# Patient Record
Sex: FEMALE | Race: WHITE | Employment: OTHER
[De-identification: names, ages, dates, MRNs, and addresses within clinical notes are randomized per-mention and may not be internally consistent; named-entity substitution may affect disease eponyms.]

---

## 2017-05-16 ENCOUNTER — HOSPITAL ENCOUNTER (OUTPATIENT)
Dept: PHYSICAL THERAPY | Facility: CLINIC | Age: 62
Setting detail: THERAPIES SERIES
Discharge: HOME | End: 2017-05-16
Payer: COMMERCIAL

## 2017-05-16 PROCEDURE — 97110 THERAPEUTIC EXERCISES: CPT

## 2017-05-16 PROCEDURE — 97760 ORTHOTIC MGMT&TRAING 1ST ENC: CPT

## 2017-05-16 PROCEDURE — 97124 MASSAGE THERAPY: CPT

## 2017-05-16 PROCEDURE — 97161 PT EVAL LOW COMPLEX 20 MIN: CPT

## 2017-05-18 ENCOUNTER — HOSPITAL ENCOUNTER (OUTPATIENT)
Dept: PHYSICAL THERAPY | Facility: CLINIC | Age: 62
Setting detail: THERAPIES SERIES
Discharge: HOME | End: 2017-05-18
Payer: COMMERCIAL

## 2017-05-18 PROCEDURE — 97110 THERAPEUTIC EXERCISES: CPT

## 2017-05-18 PROCEDURE — 97124 MASSAGE THERAPY: CPT

## 2017-05-22 ENCOUNTER — HOSPITAL ENCOUNTER (OUTPATIENT)
Dept: PHYSICAL THERAPY | Facility: CLINIC | Age: 62
Setting detail: THERAPIES SERIES
Discharge: HOME | End: 2017-05-22
Payer: COMMERCIAL

## 2017-05-22 PROCEDURE — 97124 MASSAGE THERAPY: CPT

## 2017-05-22 PROCEDURE — 97110 THERAPEUTIC EXERCISES: CPT

## 2017-06-06 ENCOUNTER — HOSPITAL ENCOUNTER (OUTPATIENT)
Dept: PHYSICAL THERAPY | Facility: CLINIC | Age: 62
Setting detail: THERAPIES SERIES
Discharge: HOME | End: 2017-06-06
Payer: COMMERCIAL

## 2017-06-06 PROCEDURE — 97124 MASSAGE THERAPY: CPT

## 2017-06-06 PROCEDURE — 97110 THERAPEUTIC EXERCISES: CPT

## 2017-06-08 ENCOUNTER — HOSPITAL ENCOUNTER (OUTPATIENT)
Dept: PHYSICAL THERAPY | Facility: CLINIC | Age: 62
Setting detail: THERAPIES SERIES
Discharge: HOME | End: 2017-06-08
Payer: COMMERCIAL

## 2017-06-08 PROCEDURE — 97110 THERAPEUTIC EXERCISES: CPT

## 2017-06-08 PROCEDURE — 97124 MASSAGE THERAPY: CPT

## 2017-06-13 ENCOUNTER — HOSPITAL ENCOUNTER (OUTPATIENT)
Dept: PHYSICAL THERAPY | Facility: CLINIC | Age: 62
Setting detail: THERAPIES SERIES
Discharge: HOME | End: 2017-06-13
Payer: COMMERCIAL

## 2017-06-13 PROCEDURE — 97110 THERAPEUTIC EXERCISES: CPT

## 2017-06-13 PROCEDURE — 97124 MASSAGE THERAPY: CPT

## 2017-06-15 ENCOUNTER — HOSPITAL ENCOUNTER (OUTPATIENT)
Age: 62
Setting detail: SPECIMEN
Discharge: HOME | End: 2017-06-15
Payer: COMMERCIAL

## 2017-06-19 ENCOUNTER — HOSPITAL ENCOUNTER (OUTPATIENT)
Dept: PHYSICAL THERAPY | Facility: CLINIC | Age: 62
Setting detail: THERAPIES SERIES
Discharge: HOME | End: 2017-06-19
Payer: COMMERCIAL

## 2017-06-19 LAB — DERMATOLOGY PATHOLOGY REPORT: NORMAL

## 2017-06-19 PROCEDURE — 97110 THERAPEUTIC EXERCISES: CPT

## 2017-06-19 PROCEDURE — 97124 MASSAGE THERAPY: CPT

## 2017-06-22 ENCOUNTER — HOSPITAL ENCOUNTER (OUTPATIENT)
Dept: PHYSICAL THERAPY | Facility: CLINIC | Age: 62
Setting detail: THERAPIES SERIES
Discharge: HOME | End: 2017-06-22
Payer: COMMERCIAL

## 2017-06-22 PROBLEM — L57.0 ACTINIC KERATOSIS: Status: ACTIVE | Noted: 2017-06-22

## 2017-06-22 PROBLEM — C44.01 BASAL CELL CARCINOMA OF UPPER LIP: Status: ACTIVE | Noted: 2017-06-22

## 2017-06-22 PROCEDURE — 97110 THERAPEUTIC EXERCISES: CPT

## 2017-06-22 PROCEDURE — 97124 MASSAGE THERAPY: CPT

## 2017-06-26 ENCOUNTER — HOSPITAL ENCOUNTER (OUTPATIENT)
Dept: PHYSICAL THERAPY | Facility: CLINIC | Age: 62
Setting detail: THERAPIES SERIES
End: 2017-06-26
Payer: COMMERCIAL

## 2017-07-20 ENCOUNTER — OFFICE VISIT (OUTPATIENT)
Dept: OBGYN CLINIC | Age: 62
End: 2017-07-20
Payer: COMMERCIAL

## 2017-07-20 ENCOUNTER — HOSPITAL ENCOUNTER (OUTPATIENT)
Age: 62
Setting detail: SPECIMEN
Discharge: HOME | End: 2017-07-20
Payer: COMMERCIAL

## 2017-07-20 ENCOUNTER — HOSPITAL ENCOUNTER (OUTPATIENT)
Dept: PHYSICAL THERAPY | Facility: CLINIC | Age: 62
Setting detail: THERAPIES SERIES
Discharge: HOME | End: 2017-07-20
Payer: COMMERCIAL

## 2017-07-20 DIAGNOSIS — Z01.419 ENCOUNTER FOR ROUTINE GYNECOLOGICAL EXAMINATION WITH PAPANICOLAOU SMEAR OF CERVIX: Primary | ICD-10-CM

## 2017-07-20 DIAGNOSIS — Z12.31 ENCOUNTER FOR SCREENING MAMMOGRAM FOR BREAST CANCER: ICD-10-CM

## 2017-07-20 PROCEDURE — 97110 THERAPEUTIC EXERCISES: CPT

## 2017-07-20 PROCEDURE — 97124 MASSAGE THERAPY: CPT

## 2017-07-20 PROCEDURE — 99396 PREV VISIT EST AGE 40-64: CPT | Performed by: OBSTETRICS & GYNECOLOGY

## 2017-07-24 RX ORDER — TRIAMCINOLONE ACETONIDE 5 MG/G
CREAM TOPICAL 2 TIMES DAILY
Qty: 1 TUBE | Refills: 1 | Status: SHIPPED | OUTPATIENT
Start: 2017-07-24 | End: 2017-08-31 | Stop reason: ALTCHOICE

## 2017-07-25 ENCOUNTER — HOSPITAL ENCOUNTER (OUTPATIENT)
Dept: PHYSICAL THERAPY | Facility: CLINIC | Age: 62
Setting detail: THERAPIES SERIES
Discharge: HOME | End: 2017-07-25
Payer: COMMERCIAL

## 2017-07-27 ENCOUNTER — HOSPITAL ENCOUNTER (OUTPATIENT)
Dept: PHYSICAL THERAPY | Facility: CLINIC | Age: 62
Setting detail: THERAPIES SERIES
Discharge: HOME | End: 2017-07-27
Payer: COMMERCIAL

## 2017-07-27 PROCEDURE — 97110 THERAPEUTIC EXERCISES: CPT

## 2017-07-28 LAB — CYTOLOGY REPORT: NORMAL

## 2017-07-31 ENCOUNTER — HOSPITAL ENCOUNTER (OUTPATIENT)
Dept: WOMENS IMAGING | Age: 62
Discharge: HOME | End: 2017-07-31
Payer: COMMERCIAL

## 2017-07-31 ENCOUNTER — HOSPITAL ENCOUNTER (OUTPATIENT)
Dept: PHYSICAL THERAPY | Facility: CLINIC | Age: 62
Setting detail: THERAPIES SERIES
End: 2017-07-31
Payer: COMMERCIAL

## 2017-07-31 VITALS
DIASTOLIC BLOOD PRESSURE: 94 MMHG | HEART RATE: 60 BPM | BODY MASS INDEX: 24.45 KG/M2 | SYSTOLIC BLOOD PRESSURE: 124 MMHG | WEIGHT: 138 LBS

## 2017-07-31 DIAGNOSIS — Z12.31 ENCOUNTER FOR SCREENING MAMMOGRAM FOR BREAST CANCER: ICD-10-CM

## 2017-07-31 PROCEDURE — 77063 BREAST TOMOSYNTHESIS BI: CPT

## 2017-08-31 ENCOUNTER — OFFICE VISIT (OUTPATIENT)
Dept: OBGYN CLINIC | Age: 62
End: 2017-08-31
Payer: COMMERCIAL

## 2017-08-31 VITALS
SYSTOLIC BLOOD PRESSURE: 130 MMHG | HEIGHT: 63 IN | BODY MASS INDEX: 23.04 KG/M2 | WEIGHT: 130 LBS | DIASTOLIC BLOOD PRESSURE: 86 MMHG | HEART RATE: 66 BPM

## 2017-08-31 DIAGNOSIS — L90.0 LICHEN SCLEROSUS: ICD-10-CM

## 2017-08-31 PROCEDURE — 99213 OFFICE O/P EST LOW 20 MIN: CPT | Performed by: OBSTETRICS & GYNECOLOGY

## 2017-08-31 RX ORDER — NYSTATIN 100000 U/G
CREAM TOPICAL
Qty: 1 TUBE | Refills: 2 | Status: SHIPPED | OUTPATIENT
Start: 2017-08-31 | End: 2019-06-06

## 2017-09-20 ENCOUNTER — OFFICE VISIT (OUTPATIENT)
Dept: OBGYN CLINIC | Age: 62
End: 2017-09-20
Payer: COMMERCIAL

## 2017-09-20 VITALS
RESPIRATION RATE: 16 BRPM | HEART RATE: 76 BPM | BODY MASS INDEX: 23.05 KG/M2 | SYSTOLIC BLOOD PRESSURE: 133 MMHG | DIASTOLIC BLOOD PRESSURE: 88 MMHG | WEIGHT: 135 LBS | OXYGEN SATURATION: 100 % | HEIGHT: 64 IN

## 2017-09-20 DIAGNOSIS — L90.0 LICHEN SCLEROSUS: Primary | ICD-10-CM

## 2017-09-20 PROCEDURE — 99213 OFFICE O/P EST LOW 20 MIN: CPT | Performed by: OBSTETRICS & GYNECOLOGY

## 2017-09-20 RX ORDER — CLOBETASOL PROPIONATE 0.5 MG/G
CREAM TOPICAL
Qty: 1 TUBE | Refills: 1 | Status: SHIPPED | OUTPATIENT
Start: 2017-09-20 | End: 2017-12-13 | Stop reason: SDUPTHER

## 2017-10-19 ENCOUNTER — OFFICE VISIT (OUTPATIENT)
Dept: OBGYN CLINIC | Age: 62
End: 2017-10-19
Payer: COMMERCIAL

## 2017-10-19 VITALS
DIASTOLIC BLOOD PRESSURE: 84 MMHG | WEIGHT: 140 LBS | HEART RATE: 72 BPM | OXYGEN SATURATION: 100 % | BODY MASS INDEX: 24.8 KG/M2 | RESPIRATION RATE: 16 BRPM | HEIGHT: 63 IN | SYSTOLIC BLOOD PRESSURE: 134 MMHG

## 2017-10-19 DIAGNOSIS — L90.0 LICHEN SCLEROSUS: Primary | ICD-10-CM

## 2017-10-19 PROCEDURE — 99212 OFFICE O/P EST SF 10 MIN: CPT | Performed by: OBSTETRICS & GYNECOLOGY

## 2017-10-19 NOTE — PROGRESS NOTES
The patient was seen today. She is here regarding response to medication . Her bowels are regular and she is voiding without difficulty. HPI: 54yo with vulvar itching and skin disruption, using Temovate as directed twice daily, reports 50% improvement. Past Medical History:   Diagnosis Date    Allergy to food dye     yellow and red dyes    Anemia     Asthma     Cancer ([facility] [address]     Diverticulitis     Gastroparesis     Hiatal hernia     History of squamous cell carcinoma in situ of skin 6/9/2014    left wrist    IBS (irritable bowel syndrome)     diarrhea-prominent type    MVP (mitral valve prolapse)     Neoplasm of unspecified nature of bone, soft tissue, and skin 5/20/2014    New lesions of left arm, right arm, and right thigh.  Neoplasm of unspecified nature of bone, soft tissue, and skin     New lesions of right dorsum of hand (healed after Efudex) and lesion of right anterior thigh.     Osteoarthritis     Osteoporosis     beginning     Squamous cell carcinoma in situ 6/9/2014    left wrist    Squamous cell carcinoma in situ of skin of left wrist 6/9/2014    left wrist     Past Surgical History:   Procedure Laterality Date    CHOLECYSTECTOMY  2011    ELBOW SURGERY      Tendonitis X2    HERNIA REPAIR  2005    hiatal hernia repair    HYSTERECTOMY  92-97    [facility] - 3 surgeries    KNEE SURGERY Right 1996    MALIGNANT SKIN LESION EXCISION  2011    rt elbow-scc, rt shoulder-keratosis, lft leg skin with excoriation    NASAL POLYP SURGERY  2012    X2 in [address]    pheregenal flap    SKIN BIOPSY  2012    lft leg keratosis    SKIN BIOPSY  2009    rt forearm, under brst, lft leg-keratosis    SKIN BIOPSY  2008    back and rt forearm-keratosis, abdomen-hemangioma    SKIN BIOPSY  2002    rt breast, lft arm, lft brst-keratosis    THYROIDECTOMY  1985    Thyroid Cancer    TONSILLECTOMY AND ADENOIDECTOMY       REVIEW OF SYSTEMS:        A minimum of an eleven point review of systems was completed and found to be negative except for the pertinent positives found below. Constitutional: No fever, chills or malaise; No weight change or fatigue  Head and Eyes: No vision, Headache, Dizziness or trauma in last 12 months  ENT ROS: No hearing, Tinnitis, sinus or taste problems  Hematological and Lymphatic ROS:No Lymphoma, Von Willebrand's, Hemophillia or Bleeding History  Psych ROS: No Depression, Homicidal thoughts,suicidal thoughts, or anxiety  Breast ROS: No prior breast abnormalities or lumps  Respiratory ROS: No SOB, Pneumoniae,Cough, or Pulmonary Embolism History  Cardiovascular ROS: No Chest Pain with Exertion, Palpitations, Syncope, Edema, Arrhythmia  Gastrointestinal ROS: No Indigestion, Heartburn, Nausea, vomiting, Diahrea, Constipation,or Bowel Changes; No Bloody Stools or melena  Genito-Urinary ROS: No Dysuria, Hematuria or Nocturia. No Urinary Incontinence or Vaginal Discharge  Musculoskeletal ROS: No Arthralgia, Arthritis,Gout,Osteoporosis or Rheumatism  Neurological ROS: No CVA, Migraines, Epilepsy, Seizure Hx, or Limb Weakness  Dermatological ROS: No Rash, Itching, Hives, Mole Changes or Cancer                                            Blood pressure 134/84, pulse 72, resp. rate 16, height 5' 3\" (1.6 m), weight 140 lb (63.5 kg), SpO2 100 %, not currently breastfeeding. Abdomen: Soft non-tender; good bowel sounds. No guarding, rebound or rigidity. No CVA tenderness bilaterally. Extremities: No calf tenderness, DTR 2/4, and No edema bilaterally    Pelvic: External genitalia: periclitoral and posterior fourchette thickening and white tissue improved and minimally present today    Assessment:  Lichen sclerosus    PLAN:  Twice daily temovate to continue additional 4 weeks, then daily for four weeks  Return to the office in 8 weeks. Counseled on preventative health maintenance follow-up. Patient was seen with total face to face time of 10 minutes.  More than 50% of this visit was counseling and education regarding There were no encounter diagnoses. and 1 Month Follow-Up (Temovate trial, POSSIBLE BIOPSY, improving )   as well as  counseling on preventative health maintenance follow-up.

## 2017-12-13 ENCOUNTER — OFFICE VISIT (OUTPATIENT)
Dept: OBGYN CLINIC | Age: 62
End: 2017-12-13
Payer: COMMERCIAL

## 2017-12-13 VITALS
HEIGHT: 63 IN | HEART RATE: 68 BPM | DIASTOLIC BLOOD PRESSURE: 89 MMHG | SYSTOLIC BLOOD PRESSURE: 133 MMHG | BODY MASS INDEX: 23.04 KG/M2 | WEIGHT: 130 LBS

## 2017-12-13 DIAGNOSIS — L90.0 LICHEN SCLEROSUS: Primary | ICD-10-CM

## 2017-12-13 PROCEDURE — 99213 OFFICE O/P EST LOW 20 MIN: CPT | Performed by: OBSTETRICS & GYNECOLOGY

## 2017-12-13 RX ORDER — CLOBETASOL PROPIONATE 0.5 MG/G
CREAM TOPICAL
Qty: 1 TUBE | Refills: 1 | Status: SHIPPED | OUTPATIENT
Start: 2017-12-13 | End: 2018-06-13 | Stop reason: SDUPTHER

## 2017-12-14 NOTE — PROGRESS NOTES
completed and found to be negative except for the pertinent positives found below. Constitutional: No fever, chills or malaise; No weight change or fatigue  Head and Eyes: No vision, Headache, Dizziness or trauma in last 12 months  ENT ROS: No hearing, Tinnitis, sinus or taste problems  Hematological and Lymphatic ROS:No Lymphoma, Von Willebrand's, Hemophillia or Bleeding History  Psych ROS: No Depression, Homicidal thoughts,suicidal thoughts, or anxiety  Breast ROS: No prior breast abnormalities or lumps  Respiratory ROS: No SOB, Pneumoniae,Cough, or Pulmonary Embolism History  Cardiovascular ROS: No Chest Pain with Exertion, Palpitations, Syncope, Edema, Arrhythmia  Gastrointestinal ROS: No Indigestion, Heartburn, Nausea, vomiting, Diahrea, Constipation,or Bowel Changes; No Bloody Stools or melena  Genito-Urinary ROS: No Dysuria, Hematuria or Nocturia. No Urinary Incontinence or Vaginal Discharge  Musculoskeletal ROS: No Arthralgia, Arthritis,Gout,Osteoporosis or Rheumatism  Neurological ROS: No CVA, Migraines, Epilepsy, Seizure Hx, or Limb Weakness  Dermatological ROS: No Rash, Itching, Hives, Mole Changes or Cancer                                            Blood pressure 133/89, pulse 68, height 5' 3\" (1.6 m), weight 130 lb (59 kg), not currently breastfeeding. Abdomen: Soft non-tender; good bowel sounds. No guarding, rebound or rigidity. No CVA tenderness bilaterally. Extremities: No calf tenderness, DTR 2/4, and No edema bilaterally    Pelvic: External genitalia: normal general appearance periclitoral area healed, posterior fourchette with white tissue but thinner in depth. Assessment:  Lichen sclerosus    PLAN:  Continue temovate daily for 12 weeks then recheck. Reassurance about concerns over prolapse which is not present. Return to the office in 12 weeks. Counseled on preventative health maintenance follow-up. Patient was seen with total face to face time of 15 minutes.  More than 50% of this visit was counseling and education regarding There were no encounter diagnoses. and Medication Check (Feels like the medication along with cornstarch has been working well)   as well as  counseling on preventative health maintenance follow-up.

## 2018-02-19 ENCOUNTER — HOSPITAL ENCOUNTER (OUTPATIENT)
Dept: MRI IMAGING | Age: 63
Discharge: HOME | End: 2018-02-21
Payer: COMMERCIAL

## 2018-02-19 DIAGNOSIS — H90.A31 MIXED CONDUCTIVE AND SENSORINEURAL HEARING LOSS, UNILATERAL, RIGHT EAR WITH RESTRICTED HEARING ON THE CONTRALATERAL SIDE: ICD-10-CM

## 2018-02-19 DIAGNOSIS — H90.A22 SENSORINEURAL HEARING LOSS, UNILATERAL, LEFT EAR, WITH RESTRICTED HEARING ON THE CONTRALATERAL SIDE: ICD-10-CM

## 2018-02-19 PROCEDURE — 70551 MRI BRAIN STEM W/O DYE: CPT

## 2018-03-14 ENCOUNTER — OFFICE VISIT (OUTPATIENT)
Dept: OBGYN CLINIC | Age: 63
End: 2018-03-14
Payer: COMMERCIAL

## 2018-03-14 VITALS
HEART RATE: 71 BPM | DIASTOLIC BLOOD PRESSURE: 84 MMHG | BODY MASS INDEX: 22.2 KG/M2 | SYSTOLIC BLOOD PRESSURE: 154 MMHG | WEIGHT: 130 LBS | HEIGHT: 64 IN

## 2018-03-14 DIAGNOSIS — L90.0 LICHEN SCLEROSUS: Primary | ICD-10-CM

## 2018-03-14 PROCEDURE — 99212 OFFICE O/P EST SF 10 MIN: CPT | Performed by: OBSTETRICS & GYNECOLOGY

## 2018-06-13 ENCOUNTER — OFFICE VISIT (OUTPATIENT)
Dept: OBGYN CLINIC | Age: 63
End: 2018-06-13
Payer: COMMERCIAL

## 2018-06-13 VITALS
HEIGHT: 63 IN | SYSTOLIC BLOOD PRESSURE: 147 MMHG | BODY MASS INDEX: 22.89 KG/M2 | DIASTOLIC BLOOD PRESSURE: 91 MMHG | WEIGHT: 129.2 LBS | HEART RATE: 63 BPM

## 2018-06-13 DIAGNOSIS — Z12.39 BREAST SCREENING: Primary | ICD-10-CM

## 2018-06-13 DIAGNOSIS — N90.4 VULVAR DYSTROPHY: ICD-10-CM

## 2018-06-13 PROCEDURE — 99212 OFFICE O/P EST SF 10 MIN: CPT | Performed by: OBSTETRICS & GYNECOLOGY

## 2018-06-13 RX ORDER — CLOBETASOL PROPIONATE 0.5 MG/G
CREAM TOPICAL
Qty: 1 TUBE | Refills: 1 | Status: SHIPPED | OUTPATIENT
Start: 2018-06-13 | End: 2018-08-23

## 2018-06-14 PROBLEM — N90.4 VULVAR DYSTROPHY: Status: ACTIVE | Noted: 2018-06-14

## 2018-08-02 ENCOUNTER — TELEPHONE (OUTPATIENT)
Dept: INFUSION THERAPY | Age: 63
End: 2018-08-02

## 2018-08-15 ENCOUNTER — HOSPITAL ENCOUNTER (OUTPATIENT)
Dept: WOMENS IMAGING | Age: 63
Discharge: HOME | End: 2018-08-17
Payer: COMMERCIAL

## 2018-08-15 DIAGNOSIS — Z12.39 BREAST SCREENING: ICD-10-CM

## 2018-08-15 PROCEDURE — 77067 SCR MAMMO BI INCL CAD: CPT

## 2018-08-27 ENCOUNTER — TELEPHONE (OUTPATIENT)
Dept: ONCOLOGY | Age: 63
End: 2018-08-27

## 2018-08-28 ENCOUNTER — TELEPHONE (OUTPATIENT)
Dept: ONCOLOGY | Age: 63
End: 2018-08-28

## 2018-09-04 ENCOUNTER — OFFICE VISIT (OUTPATIENT)
Dept: OBGYN CLINIC | Age: 63
End: 2018-09-04
Payer: COMMERCIAL

## 2018-09-04 VITALS
BODY MASS INDEX: 23.21 KG/M2 | SYSTOLIC BLOOD PRESSURE: 134 MMHG | DIASTOLIC BLOOD PRESSURE: 87 MMHG | WEIGHT: 131 LBS | HEART RATE: 70 BPM

## 2018-09-04 DIAGNOSIS — Z01.419 WELL FEMALE EXAM WITH ROUTINE GYNECOLOGICAL EXAM: Primary | ICD-10-CM

## 2018-09-04 PROCEDURE — 99396 PREV VISIT EST AGE 40-64: CPT | Performed by: OBSTETRICS & GYNECOLOGY

## 2018-09-04 RX ORDER — CLOBETASOL PROPIONATE 0.5 MG/G
CREAM TOPICAL 2 TIMES DAILY
COMMUNITY
End: 2019-03-04 | Stop reason: SDUPTHER

## 2018-09-12 NOTE — PROGRESS NOTES
Felix Prior  9/4/2018              57 y.o. Chief Complaint   Patient presents with    Annual Exam     last pap 07/20/17              No referring provider defined for this encounter. The patient was seen and examined. She has no chief complaint today and is here for her annual exam.  Her bowels are regular. There are no voiding complaints. She denies any bloating. HPI : 54yo here for annual, using Temovate with significant relief of lichen sclerosus. Patient using infrequently at this time. ________________________________________________________________________    Past Medical History:   Diagnosis Date    Allergy to food dye     yellow and red dyes    Anemia     Asthma     Cancer ([facility] [address]     Diverticulitis     Gastroparesis     Hiatal hernia     History of squamous cell carcinoma in situ of skin 6/9/2014    left wrist    IBS (irritable bowel syndrome)     diarrhea-prominent type    MVP (mitral valve prolapse)     Neoplasm of unspecified nature of bone, soft tissue, and skin 5/20/2014    New lesions of left arm, right arm, and right thigh.  Neoplasm of unspecified nature of bone, soft tissue, and skin     New lesions of right dorsum of hand (healed after Efudex) and lesion of right anterior thigh.     Osteoarthritis     Osteoporosis     beginning     Squamous cell carcinoma in situ 6/9/2014    left wrist    Squamous cell carcinoma in situ of skin of left wrist 6/9/2014    left wrist                                                                   Past Surgical History:   Procedure Laterality Date    CHOLECYSTECTOMY  2011    ELBOW SURGERY      Tendonitis X2    HERNIA REPAIR  2005    hiatal hernia repair    HYSTERECTOMY, TOTAL ABDOMINAL  92-97    [facility] 3 Surgeries    KNEE SURGERY Right 1996    MALIGNANT SKIN LESION EXCISION  2011    rt elbow-scc, rt shoulder-keratosis, lft leg skin with excoriation    NASAL POLYP SURGERY  2012    X2 in [address] pheregenal flap    SKIN BIOPSY  2012    lft leg keratosis    SKIN BIOPSY  2009    rt forearm, under brst, lft leg-keratosis    SKIN BIOPSY  2008    back and rt forearm-keratosis, abdomen-hemangioma    SKIN BIOPSY  2002    rt breast, lft arm, lft brst-keratosis    THYROIDECTOMY  1985    Thyroid Cancer    TONSILLECTOMY AND ADENOIDECTOMY       Family History   Problem Relation Age of Onset    Other Mother         Hernia Surgery (passed after surgery)    Coronary Art Dis Mother     Liver Cancer Father         age 80    Coronary Art Dis Father     Liver Cancer Brother 59    Cancer Brother 59        Bowel & Liver cancer    Diabetes Other         Father's side    Thyroid Disease Other         Mom's side     History   Smoking Status    Never Smoker   Smokeless Tobacco    Never Used     History   Alcohol Use No       MEDICATIONS:  Current Outpatient Prescriptions   Medication Sig Dispense Refill    clobetasol (TEMOVATE) 0.05 % cream Apply topically 2 times daily Apply topically 2 times daily.  clindamycin (CLEOCIN) 150 MG capsule Take one PO QID. Start one day prior to surgery. 12 capsule 0    cyanocobalamin 1000 MCG/ML injection Inject 1 mL into the muscle every 30 days      Simethicone (GAS-X PO) Take 1 capsule by mouth 2 times daily as needed      nystatin (MYCOSTATIN) 973791 UNIT/GM cream Apply topically 2 times daily. 1 Tube 2    mometasone (NASONEX) 50 MCG/ACT nasal spray 2 sprays by Nasal route daily       ARMOUR THYROID 30 MG tablet Take 30 mg by mouth daily       diphenoxylate-atropine (LOMOTIL) 2.5-0.025 MG per tablet Take by mouth See Admin Instructions 2 tablets every morning and 1 tablet four times a day      VIBERZI 75 MG TABS Take 75 mg by mouth daily       albuterol (PROVENTIL HFA;VENTOLIN HFA) 108 (90 BASE) MCG/ACT inhaler Inhale 2 puffs into the lungs every 6 hours as needed for Wheezing       No current facility-administered medications for this visit. ALLERGIES:  Aspirin; Cefadroxil; Kayqeobn-hmubyrs-hesyed [fluocinolone]; Ciprofloxacin hcl; Codeine; Demerol hcl [meperidine]; Pcn [penicillins]; Red dye; Shellfish-derived products; Sulfa antibiotics; Yellow dyes (non-tartrazine); Gadolinium derivatives; and Iodine  Immunization status: stated as current, but no records available. Gynecologic History:     No LMP recorded. Patient is postmenopausal.    Sexually Active: No    STD History: No     Permanent Sterilization: Yes    Reversible Birth Control: No        Hormone Replacement Exposure: No      Family History of Breast, Ovarian , Colon or Uterine Cancer:bowel  If YES see scanned worksheet. Preventative Health Testing:  Date of Last Pap Smear: 2017  Abnormal Pap Smear History:   Colposcopy History:   Date of Last Mammogram: 2018  Date of Last Colonoscopy:   Date of Last Bone Density:      ________________________________________________________________________  REVIEW OF SYSTEMS:        A minimum of an eleven point review of systems was completed and found to be negative except for the pertinent positives found below. Constitutional: No fever, chills or malaise; No weight change or fatigue  Head and Eyes: No vision, Headache, Dizziness or trauma in last 12 months  ENT ROS: No hearing, Tinnitis, sinus or taste problems  Hematological and Lymphatic ROS:No Lymphoma, Von Willebrand's, Hemophillia or Bleeding History  Psych ROS: No Depression, Homicidal thoughts,suicidal thoughts, or anxiety  Breast ROS: No prior breast abnormalities or lumps  Respiratory ROS: No SOB, Pneumoniae,Cough, or Pulmonary Embolism History  Cardiovascular ROS: No Chest Pain with Exertion, Palpitations, Syncope, Edema, Arrhythmia  Gastrointestinal ROS: No Indigestion, Heartburn, Nausea, vomiting, Diahrea, Constipation,or Bowel Changes; No Bloody Stools or melena  Genito-Urinary ROS: No Dysuria, Hematuria or Nocturia.  No Urinary Incontinence or Vaginal

## 2018-10-15 ENCOUNTER — HOSPITAL ENCOUNTER (OUTPATIENT)
Age: 63
Setting detail: SPECIMEN
Discharge: HOME | End: 2018-10-15
Payer: COMMERCIAL

## 2018-10-17 LAB — DERMATOLOGY PATHOLOGY REPORT: NORMAL

## 2018-11-08 PROBLEM — D48.5 NEOPLASM OF UNCERTAIN BEHAVIOR OF SKIN: Status: ACTIVE | Noted: 2018-11-08

## 2018-11-19 ENCOUNTER — HOSPITAL ENCOUNTER (OUTPATIENT)
Dept: ULTRASOUND IMAGING | Age: 63
Discharge: HOME | End: 2018-11-21
Payer: COMMERCIAL

## 2018-11-19 DIAGNOSIS — R10.11 RUQ PAIN: ICD-10-CM

## 2018-11-19 PROCEDURE — 76705 ECHO EXAM OF ABDOMEN: CPT

## 2018-12-03 ENCOUNTER — HOSPITAL ENCOUNTER (OUTPATIENT)
Dept: NUCLEAR MEDICINE | Age: 63
Discharge: HOME | End: 2018-12-05
Payer: COMMERCIAL

## 2018-12-03 DIAGNOSIS — R11.0 NAUSEA: ICD-10-CM

## 2018-12-03 PROCEDURE — A9541 TC99M SULFUR COLLOID: HCPCS | Performed by: INTERNAL MEDICINE

## 2018-12-03 PROCEDURE — 3430000000 HC RX DIAGNOSTIC RADIOPHARMACEUTICAL: Performed by: INTERNAL MEDICINE

## 2018-12-03 PROCEDURE — 78264 GASTRIC EMPTYING IMG STUDY: CPT

## 2018-12-03 RX ADMIN — Medication 1.1 MILLICURIE: at 08:09

## 2019-03-04 ENCOUNTER — OFFICE VISIT (OUTPATIENT)
Dept: OBGYN CLINIC | Age: 64
End: 2019-03-04
Payer: COMMERCIAL

## 2019-03-04 VITALS
BODY MASS INDEX: 23.53 KG/M2 | HEIGHT: 63 IN | SYSTOLIC BLOOD PRESSURE: 147 MMHG | WEIGHT: 132.8 LBS | DIASTOLIC BLOOD PRESSURE: 90 MMHG | HEART RATE: 67 BPM

## 2019-03-04 DIAGNOSIS — L90.0 LICHEN SCLEROSUS: Primary | ICD-10-CM

## 2019-03-04 PROCEDURE — 99213 OFFICE O/P EST LOW 20 MIN: CPT | Performed by: OBSTETRICS & GYNECOLOGY

## 2019-03-04 RX ORDER — CLOBETASOL PROPIONATE 0.5 MG/G
CREAM TOPICAL
Qty: 1 TUBE | Refills: 0 | Status: SHIPPED | OUTPATIENT
Start: 2019-03-04 | End: 2019-06-06

## 2019-06-06 ENCOUNTER — OFFICE VISIT (OUTPATIENT)
Dept: PRIMARY CARE CLINIC | Age: 64
End: 2019-06-06
Payer: COMMERCIAL

## 2019-06-06 VITALS
HEART RATE: 61 BPM | WEIGHT: 137.4 LBS | DIASTOLIC BLOOD PRESSURE: 86 MMHG | BODY MASS INDEX: 24.34 KG/M2 | OXYGEN SATURATION: 99 % | SYSTOLIC BLOOD PRESSURE: 136 MMHG | HEIGHT: 63 IN

## 2019-06-06 DIAGNOSIS — E89.0 POST-OPERATIVE HYPOTHYROIDISM: Primary | ICD-10-CM

## 2019-06-06 DIAGNOSIS — K31.84 GASTROPARESIS: Chronic | ICD-10-CM

## 2019-06-06 DIAGNOSIS — R00.0 TACHYCARDIA: ICD-10-CM

## 2019-06-06 DIAGNOSIS — E53.8 B12 DEFICIENCY: Chronic | ICD-10-CM

## 2019-06-06 PROBLEM — E03.9 HYPOTHYROID: Chronic | Status: ACTIVE | Noted: 2019-06-06

## 2019-06-06 PROCEDURE — 96372 THER/PROPH/DIAG INJ SC/IM: CPT | Performed by: FAMILY MEDICINE

## 2019-06-06 PROCEDURE — 99203 OFFICE O/P NEW LOW 30 MIN: CPT | Performed by: FAMILY MEDICINE

## 2019-06-06 RX ORDER — LEVOTHYROXINE AND LIOTHYRONINE 19; 4.5 UG/1; UG/1
30 TABLET ORAL DAILY
COMMUNITY
End: 2019-06-06

## 2019-06-06 RX ORDER — ACETAMINOPHEN 160 MG
1 TABLET,DISINTEGRATING ORAL DAILY
COMMUNITY
End: 2020-09-23

## 2019-06-06 RX ORDER — CYANOCOBALAMIN 1000 UG/ML
1000 INJECTION INTRAMUSCULAR; SUBCUTANEOUS ONCE
Status: COMPLETED | OUTPATIENT
Start: 2019-06-06 | End: 2019-06-06

## 2019-06-06 RX ADMIN — CYANOCOBALAMIN 1000 MCG: 1000 INJECTION INTRAMUSCULAR; SUBCUTANEOUS at 11:01

## 2019-06-06 ASSESSMENT — ENCOUNTER SYMPTOMS: SHORTNESS OF BREATH: 1

## 2019-06-06 ASSESSMENT — PATIENT HEALTH QUESTIONNAIRE - PHQ9
SUM OF ALL RESPONSES TO PHQ QUESTIONS 1-9: 0
2. FEELING DOWN, DEPRESSED OR HOPELESS: 0
1. LITTLE INTEREST OR PLEASURE IN DOING THINGS: 0
SUM OF ALL RESPONSES TO PHQ QUESTIONS 1-9: 0
SUM OF ALL RESPONSES TO PHQ9 QUESTIONS 1 & 2: 0

## 2019-06-06 NOTE — PROGRESS NOTES
[address] [place] [address] PRIMARY CARE  [id number] [facility]  Dept: [phone]  Dept Fax: [phone]    Dulce Johnson a 70 y.o. female who presents today as an new patient for her medical conditionsas noted below. Chief Complaint   Patient presents with    [facility]    Medication Refill       HPI:     HPI     Stomach issues. Has lactose intolerance  Tries lactaid with taking her thyroid since there is lactose in her pill. Very allergic to yellow and red dye. Hx of IBS  Hx of Hiatal hernia surgery  Hx of GB surgery  Gastroparesis started after those surgeries. Total Thyroid surgery. No results found for: LDLCHOLESTEROL, LDLCALC    (goal LDL is <100)   AST (U/L)   Date Value   01/25/2016 73 (H)     ALT (U/L)   Date Value   01/25/2016 81 (H)     BUN (mg/dL)   Date Value   10/18/2015 12     BP Readings from Last 3 Encounters:   06/06/19 136/86   05/07/19 (!) 142/90   03/04/19 (!) 147/90          (goal 120/80)    Past Medical History:   Diagnosis Date    Allergy to food dye     yellow and red dyes    Anemia     Asthma     Cancer ([address]     Diverticulitis     Gastroparesis     Hiatal hernia     History of squamous cell carcinoma in situ of skin 6/9/2014    left wrist    IBS (irritable bowel syndrome)     diarrhea-prominent type    Lichen sclerosus     MVP (mitral valve prolapse)     Neoplasm of unspecified nature of bone, soft tissue, and skin 5/20/2014    New lesions of left arm, right arm, and right thigh.  Neoplasm of unspecified nature of bone, soft tissue, and skin     New lesions of right dorsum of hand (healed after Efudex) and lesion of right anterior thigh.     Osteoarthritis     Osteoporosis     beginning     Squamous cell carcinoma in situ 6/9/2014    left wrist    Squamous cell carcinoma in situ of skin of left wrist 6/9/2014    left wrist    Thrombocytopenia ([address]     Vulvar dystrophy       Past Surgical History: Procedure Laterality Date    CHOLECYSTECTOMY  2011    ELBOW SURGERY      Tendonitis X2    HERNIA REPAIR  2005    hiatal hernia repair    HYSTERECTOMY, TOTAL ABDOMINAL  92-97    [facility] 3 Surgeries    KNEE SURGERY Right 1996    MALIGNANT SKIN LESION EXCISION  2011    rt elbow-scc, rt shoulder-keratosis, lft leg skin with excoriation    NASAL POLYP SURGERY  2012    X2 in [address]    pheregenal flap    SKIN BIOPSY  2012    lft leg keratosis    SKIN BIOPSY  2009    rt forearm, under brst, lft leg-keratosis    SKIN BIOPSY  2008    back and rt forearm-keratosis, abdomen-hemangioma    SKIN BIOPSY  2002    rt breast, lft arm, lft brst-keratosis    THYROIDECTOMY  1985    Thyroid Cancer    TONSILLECTOMY AND ADENOIDECTOMY         Family History   Problem Relation Age of Onset    Other Mother         Hernia Surgery (passed after surgery)    Coronary Art Dis Mother     Liver Cancer Father         age 80    Coronary Art Dis Father     Liver Cancer Brother 59    Cancer Brother 59        Bowel & Liver cancer    Diabetes Other         Father's side    Thyroid Disease Other         Mom's side          Social History     Tobacco Use    Smoking status: Never Smoker    Smokeless tobacco: Never Used   Substance Use Topics    Alcohol use: No     Binge frequency: Never         Current Outpatient Medications   Medication Sig Dispense Refill    Cholecalciferol (VITAMIN D3) 2000 units CAPS Take 1 capsule by mouth daily      hyoscyamine (LEVSIN/SL) 125 MCG sublingual tablet Place 125 mcg under the tongue every 8 hours as needed for Cramping      NONFORMULARY Indications: domperidone       levothyroxine (SYNTHROID) 50 MCG tablet levothyroxine 50 mcg tablet   Take 3 tablets daily.       Simethicone (GAS-X PO) Take 1 capsule by mouth 2 times daily as needed      mometasone (NASONEX) 50 MCG/ACT nasal spray 2 sprays by Nasal route daily       diphenoxylate-atropine (LOMOTIL) 2.5-0.025 MG per tablet Take by mouth See Admin Instructions 2 tablets every morning and 1 tablet four times a day      VIBERZI 75 MG TABS Take 75 mg by mouth daily       albuterol (PROVENTIL HFA;VENTOLIN HFA) 108 (90 BASE) MCG/ACT inhaler Inhale 2 puffs into the lungs every 6 hours as needed for Wheezing       No current facility-administered medications for this visit. Allergies   Allergen Reactions    Aspirin Anaphylaxis and Shortness Of Breath    Cefadroxil Shortness Of Breath    Ksktzbbu-Wuljoxb-Qumkan [Fluocinolone] Shortness Of Breath    Ciprofloxacin Hcl Shortness Of Breath    Codeine Shortness Of Breath    Demerol Hcl [Meperidine] Shortness Of Breath    Doxycycline Nausea Only    Glucosamine Shortness Of Breath, Diarrhea and Swelling     Other reaction(s): Respiratory Difficulty  \"lips swell up and get severe diarrhea\" - INCLUDES IODINE    Iodine Hives, Shortness Of Breath and Itching    Pcn [Penicillins] Anaphylaxis and Shortness Of Breath    Red Dye Shortness Of Breath    Shellfish-Derived Products Shortness Of Breath, Diarrhea and Swelling     \"lips swell up and get severe diarrhea\" - INCLUDES IODINE    Sulfa Antibiotics Hives, Shortness Of Breath, Itching and Rash     OK in small amounts, but larger amounts cause \"shortness of breath. \"    Yellow Dye Shortness Of Breath     Other reaction(s): Respiratory Difficulty    Yellow Dyes (Non-Tartrazine) Shortness Of Breath    Gadolinium Rash     Moderate allergic-like reaction consisting of diffuse urticaria to ProHance gadolinium-containing contrast material    Gadolinium Derivatives Rash     Moderate allergic-like reaction consisting of diffuse urticaria to ProHance gadolinium-containing contrast material    Lac Bovis     Midazolam Nausea Only     Versed caused bad nausea.        Health Maintenance   Topic Date Due    TSH testing  1953    HIV screen  10/03/1968    DTaP/Tdap/Td vaccine (1 - Tdap) 10/03/1972    Lipid screen  10/03/1993    Shingles Vaccine (1 of 2) 10/03/2003    Colon cancer screen colonoscopy  10/03/2003    DEXA (modify frequency per FRAX score)  10/03/2018    Pneumococcal 65+ years Vaccine (1 of 2 - PCV13) 10/03/2018    Flu vaccine (Season Ended) 09/01/2019    Breast cancer screen  08/15/2020    Hepatitis C screen  Completed       Subjective:     Review of Systems   Constitutional: Positive for fatigue. HENT: Positive for congestion. Seasonal  otc allergy  Pill     Respiratory: Positive for shortness of breath (with heavier activity). Cardiovascular: Negative. Hx of tachycardia in past.   Had EKG done May  1st and sees [place] at [facility] [place]   Dr Ricky Velazquez at [facility] [place] doctor  Reviewed records from her PCP  Has cataracts  Objective:     /86   Pulse 61   Ht 5' 3.25\" (1.607 m)   Wt 137 lb 6.4 oz (62.3 kg)   SpO2 99%   BMI 24.15 kg/m²   Physical Exam   Constitutional: She is oriented to person, place, and time. She appears well-developed and well-nourished. No distress. HENT:   Head: Normocephalic and atraumatic. Right Ear: External ear normal.   Left Ear: External ear normal.   Mouth/Throat: Oropharynx is clear and moist.   Eyes: Pupils are equal, round, and reactive to light. Conjunctivae are normal. Right eye exhibits no discharge. Left eye exhibits no discharge. No scleral icterus. Neck: No tracheal deviation present. No thyromegaly present. Cardiovascular: Normal rate and regular rhythm. Murmur heard. No carotid bruits   Pulmonary/Chest: Effort normal and breath sounds normal. No respiratory distress. She has no wheezes. Musculoskeletal: She exhibits edema (very mild lower leg edema). Lymphadenopathy:     She has no cervical adenopathy. Neurological: She is alert and oriented to person, place, and time. Skin: Skin is warm. Capillary refill takes less than 2 seconds. No rash noted. Psychiatric: She has a normal mood and affect.  Her behavior is normal. Thought content normal.   Nursing note and vitals reviewed. Assessment:       Diagnosis Orders   1. Post-operative hypothyroidism  TSH 3RD GENERATION    T4, Free   2. Gastroparesis  EKG 12 lead   3. B12 deficiency  cyanocobalamin injection 1,000 mcg   4. Tachycardia  EKG 12 lead        Plan:       Return in about 4 months (around 10/6/2019) for thyroid. Monthly B12 shots. She states she needs EKG every 2 months per her GI specialist at [facility]. Reviewed on line info on her med she gets from [facility]) and hand wrote it. Orders Placed This Encounter   Procedures    TSH 3RD GENERATION     Standing Status:   Future     Standing Expiration Date:   6/5/2020    T4, Free     Standing Status:   Future     Standing Expiration Date:   6/5/2020    EKG 12 lead     Standing Status:   Future     Standing Expiration Date:   8/5/2019     Order Specific Question:   Reason for Exam?     Answer: Tachycardia     Comments:   tachycardia     Orders Placed This Encounter   Medications    cyanocobalamin injection 1,000 mcg       Patient given educationalmaterials - see patient instructions. Discussed use, benefit, and side effectsof prescribed medications. All patient questions answered. Pt voiced understanding. Reviewed health maintenance. Instructed to continue current medications, diet. Patient agreed with treatment plan. Follow up as directed.      Electronicallysigned by Rhea Villegas MD on 6/6/2019 at 11:38 AM

## 2019-06-14 ENCOUNTER — OFFICE VISIT (OUTPATIENT)
Dept: PRIMARY CARE CLINIC | Age: 64
End: 2019-06-14
Payer: COMMERCIAL

## 2019-06-14 VITALS
BODY MASS INDEX: 24.57 KG/M2 | OXYGEN SATURATION: 99 % | HEART RATE: 72 BPM | WEIGHT: 139.8 LBS | DIASTOLIC BLOOD PRESSURE: 84 MMHG | SYSTOLIC BLOOD PRESSURE: 132 MMHG

## 2019-06-14 DIAGNOSIS — R00.0 TACHYCARDIA: Primary | ICD-10-CM

## 2019-06-14 DIAGNOSIS — R00.2 PALPITATION: ICD-10-CM

## 2019-06-14 PROCEDURE — 99213 OFFICE O/P EST LOW 20 MIN: CPT | Performed by: FAMILY MEDICINE

## 2019-06-14 NOTE — PROGRESS NOTES
Teresita Brady a 70 y.o. female who presents today for her medical conditions/complaints as notedbelow. Chief Complaint   Patient presents with    Tachycardia     would like a referral to Dr. Sofiya Hoover Problem     left foot pain         HPI:   HPI  WOke up with heart racing x 4  this week, feels better when she gets up  She wore heart monitor 24 hrs and it didn't pick it up   Hasn't seen a heart doctor in the past.   She hasn't been checking her pulse: doesn't know how. Left large toe pain, arthritis: looked bloody and having trouble walking on it     No results found for: LDLCHOLESTEROL, LDLCALC    (goal LDL is <100)   AST (U/L)   Date Value   01/25/2016 73 (H)     ALT (U/L)   Date Value   01/25/2016 81 (H)     BUN (mg/dL)   Date Value   10/18/2015 12     BP Readings from Last 3 Encounters:   06/14/19 132/84   06/06/19 136/86   05/07/19 (!) 142/90          (goal 120/80)    Past Medical History:   Diagnosis Date    Allergy to food dye     yellow and red dyes    Anemia     Asthma     Cancer ([address]     Diverticulitis     Gastroparesis     Hiatal hernia     History of squamous cell carcinoma in situ of skin 6/9/2014    left wrist    IBS (irritable bowel syndrome)     diarrhea-prominent type    Lichen sclerosus     MVP (mitral valve prolapse)     Neoplasm of unspecified nature of bone, soft tissue, and skin 5/20/2014    New lesions of left arm, right arm, and right thigh.  Neoplasm of unspecified nature of bone, soft tissue, and skin     New lesions of right dorsum of hand (healed after Efudex) and lesion of right anterior thigh.     Osteoarthritis     Osteoporosis     beginning     Squamous cell carcinoma in situ 6/9/2014    left wrist    Squamous cell carcinoma in situ of skin of left wrist 6/9/2014    left wrist    Thrombocytopenia [facility]     Vulvar dystrophy       Past Surgical History:   Procedure Laterality Date    CHOLECYSTECTOMY  2011    ELBOW SURGERY      Tendonitis X2    HERNIA REPAIR  2005    hiatal hernia repair    HYSTERECTOMY, TOTAL ABDOMINAL  92-97    BAIRON 3 Surgeries    KNEE SURGERY Right 1996    MALIGNANT SKIN LESION EXCISION  2011    rt elbow-scc, rt shoulder-keratosis, lft leg skin with excoriation    NASAL POLYP SURGERY  2012    X2 in [address]    pheregenal flap    SKIN BIOPSY  2012    lft leg keratosis    SKIN BIOPSY  2009    rt forearm, under brst, lft leg-keratosis    SKIN BIOPSY  2008    back and rt forearm-keratosis, abdomen-hemangioma    SKIN BIOPSY  2002    rt breast, lft arm, lft brst-keratosis    THYROIDECTOMY  1985    Thyroid Cancer    TONSILLECTOMY AND ADENOIDECTOMY         Family History   Problem Relation Age of Onset    Other Mother         Hernia Surgery (passed after surgery)    Coronary Art Dis Mother     Liver Cancer Father         age 80    Coronary Art Dis Father     Liver Cancer Brother 59    Cancer Brother 59        Bowel & Liver cancer    Diabetes Other         Father's side    Thyroid Disease Other         Mom's side       Social History     Tobacco Use    Smoking status: Never Smoker    Smokeless tobacco: Never Used   Substance Use Topics    Alcohol use: No     Binge frequency: Never      Current Outpatient Medications   Medication Sig Dispense Refill    Cholecalciferol (VITAMIN D3) 2000 units CAPS Take 1 capsule by mouth daily      hyoscyamine (LEVSIN/SL) 125 MCG sublingual tablet Place 125 mcg under the tongue every 8 hours as needed for Cramping      NONFORMULARY Indications: domperidone       levothyroxine (SYNTHROID) 50 MCG tablet levothyroxine 50 mcg tablet   Take 3 tablets daily.       Simethicone (GAS-X PO) Take 1 capsule by mouth 2 times daily as needed      mometasone (NASONEX) 50 MCG/ACT nasal spray 2 sprays by Nasal route daily       diphenoxylate-atropine (LOMOTIL) 2.5-0.025 MG per tablet Take by mouth See Admin Instructions 2 tablets every morning and 1 tablet four times a (modify frequency per FRAX score)  10/03/2018    Pneumococcal 65+ years Vaccine (1 of 2 - PCV13) 10/03/2018    Flu vaccine (Season Ended) 09/01/2019    Breast cancer screen  08/15/2020    Hepatitis C screen  Completed       Subjective:      Review of Systems   Cardiovascular: Positive for palpitations. Objective:     /84   Pulse 72   Wt 139 lb 12.8 oz (63.4 kg)   SpO2 99%   BMI 24.57 kg/m²   Physical Exam   Constitutional: She is oriented to person, place, and time. She appears well-developed and well-nourished. HENT:   Head: Normocephalic and atraumatic. Cardiovascular: Intact distal pulses. Pulses:       Dorsalis pedis pulses are 1+ on the right side, and 1+ on the left side. Musculoskeletal:   Left great toe OA changes and decreased ROM   Neurological: She is alert and oriented to person, place, and time. Assessment:       Diagnosis Orders   1. Tachycardia  AFL - Morena Keith MD, Cardiology, [place]   2. Palpitation          Plan:      No follow-ups on file. Reviewed how to check pulse and count it when she is having symptoms  Orders Placed This Encounter   Procedures   Sandor Hunter MD, Cardiology, [place]     Referral Priority:   Routine     Referral Type:   Eval and Treat     Referral Reason:   Specialty Services Required     Referred to Provider:   Blanche Friedman MD     Requested Specialty:   Cardiology     Number of Visits Requested:   1     No orders of the defined types were placed in this encounter. Patient given educational materials - see patient instructions. Discussed use, benefit, and side effects of prescribed medications. All patient questions answered. Pt voiced understanding. Reviewed health maintenance. Instructed to continue current medications, diet. .  Patient agreed with treatment plan. Follow up as directed.      Electronicallysigned by Vasile Zhou MD on 6/14/2019 at 2:31 PM

## 2019-07-15 ENCOUNTER — TELEPHONE (OUTPATIENT)
Dept: PRIMARY CARE CLINIC | Age: 64
End: 2019-07-15

## 2019-07-15 DIAGNOSIS — D68.04 ACQUIRED VON WILLEBRAND'S DISEASE: ICD-10-CM

## 2019-07-15 DIAGNOSIS — R00.0 TACHYCARDIA: Primary | ICD-10-CM

## 2019-07-15 DIAGNOSIS — D69.6 THROMBOCYTOPENIA (HCC): ICD-10-CM

## 2019-07-15 PROBLEM — E89.0 POSTOPERATIVE HYPOTHYROIDISM: Status: ACTIVE | Noted: 2019-07-15

## 2019-07-22 DIAGNOSIS — K58.0 IRRITABLE BOWEL SYNDROME WITH DIARRHEA: ICD-10-CM

## 2019-07-22 DIAGNOSIS — K59.1 DIARRHEA, FUNCTIONAL: ICD-10-CM

## 2019-07-22 DIAGNOSIS — K31.84 GASTROPARESIS: Primary | ICD-10-CM

## 2019-07-22 RX ORDER — ELUXADOLINE 75 MG/1
75 TABLET, FILM COATED ORAL DAILY
Qty: 90 TABLET | Refills: 1 | Status: SHIPPED | OUTPATIENT
Start: 2019-07-22 | End: 2019-08-13

## 2019-08-07 ENCOUNTER — TELEPHONE (OUTPATIENT)
Dept: PRIMARY CARE CLINIC | Age: 64
End: 2019-08-07

## 2019-08-07 DIAGNOSIS — R11.0 NAUSEA: Primary | ICD-10-CM

## 2019-08-07 RX ORDER — ONDANSETRON 4 MG/1
4 TABLET, FILM COATED ORAL 3 TIMES DAILY PRN
Qty: 30 TABLET | Refills: 0 | Status: SHIPPED | OUTPATIENT
Start: 2019-08-07 | End: 2019-09-06

## 2019-08-12 ENCOUNTER — TELEPHONE (OUTPATIENT)
Dept: ADMINISTRATIVE | Age: 64
End: 2019-08-12

## 2019-08-12 DIAGNOSIS — R11.0 NAUSEA: ICD-10-CM

## 2019-08-12 DIAGNOSIS — K59.1 DIARRHEA, FUNCTIONAL: ICD-10-CM

## 2019-08-12 DIAGNOSIS — K58.0 IRRITABLE BOWEL SYNDROME WITH DIARRHEA: ICD-10-CM

## 2019-08-13 DIAGNOSIS — K58.0 IRRITABLE BOWEL SYNDROME WITH DIARRHEA: ICD-10-CM

## 2019-08-13 DIAGNOSIS — K59.1 DIARRHEA, FUNCTIONAL: ICD-10-CM

## 2019-08-13 RX ORDER — ELUXADOLINE 75 MG/1
75 TABLET, FILM COATED ORAL 2 TIMES DAILY
Qty: 180 TABLET | Refills: 1 | Status: SHIPPED | OUTPATIENT
Start: 2019-08-13 | End: 2019-09-16 | Stop reason: SDUPTHER

## 2019-08-20 ENCOUNTER — HOSPITAL ENCOUNTER (OUTPATIENT)
Dept: WOMENS IMAGING | Age: 64
Discharge: HOME | End: 2019-08-22
Payer: COMMERCIAL

## 2019-08-20 DIAGNOSIS — Z12.39 BREAST CANCER SCREENING: ICD-10-CM

## 2019-08-20 PROCEDURE — 77063 BREAST TOMOSYNTHESIS BI: CPT

## 2019-09-06 ENCOUNTER — OFFICE VISIT (OUTPATIENT)
Dept: OBGYN CLINIC | Age: 64
End: 2019-09-06
Payer: COMMERCIAL

## 2019-09-06 VITALS
DIASTOLIC BLOOD PRESSURE: 85 MMHG | SYSTOLIC BLOOD PRESSURE: 140 MMHG | BODY MASS INDEX: 24.3 KG/M2 | WEIGHT: 138.25 LBS | HEART RATE: 88 BPM

## 2019-09-06 DIAGNOSIS — Z01.419 ENCOUNTER FOR WELL WOMAN EXAM WITH ROUTINE GYNECOLOGICAL EXAM: Primary | ICD-10-CM

## 2019-09-06 PROCEDURE — 99397 PER PM REEVAL EST PAT 65+ YR: CPT | Performed by: OBSTETRICS & GYNECOLOGY

## 2019-09-06 RX ORDER — CLOBETASOL PROPIONATE 0.5 MG/G
CREAM TOPICAL 2 TIMES DAILY
COMMUNITY
End: 2019-09-06 | Stop reason: SDUPTHER

## 2019-09-06 RX ORDER — CLOBETASOL PROPIONATE 0.5 MG/G
CREAM TOPICAL PRN
Qty: 1 TUBE | Refills: 1 | Status: SHIPPED | OUTPATIENT
Start: 2019-09-06 | End: 2020-12-15 | Stop reason: SDUPTHER

## 2019-09-06 ASSESSMENT — ENCOUNTER SYMPTOMS
CONSTIPATION: 0
ABDOMINAL PAIN: 0
BACK PAIN: 0
COUGH: 0
SHORTNESS OF BREATH: 0

## 2019-09-06 NOTE — PROGRESS NOTES
Lifestyle    Physical activity:     Days per week: Not on file     Minutes per session: Not on file    Stress: Not on file   Relationships    Social connections:     Talks on phone: Not on file     Gets together: Not on file     Attends Oriental orthodox service: Not on file     Active member of club or organization: Not on file     Attends meetings of clubs or organizations: Not on file     Relationship status: Not on file    Intimate partner violence:     Fear of current or ex partner: Not on file     Emotionally abused: Not on file     Physically abused: Not on file     Forced sexual activity: Not on file   Other Topics Concern    Not on file   Social History Narrative    Not on file       Family History   Problem Relation Age of Onset    Other Mother         Hernia Surgery (passed after surgery)    Coronary Art Dis Mother     Liver Cancer Father         age 80    Coronary Art Dis Father     Liver Cancer Brother 59    Cancer Brother 59        Bowel & Liver cancer    Diabetes Other         Father's side    Thyroid Disease Other         Mom's side       Review of Systems:   Review of Systems   Constitutional: Negative for chills and fever. HENT: Negative for congestion. Respiratory: Negative for cough and shortness of breath. Cardiovascular: Negative for chest pain and palpitations. Gastrointestinal: Negative for abdominal pain and constipation. Endocrine: Negative for cold intolerance and heat intolerance. Genitourinary: Negative for vaginal discharge. Musculoskeletal: Negative for back pain. Neurological: Negative for light-headedness. Psychiatric/Behavioral: The patient is not nervous/anxious.         Physical exam:  vitals:  Height   5  ft    3 in,  Weight    138 lbs,   140/85 BP  Gen: alert, no apparent distress  HEENT:No pathologic skin lesions noted,NC/AT,PERRL, normal midline nontender thyroid   Lung Exam: Clear to auscultation in all fields bilaterally, without wheezes,rales or

## 2019-09-16 DIAGNOSIS — K58.0 IRRITABLE BOWEL SYNDROME WITH DIARRHEA: ICD-10-CM

## 2019-09-16 DIAGNOSIS — K59.1 DIARRHEA, FUNCTIONAL: ICD-10-CM

## 2019-09-16 RX ORDER — ELUXADOLINE 75 MG/1
75 TABLET, FILM COATED ORAL 2 TIMES DAILY
Qty: 180 TABLET | Refills: 1 | Status: SHIPPED | OUTPATIENT
Start: 2019-09-16 | End: 2020-09-02 | Stop reason: SDUPTHER

## 2019-09-16 RX ORDER — LEVOTHYROXINE SODIUM 0.05 MG/1
TABLET ORAL
Qty: 270 TABLET | Refills: 1 | Status: SHIPPED | OUTPATIENT
Start: 2019-09-16 | End: 2020-09-23 | Stop reason: SDUPTHER

## 2019-10-18 ENCOUNTER — HOSPITAL ENCOUNTER (OUTPATIENT)
Dept: GENERAL RADIOLOGY | Age: 64
Discharge: HOME | End: 2019-10-20
Payer: COMMERCIAL

## 2019-10-18 ENCOUNTER — HOSPITAL ENCOUNTER (OUTPATIENT)
Age: 64
Discharge: HOME | End: 2019-10-20
Payer: COMMERCIAL

## 2019-10-18 DIAGNOSIS — M25.552 LEFT HIP PAIN: ICD-10-CM

## 2019-10-18 PROCEDURE — 73502 X-RAY EXAM HIP UNI 2-3 VIEWS: CPT

## 2019-11-01 ENCOUNTER — HOSPITAL ENCOUNTER (OUTPATIENT)
Age: 64
Discharge: HOME | End: 2019-11-03
Payer: COMMERCIAL

## 2019-11-01 PROCEDURE — 93005 ELECTROCARDIOGRAM TRACING: CPT | Performed by: NURSE PRACTITIONER

## 2019-11-02 LAB
EKG ATRIAL RATE: 64 BPM
EKG P AXIS: 49 DEGREES
EKG P-R INTERVAL: 128 MS
EKG Q-T INTERVAL: 434 MS
EKG QRS DURATION: 72 MS
EKG QTC CALCULATION (BAZETT): 447 MS
EKG R AXIS: 3 DEGREES
EKG T AXIS: 8 DEGREES
EKG VENTRICULAR RATE: 64 BPM

## 2019-11-02 PROCEDURE — 93010 ELECTROCARDIOGRAM REPORT: CPT | Performed by: INTERNAL MEDICINE

## 2019-12-17 ENCOUNTER — HOSPITAL ENCOUNTER (OUTPATIENT)
Dept: GENERAL RADIOLOGY | Age: 64
Discharge: HOME | End: 2019-12-19
Payer: COMMERCIAL

## 2019-12-17 ENCOUNTER — HOSPITAL ENCOUNTER (OUTPATIENT)
Age: 64
Discharge: HOME | End: 2019-12-19
Payer: COMMERCIAL

## 2019-12-17 ENCOUNTER — HOSPITAL ENCOUNTER (OUTPATIENT)
Age: 64
Discharge: HOME | End: 2019-12-17
Payer: COMMERCIAL

## 2019-12-17 DIAGNOSIS — M54.50 LOW BACK PAIN, UNSPECIFIED BACK PAIN LATERALITY, UNSPECIFIED CHRONICITY, UNSPECIFIED WHETHER SCIATICA PRESENT: ICD-10-CM

## 2019-12-17 PROCEDURE — 93005 ELECTROCARDIOGRAM TRACING: CPT

## 2019-12-17 PROCEDURE — 72100 X-RAY EXAM L-S SPINE 2/3 VWS: CPT

## 2019-12-18 LAB
EKG ATRIAL RATE: 70 BPM
EKG P AXIS: 67 DEGREES
EKG P-R INTERVAL: 128 MS
EKG Q-T INTERVAL: 386 MS
EKG QRS DURATION: 80 MS
EKG QTC CALCULATION (BAZETT): 416 MS
EKG R AXIS: 35 DEGREES
EKG T AXIS: -86 DEGREES
EKG VENTRICULAR RATE: 70 BPM

## 2020-01-06 ENCOUNTER — HOSPITAL ENCOUNTER (OUTPATIENT)
Dept: PHYSICAL THERAPY | Facility: CLINIC | Age: 65
Setting detail: THERAPIES SERIES
Discharge: HOME | End: 2020-01-06
Payer: COMMERCIAL

## 2020-01-06 PROCEDURE — 97110 THERAPEUTIC EXERCISES: CPT

## 2020-01-06 PROCEDURE — 97161 PT EVAL LOW COMPLEX 20 MIN: CPT

## 2020-01-06 NOTE — CONSULTS
[] PM    [] Sit    [] Rise/Sit    []Stand    [] Walk    [x] Lying    [] Other:  Worse: [] AM    [] PM    [] Sit    [x] Rise/Sit    []Stand    [] Walk    [] Lying    [x] Bend                             [] Valsalva    [x] Other:cold  Sleep: [x] OK    [] Disturbed    Objective:      STRENGTH  STRENGTH  ROM    Left Right  Left Right Cervical    C5 Shld Abd 4+ 4 L1-2 Hip Flex 4- 4- Flexion    Shld Flexion 5 5 Hip Abd   Extension    Shld IR   L3-4 Knee Ext 5 5 Rotation L R   Shld ER   L4 Ankle DF 5 5 Sidebend L R   C6 Elb Flex 5 5 L5 EHL 5 5 Retraction    C7 Elb Ext 5 5 S1 Plant.  Flex   Lumbar    C8 EPL   Abdominals   Flexion wnl   T1 Fing Abd   Erector Spinae   Extension 50% deficit         Rotation L  Mod dec R  Mod dec         Sidebend L slight dec R  slight dec         UE/LE           HIps wnl wnl         knees wnl wnl                                        L hip grinding test positive,   R negative    L hip pain with  With trunk rotaiton, knees to the R  TESTS (+/-) LEFT RIGHT Not Tested   SLR [] sit [] supine - - []   Hamstring (SLR) 70 70 []   SKTC wnl,pn wnl []   DKTC wnl  []       OBSERVATION No Deficit Deficit Not Tested Comments   Posture       Forward Head [] [x] []    Rounded Shoulders [] [x] []    Kyphosis [x] [] []    Lordosis [] [x] [] flat   Lateral Shift [x] [] []    Scoliosis [x] [] []    Iliac Crest [x] [] []    PSIS [x] [] []    Palpation [] [x] [] Tender to palpation B buttocks/LB ,slight lat thighs   Sensation [x] [] []        Functional Information:  Patient lives with: spouse   In what type of home [x]  One story   [] Two story   [] Split level   Number of stairs to enter 1 step   With handrail on the []  Right to enter   [] Left to enter   Bathroom has a [x]  Tub only  [] Tub/shower combo   [] Walk in shower    []  Grab bars   Washing machine is on [x]  Main level   [] Second level   [] Basement   Employer    Job Status []  Normal duty   [] Light duty   [] Off due to condition    [x] Retired   [] Not employed   [] Disability   [] Other:   Work activities/duties        ADL/IADL Previous level of function Current level of function Who currently assists the patient with task   Bathing  [x] Independent  [] Assist [x] Independent  [] Assist    Dress/grooming [x] Independent  [] Assist [x] Independent  [] Assist    Transfer/mobility [x] Independent  [] Assist [x] Independent  [] Assist    Feeding [x] Independent  [] Assist [x] Independent  [] Assist    Toileting [x] Independent  [] Assist [x] Independent  [] Assist    Driving [x] Independent  [] Assist [x] Independent  [] Assist    Housekeeping [x] Independent  [] Assist [x] Independent  [] Assist    Grocery shop/meal prep [x] Independent  [] Assist [x] Independent  [] Assist            FUNCTION Normal Difficult Unable   Sitting [] [x] []   Standing [] [x] []   Ambulation [x] [] []   Groom/Dress [x] [] []   Lift/Carry [] [] []   Stairs [x] [] []   Bending [] [x] []   Sit to Stand [] [] []   [id number]/50  26% deficit        Assessment:  Problems:    [x] ? Back /HIP Pain:    [] ? Cervical Pain:    [x] ? ROM: L SPINE    [x] ? Strength: HIP FLEX ,EXT,ABD  [x] ? Function: Difficulty with sitting over 1 hour, bending,  With cold weather  [x] Postural Deviations      STG: (to be met in 6 treatments)  1. ? Pain: 6/10 at max, less frequent  2. ? Strength: core and B hips  3. ? Function: improve tolerance to bend and prolonged sitting  4. Independent with Home Exercise Programs    LTG: (to be met in 12 treatments)  1. Pain 2 or less  2. Able to sit for 2-3 hours w/o increased pain  3. Able to bend without increased pain.       Patient goals: reduce pain    Rehab Potential:  [x] Good  [] Fair  [] Poor   Suggested Professional Referral:  [x] No  [] Yes:  Barriers to Goal Achievement:  [] No  [x] Yes:DDD ,L ARTHRITIS   Domestic Concerns:  [x] No  [] Yes:    Pt. Education:  [x] Plans/Goals, Risks/Benefits discussed  [x] Home exercise program  Method of Education: [x] Verbal  [x] Demo  [x] Written  Comprehension of Education:  [x] Verbalizes understanding. [x] Demonstrates understanding. [] Needs Review. [] Demonstrates/verbalizes understanding of HEP/Ed previously given. Treatment Plan:  [x] Therapeutic Exercise   50225  [] Iontophoresis: 4 mg/mL Dexamethasone Sodium Phosphate  mAmin  85351   [] Therapeutic Activity  83452 [] Vasopneumatic cold with compression  41565    [] Gait Training   06969 [] Ultrasound   64551   [] Neuromuscular Re-education  56581 [x] Electrical Stimulation Unattended  29155   [] Manual Therapy  49632 [] Electrical Stimulation Attended  16434   [x] Instruction in HEP  [] Lumbar/Cervical Traction  17030   [] Aquatic Therapy   31691 [x] hotpack    [x] Massage   65501      [] Dry Needling, 1 or 2 muscles  92566   [] Biofeedback, first 15 minutes   85907  [] Biofeedback, additional 15 minutes   56694 [] Dry Needling, 3 or more muscles  90760     []  Medication allergies reviewed for use of    Dexamethasone Sodium Phosphate 4mg/ml     with iontophoresis treatments. Pt is not allergic. Frequency:  2 x/week for 12 visits    Todays Treatment:  Modalities: HP  In sitting 15 min  Precautions:  Exercises:  Exercise Reps/ Time Weight/ Level Comments   Supine      SKTC 5     DKTC 5     TRUNK ROT 5     HS STRETCH 1X 20 SEC WITH STRAP AND PT ASSIST  ALSO DEMO HS STRETCH SEATED IN CHAIR         PRONE      PRONE PROP 15 SEC X 1     ALT HIP EXT 5EA                                   Other:    Specific Instructions for next treatment: BACK STRETCHING AND STRENGTHENING, B HIP STRENGTHENING, HP , IFC IF NEEDED.       Evaluation Complexity:  History (Personal factors, comorbidities) [] 0 [x] 1-2 [] 3+   Exam (limitations, restrictions) [] 1-2 [x] 3 [] 4+   Clinical presentation (progression) [x] Stable [] Evolving  [] Unstable   Decision Making [x] Low [] Moderate [] High    [x] Low Complexity [] Moderate Complexity [] High Complexity       Treatment Charges: Mins Units   [x] Evaluation       [x]  Low       []  Moderate       []  High 25 1   [x]  Modalities HP 15 0   x  Ther Exercise 20 1   []  Manual Therapy     []  Ther Activities     []  Aquatics     []  Vasocompression     []  Other       TOTAL TREATMENT TIME: 40 MINS    Time in: 11A      Time out: 1210P    Electronically signed by: Marcela Ortiz PT

## 2020-01-09 ENCOUNTER — HOSPITAL ENCOUNTER (OUTPATIENT)
Dept: PHYSICAL THERAPY | Facility: CLINIC | Age: 65
Setting detail: THERAPIES SERIES
Discharge: HOME | End: 2020-01-09
Payer: COMMERCIAL

## 2020-01-09 PROCEDURE — G0283 ELEC STIM OTHER THAN WOUND: HCPCS

## 2020-01-09 PROCEDURE — 97110 THERAPEUTIC EXERCISES: CPT

## 2020-01-13 ENCOUNTER — HOSPITAL ENCOUNTER (OUTPATIENT)
Dept: PHYSICAL THERAPY | Facility: CLINIC | Age: 65
Setting detail: THERAPIES SERIES
Discharge: HOME | End: 2020-01-13
Payer: COMMERCIAL

## 2020-01-13 PROCEDURE — 97110 THERAPEUTIC EXERCISES: CPT

## 2020-01-13 PROCEDURE — G0283 ELEC STIM OTHER THAN WOUND: HCPCS

## 2020-01-13 NOTE — FLOWSHEET NOTE
SIDE LYING          HIP ABD  5    Added 1/9/2020    CLAM SHELLS  5    Added 1/9/2020         SEATED       HIP FLEX 5EA  Added 1/9/2020         STANDING      SQUATS 5  Added 1/13/2020   Other    Specific Instructions for next treatment: BACK STRETCHING AND STRENGTHENING, B HIP STRENGTHENING, HP , IFC        Treatment Charges: Mins Units   [x]  Modalities HP /IFC 15/15 0/1   [x]  Ther Exercise 25 2   []  Manual Therapy     []  Ther Activities     []  Aquatics     []  Vasocompression     []  Other     Total Treatment time 40        Assessment: [x] Progressing toward goals. Pt exercising at home. Needed review. Again needed much verbal cuing to correctly perform L stab ex's. Less  L hip pain with ex's. Good response to HP/IFC at last session. Reports ability to sit longer without aggravation of pain. Pt to benefit from skilled physical therapy services in order to improve strength, flexibility, core control, functional activity tolerance and to decrease pain. [] No change. [] Other:    Problems:    [x]? ? Back /HIP Pain:                 []? ? Cervical Pain:        [x]? ? ROM: L SPINE                  [x]? ? Strength:  HIP FLEX ,EXT,ABD  [x]? ? Function: Difficulty with sitting over 1 hour, bending,  With cold weather  [x]? Postural Deviations                            STG: (to be met in 6 treatments)  1. ? Pain: 6/10 at max, less frequent  2. ? Strength: core and B hips  3. ? Function: improve tolerance to bend and prolonged sitting  4. Independent with Home Exercise Programs     LTG: (to be met in 12 treatments)  1. Pain 2 or less  2. Able to sit for 2-3 hours w/o increased pain  3. Able to bend without increased pain.        Patient goals: reduce pain    Pt. Education:  [x] Yes  [] No  [x] Reviewed Prior HEP/Ed  Method of Education: [x] Verbal  [x] Demo  [x] Written- ADD SQUATS TO HEP  Comprehension of Education:  [x] Verbalizes understanding. [x] Demonstrates understanding.   [] Needs

## 2020-01-15 ENCOUNTER — HOSPITAL ENCOUNTER (OUTPATIENT)
Dept: PHYSICAL THERAPY | Facility: CLINIC | Age: 65
Setting detail: THERAPIES SERIES
Discharge: HOME | End: 2020-01-15
Payer: COMMERCIAL

## 2020-01-15 PROCEDURE — 97110 THERAPEUTIC EXERCISES: CPT

## 2020-01-15 PROCEDURE — G0283 ELEC STIM OTHER THAN WOUND: HCPCS

## 2020-01-20 ENCOUNTER — HOSPITAL ENCOUNTER (OUTPATIENT)
Dept: PHYSICAL THERAPY | Facility: CLINIC | Age: 65
Setting detail: THERAPIES SERIES
Discharge: HOME | End: 2020-01-20
Payer: COMMERCIAL

## 2020-01-20 PROCEDURE — G0283 ELEC STIM OTHER THAN WOUND: HCPCS

## 2020-01-20 PROCEDURE — 97110 THERAPEUTIC EXERCISES: CPT

## 2020-01-20 PROCEDURE — 97124 MASSAGE THERAPY: CPT

## 2020-01-20 NOTE — FLOWSHEET NOTE
[] [address]  [address]  [phone]  F: [phone] [x] [address]  [facility]   [address]  P: [phone]  F: [phone] [] [place]  [address] [facility]  P: [phone]  F: [phone] [] [address]  [facility]   [address]   [place]: [phone]  F: [phone]      Physical Therapy Daily Treatment Note    Date:  2020  Patient Name:  Rolanda Gaston    :  1955  MRN: [id number]  Physician: Dr Tomas Friendly: karen [facility]  Medical Diagnosis: arthritis and DDD Lumbar spine                       Rehab Codes: M54.5.79.1,M62.81, M25.551,M25.552  Onset Date: 2019         Visit# / total visits: 12  Cancels/No Shows: 0    Subjective:    Pain:  [x] Yes  [] No Location: B LBP/L HIP   Pain Rating: (0-10 scale)3/10  Pain altered Tx:  [x] No  [] Yes  Action:   Comments: Received cortisone shot L buttock 2020  \"Haven't felt this good in a long time\"       Objective:  Modalities:  2020 added massage to LB and L buttock- tender over L piriformis     HP/IFC to B LB - extra padding-  Supine with wedge  Precautions:  Exercises:  Exercise Reps/ Time Weight/ Level Comments   Supine         SKTC 5  5 sec No  increased pain   DKTC 5  5 sec     TRUNK ROT 5  5 sec Good tolerance today   HS STRETCH 2x ea 20 SEC WITH  PT ASSIST           ALT LEG LIFTS 10  Added 2020   ALT ARM AND LEG LIFTS 10  Added 2020   BRIDGES 10  Added 2020   SLR  10  Added 2020                   PRONE         PRONE PROP 30 sec       ALT HIP EXT 5       QUAD STRETCH 3 ea  5 sec Had to decrease  today due to pain              SIDE LYING          HIP ABD 10    Added 2020    CLAM SHELLS 10    Added 2020         SEATED       HIP FLEX 10

## 2020-01-22 ENCOUNTER — HOSPITAL ENCOUNTER (OUTPATIENT)
Dept: PHYSICAL THERAPY | Facility: CLINIC | Age: 65
Setting detail: THERAPIES SERIES
Discharge: HOME | End: 2020-01-22
Payer: COMMERCIAL

## 2020-01-22 PROCEDURE — 97124 MASSAGE THERAPY: CPT

## 2020-01-22 PROCEDURE — G0283 ELEC STIM OTHER THAN WOUND: HCPCS

## 2020-01-22 NOTE — FLOWSHEET NOTE
[facility] Fall Risk Assessment    Patient Name:  Nanette Owens  : 1955        Risk Factor Scale  Score   History of Falls [] Yes  [x] No 25  0 0   Secondary Diagnosis [] Yes  [x] No 15  0 0   Ambulatory Aid [] Furniture  [] Crutches/cane/walker  [x] None/bedrest/wheelchair/nurse 30  15  0 0   IV/Heparin Lock [] Yes  [x] No 20  0 0   Gait/Transferring [] Impaired  [] Weak  [x] Normal/bedrest/immobile 20  10  0 0   Mental Status [] Forgets limitations  [x] Oriented to own ability 15  0 0      Total:0     Based on the Assessment score: check the appropriate box.     [x]  No intervention needed   Low =   Score of 0-24    []  Use standard prevention interventions Moderate =  Score of 24-44   [] Give patient handout and discuss fall prevention strategies   [] Establish goal of education for patient/family RE: fall prevention strategies    []  Use high risk prevention interventions High = Score of 45 and higher   [] Give patient handout and discuss fall prevention strategies   [] Establish goal of education for patient/family Re: fall prevention strategies   [] Discuss lifeline / other resources    Electronically signed by:   Wilton Archuleta PT  Date: 2020

## 2020-01-22 NOTE — FLOWSHEET NOTE
[] [facility]) - [facility] &  Therapy  [address]  [phone]  F: [phone] [x] [address]  [place] [address]   [address]  P: [phone]  F: [phone] [] [address] &  Therapy  [address] [facility]  P: [phone]  F: [phone] [] [address]  [facility]   [address]   [place]: [phone]  F: [phone]      Physical Therapy Daily Treatment Note    Date:  2020  Patient Name:  Mary Ann Chapa    :  1955  MRN: [id number]  Physician: Dr Estevez Sing: karen [facility]  Medical Diagnosis: arthritis and DDD Lumbar spine                       Rehab Codes: M54.5.79.1,M62.81, M25.551,M25.552  Onset Date: 2019         Visit# / total visits: 12  Cancels/No Shows: 0    Subjective:    Pain:  [x] Yes  [] No Location: B LBP/L HIP   Pain Rating: (0-10 scale)3-4/10,    L flank 9/10  Pain altered Tx:  [] No  [x] Yes  Action: held ex's today, held reassessment  Comments: pt states she felt good after last session. Later that evening was upon stool reaching into cupboard. Feels she may have pulled something as later pain was intense to L side-waist level and just below.  Pain still high     Objective:  Modalities:   massage to LB and L buttock- tender over L piriformis,  included area of increased pain today L side-very tender but responded to massage     HP/IFC to B LB - extra padding-  Supine with wedge  Precautions:  Exercises: 2020  HELD DUE TO PAIN  Exercise Reps/ Time Weight/ Level Comments   Supine         SKTC 5  5 sec No  increased pain   DKTC 5  5 sec     TRUNK ROT 5  5 sec Good tolerance today   HS STRETCH 2x ea 20 SEC WITH  PT ASSIST           ALT LEG LIFTS 10  Added 2020   ALT ARM AND LEG LIFTS 10  Added 2020   BRIDGES 10  Added 2020   SLR  10 Added 2020                   PRONE         PRONE PROP 30 sec       ALT HIP EXT 5       QUAD STRETCH 3 ea  5 sec Had to decrease  today due to pain              SIDE LYING          HIP ABD 10    Added 2020    CLAM SHELLS 10    Added 2020         SEATED       HIP FLEX 10  Added 2020         STANDING      SQUATS 10     Other    Specific Instructions for next treatment: BACK STRETCHING AND STRENGTHENING, B HIP STRENGTHENING, HP , IFC, massage       Treatment Charges: Mins Units   [x]  Modalities HP /IFC 15/15 0/1   [x]  Ther Exercise     []  Manual Therapy     []  Ther Activities     []  Aquatics     []  Vasocompression     [x]  Other massage 25 2   Total Treatment time 40        Assessment: [] Progressing toward goals. [] No change. [x] Other: no exercises due to pain L side after reaching into cupboard earlier this week,. Good response to treatmen- pain  to 6/10. Pt to benefit from continue skilled physical therapy services in order to improve strength, flexibility, core control, functional activity tolerance and to decrease pain. REASSESS next session. Problems:    [x]? ? Back /HIP Pain:                 []? ? Cervical Pain:        [x]? ? ROM: L SPINE                  [x]? ? Strength:  HIP FLEX ,EXT,ABD  [x]? ? Function: Difficulty with sitting over 1 hour, bending,  With cold weather  [x]? Postural Deviations                            STG: (to be met in 6 treatments)  1. ? Pain: 6/10 at max, less frequent  2. ? Strength: core and B hips  3. ? Function: improve tolerance to bend and prolonged sitting  4. Independent with Home Exercise Programs     LTG: (to be met in 12 treatments)  1. Pain 2 or less  2. Able to sit for 2-3 hours w/o increased pain  3. Able to bend without increased pain.        Patient goals: reduce pain    Pt.  Education:  [x] Yes  [] No  [] Reviewed Prior HEP/Ed  Method of Education: [x] Verbal hold ex today, resume as able with HEP- gentle  [] Demo  [] Written-  Comprehension of Education:  [] Verbalizes understanding. [] Demonstrates understanding. [] Needs review. [] Demonstrates/verbalizes HEP/Ed previously given. Plan: [x] Continue per plan of care. 2x/week   [] Other:      Time In:10am          Time Out: 10:55am    Electronically signed by:  Shree White PT

## 2020-01-27 ENCOUNTER — HOSPITAL ENCOUNTER (OUTPATIENT)
Dept: PHYSICAL THERAPY | Facility: CLINIC | Age: 65
Setting detail: THERAPIES SERIES
Discharge: HOME | End: 2020-01-27
Payer: COMMERCIAL

## 2020-01-27 NOTE — FLOWSHEET NOTE
[] [address]  [address]    [phone]  F: [phone]   [] [address] [facility] [address]   [address]  P: [phone]  F: [phone]  [] [address]  [address] [facility]  P: [phone]  F: [phone]  [] [address]  [address] [facility] [address]   [address]   [place]: [phone]  F: [phone]   [] [address]  [place]: [phone]   F: [phone]     Physical Therapy Cancel/No Show note    Date: 2020  Patient: Olga Lidia Devries  : 1955  MRN: [id number]    Cancels/No Shows to date:    For today's appointment patient:    [x]  Cancelled    [] Rescheduled appointment    [] No-show     Reason given by patient:    [x]  Patient ill    []  Conflicting appointment    [] No transportation      [] Conflict with work    [] No reason given    [] Weather related    [] Other:      Comments:        [x] Next appointment was confirmed    Electronically signed by: Marcela Ortiz, PT

## 2020-01-29 ENCOUNTER — HOSPITAL ENCOUNTER (OUTPATIENT)
Dept: PHYSICAL THERAPY | Facility: CLINIC | Age: 65
Setting detail: THERAPIES SERIES
Discharge: HOME | End: 2020-01-29
Payer: COMMERCIAL

## 2020-01-29 NOTE — FLOWSHEET NOTE
[] [address]  [address]    [phone]  F: [phone]   [] [address]  [facility] [facility] [address]   [address]  P: [phone]  F: [phone]  [] [address]  [address] [facility] [address]  P: [phone]  F: [phone]  [] [address]  [address] [facility] [address]   [address]   [place]: [phone]  F: [phone]   [] [place] [address] [facility] [address]  [place]: [phone]   F: [phone]     Physical Therapy Cancel/No Show note    Date: 2020  Patient: Sonido Dickens  : 1955  MRN: [id number]    Cancels/No Shows to date:    For today's appointment patient:    [x]  Cancelled    [] Rescheduled appointment    [] No-show     Reason given by patient:    [x]  Patient ill    []  Conflicting appointment    [] No transportation      [] Conflict with work    [] No reason given    [] Weather related    [] Other:      Comments:        [] Next appointment was confirmed    Electronically signed by: Crow Brian PT

## 2020-02-03 ENCOUNTER — HOSPITAL ENCOUNTER (OUTPATIENT)
Dept: PHYSICAL THERAPY | Facility: CLINIC | Age: 65
Setting detail: THERAPIES SERIES
Discharge: HOME | End: 2020-02-03
Payer: COMMERCIAL

## 2020-02-03 PROCEDURE — 97110 THERAPEUTIC EXERCISES: CPT

## 2020-02-03 PROCEDURE — G0283 ELEC STIM OTHER THAN WOUND: HCPCS

## 2020-02-03 NOTE — DISCHARGE SUMMARY
pain-met        Patient goals: reduce pain    Treatment to Date:  [x] Therapeutic Exercise    [] Modalities:  [] Therapeutic Activity    [] Ultrasound  [x] Electrical Stimulation  [] Gait Training     [x] Massage       [] Lumbar/Cervical Traction  [] Neuromuscular Re-education [x] hotpack [] Iontophoresis: 4 mg/mL  [x] Instruction in Home Exercise Program                     Dexamethasone Sodium  [] Manual Therapy             Phosphate 40-80 mAmin  [] Aquatic Therapy                   [] Vasocompression/    [] Other:             Game Ready    Discharge Status:     [x] Pt recovered from conditions. Treatment goals were met. [x] Pt to continue exercise/home instructions independently. Electronically signed by Roman Russell PT on 2/3/2020 at 3:12 PM      If you have any questions or concerns, please don't hesitate to call.   Thank you for your referral.

## 2020-02-03 NOTE — FLOWSHEET NOTE
did well with all ex's - no pain    REASSESSMENT: 2/3/2020  HIP STRENGTH 5/5 FLEX, ABD AND EXT  TRUNK ROM WNL FLEX, EXT and B, no pain  SLR neg B , to 90AA   oswestry 2/50   4% deficit,   no difficulty with bending or prolonged sitting      Treatment Charges: Mins Units   [x]  Modalities HP /IFC 15/15 0/1   [x]  Ther Exercise 25 2   []  Manual Therapy     []  Ther Activities     []  Aquatics     []  Vasocompression     [x]  Other massage     Total Treatment time 40        Assessment: [] Progressing toward goals. [] No change. [x] Other: all goals met. Pt feels ready to self treat. She was advised to continue HEP at least 3x/week. Problems:    [x]? ? Back /HIP Pain:                 []? ? Cervical Pain:        [x]? ? ROM: L SPINE                  [x]? ? Strength:  HIP FLEX ,EXT,ABD  [x]? ? Function: Difficulty with sitting over 1 hour, bending,  With cold weather  [x]? Postural Deviations                            STG: (to be met in 6 treatments)2/3/2020  All met  1. ? Pain: 6/10 at max, less frequent-  2. ? Strength: core and B hips  3. ? Function: improve tolerance to bend and prolonged sitting  4. Independent with Home Exercise Programs     LTG: (to be met in 12 treatments) 2/3/2020  1. Pain 2 or less-met  2. Able to sit for 2-3 hours w/o increased pain-met  3. Able to bend without increased pain-met        Patient goals: reduce pain    Pt. Education:  [] Yes  [] No  [x] Reviewed Prior HEP/Ed  Method of Education: [] Verbal   [] Demo  [] Written-  Comprehension of Education:  [] Verbalizes understanding. [] Demonstrates understanding. [] Needs review. [x] Demonstrates/verbalizes HEP/Ed previously given. Plan: Discharge to HEP.          Time In:10am          Time Out: 11:05am    Electronically signed by:  Lorenzo Landaverde, PT

## 2020-02-05 ENCOUNTER — APPOINTMENT (OUTPATIENT)
Dept: PHYSICAL THERAPY | Facility: CLINIC | Age: 65
End: 2020-02-05
Payer: COMMERCIAL

## 2020-02-10 ENCOUNTER — APPOINTMENT (OUTPATIENT)
Dept: PHYSICAL THERAPY | Facility: CLINIC | Age: 65
End: 2020-02-10
Payer: COMMERCIAL

## 2020-02-12 ENCOUNTER — APPOINTMENT (OUTPATIENT)
Dept: PHYSICAL THERAPY | Facility: CLINIC | Age: 65
End: 2020-02-12
Payer: COMMERCIAL

## 2020-02-17 ENCOUNTER — HOSPITAL ENCOUNTER (OUTPATIENT)
Age: 65
Discharge: HOME | End: 2020-02-17
Payer: COMMERCIAL

## 2020-02-17 PROCEDURE — 93005 ELECTROCARDIOGRAM TRACING: CPT

## 2020-02-23 LAB
EKG ATRIAL RATE: 63 BPM
EKG P AXIS: 73 DEGREES
EKG P-R INTERVAL: 130 MS
EKG Q-T INTERVAL: 398 MS
EKG QRS DURATION: 74 MS
EKG QTC CALCULATION (BAZETT): 407 MS
EKG R AXIS: 35 DEGREES
EKG T AXIS: -71 DEGREES
EKG VENTRICULAR RATE: 63 BPM

## 2020-03-18 RX ORDER — CLOBETASOL PROPIONATE 0.5 MG/G
OINTMENT TOPICAL
Qty: 15 G | Refills: 0 | Status: SHIPPED | OUTPATIENT
Start: 2020-03-18 | End: 2020-09-23

## 2020-04-02 ENCOUNTER — HOSPITAL ENCOUNTER (OUTPATIENT)
Age: 65
Setting detail: SPECIMEN
Discharge: HOME | End: 2020-04-02
Payer: COMMERCIAL

## 2020-04-06 LAB — DERMATOLOGY PATHOLOGY REPORT: NORMAL

## 2020-04-28 ENCOUNTER — HOSPITAL ENCOUNTER (OUTPATIENT)
Age: 65
Discharge: HOME | End: 2020-04-28
Payer: COMMERCIAL

## 2020-04-28 LAB
ALT SERPL-CCNC: 13 U/L (ref 5–33)
AST SERPL-CCNC: 18 U/L
CHOLESTEROL, FASTING: 198 MG/DL
CHOLESTEROL/HDL RATIO: 4.8
HDLC SERPL-MCNC: 41 MG/DL
LDL CHOLESTEROL: 106 MG/DL (ref 0–130)
TOTAL CK: 156 U/L (ref 26–192)
TRIGLYCERIDE, FASTING: 255 MG/DL
VLDLC SERPL CALC-MCNC: ABNORMAL MG/DL (ref 1–30)

## 2020-04-28 PROCEDURE — 36415 COLL VENOUS BLD VENIPUNCTURE: CPT

## 2020-04-28 PROCEDURE — 80061 LIPID PANEL: CPT

## 2020-04-28 PROCEDURE — 84450 TRANSFERASE (AST) (SGOT): CPT

## 2020-04-28 PROCEDURE — 84460 ALANINE AMINO (ALT) (SGPT): CPT

## 2020-04-28 PROCEDURE — 82550 ASSAY OF CK (CPK): CPT

## 2020-05-04 ENCOUNTER — HOSPITAL ENCOUNTER (OUTPATIENT)
Dept: GENERAL RADIOLOGY | Age: 65
Discharge: HOME | End: 2020-05-06
Payer: COMMERCIAL

## 2020-05-04 ENCOUNTER — HOSPITAL ENCOUNTER (OUTPATIENT)
Age: 65
Discharge: HOME | End: 2020-05-06
Payer: COMMERCIAL

## 2020-05-04 PROCEDURE — 74019 RADEX ABDOMEN 2 VIEWS: CPT

## 2020-05-28 ENCOUNTER — HOSPITAL ENCOUNTER (OUTPATIENT)
Age: 65
Discharge: HOME | End: 2020-05-28
Payer: COMMERCIAL

## 2020-05-28 LAB
AMYLASE: 65 U/L (ref 28–100)
ANION GAP SERPL CALCULATED.3IONS-SCNC: 15 MMOL/L (ref 9–17)
BUN BLDV-MCNC: 13 MG/DL (ref 8–23)
BUN/CREAT BLD: ABNORMAL (ref 9–20)
CALCIUM SERPL-MCNC: 8.3 MG/DL (ref 8.6–10.4)
CHLORIDE BLD-SCNC: 99 MMOL/L (ref 98–107)
CO2: 24 MMOL/L (ref 20–31)
CREAT SERPL-MCNC: 0.89 MG/DL (ref 0.5–0.9)
GFR AFRICAN AMERICAN: >60 ML/MIN
GFR NON-AFRICAN AMERICAN: >60 ML/MIN
GFR SERPL CREATININE-BSD FRML MDRD: ABNORMAL ML/MIN/{1.73_M2}
GFR SERPL CREATININE-BSD FRML MDRD: ABNORMAL ML/MIN/{1.73_M2}
GLUCOSE BLD-MCNC: 83 MG/DL (ref 70–99)
LIPASE: 18 U/L (ref 13–60)
POTASSIUM SERPL-SCNC: 4.3 MMOL/L (ref 3.7–5.3)
SODIUM BLD-SCNC: 138 MMOL/L (ref 135–144)

## 2020-05-28 PROCEDURE — 36415 COLL VENOUS BLD VENIPUNCTURE: CPT

## 2020-05-28 PROCEDURE — 80048 BASIC METABOLIC PNL TOTAL CA: CPT

## 2020-05-28 PROCEDURE — 82150 ASSAY OF AMYLASE: CPT

## 2020-05-28 PROCEDURE — 83690 ASSAY OF LIPASE: CPT

## 2020-07-20 ENCOUNTER — HOSPITAL ENCOUNTER (OUTPATIENT)
Age: 65
Discharge: HOME | End: 2020-07-20
Payer: COMMERCIAL

## 2020-07-20 LAB
THYROXINE, FREE: 0.86 NG/DL (ref 0.93–1.7)
TSH SERPL DL<=0.05 MIU/L-ACNC: 26.77 MIU/L (ref 0.3–5)

## 2020-07-20 PROCEDURE — 84439 ASSAY OF FREE THYROXINE: CPT

## 2020-07-20 PROCEDURE — 36415 COLL VENOUS BLD VENIPUNCTURE: CPT

## 2020-07-20 PROCEDURE — 84443 ASSAY THYROID STIM HORMONE: CPT

## 2020-08-21 ENCOUNTER — HOSPITAL ENCOUNTER (OUTPATIENT)
Age: 65
Discharge: HOME | End: 2020-08-21
Payer: COMMERCIAL

## 2020-08-21 LAB
EKG ATRIAL RATE: 69 BPM
EKG P AXIS: 56 DEGREES
EKG P-R INTERVAL: 132 MS
EKG Q-T INTERVAL: 402 MS
EKG QRS DURATION: 74 MS
EKG QTC CALCULATION (BAZETT): 430 MS
EKG R AXIS: 49 DEGREES
EKG T AXIS: 19 DEGREES
EKG VENTRICULAR RATE: 69 BPM
THYROXINE, FREE: 1.23 NG/DL (ref 0.93–1.7)
TSH SERPL DL<=0.05 MIU/L-ACNC: 10.34 MIU/L (ref 0.3–5)

## 2020-08-21 PROCEDURE — 36415 COLL VENOUS BLD VENIPUNCTURE: CPT

## 2020-08-21 PROCEDURE — 93005 ELECTROCARDIOGRAM TRACING: CPT | Performed by: NURSE PRACTITIONER

## 2020-08-21 PROCEDURE — 93010 ELECTROCARDIOGRAM REPORT: CPT | Performed by: INTERNAL MEDICINE

## 2020-08-21 PROCEDURE — 84443 ASSAY THYROID STIM HORMONE: CPT

## 2020-08-21 PROCEDURE — 84439 ASSAY OF FREE THYROXINE: CPT

## 2020-09-04 ENCOUNTER — HOSPITAL ENCOUNTER (OUTPATIENT)
Age: 65
Setting detail: OBSERVATION
Discharge: HOME | End: 2020-09-05
Attending: EMERGENCY MEDICINE | Admitting: INTERNAL MEDICINE
Payer: COMMERCIAL

## 2020-09-04 ENCOUNTER — APPOINTMENT (OUTPATIENT)
Dept: GENERAL RADIOLOGY | Age: 65
End: 2020-09-04
Payer: COMMERCIAL

## 2020-09-04 PROBLEM — R07.9 CHEST PAIN: Status: ACTIVE | Noted: 2020-09-04

## 2020-09-04 LAB
ABSOLUTE EOS #: 0.1 K/UL (ref 0–0.4)
ABSOLUTE IMMATURE GRANULOCYTE: ABNORMAL K/UL (ref 0–0.3)
ABSOLUTE LYMPH #: 0.9 K/UL (ref 1–4.8)
ABSOLUTE MONO #: 0.3 K/UL (ref 0.1–1.3)
ALBUMIN SERPL-MCNC: 4.4 G/DL (ref 3.5–5.2)
ALBUMIN/GLOBULIN RATIO: ABNORMAL (ref 1–2.5)
ALP BLD-CCNC: 67 U/L (ref 35–104)
ALT SERPL-CCNC: 16 U/L (ref 5–33)
ANION GAP SERPL CALCULATED.3IONS-SCNC: 12 MMOL/L (ref 9–17)
AST SERPL-CCNC: 20 U/L
BASOPHILS # BLD: 0 % (ref 0–2)
BASOPHILS ABSOLUTE: 0 K/UL (ref 0–0.2)
BILIRUB SERPL-MCNC: 0.32 MG/DL (ref 0.3–1.2)
BUN BLDV-MCNC: 13 MG/DL (ref 8–23)
BUN/CREAT BLD: ABNORMAL (ref 9–20)
CALCIUM SERPL-MCNC: 7.6 MG/DL (ref 8.6–10.4)
CHLORIDE BLD-SCNC: 102 MMOL/L (ref 98–107)
CO2: 27 MMOL/L (ref 20–31)
CREAT SERPL-MCNC: 0.85 MG/DL (ref 0.5–0.9)
DIFFERENTIAL TYPE: ABNORMAL
EOSINOPHILS RELATIVE PERCENT: 2 % (ref 0–4)
GFR AFRICAN AMERICAN: >60 ML/MIN
GFR NON-AFRICAN AMERICAN: >60 ML/MIN
GFR SERPL CREATININE-BSD FRML MDRD: ABNORMAL ML/MIN/{1.73_M2}
GFR SERPL CREATININE-BSD FRML MDRD: ABNORMAL ML/MIN/{1.73_M2}
GLUCOSE BLD-MCNC: 78 MG/DL (ref 70–99)
HCT VFR BLD CALC: 36.8 % (ref 36–46)
HEMOGLOBIN: 12.3 G/DL (ref 12–16)
IMMATURE GRANULOCYTES: ABNORMAL %
INR BLD: 0.9
LYMPHOCYTES # BLD: 16 % (ref 24–44)
MCH RBC QN AUTO: 31.5 PG (ref 26–34)
MCHC RBC AUTO-ENTMCNC: 33.5 G/DL (ref 31–37)
MCV RBC AUTO: 94.2 FL (ref 80–100)
MONOCYTES # BLD: 6 % (ref 1–7)
NRBC AUTOMATED: ABNORMAL PER 100 WBC
PDW BLD-RTO: 14.8 % (ref 11.5–14.9)
PLATELET # BLD: 116 K/UL (ref 150–450)
PLATELET ESTIMATE: ABNORMAL
PMV BLD AUTO: 12.2 FL (ref 6–12)
POTASSIUM SERPL-SCNC: 4.1 MMOL/L (ref 3.7–5.3)
PROTHROMBIN TIME: 12.4 SEC (ref 11.8–14.6)
RBC # BLD: 3.91 M/UL (ref 4–5.2)
RBC # BLD: ABNORMAL 10*6/UL
SEG NEUTROPHILS: 76 % (ref 36–66)
SEGMENTED NEUTROPHILS ABSOLUTE COUNT: 4.6 K/UL (ref 1.3–9.1)
SODIUM BLD-SCNC: 141 MMOL/L (ref 135–144)
TOTAL PROTEIN: 7.5 G/DL (ref 6.4–8.3)
TROPONIN INTERP: NORMAL
TROPONIN INTERP: NORMAL
TROPONIN T: NORMAL NG/ML
TROPONIN T: NORMAL NG/ML
TROPONIN, HIGH SENSITIVITY: 7 NG/L (ref 0–14)
TROPONIN, HIGH SENSITIVITY: 7 NG/L (ref 0–14)
WBC # BLD: 5.9 K/UL (ref 3.5–11)
WBC # BLD: ABNORMAL 10*3/UL

## 2020-09-04 PROCEDURE — 99285 EMERGENCY DEPT VISIT HI MDM: CPT

## 2020-09-04 PROCEDURE — 71046 X-RAY EXAM CHEST 2 VIEWS: CPT

## 2020-09-04 PROCEDURE — G0378 HOSPITAL OBSERVATION PER HR: HCPCS

## 2020-09-04 PROCEDURE — 80053 COMPREHEN METABOLIC PANEL: CPT

## 2020-09-04 PROCEDURE — 85025 COMPLETE CBC W/AUTO DIFF WBC: CPT

## 2020-09-04 PROCEDURE — 73030 X-RAY EXAM OF SHOULDER: CPT

## 2020-09-04 PROCEDURE — 85610 PROTHROMBIN TIME: CPT

## 2020-09-04 PROCEDURE — 93005 ELECTROCARDIOGRAM TRACING: CPT | Performed by: EMERGENCY MEDICINE

## 2020-09-04 PROCEDURE — 2580000003 HC RX 258: Performed by: INTERNAL MEDICINE

## 2020-09-04 PROCEDURE — 84484 ASSAY OF TROPONIN QUANT: CPT

## 2020-09-04 PROCEDURE — 36415 COLL VENOUS BLD VENIPUNCTURE: CPT

## 2020-09-04 RX ORDER — SODIUM CHLORIDE 0.9 % (FLUSH) 0.9 %
10 SYRINGE (ML) INJECTION EVERY 12 HOURS SCHEDULED
Status: DISCONTINUED | OUTPATIENT
Start: 2020-09-04 | End: 2020-09-05 | Stop reason: HOSPADM

## 2020-09-04 RX ORDER — METOCLOPRAMIDE 10 MG/1
10 TABLET ORAL
Status: DISCONTINUED | OUTPATIENT
Start: 2020-09-04 | End: 2020-09-05 | Stop reason: HOSPADM

## 2020-09-04 RX ORDER — ACETAMINOPHEN 325 MG/1
650 TABLET ORAL EVERY 4 HOURS PRN
Status: DISCONTINUED | OUTPATIENT
Start: 2020-09-04 | End: 2020-09-05 | Stop reason: HOSPADM

## 2020-09-04 RX ORDER — SODIUM CHLORIDE 0.9 % (FLUSH) 0.9 %
10 SYRINGE (ML) INJECTION PRN
Status: DISCONTINUED | OUTPATIENT
Start: 2020-09-04 | End: 2020-09-05 | Stop reason: HOSPADM

## 2020-09-04 RX ORDER — CLOPIDOGREL BISULFATE 75 MG/1
75 TABLET ORAL ONCE
Status: DISCONTINUED | OUTPATIENT
Start: 2020-09-04 | End: 2020-09-05 | Stop reason: HOSPADM

## 2020-09-04 RX ADMIN — Medication 10 ML: at 19:29

## 2020-09-04 ASSESSMENT — PAIN DESCRIPTION - ORIENTATION: ORIENTATION: LEFT

## 2020-09-04 ASSESSMENT — ENCOUNTER SYMPTOMS
HEARTBURN: 0
BACK PAIN: 0
SHORTNESS OF BREATH: 0
COUGH: 0
VOMITING: 0
ABDOMINAL PAIN: 0
NAUSEA: 0

## 2020-09-04 ASSESSMENT — PAIN DESCRIPTION - LOCATION: LOCATION: ARM

## 2020-09-04 ASSESSMENT — PAIN DESCRIPTION - FREQUENCY: FREQUENCY: CONTINUOUS

## 2020-09-04 ASSESSMENT — PAIN SCALES - GENERAL
PAINLEVEL_OUTOF10: 3
PAINLEVEL_OUTOF10: 0
PAINLEVEL_OUTOF10: 1

## 2020-09-04 ASSESSMENT — HEART SCORE: ECG: 1

## 2020-09-04 ASSESSMENT — PAIN DESCRIPTION - DESCRIPTORS: DESCRIPTORS: ACHING

## 2020-09-04 ASSESSMENT — PAIN DESCRIPTION - PAIN TYPE: TYPE: ACUTE PAIN

## 2020-09-04 NOTE — PROGRESS NOTES
Dr. Brad Romero notified of pt stating she takes Domperidone 3x daily before meals d/t her GI issues, but she has to go to [facility]) to get the medication since it is banned in the [facility]. Order received for Reglan 3x daily before meals, upon telling the pt that she stated she would rather take pepto bismol than reglan, when asked if she had a reaction, she stated when taking reglan she didn't like the way it made her feel and will not take it. Dr. Brad Romero notified of this also, waiting on a response.

## 2020-09-04 NOTE — ED NOTES
Mode of arrival (squad #, walk in, police, etc) : Pt. Walked into ED        Chief complaint(s): arm pain. Arrival Note (brief scenario, treatment PTA, etc).: Pt. States she had been having sinus issues the past couple of days. Pt. States Tuesday night she took a tylenol sinus and began having pain in her left shoulder. Pt. States the pain has only got worse. Pt. Denies any injury to the shoulder. Pt has full range of motion in both upper extremities. Pt. Is alert and oriented resting on cot in room no signs of distress. C= \"Have you ever felt that you should Cut down on your drinking? \" No  A= \"Have people Annoyed you by criticizing your drinking? \"  No  G= \"Have you ever felt bad or Guilty about your drinking? \"  No  E= \"Have you ever had a drink as an Eye-opener first thing in the morning to steady your nerves or to help a hangover? \"  No      Deferred []      Reason for deferring: N/A    *If yes to two or more: probable alcohol abuse. Dolph Sever, RN  09/04/20 3300

## 2020-09-04 NOTE — PROGRESS NOTES
Paged Dr. Diamante Mendoza for cardiac consult regarding chest pain and ekg changes. Dr. Diamante Mendoza called back and spoke with RN.

## 2020-09-04 NOTE — ED PROVIDER NOTES
Osteoporosis     beginning     Squamous cell carcinoma in situ 6/9/2014    left wrist    Squamous cell carcinoma in situ of skin of left wrist 6/9/2014    left wrist    Thrombocytopenia ([facility] [address]     Vulvar dystrophy      Past Problem List  Patient Active Problem List   Diagnosis Code    Benign neoplasm of skin of upper limb, including shoulder D23.60    Benign neoplasm of skin of trunk, except scrotum D23.5    Rosacea L71.9    Squamous cell carcinoma of skin of upper limb, including shoulder C44.621    Neoplasm of unspecified nature of bone, soft tissue, and skin D49.2    Squamous cell carcinoma in situ D09.9    Squamous cell carcinoma in situ of skin of left wrist D04.62    Allergy to food dye Z91.02    Other plastic surgery for unacceptable cosmetic appearance Z41.1    Diarrhea, functional K59.1    Abdominal pain, right upper quadrant R10.11    Basal cell carcinoma of upper lip C44.01    Actinic keratosis V38.2    Lichen sclerosus A35.1    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    Neoplasm of uncertain behavior of skin D48.5    Hypothyroid E03.9    Gastroparesis K31.84    B12 deficiency E53.8    Acquired von Willebrand's disease ([facility] [address] D68.0    Asthma J45.909    Benign paroxysmal positional vertigo H81.10    Cervical spondylosis without myelopathy M47.812    Diaphragmatic hernia K44.9    Diverticular disease of colon K57.30    Dyspareunia JXM5106    Enthesopathy M77.9    Hip pain M25.559    History of malignant neoplasm of thyroid Z85.850    Hypoparathyroidism (HCC) E20.9    Hyperlipidemia E78.5    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    Iron deficiency anemia D50.9    Irritable bowel syndrome with diarrhea K58.0    Medial epicondylitis M77.00    Mitral valve disorder I05.9    Obstructive sleep apnea syndrome G47.33    Pain in limb M79.609    Postoperative hypothyroidism E89.0    Scoliosis (and kyphoscoliosis), idiopathic M41.20    Sensorineural hearing loss, bilateral H90.3    Thrombocytopenia (HCC) D69.6    Vitamin D deficiency E55.9    Wrist joint pain M25.539    Gastroesophageal reflux disease K21.9    Chest pain R07.9     SURGICAL HISTORY       Past Surgical History:   Procedure Laterality Date    CATARACT REMOVAL Right 07/17/2019    Right eye    CHOLECYSTECTOMY  2011    ELBOW SURGERY      Tendonitis X2    HERNIA REPAIR  2005    hiatal hernia repair    HYSTERECTOMY, TOTAL ABDOMINAL  92-97    [facility] 3 Surgeries    KNEE SURGERY Right 1996    MALIGNANT SKIN LESION EXCISION  2011    rt elbow-scc, rt shoulder-keratosis, lft leg skin with excoriation    NASAL POLYP SURGERY  2012    X2 in [address]    pheregenal flap    SKIN BIOPSY  2012    lft leg keratosis    SKIN BIOPSY  2009    rt forearm, under brst, lft leg-keratosis    SKIN BIOPSY  2008    back and rt forearm-keratosis, abdomen-hemangioma    SKIN BIOPSY  2002    rt breast, lft arm, lft brst-keratosis    THYROIDECTOMY  1985    Thyroid Cancer    TONSILLECTOMY AND ADENOIDECTOMY       CURRENT MEDICATIONS       Previous Medications    ALBUTEROL (PROVENTIL HFA;VENTOLIN HFA) 108 (90 BASE) MCG/ACT INHALER    Inhale 2 puffs into the lungs every 6 hours as needed for Wheezing    CHOLECALCIFEROL (VITAMIN D3) 2000 UNITS CAPS    Take 1 capsule by mouth daily    CLINDAMYCIN (CLEOCIN) 150 MG CAPSULE    Take one PO QID. Start one day prior to surgery. CLOBETASOL (TEMOVATE) 0.05 % CREAM    Apply topically as needed (when she has a lichen sclerosus flare up) Apply topically 2 times daily. CLOBETASOL (TEMOVATE) 0.05 % OINTMENT    Apply topically 2 times daily. LEVOTHYROXINE (SYNTHROID) 50 MCG TABLET    levothyroxine 50 mcg tablet  Take 3 tablets daily.     MOMETASONE (NASONEX) 50 MCG/ACT NASAL SPRAY    2 sprays by Nasal route daily     NONFORMULARY    Indications: domperidone     SIMETHICONE (GAS-X PO)    Take 1 capsule by mouth 2 times daily as needed    VIBERZI 75 MG TABS    Take 75 mg by mouth 2 times daily for 90 days. Saroj Roman     is allergic to latex; aspirin; cefadroxil; gegjqhtx-wnyzefa-mkzjro [fluocinolone]; ciprofloxacin hcl; codeine; demerol hcl [meperidine]; doxycycline; glucosamine; iodine; pcn [penicillins]; red dye; shellfish-derived products; sulfa antibiotics; yellow dye; yellow dyes (non-tartrazine); gadolinium; gadolinium derivatives; lac bovis; and midazolam.  FAMILY HISTORY     She indicated that her mother is . She indicated that her father is . She indicated that one of her three brothers is . She indicated that the status of her other is unknown. SOCIAL HISTORY       Social History     Tobacco Use    Smoking status: Never Smoker    Smokeless tobacco: Never Used   Substance Use Topics    Alcohol use: No     Binge frequency: Never    Drug use: No     PHYSICAL EXAM     INITIAL VITALS: BP (!) 141/85   Pulse 73   Temp 97.2 °F (36.2 °C) (Oral)   Resp 18   Ht 5' 4\" (1.626 m)   Wt 137 lb (62.1 kg)   SpO2 100%   BMI 23.52 kg/m²    Physical Exam  Vitals signs reviewed. Constitutional:       General: She is not in acute distress. Appearance: Normal appearance. She is well-developed. She is not diaphoretic. HENT:      Head: Normocephalic and atraumatic. Right Ear: External ear normal.      Left Ear: External ear normal.      Nose: Nose normal. No congestion. Mouth/Throat:      Mouth: Mucous membranes are moist.      Pharynx: Oropharynx is clear. Eyes:      General:         Right eye: No discharge. Left eye: No discharge. Conjunctiva/sclera: Conjunctivae normal.      Pupils: Pupils are equal, round, and reactive to light. Neck:      Musculoskeletal: Normal range of motion and neck supple. Trachea: No tracheal deviation. Cardiovascular:      Rate and Rhythm: Normal rate and regular rhythm. Pulses: Normal pulses. Heart sounds: Normal heart sounds.       Comments: Radial pulses and pt 2+ BL  Pulmonary: Effort: Pulmonary effort is normal. No respiratory distress. Breath sounds: Normal breath sounds. No stridor. No wheezing or rales. Abdominal:      Palpations: Abdomen is soft. Tenderness: There is no abdominal tenderness. There is no guarding or rebound. Musculoskeletal: Normal range of motion. General: No tenderness or deformity. Skin:     General: Skin is warm and dry. Capillary Refill: Capillary refill takes less than 2 seconds. Findings: No erythema or rash. Neurological:      General: No focal deficit present. Mental Status: She is alert and oriented to person, place, and time. Cranial Nerves: No cranial nerve deficit. Coordination: Coordination normal.   Psychiatric:         Mood and Affect: Mood normal.         Behavior: Behavior normal.         Thought Content: Thought content normal.         Judgment: Judgment normal.         MEDICAL DECISION MAKING:   Heart score 5  Given plavix 75 mg, asa allergy  Admit to [facility] clinic for cp/acs  Do not suspect AD or PE  Consult card dr Jacqueline Matamoros on floor         Procedures    DIAGNOSTIC RESULTS   EKG:All EKG's are interpreted by the Emergency Department Physician who either signs or Co-signs this chart in the absence of a cardiologist.  NSR, nonspecific changes, inverted t waves inf and lateral that are new compared to old ekg, normal rate and normal intervals        RADIOLOGY:All plain film, CT, MRI, and formal ultrasound images (except ED bedside ultrasound) are read by the radiologist, see reports below, unless otherwisenoted in MDM or here. XR SHOULDER LEFT (MIN 2 VIEWS)   Final Result   Mild AC joint osteoarthritis. No acute osseous abnormality. Follow-up imaging recommended if pain persists or worsens following   conservative management.          XR CHEST (2 VW)   Final Result   No acute cardiopulmonary findings           LABS: All lab results were reviewed by myself, and all abnormals are listed below. Labs Reviewed   CBC WITH AUTO DIFFERENTIAL - Abnormal; Notable for the following components:       Result Value    RBC 3.91 (*)     Platelets 310 (*)     MPV 12.2 (*)     Seg Neutrophils 76 (*)     Lymphocytes 16 (*)     Absolute Lymph # 0.90 (*)     All other components within normal limits   COMPREHENSIVE METABOLIC PANEL - Abnormal; Notable for the following components:    Calcium 7.6 (*)     All other components within normal limits   TROPONIN   PROTIME-INR   TROPONIN       EMERGENCY DEPARTMENTCOURSE:         Vitals:    Vitals:    09/04/20 1356   BP: (!) 141/85   Pulse: 73   Resp: 18   Temp: 97.2 °F (36.2 °C)   TempSrc: Oral   SpO2: 100%   Weight: 137 lb (62.1 kg)   Height: 5' 4\" (1.626 m)       The patient was given the following medications while in the emergency department:  Orders Placed This Encounter   Medications    clopidogrel (PLAVIX) tablet 75 mg     CONSULTS:  IP CONSULT TO INTERNAL MEDICINE  IP CONSULT TO CARDIOLOGY    FINAL IMPRESSION      1.  Chest pain, unspecified type          DISPOSITION/PLAN   DISPOSITION Admitted 09/04/2020 04:57:12 PM      PATIENT REFERRED TO:  Mimi Tabares APRN - CNP  [address] [place] [address]  [phone]          Mimi Tabares APRN - CNP  [address] [facility] [phone]          DISCHARGE MEDICATIONS:  New Prescriptions    No medications on file     Royal Sravanthi MD  Attending Emergency Physician                   Royal Sravanthi MD  09/04/20 2859

## 2020-09-05 ENCOUNTER — APPOINTMENT (OUTPATIENT)
Dept: NUCLEAR MEDICINE | Age: 65
End: 2020-09-05
Payer: COMMERCIAL

## 2020-09-05 VITALS
BODY MASS INDEX: 23.39 KG/M2 | RESPIRATION RATE: 16 BRPM | TEMPERATURE: 97.1 F | SYSTOLIC BLOOD PRESSURE: 111 MMHG | HEART RATE: 74 BPM | HEIGHT: 64 IN | DIASTOLIC BLOOD PRESSURE: 76 MMHG | OXYGEN SATURATION: 98 % | WEIGHT: 137 LBS

## 2020-09-05 LAB
ABSOLUTE EOS #: 0.1 K/UL (ref 0–0.4)
ABSOLUTE IMMATURE GRANULOCYTE: ABNORMAL K/UL (ref 0–0.3)
ABSOLUTE LYMPH #: 1.02 K/UL (ref 1–4.8)
ABSOLUTE MONO #: 0.36 K/UL (ref 0.1–1.3)
ANION GAP SERPL CALCULATED.3IONS-SCNC: 10 MMOL/L (ref 9–17)
BASOPHILS # BLD: 1 % (ref 0–2)
BASOPHILS ABSOLUTE: 0.05 K/UL (ref 0–0.2)
BUN BLDV-MCNC: 15 MG/DL (ref 8–23)
BUN/CREAT BLD: ABNORMAL (ref 9–20)
CALCIUM SERPL-MCNC: 7.5 MG/DL (ref 8.6–10.4)
CHLORIDE BLD-SCNC: 103 MMOL/L (ref 98–107)
CO2: 26 MMOL/L (ref 20–31)
CREAT SERPL-MCNC: 0.7 MG/DL (ref 0.5–0.9)
DIFFERENTIAL TYPE: ABNORMAL
EKG ATRIAL RATE: 76 BPM
EKG P AXIS: 45 DEGREES
EKG P-R INTERVAL: 124 MS
EKG Q-T INTERVAL: 362 MS
EKG QRS DURATION: 74 MS
EKG QTC CALCULATION (BAZETT): 407 MS
EKG R AXIS: 10 DEGREES
EKG T AXIS: -60 DEGREES
EKG VENTRICULAR RATE: 76 BPM
EOSINOPHILS RELATIVE PERCENT: 2 % (ref 0–4)
GFR AFRICAN AMERICAN: >60 ML/MIN
GFR NON-AFRICAN AMERICAN: >60 ML/MIN
GFR SERPL CREATININE-BSD FRML MDRD: ABNORMAL ML/MIN/{1.73_M2}
GFR SERPL CREATININE-BSD FRML MDRD: ABNORMAL ML/MIN/{1.73_M2}
GLUCOSE BLD-MCNC: 97 MG/DL (ref 70–99)
HCT VFR BLD CALC: 31.9 % (ref 36–46)
HEMOGLOBIN: 10.7 G/DL (ref 12–16)
IMMATURE GRANULOCYTES: ABNORMAL %
LV EF: 81 %
LVEF MODALITY: NORMAL
LYMPHOCYTES # BLD: 20 % (ref 24–44)
MCH RBC QN AUTO: 31.6 PG (ref 26–34)
MCHC RBC AUTO-ENTMCNC: 33.6 G/DL (ref 31–37)
MCV RBC AUTO: 94 FL (ref 80–100)
MONOCYTES # BLD: 7 % (ref 1–7)
MORPHOLOGY: NORMAL
NRBC AUTOMATED: ABNORMAL PER 100 WBC
PDW BLD-RTO: 14.6 % (ref 11.5–14.9)
PLATELET # BLD: 91 K/UL (ref 150–450)
PLATELET ESTIMATE: ABNORMAL
PMV BLD AUTO: 12.7 FL (ref 6–12)
POTASSIUM SERPL-SCNC: 4.5 MMOL/L (ref 3.7–5.3)
RBC # BLD: 3.39 M/UL (ref 4–5.2)
RBC # BLD: ABNORMAL 10*6/UL
SEG NEUTROPHILS: 70 % (ref 36–66)
SEGMENTED NEUTROPHILS ABSOLUTE COUNT: 3.57 K/UL (ref 1.3–9.1)
SODIUM BLD-SCNC: 139 MMOL/L (ref 135–144)
WBC # BLD: 5.1 K/UL (ref 3.5–11)
WBC # BLD: ABNORMAL 10*3/UL

## 2020-09-05 PROCEDURE — 78452 HT MUSCLE IMAGE SPECT MULT: CPT

## 2020-09-05 PROCEDURE — G0378 HOSPITAL OBSERVATION PER HR: HCPCS

## 2020-09-05 PROCEDURE — 85025 COMPLETE CBC W/AUTO DIFF WBC: CPT

## 2020-09-05 PROCEDURE — 80048 BASIC METABOLIC PNL TOTAL CA: CPT

## 2020-09-05 PROCEDURE — 36415 COLL VENOUS BLD VENIPUNCTURE: CPT

## 2020-09-05 PROCEDURE — 99220 PR INITIAL OBSERVATION CARE/DAY 70 MINUTES: CPT | Performed by: INTERNAL MEDICINE

## 2020-09-05 PROCEDURE — A9500 TC99M SESTAMIBI: HCPCS | Performed by: INTERNAL MEDICINE

## 2020-09-05 PROCEDURE — 6360000002 HC RX W HCPCS: Performed by: INTERNAL MEDICINE

## 2020-09-05 PROCEDURE — 6370000000 HC RX 637 (ALT 250 FOR IP): Performed by: INTERNAL MEDICINE

## 2020-09-05 PROCEDURE — 93017 CV STRESS TEST TRACING ONLY: CPT

## 2020-09-05 PROCEDURE — 2580000003 HC RX 258: Performed by: INTERNAL MEDICINE

## 2020-09-05 PROCEDURE — 3430000000 HC RX DIAGNOSTIC RADIOPHARMACEUTICAL: Performed by: INTERNAL MEDICINE

## 2020-09-05 RX ORDER — NITROGLYCERIN 0.4 MG/1
0.4 TABLET SUBLINGUAL EVERY 5 MIN PRN
Status: DISCONTINUED | OUTPATIENT
Start: 2020-09-05 | End: 2020-09-05 | Stop reason: HOSPADM

## 2020-09-05 RX ORDER — LEVOTHYROXINE SODIUM 0.05 MG/1
50 TABLET ORAL DAILY
Status: DISCONTINUED | OUTPATIENT
Start: 2020-09-05 | End: 2020-09-05 | Stop reason: HOSPADM

## 2020-09-05 RX ORDER — METOPROLOL TARTRATE 5 MG/5ML
5 INJECTION INTRAVENOUS EVERY 5 MIN PRN
Status: DISCONTINUED | OUTPATIENT
Start: 2020-09-05 | End: 2020-09-05 | Stop reason: HOSPADM

## 2020-09-05 RX ORDER — ALBUTEROL SULFATE 90 UG/1
2 AEROSOL, METERED RESPIRATORY (INHALATION) EVERY 6 HOURS PRN
Status: DISCONTINUED | OUTPATIENT
Start: 2020-09-05 | End: 2020-09-05 | Stop reason: HOSPADM

## 2020-09-05 RX ORDER — AMINOPHYLLINE DIHYDRATE 25 MG/ML
50 INJECTION, SOLUTION INTRAVENOUS PRN
Status: DISCONTINUED | OUTPATIENT
Start: 2020-09-05 | End: 2020-09-05 | Stop reason: HOSPADM

## 2020-09-05 RX ORDER — SODIUM CHLORIDE 0.9 % (FLUSH) 0.9 %
10 SYRINGE (ML) INJECTION PRN
Status: DISCONTINUED | OUTPATIENT
Start: 2020-09-05 | End: 2020-09-05 | Stop reason: HOSPADM

## 2020-09-05 RX ORDER — SODIUM CHLORIDE 9 MG/ML
500 INJECTION, SOLUTION INTRAVENOUS CONTINUOUS PRN
Status: DISCONTINUED | OUTPATIENT
Start: 2020-09-05 | End: 2020-09-05 | Stop reason: HOSPADM

## 2020-09-05 RX ORDER — ATROPINE SULFATE 0.1 MG/ML
0.5 INJECTION INTRAVENOUS EVERY 5 MIN PRN
Status: DISCONTINUED | OUTPATIENT
Start: 2020-09-05 | End: 2020-09-05 | Stop reason: HOSPADM

## 2020-09-05 RX ORDER — ALBUTEROL SULFATE 90 UG/1
2 AEROSOL, METERED RESPIRATORY (INHALATION) PRN
Status: DISCONTINUED | OUTPATIENT
Start: 2020-09-05 | End: 2020-09-05 | Stop reason: HOSPADM

## 2020-09-05 RX ADMIN — ACETAMINOPHEN 650 MG: 325 TABLET, FILM COATED ORAL at 10:43

## 2020-09-05 RX ADMIN — Medication 10 ML: at 08:46

## 2020-09-05 RX ADMIN — Medication 10 ML: at 10:31

## 2020-09-05 RX ADMIN — TETRAKIS(2-METHOXYISOBUTYLISOCYANIDE)COPPER(I) TETRAFLUOROBORATE 37.8 MILLICURIE: 1 INJECTION, POWDER, LYOPHILIZED, FOR SOLUTION INTRAVENOUS at 09:21

## 2020-09-05 RX ADMIN — Medication 10 ML: at 07:25

## 2020-09-05 RX ADMIN — REGADENOSON 0.4 MG: 0.08 INJECTION, SOLUTION INTRAVENOUS at 09:17

## 2020-09-05 RX ADMIN — TETRAKIS(2-METHOXYISOBUTYLISOCYANIDE)COPPER(I) TETRAFLUOROBORATE 9.2 MILLICURIE: 1 INJECTION, POWDER, LYOPHILIZED, FOR SOLUTION INTRAVENOUS at 07:25

## 2020-09-05 ASSESSMENT — PAIN SCALES - GENERAL: PAINLEVEL_OUTOF10: 3

## 2020-09-05 NOTE — PROGRESS NOTES
lexiscan stress test complete. Patient had very brief sob and slight nausea.  Caffeine given for reversal. Pt. Did c/o headache but stated she had that before test.

## 2020-09-05 NOTE — PLAN OF CARE
Problem: Falls - Risk of:  Goal: Will remain free from falls  Description: Will remain free from falls  Outcome: Ongoing  Note: Pt remains free from falls this shift. Bed in lowest position. Calls appropriately. Goal: Absence of physical injury  Description: Absence of physical injury  Outcome: Ongoing     Problem: Pain:  Goal: Pain level will decrease  Description: Pain level will decrease  Outcome: Ongoing  Note: Pt denies pain this shift.    Goal: Control of acute pain  Description: Control of acute pain  Outcome: Ongoing  Goal: Control of chronic pain  Description: Control of chronic pain  Outcome: Ongoing

## 2020-09-05 NOTE — H&P
SCOTT Matthew 51    HISTORY AND PHYSICAL EXAMINATION            Date:   9/5/2020  Patient name:  Renata Waters  Date of admission:  9/4/2020  2:42 PM  MRN:   [id number]  Account:  [de-identified]  YOB: 1955  PCP:    CHARLOTTE Yoon CNP  Room:   2116/2116-01  Code Status:    Full Code    Chief Complaint:     Chief Complaint   Patient presents with    Arm Pain     denies injury        History Obtained From:     patient, electronic medical record    History of Present Illness: The patient is a 75 y.o. Non-/non  female who presents with Arm Pain (denies injury )   and she is admitted to the hospital for the management of chest pain. Patient past medical history of hypothyroidism, IBS, she noticed chest pain, chest pain started Tuesday, pain is mainly affecting left arm, left side of chest, intermittent nature, dull aching. No injury, trauma, fall. No aggravating or relieving factor. Patient is a very poor historian  She has history of IBS diarrhea variety, on Viberzi, history of thyroid cancer on Synthroid  At the time of my assessment, chest pain has completely resolved        Past Medical History:     Past Medical History:   Diagnosis Date    Allergy to food dye     yellow and red dyes    Anemia     Asthma     Cancer ([facility] [address]     Diverticulitis     Gastroparesis     Hiatal hernia     History of squamous cell carcinoma in situ of skin 6/9/2014    left wrist    IBS (irritable bowel syndrome)     diarrhea-prominent type    Lichen sclerosus     MVP (mitral valve prolapse)     Neoplasm of unspecified nature of bone, soft tissue, and skin 5/20/2014    New lesions of left arm, right arm, and right thigh.  Neoplasm of unspecified nature of bone, soft tissue, and skin     New lesions of right dorsum of hand (healed after Efudex) and lesion of right anterior thigh.     Osteoarthritis     Osteoporosis     beginning     Squamous cell carcinoma in situ 6/9/2014    left wrist    Squamous cell carcinoma in situ of skin of left wrist 6/9/2014    left wrist    Thrombocytopenia ([address]     Vulvar dystrophy         Past Surgical History:     Past Surgical History:   Procedure Laterality Date    CATARACT REMOVAL Right 07/17/2019    Right eye    CHOLECYSTECTOMY  2011    ELBOW SURGERY      Tendonitis X2    HERNIA REPAIR  2005    hiatal hernia repair    HYSTERECTOMY, TOTAL ABDOMINAL  92-97    [facility] 3 Surgeries    KNEE SURGERY Right 1996    MALIGNANT SKIN LESION EXCISION  2011    rt elbow-scc, rt shoulder-keratosis, lft leg skin with excoriation    NASAL POLYP SURGERY  2012    X2 in [address]    pheregenal flap    SKIN BIOPSY  2012    lft leg keratosis    SKIN BIOPSY  2009    rt forearm, under brst, lft leg-keratosis    SKIN BIOPSY  2008    back and rt forearm-keratosis, abdomen-hemangioma    SKIN BIOPSY  2002    rt breast, lft arm, lft brst-keratosis    THYROIDECTOMY  1985    Thyroid Cancer    TONSILLECTOMY AND ADENOIDECTOMY          Medications Prior to Admission:     Prior to Admission medications    Medication Sig Start Date End Date Taking? Authorizing Provider   NONFORMULARY Take 10 mg by mouth 3 times daily Domperidone (Jamp)   Yes Historical Provider, MD CONTE 75 MG TABS Take 75 mg by mouth 2 times daily for 90 days. ALFREDO 9/2/20 12/1/20 Yes Magdalene Tabares, APRN - CNP   levothyroxine (SYNTHROID) 50 MCG tablet levothyroxine 50 mcg tablet  Take 3 tablets daily. 9/16/19  Yes Alton Brown MD   clobetasol (TEMOVATE) 0.05 % cream Apply topically as needed (when she has a lichen sclerosus flare up) Apply topically 2 times daily.  9/6/19  Yes Malia Martinez MD   albuterol (PROVENTIL HFA;VENTOLIN HFA) 108 (90 BASE) MCG/ACT inhaler Inhale 2 puffs into the lungs every 6 hours as needed for Wheezing   Yes Historical Provider, MD   clobetasol (TEMOVATE) 0.05 % ointment Apply topically 2 times daily. 3/18/20   Malia Martinez MD   clindamycin (CLEOCIN) 150 MG capsule Take one PO QID. Start one day prior to surgery. 3/10/20   Malina Hill MD   Cholecalciferol (VITAMIN D3) 2000 units CAPS Take 1 capsule by mouth daily    Historical Provider, MD   Simethicone (GAS-X PO) Take 1 capsule by mouth 2 times daily as needed    Historical Provider, MD   mometasone (NASONEX) 50 MCG/ACT nasal spray 2 sprays by Nasal route daily  5/1/17   Historical Provider, MD        Allergies:     Latex; Aspirin; Cefadroxil; Ecxpsvos-paaiiwd-[id number] [fluocinolone]; Ciprofloxacin hcl; Codeine; Demerol hcl [meperidine]; Doxycycline; Glucosamine; Iodine; Pcn [penicillins]; Red dye; Shellfish-derived products; Sulfa antibiotics; Yellow dye; Yellow dyes (non-tartrazine); Gadolinium; Gadolinium derivatives; Lac bovis; and Midazolam    Social History:     Tobacco:    reports that she has never smoked. She has never used smokeless tobacco.  Alcohol:      reports no history of alcohol use. Drug Use:  reports no history of drug use. Family History:     Family History   Problem Relation Age of Onset    Other Mother         Hernia Surgery (passed after surgery)    Coronary Art Dis Mother     Liver Cancer Father         age 80    Coronary Art Dis Father     Liver Cancer Brother 59    Cancer Brother 59        Bowel & Liver cancer    Diabetes Other         Father's side    Thyroid Disease Other         Mom's side       Review of Systems:     Positive and Negative as described in HPI. CONSTITUTIONAL:  negative for fevers, chills, sweats, fatigue, weight loss  HEENT:  negative for vision, hearing changes, runny nose, throat pain  RESPIRATORY:  negative for shortness of breath, cough, congestion, wheezing. CARDIOVASCULAR:  negative for chest pain, palpitations.   GASTROINTESTINAL:  negative for nausea, vomiting, diarrhea, constipation, change in bowel habits, abdominal pain   GENITOURINARY:  negative for difficulty of urination, burning with urination, frequency   INTEGUMENT:  negative for rash, skin lesions, easy bruising   HEMATOLOGIC/LYMPHATIC:  negative for swelling/edema   ALLERGIC/IMMUNOLOGIC:  negative for urticaria , itching  ENDOCRINE:  negative increase in drinking, increase in urination, hot or cold intolerance  MUSCULOSKELETAL: Pain in left arm, left side of chest  NEUROLOGICAL:  negative for headaches, dizziness, lightheadedness, numbness, pain, tingling extremities  BEHAVIOR/PSYCH:  negative for depression, anxiety    Physical Exam:   BP (!) 142/75   Pulse 66   Temp 97.9 °F (36.6 °C) (Oral)   Resp 16   Ht 5' 4\" (1.626 m)   Wt 137 lb (62.1 kg)   SpO2 96%   BMI 23.52 kg/m²   Temp (24hrs), Av.6 °F (36.4 °C), Min:97.2 °F (36.2 °C), Max:97.9 °F (36.6 °C)    No results for input(s): POCGLU in the last 72 hours. No intake or output data in the 24 hours ending 20 1042    General Appearance:  alert, well appearing, and in no acute distress  Mental status: oriented to person, place, and time with normal affect  Head:  normocephalic, atraumatic. Eye: no icterus, redness, pupils equal and reactive, extraocular eye movements intact, conjunctiva clear  Ear: normal external ear, no discharge, hearing intact  Nose:  no drainage noted  Mouth: mucous membranes moist  Neck: supple, no carotid bruits, thyroid not palpable  Lungs: Bilateral equal air entry, clear to ausculation, no wheezing, rales or rhonchi, normal effort  Cardiovascular: normal rate, regular rhythm, no murmur, gallop, rub.   Abdomen: Soft, nontender, nondistended, normal bowel sounds, no hepatomegaly or splenomegaly  Neurologic: There are no new focal motor or sensory deficits, normal muscle tone and bulk, no abnormal sensation, normal speech, cranial nerves II through XII grossly intact  Skin: No gross lesions, rashes, bruising or bleeding on exposed skin area  Extremities: Restricted movement of left arm  Psych: normal affect     Investigations: Laboratory Testing:  Recent Results (from the past 24 hour(s))   EKG 12 Lead    Collection Time: 09/04/20  2:00 PM   Result Value Ref Range    Ventricular Rate 76 BPM    Atrial Rate 76 BPM    P-R Interval 124 ms    QRS Duration 74 ms    Q-T Interval 362 ms    QTc Calculation (Bazett) 407 ms    P Axis 45 degrees    R Axis 10 degrees    T Axis -60 degrees   CBC Auto Differential    Collection Time: 09/04/20  3:05 PM   Result Value Ref Range    WBC 5.9 3.5 - 11.0 k/uL    RBC 3.91 (L) 4.0 - 5.2 m/uL    Hemoglobin 12.3 12.0 - 16.0 g/dL    Hematocrit 36.8 36 - 46 %    MCV 94.2 80 - 100 fL    MCH 31.5 26 - 34 pg    MCHC 33.5 31 - 37 g/dL    RDW 14.8 11.5 - 14.9 %    Platelets 678 (L) 553 - 450 k/uL    MPV 12.2 (H) 6.0 - 12.0 fL    NRBC Automated NOT REPORTED per 100 WBC    Differential Type NOT REPORTED     Seg Neutrophils 76 (H) 36 - 66 %    Lymphocytes 16 (L) 24 - 44 %    Monocytes 6 1 - 7 %    Eosinophils % 2 0 - 4 %    Basophils 0 0 - 2 %    Immature Granulocytes NOT REPORTED 0 %    Segs Absolute 4.60 1.3 - 9.1 k/uL    Absolute Lymph # 0.90 (L) 1.0 - 4.8 k/uL    Absolute Mono # 0.30 0.1 - 1.3 k/uL    Absolute Eos # 0.10 0.0 - 0.4 k/uL    Basophils Absolute 0.00 0.0 - 0.2 k/uL    Absolute Immature Granulocyte NOT REPORTED 0.00 - 0.30 k/uL    WBC Morphology NOT REPORTED     RBC Morphology NOT REPORTED     Platelet Estimate NOT REPORTED    Comprehensive Metabolic Panel    Collection Time: 09/04/20  3:05 PM   Result Value Ref Range    Glucose 78 70 - 99 mg/dL    BUN 13 8 - 23 mg/dL    CREATININE 0.85 0.50 - 0.90 mg/dL    Bun/Cre Ratio NOT REPORTED 9 - 20    Calcium 7.6 (L) 8.6 - 10.4 mg/dL    Sodium 141 135 - 144 mmol/L    Potassium 4.1 3.7 - 5.3 mmol/L    Chloride 102 98 - 107 mmol/L    CO2 27 20 - 31 mmol/L    Anion Gap 12 9 - 17 mmol/L    Alkaline Phosphatase 67 35 - 104 U/L    ALT 16 5 - 33 U/L    AST 20 <32 U/L    Total Bilirubin 0.32 0.3 - 1.2 mg/dL    Total Protein 7.5 6.4 - 8.3 g/dL    Alb 4.4 3.5 - 5.2 g/dL Albumin/Globulin Ratio NOT REPORTED 1.0 - 2.5    GFR Non-African American >60 >60 mL/min    GFR African American >60 >60 mL/min    GFR Comment          GFR Staging NOT REPORTED    Troponin    Collection Time: 09/04/20  3:05 PM   Result Value Ref Range    Troponin, High Sensitivity 7 0 - 14 ng/L    Troponin T NOT REPORTED <0.03 ng/mL    Troponin Interp NOT REPORTED    Protime-INR    Collection Time: 09/04/20  3:05 PM   Result Value Ref Range    Protime 12.4 11.8 - 14.6 sec    INR 0.9    Troponin    Collection Time: 09/04/20  5:18 PM   Result Value Ref Range    Troponin, High Sensitivity 7 0 - 14 ng/L    Troponin T NOT REPORTED <0.03 ng/mL    Troponin Interp NOT REPORTED    CBC with DIFF    Collection Time: 09/05/20  4:24 AM   Result Value Ref Range    WBC 5.1 3.5 - 11.0 k/uL    RBC 3.39 (L) 4.0 - 5.2 m/uL    Hemoglobin 10.7 (L) 12.0 - 16.0 g/dL    Hematocrit 31.9 (L) 36 - 46 %    MCV 94.0 80 - 100 fL    MCH 31.6 26 - 34 pg    MCHC 33.6 31 - 37 g/dL    RDW 14.6 11.5 - 14.9 %    Platelets 91 (L) 456 - 450 k/uL    MPV 12.7 (H) 6.0 - 12.0 fL    NRBC Automated NOT REPORTED per 100 WBC    Differential Type NOT REPORTED     Immature Granulocytes NOT REPORTED 0 %    Absolute Immature Granulocyte NOT REPORTED 0.00 - 0.30 k/uL    WBC Morphology NOT REPORTED     RBC Morphology NOT REPORTED     Platelet Estimate NOT REPORTED     Seg Neutrophils 70 (H) 36 - 66 %    Lymphocytes 20 (L) 24 - 44 %    Monocytes 7 1 - 7 %    Eosinophils % 2 0 - 4 %    Basophils 1 0 - 2 %    Segs Absolute 3.57 1.3 - 9.1 k/uL    Absolute Lymph # 1.02 1.0 - 4.8 k/uL    Absolute Mono # 0.36 0.1 - 1.3 k/uL    Absolute Eos # 0.10 0.0 - 0.4 k/uL    Basophils Absolute 0.05 0.0 - 0.2 k/uL    Morphology Normal    BASIC METABOLIC PANEL    Collection Time: 09/05/20  4:24 AM   Result Value Ref Range    Glucose 97 70 - 99 mg/dL    BUN 15 8 - 23 mg/dL    CREATININE 0.70 0.50 - 0.90 mg/dL    Bun/Cre Ratio NOT REPORTED 9 - 20    Calcium 7.5 (L) 8.6 - 10.4 mg/dL Sodium 139 135 - 144 mmol/L    Potassium 4.5 3.7 - 5.3 mmol/L    Chloride 103 98 - 107 mmol/L    CO2 26 20 - 31 mmol/L    Anion Gap 10 9 - 17 mmol/L    GFR Non-African American >60 >60 mL/min    GFR African American >60 >60 mL/min    GFR Comment          GFR Staging NOT REPORTED        Imaging/Diagnostics:        Assessment :      Primary Problem  <principal problem not specified>    Active Hospital Problems    Diagnosis Date Noted    Chest pain [R07.9] 09/04/2020    Hypothyroid [E03.9] 06/06/2019    [facility] [D68.0] 10/30/2018    Irritable bowel syndrome with diarrhea [K58.0] 09/27/2012       Plan:     Patient status Admit as inpatient in the  Progressive Unit/Step down    1. Patient presented with pain in left arm, left side of chest, stress test, nuclear part is negative, EKG is normal, troponins normal, evaluated by cardiologist  2. History of thyroid cancer on Synthroid, TSH is 10, patient dose of Synthroid need to be adjusted, as per nursing assessment, patient is refusing her meds, I suspect she has issues of noncompliance, her TSH should be less than 0.01, discussed with patient and family, need to follow-up with endocrinologist as outpatient  3. History of IBS with diarrhea on Viberzi, controlled  4. Likely discharge later today  5. Ordering x-ray of left shoulder to rule out arthritis    Consultations:   [address] [place] [address] CONSULT TO CARDIOLOGY     Patient is admitted as inpatient status because of co-morbidities listed above, severity of signs and symptoms as outlined, requirement for current medical therapies and most importantly because of direct risk to patient if care not provided in a hospital setting. Canelo Galaviz MD  9/5/2020  10:42 AM    Copy sent to Dr. Rj Rinaldi, APRN - CNP    Please note that this chart was generated using voice recognition Dragon dictation software.   Although every effort was made to ensure the accuracy of this automated transcription, some errors in transcription may have occurred.

## 2020-09-05 NOTE — CONSULTS
07/17/2019). Home Medications:    Prior to Admission medications    Medication Sig Start Date End Date Taking? Authorizing Provider   NONFORMULARY Take 10 mg by mouth 3 times daily Domperidone (Jamp)   Yes Historical Provider, MD   VIBERZI 75 MG TABS Take 75 mg by mouth 2 times daily for 90 days. ALFREDO 9/2/20 12/1/20 Yes Magdalene Tabares, APRN - CNP   levothyroxine (SYNTHROID) 50 MCG tablet levothyroxine 50 mcg tablet  Take 3 tablets daily. 9/16/19  Yes Heather Zayas MD   clobetasol (TEMOVATE) 0.05 % cream Apply topically as needed (when she has a lichen sclerosus flare up) Apply topically 2 times daily. 9/6/19  Yes Sylvia Arias MD   albuterol (PROVENTIL HFA;VENTOLIN HFA) 108 (90 BASE) MCG/ACT inhaler Inhale 2 puffs into the lungs every 6 hours as needed for Wheezing   Yes Historical Provider, MD   clobetasol (TEMOVATE) 0.05 % ointment Apply topically 2 times daily. 3/18/20   Sylvia Arias MD   clindamycin (CLEOCIN) 150 MG capsule Take one PO QID. Start one day prior to surgery. 3/10/20   Nitin Francisco MD   Cholecalciferol (VITAMIN D3) 2000 units CAPS Take 1 capsule by mouth daily    Historical Provider, MD   Simethicone (GAS-X PO) Take 1 capsule by mouth 2 times daily as needed    Historical Provider, MD   mometasone (NASONEX) 50 MCG/ACT nasal spray 2 sprays by Nasal route daily  5/1/17   Historical Provider, MD       Allergies:  Latex; Aspirin; Cefadroxil; [id number]-kstqvad-pocpqj [fluocinolone]; Ciprofloxacin hcl; Codeine; Demerol hcl [meperidine]; Doxycycline; Glucosamine; Iodine; Pcn [penicillins]; Red dye; Shellfish-derived products; Sulfa antibiotics; Yellow dye; Yellow dyes (non-tartrazine); Gadolinium; Gadolinium derivatives; Lac bovis; and Midazolam    Social History:   reports that she has never smoked. She has never used smokeless tobacco. She reports that she does not drink alcohol or use drugs.      Family History: family history includes Cancer (age of onset: 59) in her brother; Coronary Art Dis in her father and mother; Diabetes in an other family member; JoséL uis Bowers in her father; Liver Cancer (age of onset: 59) in her brother; Other in her mother; Thyroid Disease in an other family member. No h/o sudden cardiac death. No for premature CAD    REVIEW OF SYSTEMS:    · Constitutional: there has been no unanticipated weight loss. There's been No change in energy level, No change in activity level. · Eyes: No visual changes or diplopia. No scleral icterus. · ENT: No Headaches, hearing loss or vertigo. No mouth sores or sore throat. · Cardiovascular: see above  · Respiratory: see above  · Gastrointestinal: No abdominal pain, appetite loss, blood in stools. · Genitourinary: No dysuria, trouble voiding, or hematuria. · Musculoskeletal:  No gait disturbance, No weakness or joint complaints. · Integumentary: No rash or pruritis. · Neurological: No headache or diplopia. No tingling  · Psychiatric: No anxiety, or depression. · Endocrine: No temperature intolerance. · Hematologic/Lymphatic: No abnormal bruising or bleeding, blood clots or swollen lymph nodes. · Allergic/Immunologic: No nasal congestion or hives. PHYSICAL EXAM:      /71   Pulse 64   Temp 97.8 °F (36.6 °C) (Oral)   Resp 16   Ht 5' 4\" (1.626 m)   Wt 137 lb (62.1 kg)   SpO2 97%   BMI 23.52 kg/m²    Constitutional and General Appearance: alert, cooperative, no distress and appears stated age  HEENT: PERRL, no cervical lymphadenopathy. No masses palpable. Normal oral mucosa  Respiratory:  · Normal excursion and expansion without use of accessory muscles  · Resp Auscultation: Good respiratory effort. No for increased work of breathing.  On auscultation: clear to auscultation bilaterally  Cardiovascular:  · Heart tones are crisp and normal. regular S1 and S2.  · Jugular venous pulsation Normal  · The carotid upstroke is normal in amplitude and contour without delay or bruit   Abdomen:   · soft  · Bowel sounds present  Extremities:  ·  No edema  Neurological:  · Alert and oriented. DATA:    Diagnostics:    EKG: SR, PAC, NS T wave abnormality  Labs:     CBC:   Recent Labs     09/04/20  1505 09/05/20  0424   WBC 5.9 5.1   HGB 12.3 10.7*   HCT 36.8 31.9*   * 91*     BMP:   Recent Labs     09/04/20  1505 09/05/20  0424    139   K 4.1 4.5   CO2 27 26   BUN 13 15   CREATININE 0.85 0.70   LABGLOM >60 >60   GLUCOSE 78 97     BNP: No results for input(s): BNP in the last 72 hours. PT/INR:   Recent Labs     09/04/20  1505   PROTIME 12.4   INR 0.9     APTT:No results for input(s): APTT in the last 72 hours. CARDIAC ENZYMES:No results for input(s): CKTOTAL, CKMB, CKMBINDEX, TROPONINI in the last 72 hours.   FASTING LIPID PANEL:  Lab Results   Component Value Date    HDL 41 04/28/2020     LIVER PROFILE:  Recent Labs     09/04/20  1505   AST 20   ALT 16   LABALBU 4.4       IMPRESSION:    Patient Active Problem List   Diagnosis    Benign neoplasm of skin of upper limb, including shoulder    Benign neoplasm of skin of trunk, except scrotum    Rosacea    Squamous cell carcinoma of skin of upper limb, including shoulder    Neoplasm of unspecified nature of bone, soft tissue, and skin    Squamous cell carcinoma in situ    Squamous cell carcinoma in situ of skin of left wrist    Allergy to food dye    Other plastic surgery for unacceptable cosmetic appearance    Diarrhea, functional    Abdominal pain, right upper quadrant    Basal cell carcinoma of upper lip    Actinic keratosis    Lichen sclerosus    Vulvar dystrophy    Neoplasm of uncertain behavior of skin    Hypothyroid    Gastroparesis    B12 deficiency    Acquired [facility]'s disease ([facility] [address]    Asthma    Benign paroxysmal positional vertigo    Cervical spondylosis without myelopathy    Diaphragmatic hernia    Diverticular disease of colon    Dyspareunia    Enthesopathy    Hip pain    History of malignant neoplasm of thyroid    Hypoparathyroidism ([facility]    Hyperlipidemia    Intestinal disaccharidase deficiency    Iron deficiency anemia    Irritable bowel syndrome with diarrhea    Medial epicondylitis    Mitral valve disorder    Obstructive sleep apnea syndrome    Pain in limb    Postoperative hypothyroidism    Scoliosis (and kyphoscoliosis), idiopathic    Sensorineural hearing loss, bilateral    Thrombocytopenia (HCC)    Vitamin D deficiency    Wrist joint pain    Gastroesophageal reflux disease    Chest pain     Atypical chest pain  Thrombocytopenia  IBS  Palpitations  Hypothyroidism. TSH 10.34    RECOMMENDATIONS:  1. Patient had stress test today. If no ischemia, can be discharged  2. Metoprolol 25mg po BID  3. Recommend holter monitor for 2 weeks  4. Primary team to address thyroid profile      Discussed with patient and spouse and Nurse.     Sylvia Nguyen [id number] Cardiology Consult           [phone]

## 2020-09-08 NOTE — DISCHARGE SUMMARY
[facility] Internal Medicine    Discharge Summary     Patient ID: Shahrzad Ledbetter  :  1955   MRN: [id number]     ACCOUNT:  [de-identified]   Patient's PCP: CHARLOTTE Irizarry CNP  Admit Date: 2020   Discharge Date: 2020    Length of Stay: 0  Code Status:  Prior  Admitting Physician: Canelo Galaviz MD  Discharge Physician: Canelo Galaviz MD     Active Discharge Diagnoses:     Primary Problem  <principal problem not specified>      [place] Problems    Diagnosis Date Noted    Chest pain [R07.9] 2020    Hypothyroid [E03.9] 2019    [facility] [D68.0] 10/30/2018    Irritable bowel syndrome with diarrhea [K58.0] 2012       Admission Condition:  Poor     Discharged Condition: fair    Hospital Stay:     Hospital Course:  Shahrzad Ledbetter is a 75 y.o. female who was admitted for the management of <principal problem not specified> , presented to ER with Arm Pain (denies injury )  Patient has history of IBS, admitted with chest pain, patient underwent stress test which was negative  Patient has history of thyroid cancer, her TSH was high, patient was explained that she need to take Synthroid regularly, to keep TSH less than 0.01, she need to follow with endocrinologist as outpatient  Patient has history of noncompliance with medication        Significant therapeutic interventions:     Significant Diagnostic Studies:   Labs / Micro:        ,     Radiology:    Xr Chest (2 Vw)    Result Date: 2020  EXAMINATION: TWO XRAY VIEWS OF THE CHEST 2020 3:13 pm COMPARISON: 2015, chest examination HISTORY: ORDERING SYSTEM PROVIDED HISTORY: cp TECHNOLOGIST PROVIDED HISTORY: cp Reason for Exam: Pt states left shoulder and chest pain 4 days. No trauma.  Acuity: Unknown Type of Exam: Unknown FINDINGS: Stable normal cardiopericardial silhouette Mild tortuosity of the thoracic aorta No significant pleural or parenchymal findings Mild dextroscoliosis of the upper thoracic spine. No acute cardiopulmonary findings     Xr Shoulder Left (min 2 Views)    Result Date: 9/4/2020  EXAMINATION: 3 XRAY VIEWS OF THE LEFT SHOULDER 9/4/2020 3:14 pm COMPARISON: None. HISTORY: ORDERING SYSTEM PROVIDED HISTORY: pain TECHNOLOGIST PROVIDED HISTORY: pain Reason for Exam: Pt states left shoulder and chest pain 4 days. No trauma. Acuity: Acute Type of Exam: Initial FINDINGS: Osseous structures of the left shoulder are intact and aligned normally. AC joint space narrowing with marginal osteophytosis reflects AC joint osteoarthritis. No retained radiopaque foreign body. Visualized portions of the upper outer left hemithorax appear unremarkable. Mild AC joint osteoarthritis. No acute osseous abnormality. Follow-up imaging recommended if pain persists or worsens following conservative management. Nm Cardiac Stress Test Nuclear Imaging    Result Date: 9/5/2020  EXAMINATION: MYOCARDIAL PERFUSION IMAGING 9/5/2020 7:25 am TECHNIQUE: For the rest study, 9.2 mCi of Tc 99 labeled sestamibi were injected. SPECT images were acquired. Under cardiology supervision, 0.4mg Marnee Rubinstein was infused. After pharmacologic stress, 37.8 mCi of Tc 99 labeled sestamibi were injected. SPECT images with ECG gating were acquired. COMPARISON: 01/27/2017 HISTORY: ORDERING SYSTEM PROVIDED HISTORY: Chest pain TECHNOLOGIST PROVIDED HISTORY: Reason for Exam: Chest pain Procedure Type->Rx unable to exercise Reason for Exam: Chest pain Acuity: Unknown Type of Exam: Unknown FINDINGS: Images interpreted utilizing [facility] and [employer]. There is normal distribution of radiotracer throughout the myocardium without evidence for a significant reversible or fixed perfusion defect. The gated images show no wall motion abnormalities. Normal myocardial thickening. Perfusion scores are visually adjusted to account for artifact.  Summed stress score:  0 Summed rest score:  0 Summed reversibility score:  0 Function: End diastolic volume:  25VT Left ventricular ejection fraction:  81% TID score:  1.07 (scores greater than 1.39 are considered elevated for Lexiscan stress with Tc99m) Notes concerning risk stratification: Risk stratification incorporates both clinical history and some testing results. Final risk determination is the responsibility of the ordering provider as other patient information and test results may increase or decrease the risk assessment reported for this examination. Risk stratification criteria are adapted from \"Noninvasive Risk Stratification\" criteria from [place]. Al, ACC/AATS/AHA/ASE/ASNC/SCAI/SCCT/STS 2017 Appropriate Use Criteria For Coronary Revascularization in Patients With Stable Ischemic Heart Disease [facility] Volume 69, Issue 17, May 2017 High risk (>3% annual death or MI) 1. Severe resting LV dysfunction (LVEF <35%) not readily explained by non coronary causes 2. Resting perfusion abnormalities greater than 10% of the myocardium in patients without prior history or evidence of MI 3. Stress-induced perfusion abnormalities encumbering greater than or equal to 10% myocardium or stress segmental scores indicating multiple vascular territories with abnormalities 4. Stress-induced LV dilatation (TID ratio greater than 1.19 for exercise and greater than 1.39 for regadenoson) Intermediate risk (1% to 3% annual death or MI) 1. Mild/moderate resting LV dysfunction (LVEF 35% to 49%) not readily explained by non coronary causes. 2. Resting perfusion abnormalities in 5%-9.9% of the myocardium in patients without a history or prior evidence of MI 3. Stress-induced perfusion abnormality encumbering 5%-9.9% of the myocardium or stress segmental scores indicating 1 vascular territory with abnormalities but without LV dilation 4. Small wall motion abnormality involving 1-2 segments and only 1 coronary bed.  Low Risk (Less than 1% annual death or MI) 1. Normal or small myocardial perfusion defect at rest or with stress encumbering less than 5% of the myocardium. Normal study. Risk stratification: Low         Consultations:    Consults:     Final Specialist Recommendations/Findings:   IP CONSULT TO INTERNAL MEDICINE  IP CONSULT TO CARDIOLOGY      The patient was seen and examined on day of discharge and this discharge summary is in conjunction with any daily progress note from day of discharge. Discharge plan:     Disposition: Home    Physician Follow Up:     Piotr Boles, APRN - CNP  [address], [address]    Schedule an appointment as soon as possible for a visit         Requiring Further Evaluation/Follow Up POST HOSPITALIZATION/Incidental Findings:    Diet: cardiac diet    Activity: As tolerated    Instructions to Patient:     Discharge Medications:      Medication List      START taking these medications    metoprolol tartrate 25 MG tablet  Commonly known as:  LOPRESSOR  Take 1 tablet by mouth 2 times daily        CONTINUE taking these medications    albuterol sulfate  (90 Base) MCG/ACT inhaler     * clobetasol 0.05 % cream  Commonly known as:  TEMOVATE  Apply topically as needed (when she has a lichen sclerosus flare up) Apply topically 2 times daily. * clobetasol 0.05 % ointment  Commonly known as:  Temovate  Apply topically 2 times daily. GAS-X PO     levothyroxine 50 MCG tablet  Commonly known as:  SYNTHROID  levothyroxine 50 mcg tablet  Take 3 tablets daily. mometasone 50 MCG/ACT nasal spray  Commonly known as:  NASONEX     NONFORMULARY     Viberzi 75 MG Tabs  Generic drug:  Eluxadoline  Take 75 mg by mouth 2 times daily for 90 days. ALFREDO     Vitamin D3 50 MCG (2000 UT) Caps         * This list has 2 medication(s) that are the same as other medications prescribed for you.  Read the directions carefully, and ask your doctor or other care provider to review them with you. ASK your doctor about these medications    clindamycin 150 MG capsule  Commonly known as:  CLEOCIN  Take one PO QID. Start one day prior to surgery. Where to Get Your Medications      These medications were sent to [address], [facility] [address] [facility]    Phone:  [phone]   · metoprolol tartrate 25 MG tablet         Time Spent on discharge is  35 mins in patient examination, evaluation, counseling as well as medication reconciliation, prescriptions for required medications, discharge plan and follow up. Electronically signed by   Jamin Michelle MD  9/7/2020  9:28 PM      Thank you Dr. Moe Tabares, CHARLOTTE - TATY for the opportunity to be involved in this patient's care.

## 2020-09-22 PROBLEM — D49.9 NEOPLASTIC DISEASE: Status: ACTIVE | Noted: 2018-11-08

## 2020-09-22 PROBLEM — E89.0 POSTPROCEDURAL HYPOTHYROIDISM: Status: ACTIVE | Noted: 2018-07-16

## 2020-09-22 PROBLEM — E03.9 HYPOTHYROIDISM, UNSPECIFIED: Status: ACTIVE | Noted: 2019-01-30

## 2020-09-22 PROBLEM — K31.84 GASTROPARESIS: Status: ACTIVE | Noted: 2019-06-06

## 2020-11-16 ENCOUNTER — TELEPHONE (OUTPATIENT)
Dept: OBGYN CLINIC | Age: 65
End: 2020-11-16

## 2020-12-15 ENCOUNTER — OFFICE VISIT (OUTPATIENT)
Dept: OBGYN CLINIC | Age: 65
End: 2020-12-15
Payer: COMMERCIAL

## 2020-12-15 VITALS
BODY MASS INDEX: 24.43 KG/M2 | HEIGHT: 64 IN | TEMPERATURE: 97.3 F | DIASTOLIC BLOOD PRESSURE: 94 MMHG | WEIGHT: 143.13 LBS | SYSTOLIC BLOOD PRESSURE: 163 MMHG | HEART RATE: 71 BPM

## 2020-12-15 PROCEDURE — 99397 PER PM REEVAL EST PAT 65+ YR: CPT | Performed by: OBSTETRICS & GYNECOLOGY

## 2020-12-15 RX ORDER — CLOBETASOL PROPIONATE 0.5 MG/G
CREAM TOPICAL PRN
Qty: 1 TUBE | Refills: 1 | Status: SHIPPED | OUTPATIENT
Start: 2020-12-15 | End: 2021-08-26

## 2020-12-15 ASSESSMENT — ENCOUNTER SYMPTOMS
ABDOMINAL PAIN: 0
SHORTNESS OF BREATH: 0
BACK PAIN: 1
COUGH: 0

## 2020-12-15 NOTE — PROGRESS NOTES
Breath    Doxycycline Nausea Only    Glucosamine Shortness Of Breath, Diarrhea and Swelling     Other reaction(s): Respiratory Difficulty  \"lips swell up and get severe diarrhea\" - INCLUDES IODINE    Iodine Hives, Shortness Of Breath and Itching    Pcn [Penicillins] Anaphylaxis and Shortness Of Breath    Red Dye Shortness Of Breath    Shellfish-Derived Products Shortness Of Breath, Diarrhea and Swelling     \"lips swell up and get severe diarrhea\" - INCLUDES IODINE    Sulfa Antibiotics Hives, Shortness Of Breath, Itching and Rash     OK in small amounts, but larger amounts cause \"shortness of breath. \"    Yellow Dye Shortness Of Breath     Other reaction(s): Respiratory Difficulty    Yellow Dyes (Non-Tartrazine) Shortness Of Breath    Gadolinium Rash     Moderate allergic-like reaction consisting of diffuse urticaria to ProHance gadolinium-containing contrast material    Gadolinium Derivatives Rash     Moderate allergic-like reaction consisting of diffuse urticaria to ProHance gadolinium-containing contrast material    Lac Bovis     Midazolam Nausea Only     Versed caused bad nausea. Past Medical History:   Past Medical History:   Diagnosis Date    Allergy to food dye     yellow and red dyes    Anemia     Asthma     Cancer ([facility]     Diverticulitis     Gastroparesis     Hiatal hernia     History of squamous cell carcinoma in situ of skin 6/9/2014    left wrist    IBS (irritable bowel syndrome)     diarrhea-prominent type    Lichen sclerosus     MVP (mitral valve prolapse)     Neoplasm of unspecified nature of bone, soft tissue, and skin 5/20/2014    New lesions of left arm, right arm, and right thigh.  Neoplasm of unspecified nature of bone, soft tissue, and skin     New lesions of right dorsum of hand (healed after Efudex) and lesion of right anterior thigh.     Osteoarthritis     Osteoporosis     beginning     Squamous cell carcinoma in situ 6/9/2014    left wrist    Squamous cell carcinoma in situ of skin of left wrist 2014    left wrist    Thrombocytopenia ([address]     Vulvar dystrophy      Past Surgical History:   Past Surgical History:   Procedure Laterality Date    CATARACT REMOVAL Right 2019    Right eye    CHOLECYSTECTOMY  2011    ELBOW SURGERY      Tendonitis X2    HERNIA REPAIR  2005    hiatal hernia repair    HYSTERECTOMY, TOTAL ABDOMINAL  92-97    [facility] 3 Surgeries    KNEE SURGERY Right 1996    MALIGNANT SKIN LESION EXCISION  2011    rt elbow-scc, rt shoulder-keratosis, lft leg skin with excoriation    NASAL POLYP SURGERY  2012    X2 in [address]    pheregenal flap    SKIN BIOPSY      lft leg keratosis    SKIN BIOPSY      rt forearm, under brst, lft leg-keratosis    SKIN BIOPSY      back and rt forearm-keratosis, abdomen-hemangioma    SKIN BIOPSY      rt breast, lft arm, lft brst-keratosis    THYROIDECTOMY  1985    Thyroid Cancer    TONSILLECTOMY AND ADENOIDECTOMY       Obstetric History:   3  Para 2  Gynecologic History: LMP postmenopausal  Last Pap: 15       Any history of abnormal paps no    PriorColpo/Biopsy n/a     Last Mammogram 17  Result  normal  Contraception: postmenopausal  Complications: none  STDs: none  Psychosocial History: Occupation:   Retired, teacher's aid previously   Caffeine No    At risk for depression No    Abuse:   No  Seatbelt:   Yes  Exercise:  No    Social History     Socioeconomic History    Marital status:      Spouse name: Not on file    Number of children: Not on file    Years of education: Not on file    Highest education level: Not on file   Occupational History    Not on file   Social Needs    Financial resource strain: Not on file    Food insecurity     Worry: Not on file     Inability: Not on file    Transportation needs     Medical: Not on file     Non-medical: Not on file   Tobacco Use    Smoking status: Never Smoker    Smokeless tobacco: Never Used   Substance and Sexual Activity    Alcohol use: No     Binge frequency: Never    Drug use: No    Sexual activity: Not Currently   Lifestyle    Physical activity     Days per week: Not on file     Minutes per session: Not on file    Stress: Not on file   Relationships    Social connections     Talks on phone: Not on file     Gets together: Not on file     Attends Nondenominational service: Not on file     Active member of club or organization: Not on file     Attends meetings of clubs or organizations: Not on file     Relationship status: Not on file    Intimate partner violence     Fear of current or ex partner: Not on file     Emotionally abused: Not on file     Physically abused: Not on file     Forced sexual activity: Not on file   Other Topics Concern    Not on file   Social History Narrative    Not on file       Family History   Problem Relation Age of Onset    Other Mother         Hernia Surgery (passed after surgery)    Coronary Art Dis Mother     Liver Cancer Father         age 80    Coronary Art Dis Father     Liver Cancer Brother 59    Cancer Brother 59        Bowel & Liver cancer    Diabetes Other         Father's side    Thyroid Disease Other         Mom's side       Review of Systems:   Review of Systems   Constitutional: Negative for chills and fever. HENT: Positive for hearing loss. Respiratory: Negative for cough and shortness of breath. Cardiovascular: Negative for chest pain and palpitations. Gastrointestinal: Negative for abdominal pain. Genitourinary: Negative for pelvic pain and vaginal discharge. Musculoskeletal: Positive for arthralgias and back pain. Neurological: Negative for dizziness and light-headedness. Psychiatric/Behavioral: The patient is not nervous/anxious.         Physical exam:  vitals:  Height   5  ft    6 in,  Weight    143 lbs,   163/94 BP  Gen: alert, no apparent distress  HEENT:No pathologic skin lesions noted,NC/AT,PERRL, normal midline nontender thyroid   Lung Exam: Clear to auscultation in all fields bilaterally, without wheezes,rales or rhonchi. Cardiac Exam: Normal sinus rhythm andrate, without murmurs, rubs or gallops appreciated. Breast Exam: Symmetric without pathological skin changes, nontender without discrete suspicious masses palpated, supraclavicular or axillary adenopathy or nipple discharge noted. Abdominal Exam: Nontender to deep palpation without organomegaly, masses or CVAT appreciated, BS positive. No spinal deformation or tenderness. External Genitalia: Normal development without vulvar,vaginal or cervical lesions noted. Normal vaginal discharge, uterus anterior, 4-6 weeks without CMT. Adnexa nontender without abnormal masses bilaterally. Rectal Exam: Omitted. Extremities: Nontender without clubbing, cyanosis or edema. F.R.O.M. Neurologic Exam: Grossly intact without noted sensorimotor deficits and oriented x 3. Assessment/Plan:   Unremarkable annual Gyn exam.    Cervical Cytology Evaluation begins at 24years old. If Negative Cytology, Follow-up screening per current guidelines. Mammograms every 1year. If 37 yo and last mammogram was negative. Calcium and Vitamin D dosing reviewed. Colonoscopy screening reviewed as well as onset for bone density testing. Birth control and barrier recommendations discussed. STD counseling and prevention reviewed. Routine health maintenance per patients PCP.   Pt to follow up for annual exam in 1 year    Charis Galloway MD  [address]

## 2020-12-21 ENCOUNTER — HOSPITAL ENCOUNTER (OUTPATIENT)
Dept: GENERAL RADIOLOGY | Age: 65
Discharge: HOME | End: 2020-12-23
Payer: COMMERCIAL

## 2020-12-21 ENCOUNTER — HOSPITAL ENCOUNTER (OUTPATIENT)
Age: 65
Discharge: HOME | End: 2020-12-23
Payer: COMMERCIAL

## 2020-12-21 PROCEDURE — 73562 X-RAY EXAM OF KNEE 3: CPT

## 2020-12-21 PROCEDURE — 73080 X-RAY EXAM OF ELBOW: CPT

## 2020-12-21 PROCEDURE — 73502 X-RAY EXAM HIP UNI 2-3 VIEWS: CPT

## 2020-12-22 ENCOUNTER — HOSPITAL ENCOUNTER (OUTPATIENT)
Age: 65
Discharge: HOME | End: 2020-12-22
Payer: COMMERCIAL

## 2020-12-22 LAB
CHOLESTEROL, FASTING: 183 MG/DL
CHOLESTEROL/HDL RATIO: 4.6
HDLC SERPL-MCNC: 40 MG/DL
LDL CHOLESTEROL: 72 MG/DL (ref 0–130)
MAGNESIUM: 2.1 MG/DL (ref 1.6–2.6)
TRIGLYCERIDE, FASTING: 357 MG/DL
VLDLC SERPL CALC-MCNC: ABNORMAL MG/DL (ref 1–30)

## 2020-12-22 PROCEDURE — 83735 ASSAY OF MAGNESIUM: CPT

## 2020-12-22 PROCEDURE — 36415 COLL VENOUS BLD VENIPUNCTURE: CPT

## 2020-12-22 PROCEDURE — 80061 LIPID PANEL: CPT

## 2021-02-15 ENCOUNTER — HOSPITAL ENCOUNTER (OUTPATIENT)
Age: 66
Discharge: HOME | End: 2021-02-15
Payer: COMMERCIAL

## 2021-02-15 LAB
C-REACTIVE PROTEIN: <3 MG/L (ref 0–5)
HCT VFR BLD CALC: 36 % (ref 36–46)
HEMOGLOBIN: 12 G/DL (ref 12–16)
MCH RBC QN AUTO: 31.4 PG (ref 26–34)
MCHC RBC AUTO-ENTMCNC: 33.3 G/DL (ref 31–37)
MCV RBC AUTO: 94.3 FL (ref 80–100)
NRBC AUTOMATED: ABNORMAL PER 100 WBC
PDW BLD-RTO: 14.2 % (ref 11.5–14.9)
PLATELET # BLD: 113 K/UL (ref 150–450)
PMV BLD AUTO: 12.6 FL (ref 6–12)
RBC # BLD: 3.82 M/UL (ref 4–5.2)
SEDIMENTATION RATE, ERYTHROCYTE: 7 MM (ref 0–30)
WBC # BLD: 7.8 K/UL (ref 3.5–11)

## 2021-02-15 PROCEDURE — 85027 COMPLETE CBC AUTOMATED: CPT

## 2021-02-15 PROCEDURE — 86140 C-REACTIVE PROTEIN: CPT

## 2021-02-15 PROCEDURE — 85652 RBC SED RATE AUTOMATED: CPT

## 2021-02-15 PROCEDURE — 36415 COLL VENOUS BLD VENIPUNCTURE: CPT

## 2021-03-31 ENCOUNTER — HOSPITAL ENCOUNTER (OUTPATIENT)
Age: 66
Discharge: HOME | End: 2021-03-31
Payer: COMMERCIAL

## 2021-03-31 PROCEDURE — 93005 ELECTROCARDIOGRAM TRACING: CPT | Performed by: INTERNAL MEDICINE

## 2021-04-01 LAB
EKG ATRIAL RATE: 66 BPM
EKG P AXIS: 64 DEGREES
EKG P-R INTERVAL: 130 MS
EKG Q-T INTERVAL: 396 MS
EKG QRS DURATION: 72 MS
EKG QTC CALCULATION (BAZETT): 415 MS
EKG R AXIS: 50 DEGREES
EKG T AXIS: 18 DEGREES
EKG VENTRICULAR RATE: 66 BPM

## 2021-04-07 ENCOUNTER — HOSPITAL ENCOUNTER (OUTPATIENT)
Dept: ULTRASOUND IMAGING | Age: 66
Discharge: HOME | End: 2021-04-09
Payer: COMMERCIAL

## 2021-04-07 DIAGNOSIS — K76.0 FATTY LIVER: ICD-10-CM

## 2021-04-07 PROCEDURE — 76705 ECHO EXAM OF ABDOMEN: CPT

## 2021-04-12 ENCOUNTER — OFFICE VISIT (OUTPATIENT)
Dept: UROLOGY | Age: 66
End: 2021-04-12
Payer: COMMERCIAL

## 2021-04-12 VITALS — DIASTOLIC BLOOD PRESSURE: 88 MMHG | HEART RATE: 79 BPM | SYSTOLIC BLOOD PRESSURE: 142 MMHG | TEMPERATURE: 97.5 F

## 2021-04-12 DIAGNOSIS — R10.31 RIGHT LOWER QUADRANT PAIN: Primary | ICD-10-CM

## 2021-04-12 DIAGNOSIS — R10.9 RIGHT FLANK PAIN: ICD-10-CM

## 2021-04-12 PROCEDURE — 99204 OFFICE O/P NEW MOD 45 MIN: CPT | Performed by: UROLOGY

## 2021-04-12 ASSESSMENT — ENCOUNTER SYMPTOMS
BACK PAIN: 1
CONSTIPATION: 0
NAUSEA: 0
DIARRHEA: 0
VOMITING: 0
COUGH: 0
EYE REDNESS: 0
ABDOMINAL PAIN: 1
EYE PAIN: 0
WHEEZING: 0
SHORTNESS OF BREATH: 0

## 2021-04-12 NOTE — PROGRESS NOTES
Review of Systems   Constitutional: Negative for appetite change, chills and fever. Eyes: Negative for pain, redness and visual disturbance. Respiratory: Negative for cough, shortness of breath and wheezing. Cardiovascular: Negative for chest pain and leg swelling. Gastrointestinal: Positive for abdominal pain. Negative for constipation, diarrhea, nausea and vomiting. Genitourinary: Negative for difficulty urinating, dysuria, flank pain, frequency, hematuria and urgency. Musculoskeletal: Positive for back pain. Negative for joint swelling and myalgias. Skin: Negative for rash and wound. Neurological: Negative for dizziness, tremors and numbness. Hematological: Does not bruise/bleed easily.

## 2021-04-12 NOTE — PROGRESS NOTES
[address] [address]  Dept:  Jenny Clements [facility] Office [facility] - New Patient    Patient:  Nolvia Cramer  YOB: 1955  Date: 4/12/2021    The patient is a 77 y.o. female who presentstoday for evaluation of the following problems:   Chief Complaint   Patient presents with    New Patient    Mass     renal mass US     referred by CHARLOTTE Garcia CNP. HPI  Dave Hudson is a 59-year-old female who recently had a right upper quadrant ultrasound. Apparently she was told she has a mass in her right kidney. She has had right flank pain and right lower quadrant pain for the last few months. She is particularly concerned because she has a history of liver cancer in her family. She denies any bothersome urinary symptoms. She has never had any gross hematuria. (Patient's old records have been requested, reviewed and summarized in today's note.)    Summary of old records: N/A    History: N/A    ProceduresToday: N/A    Urinalysis today:  No results found for this visit on 04/12/21. AUA Symptom Score (4/12/2021):   INCOMPLETE EMPTYING: How often have you had the sensation of not emptying your bladder?: Not at all  FREQUENCY: How often do you have to urinate less than every two hours?: Not at all  INTERMITTENCY: How often have you found you stopped and started again several times when you urinated?: Not at all  URGENCY: How often have you found it difficult to postpone urination?: Not at all  WEAK STREAM: How often have you had a weak urinary stream?: Not at all  STRAINING: How often have you had to strain to start  urination?: Not at all  NOCTURIA: How many times did you typically get up at night to uriniate?: 1 Time  TOTAL I-PSS SCORE[de-identified] 1  How would you feel if you were to spend the rest of your life with your urinary condition?: Pleased    Last BUN andcreatinine:  Lab Results   Component Value Date BUN 15 09/05/2020     Lab Results   Component Value Date    CREATININE 0.70 09/05/2020       Additional Lab/Culture results: none    Reviewed during this Office Visit: none  (results were independently reviewed byphysician and radiology report verified)    PAST MEDICAL, FAMILY AND SOCIAL HISTORY:  Past Medical History:   Diagnosis Date    Allergy to food dye     yellow and red dyes    Anemia     Asthma     Cancer ([address]     Diverticulitis     Gastroparesis     Hiatal hernia     History of squamous cell carcinoma in situ of skin 6/9/2014    left wrist    IBS (irritable bowel syndrome)     diarrhea-prominent type    Lichen sclerosus     MVP (mitral valve prolapse)     Neoplasm of unspecified nature of bone, soft tissue, and skin 5/20/2014    New lesions of left arm, right arm, and right thigh.  Neoplasm of unspecified nature of bone, soft tissue, and skin     New lesions of right dorsum of hand (healed after Efudex) and lesion of right anterior thigh.     Osteoarthritis     Osteoporosis     beginning     Squamous cell carcinoma in situ 6/9/2014    left wrist    Squamous cell carcinoma in situ of skin of left wrist 6/9/2014    left wrist    Thrombocytopenia ([address]     Vulvar dystrophy      Past Surgical History:   Procedure Laterality Date    CATARACT REMOVAL Right 07/17/2019    Right eye    CHOLECYSTECTOMY  2011    ELBOW SURGERY      Tendonitis X2    HERNIA REPAIR  2005    hiatal hernia repair    HYSTERECTOMY, TOTAL ABDOMINAL  92-97    [facility] 3 Surgeries    KNEE SURGERY Right 1996    MALIGNANT SKIN LESION EXCISION  2011    rt elbow-scc, rt shoulder-keratosis, lft leg skin with excoriation    NASAL POLYP SURGERY  2012    X2 in [address]    pheregenal flap    SKIN BIOPSY  2012    lft leg keratosis    SKIN BIOPSY  2009    rt forearm, under brst, lft leg-keratosis    SKIN BIOPSY  2008    back and rt forearm-keratosis, abdomen-hemangioma    SKIN BIOPSY  2002    rt Exam:    This a 77 y.o. female      Vitals:    04/12/21 0832   BP: (!) 142/88   Pulse: 79   Temp: 97.5 °F (36.4 °C)     There is no height or weight on file to calculate BMI. Physical Exam  Constitutional: Patient in no acute distress, ggod grooming, appropriately dressed  Neuro: Alert and oriented to person, place and time. Psych:Mood normal, affect normal  Skin: No rash noted  HEENT: Head: Normocephalic and atraumatic,Conjunctivae and EOM are normal,Nose- normal, Right/Left External Ear: normal, Mouth: Mucosa Moist  Neck: Supple  Lungs: Respiratory effort is normal  Cardiovascular: strong and regular, no lower leg edema  Abdomen: Soft, non-tender, non-distended with no CVA,    Lymphatics: No cervical palpable lymphadenopathy. Bladder non-tender and not distended. Musculoskeletal: Normal gait and station        Assessment and Plan      1. Right lower quadrant pain    2. Right flank pain            Plan:   Renal u/s non concerning for mass  Will get CT to elucidate cause of RLQ and Right flank pain. Doubt urological in etiology. F/u 1 week       Prescriptions Ordered:  No orders of the defined types were placed in this encounter. Orders Placed:  Orders Placed This Encounter   Procedures    CT ABDOMEN PELVIS WO CONTRAST Additional Contrast? None     Standing Status:   Future     Standing Expiration Date:   4/12/2022     Order Specific Question:   Additional Contrast?     Answer:   None     Order Specific Question:   Reason for exam:     Answer:   right flank pain, right lower quadrant pain            Abigail Banda MD    Agree with the ROS entered by the MA.

## 2021-04-16 ENCOUNTER — HOSPITAL ENCOUNTER (OUTPATIENT)
Dept: CT IMAGING | Facility: CLINIC | Age: 66
Discharge: HOME | End: 2021-04-18
Payer: COMMERCIAL

## 2021-04-16 DIAGNOSIS — R10.31 RIGHT LOWER QUADRANT PAIN: ICD-10-CM

## 2021-04-16 DIAGNOSIS — R10.9 RIGHT FLANK PAIN: ICD-10-CM

## 2021-04-16 PROCEDURE — 74176 CT ABD & PELVIS W/O CONTRAST: CPT

## 2021-04-19 ENCOUNTER — OFFICE VISIT (OUTPATIENT)
Dept: UROLOGY | Age: 66
End: 2021-04-19
Payer: COMMERCIAL

## 2021-04-19 VITALS
SYSTOLIC BLOOD PRESSURE: 130 MMHG | WEIGHT: 142 LBS | HEART RATE: 85 BPM | HEIGHT: 64 IN | DIASTOLIC BLOOD PRESSURE: 74 MMHG | TEMPERATURE: 97.7 F | BODY MASS INDEX: 24.24 KG/M2

## 2021-04-19 DIAGNOSIS — R10.9 RIGHT FLANK PAIN: ICD-10-CM

## 2021-04-19 DIAGNOSIS — R10.31 RIGHT LOWER QUADRANT PAIN: Primary | ICD-10-CM

## 2021-04-19 PROCEDURE — 99213 OFFICE O/P EST LOW 20 MIN: CPT | Performed by: UROLOGY

## 2021-04-19 ASSESSMENT — ENCOUNTER SYMPTOMS
NAUSEA: 0
BACK PAIN: 0
EYE REDNESS: 0
ABDOMINAL PAIN: 1
EYE PAIN: 0
VOMITING: 0
COUGH: 0
SHORTNESS OF BREATH: 0
WHEEZING: 0

## 2021-04-19 NOTE — PROGRESS NOTES
..Review of Systems   Constitutional: Negative for activity change, chills and fever. Eyes: Negative for pain, redness and visual disturbance. Respiratory: Negative for cough, shortness of breath and wheezing. Cardiovascular: Negative for chest pain and leg swelling. Gastrointestinal: Positive for abdominal pain. Negative for nausea and vomiting. Genitourinary: Positive for flank pain. Negative for difficulty urinating, dysuria, enuresis, frequency, hematuria and urgency. Musculoskeletal: Negative for back pain, joint swelling and myalgias. Skin: Negative for rash and wound. Neurological: Negative for dizziness, tremors and numbness. Hematological: Does not bruise/bleed easily.

## 2021-04-19 NOTE — PROGRESS NOTES
[address] [address]  Dept: [address] Clements [facility] Urology Office Note - Established    Patient:  Pilar Bamberger  YOB: 1955  Date: 4/19/2021    The patient is a 77 y.o. female whopresents today for evaluation of the following problems:   Chief Complaint   Patient presents with    Follow-up       HPI  Rika Jaquez was seeing me for right upper quadrant and flank pain that radiated to her right lower quadrant. We had ordered a CT and it shows no urinary tract abnormalities. It does show appendiceal thickening. She does localize her pain to her right lower quadrant. There are no urinary abnormalities noted. Summary of old records: N/A    Additional History: N/A    Procedures Today: N/A    Urinalysis today:  No results found for this visit on 04/19/21. Imaging Reviewed during this Office Visit: CT appendiceal thickening  (results were independently reviewed by physician and radiology report verified)    AUA Symptom Score (4/19/2021):   INCOMPLETE EMPTYING: How often have you had the sensation of not emptying your bladder?: Not at all  FREQUENCY: How often do you have to urinate less than every two hours?: Not at all  INTERMITTENCY: How often have you found you stopped and started again several times when you urinated?: Not at all  URGENCY: How often have you found it difficult to postpone urination?: Not at all  WEAK STREAM: How often have you had a weak urinary stream?: Not at all  STRAINING: How often have you had to strain to start  urination?: Not at all  NOCTURIA: How many times did you typically get up at night to uriniate?: NONE  TOTAL I-PSS SCORE[de-identified] 0  How would you feel if you were to spend the rest of your life with your urinary condition?: Delighted    Last BUN and creatinine:  Lab Results   Component Value Date    BUN 15 09/05/2020     Lab Results   Component Value Date    CREATININE 0.70 09/05/2020       Additional Lab/Culture results: none    PAST MEDICAL, FAMILY AND SOCIAL HISTORY UPDATE:  Past Medical History:   Diagnosis Date    Allergy to food dye     yellow and red dyes    Anemia     Asthma     Cancer ([address]     Diverticulitis     Gastroparesis     Hiatal hernia     History of squamous cell carcinoma in situ of skin 6/9/2014    left wrist    IBS (irritable bowel syndrome)     diarrhea-prominent type    Lichen sclerosus     MVP (mitral valve prolapse)     Neoplasm of unspecified nature of bone, soft tissue, and skin 5/20/2014    New lesions of left arm, right arm, and right thigh.  Neoplasm of unspecified nature of bone, soft tissue, and skin     New lesions of right dorsum of hand (healed after Efudex) and lesion of right anterior thigh.     Osteoarthritis     Osteoporosis     beginning     Squamous cell carcinoma in situ 6/9/2014    left wrist    Squamous cell carcinoma in situ of skin of left wrist 6/9/2014    left wrist    Thrombocytopenia ([address]     Vulvar dystrophy      Past Surgical History:   Procedure Laterality Date    CATARACT REMOVAL Right 07/17/2019    Right eye    CHOLECYSTECTOMY  2011    ELBOW SURGERY      Tendonitis X2    HERNIA REPAIR  2005    hiatal hernia repair    HYSTERECTOMY, TOTAL ABDOMINAL  92-97    [facility] 3 Surgeries    KNEE SURGERY Right 1996    MALIGNANT SKIN LESION EXCISION  2011    rt elbow-scc, rt shoulder-keratosis, lft leg skin with excoriation    NASAL POLYP SURGERY  2012    X2 in [address]    pheregenal flap    SKIN BIOPSY  2012    lft leg keratosis    SKIN BIOPSY  2009    rt forearm, under brst, lft leg-keratosis    SKIN BIOPSY  2008    back and rt forearm-keratosis, abdomen-hemangioma    SKIN BIOPSY  2002    rt breast, lft arm, lft brst-keratosis    THYROIDECTOMY  1985    Thyroid Cancer    TONSILLECTOMY AND ADENOIDECTOMY       Family History   Problem Relation Age of Onset    Other Mother Hernia Surgery (passed after surgery)    Coronary Art Dis Mother     Liver Cancer Father         age 80    Coronary Art Dis Father     Liver Cancer Brother 59    Cancer Brother 59        Bowel & Liver cancer    Diabetes Other         Father's side    Thyroid Disease Other         Mom's side     Outpatient Medications Marked as Taking for the 4/19/21 encounter (Office Visit) with Lien Miller MD   Medication Sig Dispense Refill    albuterol sulfate  (90 Base) MCG/ACT inhaler Inhale 2 puffs into the lungs every 6 hours as needed for Wheezing 2 Inhaler 2    VIBERZI 75 MG TABS Take 75 mg by mouth 2 times daily for 90 days. ALFREDO 180 tablet 1    levothyroxine (SYNTHROID) 50 MCG tablet levothyroxine 50 mcg tablet  Take 3 tablets daily. 270 tablet 1    clobetasol (TEMOVATE) 0.05 % cream Apply topically as needed (when she has a lichen sclerosus flare up) Apply topically 2 times daily. 1 Tube 1    miconazole (MICOTIN) 2 % vaginal cream Place 1 applicator vaginally nightly Place vaginally nightly.       NONFORMULARY Take 10 mg by mouth 3 times daily Domperidone (Jamp)        (All medications reviewed and updated by provider sincelast office visit or hospitalization)   Latex, Aspirin, Cefadroxil, [place]izihau-yemhocb-cyabzh [fluocinolone], Ciprofloxacin hcl, Codeine, Demerol hcl [meperidine], Doxycycline, Glucosamine, Iodine, Pcn [penicillins], Red dye, Shellfish-derived products, Sulfa antibiotics, Yellow dye, Yellow dyes (non-tartrazine), Gadolinium, Gadolinium derivatives, Lac bovis, Midazolam, and Iodides  Social History     Tobacco Use   Smoking Status Never Smoker   Smokeless Tobacco Never Used      (If patient a smoker, smoking cessation counseling offered)     Social History     Substance and Sexual Activity   Alcohol Use No    Binge frequency: Never       REVIEW OF SYSTEMS:  Review of Systems      Physical Exam:      Vitals:    04/19/21 1116   BP: 130/74   Pulse: 85   Temp: 97.7 °F (36.5 °C)     Body mass index is 24.37 kg/m². Patient is a 77 y.o. female in noacute distress and alert and oriented to person, place and time. Physical Exam  Constitutional: Patient in no acute distress. Neuro: Alert andoriented to person, place and time. Psych: Mood normal, affect normal  Skin: No rash noted        and Plan      1. Right lower quadrant pain    2. Right flank pain           Plan:   F/u prn  Will defer evaluation of ct findings to pt's NP and will forward CT to her office     Return if symptoms worsen or fail to improve. Prescriptions Ordered:  No orders of the defined types were placed in this encounter. Orders Placed:  No orders of the defined types were placed in this encounter. Abimael Langley MD    Agree with the ROS entered by the MA.

## 2021-04-27 ENCOUNTER — HOSPITAL ENCOUNTER (OUTPATIENT)
Dept: PREADMISSION TESTING | Age: 66
Discharge: HOME | End: 2021-05-01
Payer: COMMERCIAL

## 2021-04-27 VITALS
RESPIRATION RATE: 18 BRPM | TEMPERATURE: 98.7 F | HEIGHT: 64 IN | DIASTOLIC BLOOD PRESSURE: 73 MMHG | OXYGEN SATURATION: 99 % | HEART RATE: 70 BPM | WEIGHT: 142 LBS | BODY MASS INDEX: 24.24 KG/M2 | SYSTOLIC BLOOD PRESSURE: 146 MMHG

## 2021-04-27 LAB
ABSOLUTE EOS #: 0.2 K/UL (ref 0–0.4)
ABSOLUTE IMMATURE GRANULOCYTE: ABNORMAL K/UL (ref 0–0.3)
ABSOLUTE LYMPH #: 1.2 K/UL (ref 1–4.8)
ABSOLUTE MONO #: 0.3 K/UL (ref 0.1–1.3)
ANION GAP SERPL CALCULATED.3IONS-SCNC: 13 MMOL/L (ref 9–17)
BASOPHILS # BLD: 1 % (ref 0–2)
BASOPHILS ABSOLUTE: 0 K/UL (ref 0–0.2)
BUN BLDV-MCNC: 14 MG/DL (ref 8–23)
BUN/CREAT BLD: NORMAL (ref 9–20)
CALCIUM SERPL-MCNC: 9.2 MG/DL (ref 8.6–10.4)
CHLORIDE BLD-SCNC: 98 MMOL/L (ref 98–107)
CO2: 28 MMOL/L (ref 20–31)
CREAT SERPL-MCNC: 0.86 MG/DL (ref 0.5–0.9)
DIFFERENTIAL TYPE: ABNORMAL
EOSINOPHILS RELATIVE PERCENT: 3 % (ref 0–4)
GFR AFRICAN AMERICAN: >60 ML/MIN
GFR NON-AFRICAN AMERICAN: >60 ML/MIN
GFR SERPL CREATININE-BSD FRML MDRD: NORMAL ML/MIN/{1.73_M2}
GFR SERPL CREATININE-BSD FRML MDRD: NORMAL ML/MIN/{1.73_M2}
GLUCOSE BLD-MCNC: 86 MG/DL (ref 70–99)
HCT VFR BLD CALC: 34.9 % (ref 36–46)
HEMOGLOBIN: 11.5 G/DL (ref 12–16)
IMMATURE GRANULOCYTES: ABNORMAL %
LYMPHOCYTES # BLD: 20 % (ref 24–44)
MCH RBC QN AUTO: 30.3 PG (ref 26–34)
MCHC RBC AUTO-ENTMCNC: 32.9 G/DL (ref 31–37)
MCV RBC AUTO: 92 FL (ref 80–100)
MONOCYTES # BLD: 5 % (ref 1–7)
NRBC AUTOMATED: ABNORMAL PER 100 WBC
PDW BLD-RTO: 14.3 % (ref 11.5–14.9)
PLATELET # BLD: 94 K/UL (ref 150–450)
PLATELET ESTIMATE: ABNORMAL
PMV BLD AUTO: 11.6 FL (ref 6–12)
POTASSIUM SERPL-SCNC: 4.3 MMOL/L (ref 3.7–5.3)
RBC # BLD: 3.79 M/UL (ref 4–5.2)
RBC # BLD: ABNORMAL 10*6/UL
SEG NEUTROPHILS: 71 % (ref 36–66)
SEGMENTED NEUTROPHILS ABSOLUTE COUNT: 4.5 K/UL (ref 1.3–9.1)
SODIUM BLD-SCNC: 139 MMOL/L (ref 135–144)
WBC # BLD: 6.1 K/UL (ref 3.5–11)
WBC # BLD: ABNORMAL 10*3/UL

## 2021-04-27 PROCEDURE — 36415 COLL VENOUS BLD VENIPUNCTURE: CPT

## 2021-04-27 PROCEDURE — 85025 COMPLETE CBC W/AUTO DIFF WBC: CPT

## 2021-04-27 PROCEDURE — 80048 BASIC METABOLIC PNL TOTAL CA: CPT

## 2021-04-27 PROCEDURE — 93005 ELECTROCARDIOGRAM TRACING: CPT | Performed by: SURGERY

## 2021-04-27 ASSESSMENT — ENCOUNTER SYMPTOMS
CONSTIPATION: 0
COUGH: 0
SHORTNESS OF BREATH: 0
VOMITING: 0
DIARRHEA: 0
WHEEZING: 0
RHINORRHEA: 0
NAUSEA: 1
ANAL BLEEDING: 0
TROUBLE SWALLOWING: 0
ABDOMINAL PAIN: 1
BLOOD IN STOOL: 0
SORE THROAT: 0

## 2021-04-27 NOTE — PROGRESS NOTES
Dr. Tristin Beyer, anesthesia, was contacted and informed of patient's history, EKG and planned surgery. Orders received and no clearance required.

## 2021-04-27 NOTE — H&P (VIEW-ONLY)
HISTORY and [facility]ai [facility]leg[facility] [id number]       NAME:  Freddy Bahena  MRN: [id number]   YOB: 1955   Date: 4/27/2021   Age: 77 y.o. Gender: female     COMPLAINT AND PRESENT HISTORY:   Freddy Bahena is 77 y.o.,  female, presents for pre-anesthesia/admission testing for COLONOSCOPY DIAGNOSTIC (5-10-21) AND APPENDECTOMY LAPAROSCOPIC (5-11-21)per Dr. Kristy Rehman. Primary dx: ABDOMINAL PAIN.    HPI:  Patient states that for the past year she has felt nauseated after she eats for the first hour or so, then it gradually improves. She states that past couple of weeks s/s have worsened. She followed up with GI specialist at [facility] about 1.5 months ago- thought was she had enlarged or fatty liver. She was also following with Dr. Oj Goddard- ordered CT ABD/pelvis which did show thickening of appendix. She does c/o RLQ ABD pain, which has been present most of the time. She rates the pain about a 6 on 0-10 pain scale, increased to 7-8- describes as a dull pain. She does take Tylenol PRN for pain, also uses heating pad. Denies vomiting. Hx of hiatal hernia. Denies constipation/diarrhea, denies blood in stool/black tarry stools. Hx of colonoscopy 4 years ago- denies hx of colon polyps. Denies hx of appetite change/loss. She states \"I don't think I have cancer\". Testing completed r/t condition:  Narrative   EXAMINATION:   CT OF THE ABDOMEN AND PELVIS WITHOUT CONTRAST 4/16/2021 11:08 am       TECHNIQUE:   CT of the abdomen and pelvis was performed without the administration of   intravenous contrast. Multiplanar reformatted images are provided for review.    Dose modulation, iterative reconstruction, and/or weight based adjustment of   the mA/kV was utilized to reduce the radiation dose to as low as reasonably   achievable.       COMPARISON:   1/21/2008       HISTORY:   ORDERING SYSTEM PROVIDED HISTORY: Right lower quadrant pain   TECHNOLOGIST PROVIDED HISTORY: Right flank pain, right lower quadrant pain   Reason for Exam: patient c/o generalized abd pain for a few months   Additional signs and symptoms: patient c/o generalized abd pain for a few   months       FINDINGS:   Lower Chest: There is minimal linear scarring versus atelectasis to the lung   bases.  No focal consolidations or pleural effusions.  Heart is normal in   size.  No pericardial effusion.       Organs: Unenhanced liver, spleen, pancreas, adrenal glands and kidneys are   unremarkable.  Prior cholecystectomy.       GI/Bowel: Colonic diverticulosis without evidence for diverticulitis.  No   evidence for bowel obstruction.  Question if there is some degree of   congenital malrotation of the bowel with large bowel located essentially   entirely on the left and small bowel located predominantly on the right with   abnormal location of the ligament of Treitz.  Distal appendix is thickened   measuring up to 1.2 cm in caliber without discrete mass lesion identified or   significant periappendiceal inflammatory change.  Chronic appearing   postsurgical change to the stomach suggestive of prior Nissen fundoplication,   similar to remote exam.       Pelvis: Collapsed urinary bladder limiting evaluation without gross bladder   abnormality noted.  Patient appears to be status post prior hysterectomy.  No   adnexal masses are seen.       Peritoneum/Retroperitoneum: No ascites or focal fluid collections.  No   intraperitoneal free air.  Atherosclerosis.  No evidence for abdominal aortic   aneurysm.  No lymphadenopathy is identified.       Bones/Soft Tissues: No acute or suspicious bone or soft tissue abnormality.           Impression   Distal appendix is mildly thickened without significant periappendiceal   inflammatory change.  Nonetheless acute appendicitis cannot be excluded.  No   discrete mass lesion identified.  If no surgical intervention is planned,   consider follow-up CT to ensure stability versus resolution.       Colonic diverticulosis without evidence for diverticulitis.       Configuration of large and small bowel suggestive of congenital malrotation   of bowel.       The findings were sent to the Radiology Results [address] at 11:44   am on 4/16/2021to be communicated to the caregivers office. Narrative   EXAMINATION:   RIGHT UPPER QUADRANT ULTRASOUND       4/7/2021 9:33 am       COMPARISON:   None.       HISTORY:   ORDERING SYSTEM PROVIDED HISTORY: Fatty liver   TECHNOLOGIST PROVIDED HISTORY:   Acuity: Acute   Type of Exam: Initial       FINDINGS:   LIVER:  The echogenicity and echotexture of the liver suggests a degree of   diffuse fatty infiltration.  No evidence of focal defect.  Normal directional   vascular flow.       BILIARY SYSTEM:  The gallbladder is surgically absent.       Common bile duct is within normal limits measuring 5 mm.       RIGHT KIDNEY: The right kidney is grossly unremarkable without evidence of   hydronephrosis. There is  decreased parenchymal thickness.       PANCREAS:  Visualized portions of the pancreas are unremarkable.       OTHER: No evidence of right upper quadrant ascites.           Impression   1.  Findings compatible with a degree of hepatic steatosis.       2.  Some decreased cortical thickness right kidney. Review of additional significant medical hx:  ANEMIA, THROMBOCYTOPENIA, VON WILLEBRAND'S DISEASE: She did follow up with hematologist- Dr. Maverick Morel- states that she was told that she no longer has Von Willebrand's disease. She states she never had to do anything special prior to surgery d/t Von Willebrand's disease, hx of easy bruising/bleeding, but this has improved. Labs reviewed per [facility] do reveal elevated Factor 8 assay of 167, Von Willebrand Ag slightly elevated at 152 as of 3-9-21.   Lab Results   Component Value Date    WBC 6.1 04/27/2021    HGB 11.5 (L) 04/27/2021    HCT 34.9 (L) 04/27/2021    MCV 92.0 04/27/2021    PLT 94 (L) 04/27/2021     MVP: Patient denies hx of MVP. She did follow up with Dr. Aaliyah Mckeon last year. She believes she was experiencing too much anxiety- had a sharp pain in left arm, was admitted over night, states she has been \"fine ever since\". Denies chest pain, palpitations, SOB, dizziness, leg swelling, headache. 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 GASTROPARESIS, DIVERTICULITIS, IBS, GERD: Denies dysphagia, pharyngitis, voice change. Current medications r/t condition: VIBERZI    ASTHMA: Denies current SOB, wheezing, cough. Current medications r/t condition: ALBUTEROL INHALER PRN    BPPV: Denies recent issues w/vertigo, denies syncope. THYROID CA, HYPOPARATHYROIDISM, HYPOTHYROIDISM: Dx'd with thyroid CA 1985- tx w/complete thyroidectomy and radiation tx. Current medications r/t condition: LEVOTHYROXINE   Lab Results   Component Value Date    TSH 10.34 (H) 08/21/2020     RENE: Patient denies hx of- does not use machine. B12 DEFICIENCY: Receives monthly B12 injections, but did not receive this month- per Dr. Aung Taylor. 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 SKIN CA: HX OF BCC AND SCC- HX OF BX'S AND EXCISIONS. Denies hx of MRSA infection. Denies hx of blood clots. Denies hx of any personal or family hx of complications w/anesthesia EXCEPT DID HAVE PROLONGED EMERGENCE FROM ANESTHESIA X1, HX OF PONV. She states she does not like to take Percoset, does not like how it makes her feel- denies any true allergy.   PAST MEDICAL HISTORY     Past Medical History:   Diagnosis Date    Abdominal pain     RLQ    Abnormal computed tomography of abdomen and pelvis 04/16/2021    Distal appendix is mildly thickened without significant periappendiceal inflammatory change    Acquired von Willebrand's disease ([facility] 10/30/2018    Allergy to food dye     yellow and red dyes    Anemia     Asthma     Basal cell carcinoma of upper lip 06/22/2017    Benign paroxysmal positional vertigo 12/09/2013    Cancer ([address]     Thyroid and skin    Cardiac murmur     STATES SINCE CHILD BILLY- STATES SHE WAS BORN WITH A \"90 DEGREE ANGLE VALVE\"    Chest pain 09/04/2020    Cobalamin deficiency 12/26/2012    Diverticulitis     Diverticulosis     Gastroesophageal reflux disease 12/26/2012    Gastroparesis     Hiatal hernia     History of fractured kneecap     RIGHT    History of malignant neoplasm of thyroid 04/17/2012    History of squamous cell carcinoma in situ of skin 06/09/2014    Chart states left wrist, but patient denies    Hypoparathyroidism ([address] 07/03/2012    IBS (irritable bowel syndrome)     diarrhea-prominent type    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  Neoplasm of unspecified nature of bone, soft tissue, and skin     New lesions of right dorsum of hand (healed after Efudex) and lesion of right anterior thigh.  Obstructive sleep apnea syndrome 12/26/2012    Osteoarthritis     Osteoporosis     beginning     PONV (postoperative nausea and vomiting)     Postprocedural hypothyroidism 07/16/2018    Prolonged emergence from general anesthesia     Pt states this was \"a long time ago\"     Sensorineural hearing loss, bilateral 12/26/2012    Squamous cell carcinoma of upper extremity 04/08/2013    Scc in situ rt elbow 1/11    Thrombocytopenia ([address]     Thyroid disease     Thyroid Ca Hx. - thyroid was removed.      Vulvar dystrophy        SURGICAL HISTORY       Past Surgical History:   Procedure Laterality Date    BREAST SURGERY      Cyst removed rt. nipple     CATARACT REMOVAL Right 07/17/2019    Right eye    CHOLECYSTECTOMY  2011    COLONOSCOPY      ELBOW SURGERY Bilateral     Tendonitis X2    HERNIA REPAIR  2005    hiatal hernia repair    HYSTERECTOMY, TOTAL ABDOMINAL  92-97    [facility] 3 Surgeries    KNEE SURGERY Right 1996    W/PINS    MALIGNANT SKIN LESION EXCISION  2011    rt elbow-scc, rt shoulder-keratosis, lft leg skin with excoriation    NASAL POLYP SURGERY  2012    X2 in [facility] Right     [facility] SKIN BIOPSY  2012    lft leg keratosis    SKIN BIOPSY  2009    rt forearm, under brst, lft leg-keratosis    SKIN BIOPSY  2008    back and rt forearm-keratosis, abdomen-hemangioma    SKIN BIOPSY  2002    rt breast, lft arm, lft brst-keratosis    SKIN CANCER EXCISION      THYROIDECTOMY  1985    Thyroid Cancer    TONSILLECTOMY AND ADENOIDECTOMY         SOCIAL HISTORY       Social History     Socioeconomic History    Marital status:      Spouse name: None    Number of children: None    Years of education: None    Highest education level: None   Occupational History    None   Social Needs    Financial resource strain: None    Food insecurity     Worry: None     Inability: None    Transportation needs     Medical: None     Non-medical: None   Tobacco Use    Smoking status: Never Smoker    Smokeless tobacco: Never Used   Substance and Sexual Activity    Alcohol use: No     Binge frequency: Never    Drug use: No    Sexual activity: Not Currently   Lifestyle    Physical activity     Days per week: None     Minutes per session: None    Stress: None   Relationships    Social connections     Talks on phone: None     Gets together: None     Attends Synagogue service: None     Active member of club or organization: None     Attends meetings of clubs or organizations: None     Relationship status: None    Intimate partner violence     Fear of current or ex partner: None     Emotionally abused: None     Physically abused: None     Forced sexual activity: None Other Topics Concern    None   Social History Narrative    None       REVIEW OF SYSTEMS      Allergies   Allergen Reactions    Latex     Aspirin Anaphylaxis and Shortness Of Breath    Cefadroxil Shortness Of Breath    Rbtzdfcn-Jkedbql-Fyduxs [Fluocinolone] Shortness Of Breath    Ciprofloxacin Hcl Shortness Of Breath    Codeine Shortness Of Breath    Demerol Hcl [Meperidine] Shortness Of Breath    Doxycycline Nausea Only and Other (See Comments)    Glucosamine Shortness Of Breath, Diarrhea and Swelling     Other reaction(s): Respiratory Difficulty  \"lips swell up and get severe diarrhea\" - INCLUDES IODINE    Iodine Hives, Shortness Of Breath and Itching    Pcn [Penicillins] Anaphylaxis and Shortness Of Breath    Red Dye Shortness Of Breath    Shellfish-Derived Products Shortness Of Breath, Diarrhea and Swelling     \"lips swell up and get severe diarrhea\" - INCLUDES IODINE    Sulfa Antibiotics Hives, Shortness Of Breath, Itching and Rash     OK in small amounts, but larger amounts cause \"shortness of breath. \"    Yellow Dye Shortness Of Breath     Other reaction(s): Respiratory Difficulty    Yellow Dyes (Non-Tartrazine) Shortness Of Breath    Gadolinium Rash     Moderate allergic-like reaction consisting of diffuse urticaria to ProHance gadolinium-containing contrast material    Gadolinium Derivatives Rash     Moderate allergic-like reaction consisting of diffuse urticaria to ProHance gadolinium-containing contrast material    Lac Bovis     Midazolam Nausea Only     Versed caused bad nausea.  Iodides Hives       Current Outpatient Medications on File Prior to Encounter   Medication Sig Dispense Refill    albuterol sulfate  (90 Base) MCG/ACT inhaler Inhale 2 puffs into the lungs every 6 hours as needed for Wheezing 2 Inhaler 2    VIBERZI 75 MG TABS Take 75 mg by mouth 2 times daily for 90 days.  ALFREDO 180 tablet 1    levothyroxine (SYNTHROID) 50 MCG tablet levothyroxine 50 mcg tablet Take 3 tablets daily. 270 tablet 1    clobetasol (TEMOVATE) 0.05 % cream Apply topically as needed (when she has a lichen sclerosus flare up) Apply topically 2 times daily. 1 Tube 1    NONFORMULARY Take 10 mg by mouth 3 times daily Domperidone (Jamp)      miconazole (MICOTIN) 2 % vaginal cream Place 1 applicator vaginally nightly Place vaginally nightly. No current facility-administered medications on file prior to encounter. Review of Systems   Constitutional: Negative for chills, fever and unexpected weight change. HENT: Negative for congestion, ear pain, rhinorrhea, sore throat and trouble swallowing. Respiratory: Negative for cough, shortness of breath and wheezing. Cardiovascular: Negative for chest pain, palpitations and leg swelling. Gastrointestinal: Positive for abdominal pain and nausea. Negative for anal bleeding, blood in stool, constipation, diarrhea and vomiting. Genitourinary: Negative. Negative for dysuria, frequency and hematuria. Neurological: Negative for dizziness and headaches. Hematological: Does not bruise/bleed easily. GENERAL PHYSICAL EXAM     Vitals: BP (!) 146/73   Pulse 70   Temp 98.7 °F (37.1 °C) (Temporal)   Resp 18   Ht 5' 4\" (1.626 m)   Wt 142 lb (64.4 kg)   SpO2 99%   BMI 24.37 kg/m²               Physical Exam   Constitutional: She is oriented to person, place, and time. She appears well-developed and well-nourished. Non-toxic appearance. She does not have a sickly appearance. She does not appear ill. No distress. HENT:   Head: Normocephalic. Right Ear: External ear normal.   Left Ear: External ear normal.   Nose: Nose normal.   Mouth/Throat: Oropharynx is clear and moist and mucous membranes are normal. No oropharyngeal exudate, posterior oropharyngeal edema, posterior oropharyngeal erythema or tonsillar abscesses. Eyes: Pupils are equal, round, and reactive to light. Conjunctivae are normal. Right eye exhibits no discharge.  Left eye exhibits no discharge. Cardiovascular: Normal rate, regular rhythm and intact distal pulses. Murmur heard. Systolic murmur is present with a grade of 1/6. Pulses:       Radial pulses are 2+ on the right side and 2+ on the left side. Dorsalis pedis pulses are 2+ on the right side and 2+ on the left side. Posterior tibial pulses are 2+ on the right side and 2+ on the left side. Pulmonary/Chest: Effort normal and breath sounds normal. No accessory muscle usage. No respiratory distress. She has no decreased breath sounds. She has no wheezes. She has no rhonchi. She has no rales. Abdominal: Soft. Normal appearance and bowel sounds are normal. She exhibits no distension and no mass. There is no abdominal tenderness. There is no rebound, no guarding and no tenderness at McBurney's point. Musculoskeletal: Normal range of motion. Right lower leg: She exhibits no tenderness and no swelling. Left lower leg: She exhibits no tenderness and no swelling. Lymphadenopathy:     She has no cervical adenopathy. Neurological: She is alert and oriented to person, place, and time. Skin: Skin is warm and dry. She is not diaphoretic. Psychiatric: She has a normal mood and affect. Her behavior is normal.   Vitals reviewed.       SURGERY / PROVISIONAL DIAGNOSES:      COLONOSCOPY DIAGNOSTIC (5-10-21) AND APPENDECTOMY LAPAROSCOPIC (5-11-21)    ABDOMINAL PAIN    Patient Active Problem List    Diagnosis Date Noted    Chest pain 09/04/2020    Gastroparesis 06/06/2019    Hypothyroidism, unspecified 01/30/2019    Neoplastic disease 11/08/2018    Neoplasm of uncertain behavior of skin 11/08/2018    Acquired von Willebrand's disease ([facility] [address] 10/30/2018    Thrombocytopenia ([facility] [address] 10/16/2018    Postprocedural hypothyroidism 07/16/2018    Dystrophy of vulva 27/59/2676    Lichen sclerosus et atrophicus 08/31/2017    Basal cell carcinoma of upper lip 06/22/2017    Actinic keratosis 06/22/2017    Functional diarrhea 11/16/2016    Right upper quadrant pain 10/28/2015    Other plastic surgery for unacceptable cosmetic appearance 08/18/2015    Food allergy     Squamous cell carcinoma in situ 06/09/2014    Squamous cell carcinoma in situ of skin of left wrist 06/09/2014    Wrist joint pain 01/16/2014    Benign paroxysmal positional vertigo 12/09/2013    Hip pain 12/09/2013    Pain in limb 08/08/2013    Hyperlipidemia 05/10/2013    Benign neoplasm of skin of upper extremity 04/08/2013    Benign neoplasm of skin of trunk, except scrotum 04/08/2013    Rosacea 04/08/2013    Squamous cell carcinoma of upper extremity 04/08/2013    Cobalamin deficiency 12/26/2012    Asthma 12/26/2012    Cervical spondylosis without myelopathy 12/26/2012    Diaphragmatic hernia 12/26/2012    Diverticulosis of colon 12/26/2012    Dyspareunia 12/26/2012    Enthesopathy 12/26/2012    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

## 2021-04-27 NOTE — H&P (VIEW-ONLY)
HISTORY and [facility] [id number]       NAME:  Pilar Bamberger  MRN: [id number]   YOB: 1955   Date: 4/27/2021   Age: 77 y.o. Gender: female     COMPLAINT AND PRESENT HISTORY:   Pilar Bamberger is 77 y.o.,  female, presents for pre-anesthesia/admission testing for COLONOSCOPY DIAGNOSTIC (5-10-21) AND APPENDECTOMY LAPAROSCOPIC (5-11-21)per Dr. Natan Lewis. Primary dx: ABDOMINAL PAIN.    HPI:  Patient states that for the past year she has felt nauseated after she eats for the first hour or so, then it gradually improves. She states that past couple of weeks s/s have worsened. She followed up with GI specialist at [facility] about 1.5 months ago- thought was she had enlarged or fatty liver. She was also following with Dr. Cyndi Calle- ordered CT ABD/pelvis which did show thickening of appendix. She does c/o RLQ ABD pain, which has been present most of the time. She rates the pain about a 6 on 0-10 pain scale, increased to 7-8- describes as a dull pain. She does take Tylenol PRN for pain, also uses heating pad. Denies vomiting. Hx of hiatal hernia. Denies constipation/diarrhea, denies blood in stool/black tarry stools. Hx of colonoscopy 4 years ago- denies hx of colon polyps. Denies hx of appetite change/loss. She states \"I don't think I have cancer\". Testing completed r/t condition:  Narrative   EXAMINATION:   CT OF THE ABDOMEN AND PELVIS WITHOUT CONTRAST 4/16/2021 11:08 am       TECHNIQUE:   CT of the abdomen and pelvis was performed without the administration of   intravenous contrast. Multiplanar reformatted images are provided for review.    Dose modulation, iterative reconstruction, and/or weight based adjustment of   the mA/kV was utilized to reduce the radiation dose to as low as reasonably   achievable.       COMPARISON:   1/21/2008       HISTORY:   ORDERING SYSTEM PROVIDED HISTORY: Right lower quadrant pain   TECHNOLOGIST PROVIDED HISTORY: Right flank pain, right lower quadrant pain   Reason for Exam: patient c/o generalized abd pain for a few months   Additional signs and symptoms: patient c/o generalized abd pain for a few   months       FINDINGS:   Lower Chest: There is minimal linear scarring versus atelectasis to the lung   bases.  No focal consolidations or pleural effusions.  Heart is normal in   size.  No pericardial effusion.       Organs: Unenhanced liver, spleen, pancreas, adrenal glands and kidneys are   unremarkable.  Prior cholecystectomy.       GI/Bowel: Colonic diverticulosis without evidence for diverticulitis.  No   evidence for bowel obstruction.  Question if there is some degree of   congenital malrotation of the bowel with large bowel located essentially   entirely on the left and small bowel located predominantly on the right with   abnormal location of the ligament of Treitz.  Distal appendix is thickened   measuring up to 1.2 cm in caliber without discrete mass lesion identified or   significant periappendiceal inflammatory change.  Chronic appearing   postsurgical change to the stomach suggestive of prior Nissen fundoplication,   similar to remote exam.       Pelvis: Collapsed urinary bladder limiting evaluation without gross bladder   abnormality noted.  Patient appears to be status post prior hysterectomy.  No   adnexal masses are seen.       Peritoneum/Retroperitoneum: No ascites or focal fluid collections.  No   intraperitoneal free air.  Atherosclerosis.  No evidence for abdominal aortic   aneurysm.  No lymphadenopathy is identified.       Bones/Soft Tissues: No acute or suspicious bone or soft tissue abnormality.           Impression   Distal appendix is mildly thickened without significant periappendiceal   inflammatory change.  Nonetheless acute appendicitis cannot be excluded.  No   discrete mass lesion identified.  If no surgical intervention is planned,   consider follow-up CT to ensure stability versus resolution.       Colonic diverticulosis Benign paroxysmal positional vertigo 12/09/2013    Cancer ([address]     Thyroid and skin    Cardiac murmur     STATES SINCE CHILD BILLY- STATES SHE WAS BORN WITH A \"90 DEGREE ANGLE VALVE\"    Chest pain 09/04/2020    Cobalamin deficiency 12/26/2012    Diverticulitis     Diverticulosis     Gastroesophageal reflux disease 12/26/2012    Gastroparesis     Hiatal hernia     History of fractured kneecap     RIGHT    History of malignant neoplasm of thyroid 04/17/2012    History of squamous cell carcinoma in situ of skin 06/09/2014    Chart states left wrist, but patient denies    Hypoparathyroidism ([address] 07/03/2012    IBS (irritable bowel syndrome)     diarrhea-prominent type    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  Neoplasm of unspecified nature of bone, soft tissue, and skin     New lesions of right dorsum of hand (healed after Efudex) and lesion of right anterior thigh.  Obstructive sleep apnea syndrome 12/26/2012    Osteoarthritis     Osteoporosis     beginning     PONV (postoperative nausea and vomiting)     Postprocedural hypothyroidism 07/16/2018    Prolonged emergence from general anesthesia     Pt states this was \"a long time ago\"     Sensorineural hearing loss, bilateral 12/26/2012    Squamous cell carcinoma of upper extremity 04/08/2013    Scc in situ rt elbow 1/11    Thrombocytopenia ([address]     Thyroid disease     Thyroid Ca Hx. - thyroid was removed.      Vulvar dystrophy        SURGICAL HISTORY       Past Surgical History:   Procedure Laterality Date    BREAST SURGERY      Cyst removed rt. nipple     CATARACT REMOVAL Right 07/17/2019    Right eye    CHOLECYSTECTOMY  2011    COLONOSCOPY      ELBOW Other Topics Concern    None   Social History Narrative    None       REVIEW OF SYSTEMS      Allergies   Allergen Reactions    Latex     Aspirin Anaphylaxis and Shortness Of Breath    Cefadroxil Shortness Of Breath    Hmuohyoj-Egngfev-Glwcjb [Fluocinolone] Shortness Of Breath    Ciprofloxacin Hcl Shortness Of Breath    Codeine Shortness Of Breath    Demerol Hcl [Meperidine] Shortness Of Breath    Doxycycline Nausea Only and Other (See Comments)    Glucosamine Shortness Of Breath, Diarrhea and Swelling     Other reaction(s): Respiratory Difficulty  \"lips swell up and get severe diarrhea\" - INCLUDES IODINE    Iodine Hives, Shortness Of Breath and Itching    Pcn [Penicillins] Anaphylaxis and Shortness Of Breath    Red Dye Shortness Of Breath    Shellfish-Derived Products Shortness Of Breath, Diarrhea and Swelling     \"lips swell up and get severe diarrhea\" - INCLUDES IODINE    Sulfa Antibiotics Hives, Shortness Of Breath, Itching and Rash     OK in small amounts, but larger amounts cause \"shortness of breath. \"    Yellow Dye Shortness Of Breath     Other reaction(s): Respiratory Difficulty    Yellow Dyes (Non-Tartrazine) Shortness Of Breath    Gadolinium Rash     Moderate allergic-like reaction consisting of diffuse urticaria to ProHance gadolinium-containing contrast material    Gadolinium Derivatives Rash     Moderate allergic-like reaction consisting of diffuse urticaria to ProHance gadolinium-containing contrast material    Lac Bovis     Midazolam Nausea Only     Versed caused bad nausea.  Iodides Hives       Current Outpatient Medications on File Prior to Encounter   Medication Sig Dispense Refill    albuterol sulfate  (90 Base) MCG/ACT inhaler Inhale 2 puffs into the lungs every 6 hours as needed for Wheezing 2 Inhaler 2    VIBERZI 75 MG TABS Take 75 mg by mouth 2 times daily for 90 days.  ALFREDO 180 tablet 1    levothyroxine (SYNTHROID) 50 MCG tablet levothyroxine 50 mcg tablet Take 3 tablets daily. 270 tablet 1    clobetasol (TEMOVATE) 0.05 % cream Apply topically as needed (when she has a lichen sclerosus flare up) Apply topically 2 times daily. 1 Tube 1    NONFORMULARY Take 10 mg by mouth 3 times daily Domperidone (Jamp)      miconazole (MICOTIN) 2 % vaginal cream Place 1 applicator vaginally nightly Place vaginally nightly. No current facility-administered medications on file prior to encounter. Review of Systems   Constitutional: Negative for chills, fever and unexpected weight change. HENT: Negative for congestion, ear pain, rhinorrhea, sore throat and trouble swallowing. Respiratory: Negative for cough, shortness of breath and wheezing. Cardiovascular: Negative for chest pain, palpitations and leg swelling. Gastrointestinal: Positive for abdominal pain and nausea. Negative for anal bleeding, blood in stool, constipation, diarrhea and vomiting. Genitourinary: Negative. Negative for dysuria, frequency and hematuria. Neurological: Negative for dizziness and headaches. Hematological: Does not bruise/bleed easily. GENERAL PHYSICAL EXAM     Vitals: BP (!) 146/73   Pulse 70   Temp 98.7 °F (37.1 °C) (Temporal)   Resp 18   Ht 5' 4\" (1.626 m)   Wt 142 lb (64.4 kg)   SpO2 99%   BMI 24.37 kg/m²               Physical Exam   Constitutional: She is oriented to person, place, and time. She appears well-developed and well-nourished. Non-toxic appearance. She does not have a sickly appearance. She does not appear ill. No distress. HENT:   Head: Normocephalic. Right Ear: External ear normal.   Left Ear: External ear normal.   Nose: Nose normal.   Mouth/Throat: Oropharynx is clear and moist and mucous membranes are normal. No oropharyngeal exudate, posterior oropharyngeal edema, posterior oropharyngeal erythema or tonsillar abscesses. Eyes: Pupils are equal, round, and reactive to light. Conjunctivae are normal. Right eye exhibits no discharge.  Left eye exhibits no discharge. Cardiovascular: Normal rate, regular rhythm and intact distal pulses. Murmur heard. Systolic murmur is present with a grade of 1/6. Pulses:       Radial pulses are 2+ on the right side and 2+ on the left side. Dorsalis pedis pulses are 2+ on the right side and 2+ on the left side. Posterior tibial pulses are 2+ on the right side and 2+ on the left side. Pulmonary/Chest: Effort normal and breath sounds normal. No accessory muscle usage. No respiratory distress. She has no decreased breath sounds. She has no wheezes. She has no rhonchi. She has no rales. Abdominal: Soft. Normal appearance and bowel sounds are normal. She exhibits no distension and no mass. There is no abdominal tenderness. There is no rebound, no guarding and no tenderness at McBurney's point. Musculoskeletal: Normal range of motion. Right lower leg: She exhibits no tenderness and no swelling. Left lower leg: She exhibits no tenderness and no swelling. Lymphadenopathy:     She has no cervical adenopathy. Neurological: She is alert and oriented to person, place, and time. Skin: Skin is warm and dry. She is not diaphoretic. Psychiatric: She has a normal mood and affect. Her behavior is normal.   Vitals reviewed.       SURGERY / PROVISIONAL DIAGNOSES:      COLONOSCOPY DIAGNOSTIC (5-10-21) AND APPENDECTOMY LAPAROSCOPIC (5-11-21)    ABDOMINAL PAIN    Patient Active Problem List    Diagnosis Date Noted    Chest pain 09/04/2020    Gastroparesis 06/06/2019    Hypothyroidism, unspecified 01/30/2019    Neoplastic disease 11/08/2018    Neoplasm of uncertain behavior of skin 11/08/2018    Acquired von Willebrand's disease ([facility] [address] 10/30/2018    Thrombocytopenia ([facility] [facility] 10/16/2018    Postprocedural hypothyroidism 07/16/2018    Dystrophy of vulva 86/65/5920    Lichen sclerosus et atrophicus 08/31/2017    Basal cell carcinoma of upper lip 06/22/2017    Actinic keratosis 06/22/2017    Functional diarrhea 11/16/2016    Right upper quadrant pain 10/28/2015    Other plastic surgery for unacceptable cosmetic appearance 08/18/2015    Food allergy     Squamous cell carcinoma in situ 06/09/2014    Squamous cell carcinoma in situ of skin of left wrist 06/09/2014    Wrist joint pain 01/16/2014    Benign paroxysmal positional vertigo 12/09/2013    Hip pain 12/09/2013    Pain in limb 08/08/2013    Hyperlipidemia 05/10/2013    Benign neoplasm of skin of upper extremity 04/08/2013    Benign neoplasm of skin of trunk, except scrotum 04/08/2013    Rosacea 04/08/2013    Squamous cell carcinoma of upper extremity 04/08/2013    Cobalamin deficiency 12/26/2012    Asthma 12/26/2012    Cervical spondylosis without myelopathy 12/26/2012    Diaphragmatic hernia 12/26/2012    Diverticulosis of colon 12/26/2012    Dyspareunia 12/26/2012    Enthesopathy 12/26/2012    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

## 2021-04-27 NOTE — H&P
HISTORY and Isai Villeg[facility] [id number]       NAME:  Jesse Candelaria  MRN: [id number]   YOB: 1955   Date: 4/27/2021   Age: 77 y.o. Gender: female     COMPLAINT AND PRESENT HISTORY:   Jesse Candelaria is 77 y.o.,  female, presents for pre-anesthesia/admission testing for COLONOSCOPY DIAGNOSTIC (5-10-21) AND APPENDECTOMY LAPAROSCOPIC (5-11-21)per Dr. Izzy Moreno. Primary dx: ABDOMINAL PAIN.    HPI:  Patient states that for the past year she has felt nauseated after she eats for the first hour or so, then it gradually improves. She states that past couple of weeks s/s have worsened. She followed up with GI specialist at [facility] about 1.5 months ago- thought was she had enlarged or fatty liver. She was also following with Dr. Charlee Tiwari- ordered CT ABD/pelvis which did show thickening of appendix. She does c/o RLQ ABD pain, which has been present most of the time. She rates the pain about a 6 on 0-10 pain scale, increased to 7-8- describes as a dull pain. She does take Tylenol PRN for pain, also uses heating pad. Denies vomiting. Hx of hiatal hernia. Denies constipation/diarrhea, denies blood in stool/black tarry stools. Hx of colonoscopy 4 years ago- denies hx of colon polyps. Denies hx of appetite change/loss. She states \"I don't think I have cancer\". Testing completed r/t condition:  Narrative   EXAMINATION:   CT OF THE ABDOMEN AND PELVIS WITHOUT CONTRAST 4/16/2021 11:08 am       TECHNIQUE:   CT of the abdomen and pelvis was performed without the administration of   intravenous contrast. Multiplanar reformatted images are provided for review.    Dose modulation, iterative reconstruction, and/or weight based adjustment of   the mA/kV was utilized to reduce the radiation dose to as low as reasonably   achievable.       COMPARISON:   1/21/2008       HISTORY:   ORDERING SYSTEM PROVIDED HISTORY: Right lower quadrant pain   TECHNOLOGIST PROVIDED HISTORY: Right flank pain, right lower quadrant pain   Reason for Exam: patient c/o generalized abd pain for a few months   Additional signs and symptoms: patient c/o generalized abd pain for a few   months       FINDINGS:   Lower Chest: There is minimal linear scarring versus atelectasis to the lung   bases.  No focal consolidations or pleural effusions.  Heart is normal in   size.  No pericardial effusion.       Organs: Unenhanced liver, spleen, pancreas, adrenal glands and kidneys are   unremarkable.  Prior cholecystectomy.       GI/Bowel: Colonic diverticulosis without evidence for diverticulitis.  No   evidence for bowel obstruction.  Question if there is some degree of   congenital malrotation of the bowel with large bowel located essentially   entirely on the left and small bowel located predominantly on the right with   abnormal location of the ligament of Treitz.  Distal appendix is thickened   measuring up to 1.2 cm in caliber without discrete mass lesion identified or   significant periappendiceal inflammatory change.  Chronic appearing   postsurgical change to the stomach suggestive of prior Nissen fundoplication,   similar to remote exam.       Pelvis: Collapsed urinary bladder limiting evaluation without gross bladder   abnormality noted.  Patient appears to be status post prior hysterectomy.  No   adnexal masses are seen.       Peritoneum/Retroperitoneum: No ascites or focal fluid collections.  No   intraperitoneal free air.  Atherosclerosis.  No evidence for abdominal aortic   aneurysm.  No lymphadenopathy is identified.       Bones/Soft Tissues: No acute or suspicious bone or soft tissue abnormality.           Impression   Distal appendix is mildly thickened without significant periappendiceal   inflammatory change.  Nonetheless acute appendicitis cannot be excluded.  No   discrete mass lesion identified.  If no surgical intervention is planned,   consider follow-up CT to ensure stability versus resolution.       Colonic diverticulosis without evidence for diverticulitis.       Configuration of large and small bowel suggestive of congenital malrotation   of bowel.       The findings were sent to the Radiology Results [address] at 11:44   am on 4/16/2021to be communicated to the caregivers office. Narrative   EXAMINATION:   RIGHT UPPER QUADRANT ULTRASOUND       4/7/2021 9:33 am       COMPARISON:   None.       HISTORY:   ORDERING SYSTEM PROVIDED HISTORY: Fatty liver   TECHNOLOGIST PROVIDED HISTORY:   Acuity: Acute   Type of Exam: Initial       FINDINGS:   LIVER:  The echogenicity and echotexture of the liver suggests a degree of   diffuse fatty infiltration.  No evidence of focal defect.  Normal directional   vascular flow.       BILIARY SYSTEM:  The gallbladder is surgically absent.       Common bile duct is within normal limits measuring 5 mm.       RIGHT KIDNEY: The right kidney is grossly unremarkable without evidence of   hydronephrosis. There is  decreased parenchymal thickness.       PANCREAS:  Visualized portions of the pancreas are unremarkable.       OTHER: No evidence of right upper quadrant ascites.           Impression   1.  Findings compatible with a degree of hepatic steatosis.       2.  Some decreased cortical thickness right kidney. Review of additional significant medical hx:  ANEMIA, THROMBOCYTOPENIA, VON WILLEBRAND'S DISEASE: She did follow up with hematologist- Dr. Carrol Greene- states that she was told that she no longer has Von Willebrand's disease. She states she never had to do anything special prior to surgery d/t Von Willebrand's disease, hx of easy bruising/bleeding, but this has improved. Labs reviewed per [facility] do reveal elevated Factor 8 assay of 167, Von Willebrand Ag slightly elevated at 152 as of 3-9-21.   Lab Results   Component Value Date    WBC 6.1 04/27/2021    HGB 11.5 (L) 04/27/2021    HCT 34.9 (L) 04/27/2021    MCV 92.0 04/27/2021    PLT 94 (L) 04/27/2021     MVP: Patient denies hx of MVP. She did follow up with Dr. Kerri Burgess last year. She believes she was experiencing too much anxiety- had a sharp pain in left arm, was admitted over night, states she has been \"fine ever since\". Denies chest pain, palpitations, SOB, dizziness, leg swelling, headache. 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 GASTROPARESIS, DIVERTICULITIS, IBS, GERD: Denies dysphagia, pharyngitis, voice change. Current medications r/t condition: VIBERZI    ASTHMA: Denies current SOB, wheezing, cough. Current medications r/t condition: ALBUTEROL INHALER PRN    BPPV: Denies recent issues w/vertigo, denies syncope. THYROID CA, HYPOPARATHYROIDISM, HYPOTHYROIDISM: Dx'd with thyroid CA 1985- tx w/complete thyroidectomy and radiation tx. Current medications r/t condition: LEVOTHYROXINE   Lab Results   Component Value Date    TSH 10.34 (H) 08/21/2020     RENE: Patient denies hx of- does not use machine. B12 DEFICIENCY: Receives monthly B12 injections, but did not receive this month- per Dr. Fariha Lala. 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 SKIN CA: HX OF BCC AND SCC- HX OF BX'S AND EXCISIONS. Denies hx of MRSA infection. Denies hx of blood clots. Denies hx of any personal or family hx of complications w/anesthesia EXCEPT DID HAVE PROLONGED EMERGENCE FROM ANESTHESIA X1, HX OF PONV. She states she does not like to take Percoset, does not like how it makes her feel- denies any true allergy.   PAST MEDICAL HISTORY     Past Medical History:   Diagnosis Date    Abdominal pain     RLQ    Abnormal computed tomography of abdomen and pelvis 04/16/2021    Distal appendix is mildly thickened without significant periappendiceal inflammatory change    Acquired von Willebrand's disease ([facility] [address] 10/30/2018    Allergy to food dye     yellow and red dyes    Anemia     Asthma     Basal cell carcinoma of upper lip 06/22/2017    Benign paroxysmal positional vertigo 12/09/2013    Cancer ([address]     Thyroid and skin    Cardiac murmur     STATES SINCE CHILD BILLY- STATES SHE WAS BORN WITH A \"90 DEGREE ANGLE VALVE\"    Chest pain 09/04/2020    Cobalamin deficiency 12/26/2012    Diverticulitis     Diverticulosis     Gastroesophageal reflux disease 12/26/2012    Gastroparesis     Hiatal hernia     History of fractured kneecap     RIGHT    History of malignant neoplasm of thyroid 04/17/2012    History of squamous cell carcinoma in situ of skin 06/09/2014    Chart states left wrist, but patient denies    Hypoparathyroidism ([address] 07/03/2012    IBS (irritable bowel syndrome)     diarrhea-prominent type    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  Neoplasm of unspecified nature of bone, soft tissue, and skin     New lesions of right dorsum of hand (healed after Efudex) and lesion of right anterior thigh.  Obstructive sleep apnea syndrome 12/26/2012    Osteoarthritis     Osteoporosis     beginning     PONV (postoperative nausea and vomiting)     Postprocedural hypothyroidism 07/16/2018    Prolonged emergence from general anesthesia     Pt states this was \"a long time ago\"     Sensorineural hearing loss, bilateral 12/26/2012    Squamous cell carcinoma of upper extremity 04/08/2013    Scc in situ rt elbow 1/11    Thrombocytopenia ([address]     Thyroid disease     Thyroid Ca Hx. - thyroid was removed.      Vulvar dystrophy        SURGICAL HISTORY       Past Surgical History:   Procedure Laterality Date    BREAST SURGERY      Cyst removed rt. nipple     CATARACT REMOVAL Right 07/17/2019    Right eye    CHOLECYSTECTOMY  2011    COLONOSCOPY      ELBOW SURGERY Bilateral     Tendonitis X2    HERNIA REPAIR  2005    hiatal hernia repair    HYSTERECTOMY, TOTAL ABDOMINAL  92-97    [facility]IRON 3 Surgeries    KNEE SURGERY Right 1996    W/PINS    MALIGNANT SKIN LESION EXCISION  2011    rt elbow-scc, rt shoulder-keratosis, lft leg skin with excoriation    NASAL POLYP SURGERY  2012    [facility] Right     [facility] SKIN BIOPSY  2012    lft leg keratosis    SKIN BIOPSY  2009    rt forearm, under brst, lft leg-keratosis    SKIN BIOPSY  2008    back and rt forearm-keratosis, abdomen-hemangioma    SKIN BIOPSY  2002    rt breast, lft arm, lft brst-keratosis    SKIN CANCER EXCISION      THYROIDECTOMY  1985    Thyroid Cancer    TONSILLECTOMY AND ADENOIDECTOMY         SOCIAL HISTORY       Social History     Socioeconomic History    Marital status:      Spouse name: None    Number of children: None    Years of education: None    Highest education level: None   Occupational History    None   Social Needs    Financial resource strain: None    Food insecurity     Worry: None     Inability: None    Transportation needs     Medical: None     Non-medical: None   Tobacco Use    Smoking status: Never Smoker    Smokeless tobacco: Never Used   Substance and Sexual Activity    Alcohol use: No     Binge frequency: Never    Drug use: No    Sexual activity: Not Currently   Lifestyle    Physical activity     Days per week: None     Minutes per session: None    Stress: None   Relationships    Social connections     Talks on phone: None     Gets together: None     Attends [employer] service: None     Active member of club or organization: None     Attends meetings of clubs or organizations: None     Relationship status: None    Intimate partner violence     Fear of current or ex partner: None     Emotionally abused: None     Physically abused: None     Forced sexual activity: None Take 3 tablets daily. 270 tablet 1    clobetasol (TEMOVATE) 0.05 % cream Apply topically as needed (when she has a lichen sclerosus flare up) Apply topically 2 times daily. 1 Tube 1    NONFORMULARY Take 10 mg by mouth 3 times daily Domperidone (Jamp)      miconazole (MICOTIN) 2 % vaginal cream Place 1 applicator vaginally nightly Place vaginally nightly. No current facility-administered medications on file prior to encounter. Review of Systems   Constitutional: Negative for chills, fever and unexpected weight change. HENT: Negative for congestion, ear pain, rhinorrhea, sore throat and trouble swallowing. Respiratory: Negative for cough, shortness of breath and wheezing. Cardiovascular: Negative for chest pain, palpitations and leg swelling. Gastrointestinal: Positive for abdominal pain and nausea. Negative for anal bleeding, blood in stool, constipation, diarrhea and vomiting. Genitourinary: Negative. Negative for dysuria, frequency and hematuria. Neurological: Negative for dizziness and headaches. Hematological: Does not bruise/bleed easily. GENERAL PHYSICAL EXAM     Vitals: BP (!) 146/73   Pulse 70   Temp 98.7 °F (37.1 °C) (Temporal)   Resp 18   Ht 5' 4\" (1.626 m)   Wt 142 lb (64.4 kg)   SpO2 99%   BMI 24.37 kg/m²               Physical Exam   Constitutional: She is oriented to person, place, and time. She appears well-developed and well-nourished. Non-toxic appearance. She does not have a sickly appearance. She does not appear ill. No distress. HENT:   Head: Normocephalic. Right Ear: External ear normal.   Left Ear: External ear normal.   Nose: Nose normal.   Mouth/Throat: Oropharynx is clear and moist and mucous membranes are normal. No oropharyngeal exudate, posterior oropharyngeal edema, posterior oropharyngeal erythema or tonsillar abscesses. Eyes: Pupils are equal, round, and reactive to light. Conjunctivae are normal. Right eye exhibits no discharge.  Left eye exhibits no discharge. Cardiovascular: Normal rate, regular rhythm and intact distal pulses. Murmur heard. Systolic murmur is present with a grade of 1/6. Pulses:       Radial pulses are 2+ on the right side and 2+ on the left side. Dorsalis pedis pulses are 2+ on the right side and 2+ on the left side. Posterior tibial pulses are 2+ on the right side and 2+ on the left side. Pulmonary/Chest: Effort normal and breath sounds normal. No accessory muscle usage. No respiratory distress. She has no decreased breath sounds. She has no wheezes. She has no rhonchi. She has no rales. Abdominal: Soft. Normal appearance and bowel sounds are normal. She exhibits no distension and no mass. There is no abdominal tenderness. There is no rebound, no guarding and no tenderness at McBurney's point. Musculoskeletal: Normal range of motion. Right lower leg: She exhibits no tenderness and no swelling. Left lower leg: She exhibits no tenderness and no swelling. Lymphadenopathy:     She has no cervical adenopathy. Neurological: She is alert and oriented to person, place, and time. Skin: Skin is warm and dry. She is not diaphoretic. Psychiatric: She has a normal mood and affect. Her behavior is normal.   Vitals reviewed.       SURGERY / PROVISIONAL DIAGNOSES:      COLONOSCOPY DIAGNOSTIC (5-10-21) AND APPENDECTOMY LAPAROSCOPIC (5-11-21)    ABDOMINAL PAIN    Patient Active Problem List    Diagnosis Date Noted    Chest pain 09/04/2020    Gastroparesis 06/06/2019    Hypothyroidism, unspecified 01/30/2019    Neoplastic disease 11/08/2018    Neoplasm of uncertain behavior of skin 11/08/2018    Acquired von Willebrand's disease ([facility] [address] 10/30/2018    Thrombocytopenia ([facility] [address] 10/16/2018    Postprocedural hypothyroidism 07/16/2018    Dystrophy of vulva 83/13/3490    Lichen sclerosus et atrophicus 08/31/2017    Basal cell carcinoma of upper lip 06/22/2017    Actinic keratosis 06/22/2017    Functional diarrhea 11/16/2016    Right upper quadrant pain 10/28/2015    Other plastic surgery for unacceptable cosmetic appearance 08/18/2015    Food allergy     Squamous cell carcinoma in situ 06/09/2014    Squamous cell carcinoma in situ of skin of left wrist 06/09/2014    Wrist joint pain 01/16/2014    Benign paroxysmal positional vertigo 12/09/2013    Hip pain 12/09/2013    Pain in limb 08/08/2013    Hyperlipidemia 05/10/2013    Benign neoplasm of skin of upper extremity 04/08/2013    Benign neoplasm of skin of trunk, except scrotum 04/08/2013    Rosacea 04/08/2013    Squamous cell carcinoma of upper extremity 04/08/2013    Cobalamin deficiency 12/26/2012    Asthma 12/26/2012    Cervical spondylosis without myelopathy 12/26/2012    Diaphragmatic hernia 12/26/2012    Diverticulosis of colon 12/26/2012    Dyspareunia 12/26/2012    Enthesopathy 12/26/2012    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

## 2021-04-28 LAB
EKG ATRIAL RATE: 61 BPM
EKG P AXIS: 43 DEGREES
EKG P-R INTERVAL: 134 MS
EKG Q-T INTERVAL: 406 MS
EKG QRS DURATION: 74 MS
EKG QTC CALCULATION (BAZETT): 408 MS
EKG R AXIS: 11 DEGREES
EKG T AXIS: -26 DEGREES
EKG VENTRICULAR RATE: 61 BPM

## 2021-04-28 PROCEDURE — 93010 ELECTROCARDIOGRAM REPORT: CPT | Performed by: INTERNAL MEDICINE

## 2021-05-06 ENCOUNTER — HOSPITAL ENCOUNTER (OUTPATIENT)
Dept: LAB | Age: 66
Setting detail: SPECIMEN
Discharge: HOME | End: 2021-05-06
Payer: COMMERCIAL

## 2021-05-06 DIAGNOSIS — Z01.818 PREOP TESTING: Primary | ICD-10-CM

## 2021-05-06 PROCEDURE — U0005 INFEC AGEN DETEC AMPLI PROBE: HCPCS

## 2021-05-06 PROCEDURE — U0003 INFECTIOUS AGENT DETECTION BY NUCLEIC ACID (DNA OR RNA); SEVERE ACUTE RESPIRATORY SYNDROME CORONAVIRUS 2 (SARS-COV-2) (CORONAVIRUS DISEASE [COVID-19]), AMPLIFIED PROBE TECHNIQUE, MAKING USE OF HIGH THROUGHPUT TECHNOLOGIES AS DESCRIBED BY CMS-2020-01-R: HCPCS

## 2021-05-07 ENCOUNTER — ANESTHESIA EVENT (OUTPATIENT)
Dept: ENDOSCOPY | Age: 66
DRG: 336 | End: 2021-05-07
Payer: COMMERCIAL

## 2021-05-07 ENCOUNTER — ANESTHESIA EVENT (OUTPATIENT)
Dept: OPERATING ROOM | Age: 66
DRG: 336 | End: 2021-05-07
Payer: COMMERCIAL

## 2021-05-07 LAB
SARS-COV-2: NORMAL
SARS-COV-2: NOT DETECTED
SOURCE: NORMAL

## 2021-05-10 ENCOUNTER — HOSPITAL ENCOUNTER (OUTPATIENT)
Age: 66
Setting detail: OUTPATIENT SURGERY
Discharge: HOME | DRG: 336 | End: 2021-05-10
Attending: SURGERY | Admitting: SURGERY
Payer: COMMERCIAL

## 2021-05-10 ENCOUNTER — ANESTHESIA (OUTPATIENT)
Dept: ENDOSCOPY | Age: 66
DRG: 336 | End: 2021-05-10
Payer: COMMERCIAL

## 2021-05-10 VITALS
BODY MASS INDEX: 24.24 KG/M2 | WEIGHT: 142 LBS | OXYGEN SATURATION: 96 % | DIASTOLIC BLOOD PRESSURE: 69 MMHG | TEMPERATURE: 98.8 F | RESPIRATION RATE: 13 BRPM | HEIGHT: 64 IN | HEART RATE: 60 BPM | SYSTOLIC BLOOD PRESSURE: 148 MMHG

## 2021-05-10 VITALS — SYSTOLIC BLOOD PRESSURE: 87 MMHG | OXYGEN SATURATION: 100 % | TEMPERATURE: 97.2 F | DIASTOLIC BLOOD PRESSURE: 52 MMHG

## 2021-05-10 PROCEDURE — 6360000002 HC RX W HCPCS: Performed by: NURSE ANESTHETIST, CERTIFIED REGISTERED

## 2021-05-10 PROCEDURE — 7100000030 HC ASPR PHASE II RECOVERY - FIRST 15 MIN: Performed by: SURGERY

## 2021-05-10 PROCEDURE — 7100000001 HC PACU RECOVERY - ADDTL 15 MIN: Performed by: SURGERY

## 2021-05-10 PROCEDURE — 7100000000 HC PACU RECOVERY - FIRST 15 MIN: Performed by: SURGERY

## 2021-05-10 PROCEDURE — 7100000031 HC ASPR PHASE II RECOVERY - ADDTL 15 MIN: Performed by: SURGERY

## 2021-05-10 PROCEDURE — 3700000001 HC ADD 15 MINUTES (ANESTHESIA): Performed by: SURGERY

## 2021-05-10 PROCEDURE — 2500000003 HC RX 250 WO HCPCS: Performed by: NURSE ANESTHETIST, CERTIFIED REGISTERED

## 2021-05-10 PROCEDURE — 2580000003 HC RX 258: Performed by: ANESTHESIOLOGY

## 2021-05-10 PROCEDURE — 3700000000 HC ANESTHESIA ATTENDED CARE: Performed by: SURGERY

## 2021-05-10 PROCEDURE — 2709999900 HC NON-CHARGEABLE SUPPLY: Performed by: SURGERY

## 2021-05-10 PROCEDURE — 6360000002 HC RX W HCPCS: Performed by: ANESTHESIOLOGY

## 2021-05-10 PROCEDURE — 3609027000 HC COLONOSCOPY: Performed by: SURGERY

## 2021-05-10 PROCEDURE — 0DJD8ZZ INSPECTION OF LOWER INTESTINAL TRACT, VIA NATURAL OR ARTIFICIAL OPENING ENDOSCOPIC: ICD-10-PCS | Performed by: SURGERY

## 2021-05-10 RX ORDER — LABETALOL HYDROCHLORIDE 5 MG/ML
5 INJECTION, SOLUTION INTRAVENOUS EVERY 10 MIN PRN
Status: DISCONTINUED | OUTPATIENT
Start: 2021-05-10 | End: 2021-05-10 | Stop reason: HOSPADM

## 2021-05-10 RX ORDER — SODIUM CHLORIDE, SODIUM LACTATE, POTASSIUM CHLORIDE, CALCIUM CHLORIDE 600; 310; 30; 20 MG/100ML; MG/100ML; MG/100ML; MG/100ML
INJECTION, SOLUTION INTRAVENOUS CONTINUOUS
Status: DISCONTINUED | OUTPATIENT
Start: 2021-05-10 | End: 2021-05-10 | Stop reason: HOSPADM

## 2021-05-10 RX ORDER — SODIUM CHLORIDE 0.9 % (FLUSH) 0.9 %
10 SYRINGE (ML) INJECTION EVERY 12 HOURS SCHEDULED
Status: DISCONTINUED | OUTPATIENT
Start: 2021-05-10 | End: 2021-05-10 | Stop reason: HOSPADM

## 2021-05-10 RX ORDER — SODIUM CHLORIDE 9 MG/ML
25 INJECTION, SOLUTION INTRAVENOUS PRN
Status: DISCONTINUED | OUTPATIENT
Start: 2021-05-10 | End: 2021-05-10 | Stop reason: HOSPADM

## 2021-05-10 RX ORDER — ONDANSETRON 2 MG/ML
4 INJECTION INTRAMUSCULAR; INTRAVENOUS
Status: COMPLETED | OUTPATIENT
Start: 2021-05-10 | End: 2021-05-10

## 2021-05-10 RX ORDER — DIPHENHYDRAMINE HYDROCHLORIDE 50 MG/ML
12.5 INJECTION INTRAMUSCULAR; INTRAVENOUS
Status: COMPLETED | OUTPATIENT
Start: 2021-05-10 | End: 2021-05-10

## 2021-05-10 RX ORDER — HYDRALAZINE HYDROCHLORIDE 20 MG/ML
5 INJECTION INTRAMUSCULAR; INTRAVENOUS EVERY 10 MIN PRN
Status: DISCONTINUED | OUTPATIENT
Start: 2021-05-10 | End: 2021-05-10 | Stop reason: HOSPADM

## 2021-05-10 RX ORDER — LIDOCAINE HYDROCHLORIDE 10 MG/ML
1 INJECTION, SOLUTION EPIDURAL; INFILTRATION; INTRACAUDAL; PERINEURAL
Status: DISCONTINUED | OUTPATIENT
Start: 2021-05-10 | End: 2021-05-10 | Stop reason: HOSPADM

## 2021-05-10 RX ORDER — DEXAMETHASONE SODIUM PHOSPHATE 4 MG/ML
INJECTION, SOLUTION INTRA-ARTICULAR; INTRALESIONAL; INTRAMUSCULAR; INTRAVENOUS; SOFT TISSUE PRN
Status: DISCONTINUED | OUTPATIENT
Start: 2021-05-10 | End: 2021-05-10 | Stop reason: SDUPTHER

## 2021-05-10 RX ORDER — LIDOCAINE HYDROCHLORIDE 10 MG/ML
INJECTION, SOLUTION EPIDURAL; INFILTRATION; INTRACAUDAL; PERINEURAL PRN
Status: DISCONTINUED | OUTPATIENT
Start: 2021-05-10 | End: 2021-05-10 | Stop reason: SDUPTHER

## 2021-05-10 RX ORDER — PROPOFOL 10 MG/ML
INJECTION, EMULSION INTRAVENOUS PRN
Status: DISCONTINUED | OUTPATIENT
Start: 2021-05-10 | End: 2021-05-10 | Stop reason: SDUPTHER

## 2021-05-10 RX ORDER — ONDANSETRON 2 MG/ML
INJECTION INTRAMUSCULAR; INTRAVENOUS PRN
Status: DISCONTINUED | OUTPATIENT
Start: 2021-05-10 | End: 2021-05-10 | Stop reason: SDUPTHER

## 2021-05-10 RX ORDER — SODIUM CHLORIDE 0.9 % (FLUSH) 0.9 %
10 SYRINGE (ML) INJECTION PRN
Status: DISCONTINUED | OUTPATIENT
Start: 2021-05-10 | End: 2021-05-10 | Stop reason: HOSPADM

## 2021-05-10 RX ADMIN — LIDOCAINE HYDROCHLORIDE 50 MG: 10 INJECTION, SOLUTION EPIDURAL; INFILTRATION; INTRACAUDAL; PERINEURAL at 10:52

## 2021-05-10 RX ADMIN — SODIUM CHLORIDE, POTASSIUM CHLORIDE, SODIUM LACTATE AND CALCIUM CHLORIDE: 600; 310; 30; 20 INJECTION, SOLUTION INTRAVENOUS at 10:42

## 2021-05-10 RX ADMIN — ONDANSETRON 4 MG: 2 INJECTION, SOLUTION INTRAMUSCULAR; INTRAVENOUS at 11:00

## 2021-05-10 RX ADMIN — DEXAMETHASONE SODIUM PHOSPHATE 4 MG: 4 INJECTION, SOLUTION INTRAMUSCULAR; INTRAVENOUS at 11:00

## 2021-05-10 RX ADMIN — DIPHENHYDRAMINE HYDROCHLORIDE 12.5 MG: 50 INJECTION INTRAMUSCULAR; INTRAVENOUS at 12:08

## 2021-05-10 RX ADMIN — PROPOFOL 200 MG: 10 INJECTION, EMULSION INTRAVENOUS at 10:52

## 2021-05-10 RX ADMIN — ONDANSETRON 4 MG: 2 INJECTION INTRAMUSCULAR; INTRAVENOUS at 11:44

## 2021-05-10 ASSESSMENT — PAIN SCALES - GENERAL: PAINLEVEL_OUTOF10: 0

## 2021-05-10 ASSESSMENT — PULMONARY FUNCTION TESTS
PIF_VALUE: 2
PIF_VALUE: 3
PIF_VALUE: 10
PIF_VALUE: 2
PIF_VALUE: 10
PIF_VALUE: 2
PIF_VALUE: 1
PIF_VALUE: 1
PIF_VALUE: 10
PIF_VALUE: 10
PIF_VALUE: 1
PIF_VALUE: 10
PIF_VALUE: 10

## 2021-05-10 ASSESSMENT — ENCOUNTER SYMPTOMS: STRIDOR: 0

## 2021-05-10 NOTE — PROGRESS NOTES
Pt refused to remove her fabric Confucianism scapular. Pinned to pt's hospital gown and waiver signed.

## 2021-05-10 NOTE — OP NOTE
Operative Note      Patient: Asa Wiggins  YOB: 1955  MRN: [id number]    Date of Procedure: 5/10/2021    PROCEDURE NOTE    DATE OF PROCEDURE: 5/10/2021    SURGEON: Jaci Thomas MD    ASSISTANT: None    PREOPERATIVE DIAGNOSIS: Abdominal pain. Abnormal appendix. POSTOPERATIVE DIAGNOSIS: No cecal mass. Fair preparation right colon. Sigmoid diverticulosis. OPERATION: Total colonoscopy to cecum with intubation of terminal ileum    ANESTHESIA: General    ESTIMATED BLOOD LOSS: None    COMPLICATIONS: None     SPECIMENS:  Was Not Obtained    HISTORY: The patient is a 77y.o. year old female with history of above preop diagnosis. I recommended colonoscopy with possible biopsy or polypectomy and I explained the risk, benefits, expected outcome, and alternatives to the procedure. Risks included but are not limited to bleeding, infection, respiratory distress, hypotension, and perforation of the colon and possibility of missing a lesion. The patient understands and is in agreement. PROCEDURE: The patient was given IV conscious sedation. The patient's SPO2 remained above 90% throughout the procedure. Digital rectal exam was normal.  The colonoscope was inserted through the anus into the rectum and advanced under direct vision to the cecum without difficulty. Terminal ileum was examined for approximately 2 inches. The prep was Fair preparation right colon. .      Findings:  Terminal ileum: normal    Cecum/Ascending colon: Grossly normal.  No cecal mass. Fair preparation ascending colon. Transverse colon: normal    Descending/Sigmoid colon: abnormal: Sigmoid diverticulosis. Rectum/Anus: examined in normal and retroflexed positions and was normal    Withdrawal Time was (minutes): 18      Next screening colonoscopy: 5 years. If screening is less than 10 years the recommended reason is due:Fair preparation    The colon was decompressed.   While withdrawing the scope the above findings were

## 2021-05-10 NOTE — ANESTHESIA PRE PROCEDURE
Department of Anesthesiology  Preprocedure Note       Name:  Gustavo Horta   Age:  77 y.o.  :  1955                                          MRN:  [id number]         Date:  5/10/2021      Surgeon: Earline Severino):  Zeus Ritter MD    Procedure: Procedure(s):  COLONOSCOPY DIAGNOSTIC    Medications prior to admission:   Prior to Admission medications    Medication Sig Start Date End Date Taking? Authorizing Provider   albuterol sulfate  (90 Base) MCG/ACT inhaler Inhale 2 puffs into the lungs every 6 hours as needed for Wheezing 3/31/21   CHARLOTTE Bustos CNP   levothyroxine (SYNTHROID) 50 MCG tablet levothyroxine 50 mcg tablet  Take 3 tablets daily. 21   CHARLOTTE Parada CNP   clobetasol (TEMOVATE) 0.05 % cream Apply topically as needed (when she has a lichen sclerosus flare up) Apply topically 2 times daily. 12/15/20   Dorothy Wiseman MD   miconazole (MICOTIN) 2 % vaginal cream Place 1 applicator vaginally nightly Place vaginally nightly. Historical Provider, MD   NONFORMULARY Take 10 mg by mouth 3 times daily Domperidone (Jamp)    Historical Provider, MD       Current medications:    Current Facility-Administered Medications   Medication Dose Route Frequency Provider Last Rate Last Admin    lactated ringers infusion   Intravenous Continuous Angie Felix MD        sodium chloride flush 0.9 % injection 10 mL  10 mL Intravenous 2 times per day Angie Felix MD        sodium chloride flush 0.9 % injection 10 mL  10 mL Intravenous PRN Angie Felix MD        0.9 % sodium chloride infusion  25 mL Intravenous PRN Angie Felix MD        lidocaine PF 1 % injection 1 mL  1 mL Intradermal Once PRN Angie Felix MD           Allergies:     Allergies   Allergen Reactions    Aspirin Anaphylaxis and Shortness Of Breath    Cefadroxil Shortness Of Breath    Inxylzhr-Zwmisck-Wmxoly [Fluocinolone] Shortness Of Breath    Ciprofloxacin Hcl Shortness Of Breath    Codeine Shortness Of Breath    Demerol Hcl [Meperidine] Shortness Of Breath    Doxycycline Nausea Only and Other (See Comments)    Glucosamine Shortness Of Breath, Diarrhea and Swelling     Other reaction(s): Respiratory Difficulty  \"lips swell up and get severe diarrhea\" - INCLUDES IODINE    Iodine Hives, Shortness Of Breath and Itching    Pcn [Penicillins] Anaphylaxis and Shortness Of Breath    Red Dye Shortness Of Breath    Shellfish-Derived Products Shortness Of Breath, Diarrhea and Swelling     \"lips swell up and get severe diarrhea\" - INCLUDES IODINE    Sulfa Antibiotics Hives, Shortness Of Breath, Itching and Rash     OK in small amounts, but larger amounts cause \"shortness of breath. \"    Yellow Dye Shortness Of Breath     Other reaction(s): Respiratory Difficulty    Yellow Dyes (Non-Tartrazine) Shortness Of Breath    Gadolinium Rash     Moderate allergic-like reaction consisting of diffuse urticaria to ProHance gadolinium-containing contrast material    Gadolinium Derivatives Rash     Moderate allergic-like reaction consisting of diffuse urticaria to ProHance gadolinium-containing contrast material    Lac Bovis     Midazolam Nausea Only     Versed caused bad nausea.     Iodides Hives    Adhesive Tape Rash       Problem List:    Patient Active Problem List   Diagnosis Code    Benign neoplasm of skin of upper extremity D23.60    Benign neoplasm of skin of trunk, except scrotum D23.5    Rosacea L71.9    Squamous cell carcinoma of upper extremity C44.621    Neoplastic disease D49.9    Squamous cell carcinoma in situ D09.9    Squamous cell carcinoma in situ of skin of left wrist D04.62    Food allergy Z91.018    Other plastic surgery for unacceptable cosmetic appearance Z41.1    Functional diarrhea K59.1    Right upper quadrant pain R10.11    Basal cell carcinoma of upper lip C44.01    Actinic keratosis M70.1    Lichen sclerosus et atrophicus L90.0    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    Neoplasm of uncertain behavior of skin D48.5    Hypothyroidism, unspecified E03.9    Gastroparesis K31.84    Cobalamin deficiency E53.8    Acquired von Willebrand's disease ([facility] [address] D68.0    Asthma J45.909    Benign paroxysmal positional vertigo H81.10    Cervical spondylosis without myelopathy M47.812    Diaphragmatic hernia K44.9    Diverticulosis of colon K57.30    Dyspareunia [id number]    Enthesopathy M77.9    Hip pain M25.559    History of malignant neoplasm of thyroid Z85.850    Hypoparathyroidism (HCC) E20.9    Hyperlipidemia E78.5    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    Iron deficiency anemia D50.9    Irritable bowel syndrome with diarrhea K58.0    Medial epicondylitis M77.00    Mitral valve disorder I05.9    Obstructive sleep apnea syndrome G47.33    Pain in limb M79.609    Postprocedural hypothyroidism E89.0    Scoliosis (and kyphoscoliosis), idiopathic M41.20    Sensorineural hearing loss, bilateral H90.3    Thrombocytopenia (HCC) D69.6    Vitamin D deficiency E55.9    Wrist joint pain M25.539    Gastroesophageal reflux disease K21.9    Chest pain R07.9       Past Medical History:        Diagnosis Date    Abdominal pain     RLQ    Abnormal computed tomography of abdomen and pelvis 04/16/2021    Distal appendix is mildly thickened without significant periappendiceal inflammatory change    Acquired von Willebrand's disease ([facility] [address] 10/30/2018    Allergy to food dye     yellow and red dyes    Anemia     Asthma     Basal cell carcinoma of upper lip 06/22/2017    Benign paroxysmal positional vertigo 12/09/2013    Cancer (HCC)     Thyroid and skin    Cardiac murmur     STATES SINCE CHILD BILLY- STATES SHE WAS BORN WITH A \"90 DEGREE ANGLE VALVE\"    Chest pain 09/04/2020    Cobalamin deficiency 12/26/2012    Diverticulitis     Diverticulosis     Gastroesophageal reflux disease 12/26/2012    Gastroparesis     Hiatal hernia     History of fractured kneecap     RIGHT    History of 2009    rt forearm, under brst, lft leg-keratosis    SKIN BIOPSY  2008    back and rt forearm-keratosis, abdomen-hemangioma    SKIN BIOPSY  2002    rt breast, lft arm, lft brst-keratosis    SKIN CANCER EXCISION      THYROIDECTOMY  1985    Thyroid Cancer    TONSILLECTOMY AND ADENOIDECTOMY         Social History:    Social History     Tobacco Use    Smoking status: Never Smoker    Smokeless tobacco: Never Used   Substance Use Topics    Alcohol use: No     Binge frequency: Never                                Counseling given: Not Answered      Vital Signs (Current): There were no vitals filed for this visit. BP Readings from Last 3 Encounters:   04/27/21 (!) 146/73   04/28/21 124/80   04/19/21 130/74       NPO Status:                                                                                 BMI:   Wt Readings from Last 3 Encounters:   04/27/21 142 lb (64.4 kg)   04/28/21 141 lb 8 oz (64.2 kg)   04/19/21 142 lb (64.4 kg)     There is no height or weight on file to calculate BMI.    CBC:   Lab Results   Component Value Date    WBC 6.1 04/27/2021    RBC 3.79 04/27/2021    RBC 3.44 11/28/2011    HGB 11.5 04/27/2021    HCT 34.9 04/27/2021    MCV 92.0 04/27/2021    RDW 14.3 04/27/2021    PLT 94 04/27/2021    PLT 84 11/28/2011       CMP:   Lab Results   Component Value Date     04/27/2021    K 4.3 04/27/2021    CL 98 04/27/2021    CO2 28 04/27/2021    BUN 14 04/27/2021    CREATININE 0.86 04/27/2021    GFRAA >60 04/27/2021    LABGLOM >60 04/27/2021    GLUCOSE 86 04/27/2021    GLUCOSE 65 03/23/2012    PROT 7.5 09/04/2020    CALCIUM 9.2 04/27/2021    BILITOT 0.32 09/04/2020    ALKPHOS 67 09/04/2020    AST 20 09/04/2020    ALT 16 09/04/2020       POC Tests: No results for input(s): POCGLU, POCNA, POCK, POCCL, POCBUN, POCHEMO, POCHCT in the last 72 hours.     Coags:   Lab Results   Component Value Date    PROTIME 12.4 09/04/2020    INR 0.9 09/04/2020       HCG (If Applicable): No results found for: PREGTESTUR, PREGSERUM, HCG, HCGQUANT     ABGs: No results found for: PHART, PO2ART, YGA4AHK, WVU2OUX, BEART, J6LQRJCG     Type & Screen (If Applicable):  No results found for: LABABO, LABRH    Drug/Infectious Status (If Applicable):  Lab Results   Component Value Date    HEPCAB NONREACTIVE 01/25/2016       COVID-19 Screening (If Applicable):   Lab Results   Component Value Date    COVID19 Not Detected 05/06/2021           Anesthesia Evaluation  Patient summary reviewed and Nursing notes reviewed   history of anesthetic complications: PONV. Airway: Mallampati: III  TM distance: >3 FB   Neck ROM: full  Mouth opening: > = 3 FB Dental:    (+) lower dentures and partials      Pulmonary: breath sounds clear to auscultation  (+) sleep apnea:  asthma: allergic asthma,     (-) rhonchi, wheezes, rales and stridor                           Cardiovascular:        (-) murmur,  friction rub, systolic click, carotid bruit,  JVD and peripheral edema    ECG reviewed  Rhythm: regular  Rate: normal                    Neuro/Psych:   (+) neuromuscular disease:,             GI/Hepatic/Renal:   (+) hiatal hernia, GERD:,           Endo/Other:    (+) hypothyroidism, blood dyscrasia: thrombocytopenia, arthritis: no interval change. , . Abdominal:           Vascular:                                        Anesthesia Plan      general     ASA 3       Induction: intravenous. Anesthetic plan and risks discussed with patient. Plan discussed with CRNA.                   Tadeo Grimes MD   5/10/2021

## 2021-05-10 NOTE — ANESTHESIA POSTPROCEDURE EVALUATION
POST- ANESTHESIA EVALUATION       Pt Name: Abdirahman Cuevas  MRN: [id number]  YOB: 1955  Date of evaluation: 5/10/2021  Time:  1:04 PM      BP (!) 148/69   Pulse 60   Temp 98.8 °F (37.1 °C)   Resp 13   Ht 5' 4\" (1.626 m)   Wt 142 lb (64.4 kg)   SpO2 96%   BMI 24.37 kg/m²      Consciousness Level  Awake  Cardiopulmonary Status  Stable  Pain Adequately Treated YES  Nausea / Vomiting  NO  Adequate Hydration  YES  Anesthesia Related Complications NONE      Electronically signed by Bryan Sy MD on 5/10/2021 at 1:04 PM       Department of Anesthesiology  Postprocedure Note    Patient: Abdirahman Cuevas  MRN: [id number]  YOB: 1955  Date of evaluation: 5/10/2021  Time:  1:04 PM     Procedure Summary     Date: 05/10/21 Room / Location: [address] [place] [address] 04 / [address] [facility] ENDO    Anesthesia Start: 6643 Anesthesia Stop: 2711    Procedure: COLONOSCOPY DIAGNOSTIC (N/A ) Diagnosis: (ABDOMINAL PAIN)    Surgeons: Irvin Badillo MD Responsible Provider: Bryan Sy MD    Anesthesia Type: general ASA Status: 3          Anesthesia Type: general    Master Phase I: Master Score: 10    Master Phase II: Master Score: 10    Last vitals: Reviewed and per EMR flowsheets.        Anesthesia Post Evaluation

## 2021-05-10 NOTE — INTERVAL H&P NOTE
Update History & Physical    The patient's History and Physical of April 27, 2021 was reviewed with the patient and I examined the patient. There was no change. The surgical site was confirmed by the patient and me. COLONOSCOPY DIAGNOSTIC Today  Per Dr Benjy Griffiths     Pt denies fever/chills, chest pain or SOB, no headache or palpitation   Pt npo since the past midnight, no am oral medication pt took her inhaler  albuterol sulfate. today   Pt denies any problem with anesthesia, no hx of MRSA infection   Pt reports her bowel movement is clear liquid today. Pt denies taking any blood thinner or aspirin . See nursing flow sheet for vital signs. Plan: The risks, benefits, expected outcome, and alternative to the recommended procedure have been discussed with the patient. Patient understands and wants to proceed with the procedure.      Electronically signed by CHARLOTTE Polo CNP on 5/10/2021 at 9:39 AM

## 2021-05-11 ENCOUNTER — ANESTHESIA (OUTPATIENT)
Dept: OPERATING ROOM | Age: 66
DRG: 336 | End: 2021-05-11
Payer: COMMERCIAL

## 2021-05-11 ENCOUNTER — HOSPITAL ENCOUNTER (INPATIENT)
Age: 66
LOS: 4 days | Discharge: HOME | DRG: 336 | End: 2021-05-15
Attending: SURGERY | Admitting: SURGERY
Payer: COMMERCIAL

## 2021-05-11 VITALS — TEMPERATURE: 54.5 F | DIASTOLIC BLOOD PRESSURE: 99 MMHG | SYSTOLIC BLOOD PRESSURE: 187 MMHG | OXYGEN SATURATION: 100 %

## 2021-05-11 LAB
-: ABNORMAL
AMORPHOUS: ABNORMAL
BACTERIA: ABNORMAL
BILIRUBIN URINE: NEGATIVE
CASTS UA: ABNORMAL /LPF
COLOR: YELLOW
COMMENT UA: ABNORMAL
CRYSTALS, UA: ABNORMAL /HPF
CRYSTALS, UA: ABNORMAL /HPF
EPITHELIAL CELLS UA: ABNORMAL /HPF
GLUCOSE URINE: NEGATIVE
KETONES, URINE: NEGATIVE
LEUKOCYTE ESTERASE, URINE: NEGATIVE
MUCUS: ABNORMAL
NITRITE, URINE: NEGATIVE
OTHER OBSERVATIONS UA: ABNORMAL
PH UA: 6 (ref 5–8)
PROTEIN UA: NEGATIVE
RBC UA: ABNORMAL /HPF
RENAL EPITHELIAL, UA: ABNORMAL /HPF
SPECIFIC GRAVITY UA: 1.02 (ref 1–1.03)
TRICHOMONAS: ABNORMAL
TURBIDITY: ABNORMAL
URINE HGB: ABNORMAL
UROBILINOGEN, URINE: NORMAL
WBC UA: ABNORMAL /HPF
YEAST: ABNORMAL

## 2021-05-11 PROCEDURE — 6360000002 HC RX W HCPCS

## 2021-05-11 PROCEDURE — 2500000003 HC RX 250 WO HCPCS: Performed by: SURGERY

## 2021-05-11 PROCEDURE — 6360000002 HC RX W HCPCS: Performed by: SURGERY

## 2021-05-11 PROCEDURE — 3600000003 HC SURGERY LEVEL 3 BASE: Performed by: SURGERY

## 2021-05-11 PROCEDURE — 2709999900 HC NON-CHARGEABLE SUPPLY: Performed by: SURGERY

## 2021-05-11 PROCEDURE — 94761 N-INVAS EAR/PLS OXIMETRY MLT: CPT

## 2021-05-11 PROCEDURE — 3600000013 HC SURGERY LEVEL 3 ADDTL 15MIN: Performed by: SURGERY

## 2021-05-11 PROCEDURE — 2500000003 HC RX 250 WO HCPCS

## 2021-05-11 PROCEDURE — 81001 URINALYSIS AUTO W/SCOPE: CPT

## 2021-05-11 PROCEDURE — 1200000000 HC SEMI PRIVATE

## 2021-05-11 PROCEDURE — 3700000000 HC ANESTHESIA ATTENDED CARE: Performed by: SURGERY

## 2021-05-11 PROCEDURE — 7100000001 HC PACU RECOVERY - ADDTL 15 MIN: Performed by: SURGERY

## 2021-05-11 PROCEDURE — 6360000002 HC RX W HCPCS: Performed by: ANESTHESIOLOGY

## 2021-05-11 PROCEDURE — 0DN80ZZ RELEASE SMALL INTESTINE, OPEN APPROACH: ICD-10-PCS | Performed by: SURGERY

## 2021-05-11 PROCEDURE — 2580000003 HC RX 258: Performed by: ANESTHESIOLOGY

## 2021-05-11 PROCEDURE — 99999 PR OFFICE/OUTPT VISIT,PROCEDURE ONLY: CPT | Performed by: PHYSICIAN ASSISTANT

## 2021-05-11 PROCEDURE — 2720000010 HC SURG SUPPLY STERILE: Performed by: SURGERY

## 2021-05-11 PROCEDURE — 6370000000 HC RX 637 (ALT 250 FOR IP): Performed by: ANESTHESIOLOGY

## 2021-05-11 PROCEDURE — 0DTJ0ZZ RESECTION OF APPENDIX, OPEN APPROACH: ICD-10-PCS | Performed by: SURGERY

## 2021-05-11 PROCEDURE — 7100000000 HC PACU RECOVERY - FIRST 15 MIN: Performed by: SURGERY

## 2021-05-11 PROCEDURE — 88304 TISSUE EXAM BY PATHOLOGIST: CPT

## 2021-05-11 PROCEDURE — C1765 ADHESION BARRIER: HCPCS | Performed by: SURGERY

## 2021-05-11 PROCEDURE — 2580000003 HC RX 258: Performed by: SURGERY

## 2021-05-11 PROCEDURE — 3700000001 HC ADD 15 MINUTES (ANESTHESIA): Performed by: SURGERY

## 2021-05-11 DEVICE — BARRIER, ABSORBABLE, ADHESION
Type: IMPLANTABLE DEVICE | Site: ABDOMEN | Status: FUNCTIONAL
Brand: SEPRAFILM®

## 2021-05-11 RX ORDER — SCOLOPAMINE TRANSDERMAL SYSTEM 1 MG/1
1 PATCH, EXTENDED RELEASE TRANSDERMAL ONCE
Status: COMPLETED | OUTPATIENT
Start: 2021-05-11 | End: 2021-05-14

## 2021-05-11 RX ORDER — DIPHENHYDRAMINE HYDROCHLORIDE 50 MG/ML
12.5 INJECTION INTRAMUSCULAR; INTRAVENOUS
Status: DISCONTINUED | OUTPATIENT
Start: 2021-05-11 | End: 2021-05-11 | Stop reason: HOSPADM

## 2021-05-11 RX ORDER — LIDOCAINE HYDROCHLORIDE 10 MG/ML
INJECTION, SOLUTION EPIDURAL; INFILTRATION; INTRACAUDAL; PERINEURAL PRN
Status: DISCONTINUED | OUTPATIENT
Start: 2021-05-11 | End: 2021-05-11 | Stop reason: SDUPTHER

## 2021-05-11 RX ORDER — SODIUM CHLORIDE 9 MG/ML
25 INJECTION, SOLUTION INTRAVENOUS PRN
Status: DISCONTINUED | OUTPATIENT
Start: 2021-05-11 | End: 2021-05-15 | Stop reason: HOSPADM

## 2021-05-11 RX ORDER — 0.9 % SODIUM CHLORIDE 0.9 %
500 INTRAVENOUS SOLUTION INTRAVENOUS
Status: DISCONTINUED | OUTPATIENT
Start: 2021-05-11 | End: 2021-05-11 | Stop reason: HOSPADM

## 2021-05-11 RX ORDER — DEXAMETHASONE SODIUM PHOSPHATE 4 MG/ML
INJECTION, SOLUTION INTRA-ARTICULAR; INTRALESIONAL; INTRAMUSCULAR; INTRAVENOUS; SOFT TISSUE PRN
Status: DISCONTINUED | OUTPATIENT
Start: 2021-05-11 | End: 2021-05-11 | Stop reason: SDUPTHER

## 2021-05-11 RX ORDER — POTASSIUM CHLORIDE 7.45 MG/ML
10 INJECTION INTRAVENOUS PRN
Status: DISCONTINUED | OUTPATIENT
Start: 2021-05-11 | End: 2021-05-15 | Stop reason: HOSPADM

## 2021-05-11 RX ORDER — DIPHENHYDRAMINE HYDROCHLORIDE 50 MG/ML
INJECTION INTRAMUSCULAR; INTRAVENOUS PRN
Status: DISCONTINUED | OUTPATIENT
Start: 2021-05-11 | End: 2021-05-11 | Stop reason: SDUPTHER

## 2021-05-11 RX ORDER — FENTANYL CITRATE 50 UG/ML
25 INJECTION, SOLUTION INTRAMUSCULAR; INTRAVENOUS EVERY 5 MIN PRN
Status: DISCONTINUED | OUTPATIENT
Start: 2021-05-11 | End: 2021-05-11 | Stop reason: HOSPADM

## 2021-05-11 RX ORDER — SODIUM CHLORIDE 0.9 % (FLUSH) 0.9 %
10 SYRINGE (ML) INJECTION EVERY 12 HOURS SCHEDULED
Status: DISCONTINUED | OUTPATIENT
Start: 2021-05-11 | End: 2021-05-15 | Stop reason: HOSPADM

## 2021-05-11 RX ORDER — CLINDAMYCIN PHOSPHATE 600 MG/50ML
600 INJECTION INTRAVENOUS EVERY 8 HOURS
Status: COMPLETED | OUTPATIENT
Start: 2021-05-11 | End: 2021-05-12

## 2021-05-11 RX ORDER — ONDANSETRON 2 MG/ML
4 INJECTION INTRAMUSCULAR; INTRAVENOUS EVERY 6 HOURS PRN
Status: DISCONTINUED | OUTPATIENT
Start: 2021-05-11 | End: 2021-05-15 | Stop reason: HOSPADM

## 2021-05-11 RX ORDER — OXYCODONE HYDROCHLORIDE AND ACETAMINOPHEN 5; 325 MG/1; MG/1
1 TABLET ORAL
Status: DISCONTINUED | OUTPATIENT
Start: 2021-05-11 | End: 2021-05-11 | Stop reason: HOSPADM

## 2021-05-11 RX ORDER — ROCURONIUM BROMIDE 10 MG/ML
INJECTION, SOLUTION INTRAVENOUS PRN
Status: DISCONTINUED | OUTPATIENT
Start: 2021-05-11 | End: 2021-05-11 | Stop reason: SDUPTHER

## 2021-05-11 RX ORDER — SODIUM CHLORIDE 9 MG/ML
INJECTION, SOLUTION INTRAVENOUS CONTINUOUS
Status: DISCONTINUED | OUTPATIENT
Start: 2021-05-11 | End: 2021-05-15 | Stop reason: HOSPADM

## 2021-05-11 RX ORDER — PROMETHAZINE HYDROCHLORIDE 25 MG/ML
6.25 INJECTION, SOLUTION INTRAMUSCULAR; INTRAVENOUS
Status: DISCONTINUED | OUTPATIENT
Start: 2021-05-11 | End: 2021-05-11

## 2021-05-11 RX ORDER — SODIUM CHLORIDE, SODIUM LACTATE, POTASSIUM CHLORIDE, CALCIUM CHLORIDE 600; 310; 30; 20 MG/100ML; MG/100ML; MG/100ML; MG/100ML
INJECTION, SOLUTION INTRAVENOUS CONTINUOUS
Status: DISCONTINUED | OUTPATIENT
Start: 2021-05-11 | End: 2021-05-11

## 2021-05-11 RX ORDER — KETOROLAC TROMETHAMINE 30 MG/ML
15 INJECTION, SOLUTION INTRAMUSCULAR; INTRAVENOUS EVERY 6 HOURS PRN
Status: DISCONTINUED | OUTPATIENT
Start: 2021-05-11 | End: 2021-05-15 | Stop reason: HOSPADM

## 2021-05-11 RX ORDER — CLINDAMYCIN PHOSPHATE 600 MG/50ML
600 INJECTION INTRAVENOUS ONCE
Status: COMPLETED | OUTPATIENT
Start: 2021-05-11 | End: 2021-05-11

## 2021-05-11 RX ORDER — FENTANYL CITRATE 50 UG/ML
50 INJECTION, SOLUTION INTRAMUSCULAR; INTRAVENOUS
Status: DISCONTINUED | OUTPATIENT
Start: 2021-05-11 | End: 2021-05-15 | Stop reason: HOSPADM

## 2021-05-11 RX ORDER — ALBUTEROL SULFATE 90 UG/1
2 AEROSOL, METERED RESPIRATORY (INHALATION) EVERY 6 HOURS PRN
Status: DISCONTINUED | OUTPATIENT
Start: 2021-05-11 | End: 2021-05-15 | Stop reason: HOSPADM

## 2021-05-11 RX ORDER — LABETALOL HYDROCHLORIDE 5 MG/ML
5 INJECTION, SOLUTION INTRAVENOUS EVERY 10 MIN PRN
Status: DISCONTINUED | OUTPATIENT
Start: 2021-05-11 | End: 2021-05-11 | Stop reason: HOSPADM

## 2021-05-11 RX ORDER — MAGNESIUM SULFATE 1 G/100ML
1000 INJECTION INTRAVENOUS PRN
Status: DISCONTINUED | OUTPATIENT
Start: 2021-05-11 | End: 2021-05-15 | Stop reason: HOSPADM

## 2021-05-11 RX ORDER — PROPOFOL 10 MG/ML
INJECTION, EMULSION INTRAVENOUS PRN
Status: DISCONTINUED | OUTPATIENT
Start: 2021-05-11 | End: 2021-05-11 | Stop reason: SDUPTHER

## 2021-05-11 RX ORDER — ONDANSETRON 2 MG/ML
INJECTION INTRAMUSCULAR; INTRAVENOUS PRN
Status: DISCONTINUED | OUTPATIENT
Start: 2021-05-11 | End: 2021-05-11 | Stop reason: SDUPTHER

## 2021-05-11 RX ORDER — LEVOTHYROXINE SODIUM 0.1 MG/1
100 TABLET ORAL DAILY
Status: DISCONTINUED | OUTPATIENT
Start: 2021-05-11 | End: 2021-05-12

## 2021-05-11 RX ORDER — HYDROMORPHONE HCL 110MG/55ML
PATIENT CONTROLLED ANALGESIA SYRINGE INTRAVENOUS PRN
Status: DISCONTINUED | OUTPATIENT
Start: 2021-05-11 | End: 2021-05-11 | Stop reason: SDUPTHER

## 2021-05-11 RX ORDER — FENTANYL CITRATE 50 UG/ML
INJECTION, SOLUTION INTRAMUSCULAR; INTRAVENOUS PRN
Status: DISCONTINUED | OUTPATIENT
Start: 2021-05-11 | End: 2021-05-11 | Stop reason: SDUPTHER

## 2021-05-11 RX ORDER — SODIUM CHLORIDE 0.9 % (FLUSH) 0.9 %
10 SYRINGE (ML) INJECTION PRN
Status: DISCONTINUED | OUTPATIENT
Start: 2021-05-11 | End: 2021-05-15 | Stop reason: HOSPADM

## 2021-05-11 RX ORDER — FENTANYL CITRATE 50 UG/ML
100 INJECTION, SOLUTION INTRAMUSCULAR; INTRAVENOUS
Status: DISCONTINUED | OUTPATIENT
Start: 2021-05-11 | End: 2021-05-15 | Stop reason: HOSPADM

## 2021-05-11 RX ORDER — HYDRALAZINE HYDROCHLORIDE 20 MG/ML
5 INJECTION INTRAMUSCULAR; INTRAVENOUS EVERY 10 MIN PRN
Status: DISCONTINUED | OUTPATIENT
Start: 2021-05-11 | End: 2021-05-11 | Stop reason: HOSPADM

## 2021-05-11 RX ADMIN — SUGAMMADEX 300 MG: 100 INJECTION, SOLUTION INTRAVENOUS at 13:40

## 2021-05-11 RX ADMIN — FENTANYL CITRATE 100 MCG: 50 INJECTION, SOLUTION INTRAMUSCULAR; INTRAVENOUS at 21:15

## 2021-05-11 RX ADMIN — SODIUM CHLORIDE: 9 INJECTION, SOLUTION INTRAVENOUS at 16:27

## 2021-05-11 RX ADMIN — ONDANSETRON 4 MG: 2 INJECTION INTRAMUSCULAR; INTRAVENOUS at 18:18

## 2021-05-11 RX ADMIN — ROCURONIUM BROMIDE 50 MG: 10 INJECTION, SOLUTION INTRAVENOUS at 11:08

## 2021-05-11 RX ADMIN — SODIUM CHLORIDE, POTASSIUM CHLORIDE, SODIUM LACTATE AND CALCIUM CHLORIDE: 600; 310; 30; 20 INJECTION, SOLUTION INTRAVENOUS at 11:01

## 2021-05-11 RX ADMIN — FENTANYL CITRATE 50 MCG: 50 INJECTION, SOLUTION INTRAMUSCULAR; INTRAVENOUS at 11:08

## 2021-05-11 RX ADMIN — SODIUM CHLORIDE, POTASSIUM CHLORIDE, SODIUM LACTATE AND CALCIUM CHLORIDE: 600; 310; 30; 20 INJECTION, SOLUTION INTRAVENOUS at 12:58

## 2021-05-11 RX ADMIN — FENTANYL CITRATE 25 MCG: 50 INJECTION, SOLUTION INTRAMUSCULAR; INTRAVENOUS at 12:00

## 2021-05-11 RX ADMIN — HYDROMORPHONE HYDROCHLORIDE 0.4 MG: 2 INJECTION, SOLUTION INTRAMUSCULAR; INTRAVENOUS; SUBCUTANEOUS at 13:22

## 2021-05-11 RX ADMIN — FAMOTIDINE 20 MG: 10 INJECTION, SOLUTION INTRAVENOUS at 19:44

## 2021-05-11 RX ADMIN — PROPOFOL 150 MG: 10 INJECTION, EMULSION INTRAVENOUS at 11:08

## 2021-05-11 RX ADMIN — HYDROMORPHONE HYDROCHLORIDE 0.2 MG: 2 INJECTION, SOLUTION INTRAMUSCULAR; INTRAVENOUS; SUBCUTANEOUS at 13:42

## 2021-05-11 RX ADMIN — KETOROLAC TROMETHAMINE 15 MG: 30 INJECTION, SOLUTION INTRAMUSCULAR; INTRAVENOUS at 19:44

## 2021-05-11 RX ADMIN — LIDOCAINE HYDROCHLORIDE 50 MG: 10 INJECTION, SOLUTION EPIDURAL; INFILTRATION; INTRACAUDAL; PERINEURAL at 11:08

## 2021-05-11 RX ADMIN — DEXAMETHASONE SODIUM PHOSPHATE 4 MG: 4 INJECTION, SOLUTION INTRAMUSCULAR; INTRAVENOUS at 11:25

## 2021-05-11 RX ADMIN — HYDROMORPHONE HYDROCHLORIDE 0.5 MG: 1 INJECTION, SOLUTION INTRAMUSCULAR; INTRAVENOUS; SUBCUTANEOUS at 14:28

## 2021-05-11 RX ADMIN — CLINDAMYCIN PHOSPHATE 600 MG: 600 INJECTION, SOLUTION INTRAVENOUS at 18:27

## 2021-05-11 RX ADMIN — FENTANYL CITRATE 100 MCG: 50 INJECTION, SOLUTION INTRAMUSCULAR; INTRAVENOUS at 17:51

## 2021-05-11 RX ADMIN — FENTANYL CITRATE 25 MCG: 50 INJECTION, SOLUTION INTRAMUSCULAR; INTRAVENOUS at 11:48

## 2021-05-11 RX ADMIN — HYDROMORPHONE HYDROCHLORIDE 0.4 MG: 2 INJECTION, SOLUTION INTRAMUSCULAR; INTRAVENOUS; SUBCUTANEOUS at 13:48

## 2021-05-11 RX ADMIN — ONDANSETRON 4 MG: 2 INJECTION, SOLUTION INTRAMUSCULAR; INTRAVENOUS at 13:34

## 2021-05-11 RX ADMIN — HYDROMORPHONE HYDROCHLORIDE 0.5 MG: 1 INJECTION, SOLUTION INTRAMUSCULAR; INTRAVENOUS; SUBCUTANEOUS at 14:09

## 2021-05-11 RX ADMIN — PROMETHAZINE HYDROCHLORIDE 6.25 MG: 25 INJECTION INTRAMUSCULAR; INTRAVENOUS at 14:07

## 2021-05-11 RX ADMIN — ROCURONIUM BROMIDE 10 MG: 10 INJECTION, SOLUTION INTRAVENOUS at 12:55

## 2021-05-11 RX ADMIN — FENTANYL CITRATE 100 MCG: 50 INJECTION, SOLUTION INTRAMUSCULAR; INTRAVENOUS at 15:31

## 2021-05-11 RX ADMIN — CLINDAMYCIN IN 5 PERCENT DEXTROSE 600 MG: 12 INJECTION, SOLUTION INTRAVENOUS at 11:15

## 2021-05-11 RX ADMIN — DIPHENHYDRAMINE HYDROCHLORIDE 12.5 MG: 50 INJECTION, SOLUTION INTRAMUSCULAR; INTRAVENOUS at 11:26

## 2021-05-11 ASSESSMENT — PULMONARY FUNCTION TESTS
PIF_VALUE: 16
PIF_VALUE: 17
PIF_VALUE: 18
PIF_VALUE: 17
PIF_VALUE: 17
PIF_VALUE: 16
PIF_VALUE: 20
PIF_VALUE: 17
PIF_VALUE: 16
PIF_VALUE: 3
PIF_VALUE: 17
PIF_VALUE: 18
PIF_VALUE: 18
PIF_VALUE: 16
PIF_VALUE: 0
PIF_VALUE: 18
PIF_VALUE: 17
PIF_VALUE: 17
PIF_VALUE: 18
PIF_VALUE: 17
PIF_VALUE: 20
PIF_VALUE: 16
PIF_VALUE: 17
PIF_VALUE: 21
PIF_VALUE: 28
PIF_VALUE: 17
PIF_VALUE: 18
PIF_VALUE: 16
PIF_VALUE: 18
PIF_VALUE: 17
PIF_VALUE: 17
PIF_VALUE: 18
PIF_VALUE: 20
PIF_VALUE: 16
PIF_VALUE: 17
PIF_VALUE: 16
PIF_VALUE: 17
PIF_VALUE: 17
PIF_VALUE: 3
PIF_VALUE: 2
PIF_VALUE: 17
PIF_VALUE: 16
PIF_VALUE: 0
PIF_VALUE: 16
PIF_VALUE: 18
PIF_VALUE: 17
PIF_VALUE: 17
PIF_VALUE: 20
PIF_VALUE: 18
PIF_VALUE: 16
PIF_VALUE: 17
PIF_VALUE: 18
PIF_VALUE: 17
PIF_VALUE: 21
PIF_VALUE: 17
PIF_VALUE: 18
PIF_VALUE: 17
PIF_VALUE: 16
PIF_VALUE: 17
PIF_VALUE: 17
PIF_VALUE: 16
PIF_VALUE: 16
PIF_VALUE: 17
PIF_VALUE: 16
PIF_VALUE: 17
PIF_VALUE: 17
PIF_VALUE: 23
PIF_VALUE: 17
PIF_VALUE: 15
PIF_VALUE: 17
PIF_VALUE: 16
PIF_VALUE: 16
PIF_VALUE: 17
PIF_VALUE: 17
PIF_VALUE: 16
PIF_VALUE: 17
PIF_VALUE: 0
PIF_VALUE: 16
PIF_VALUE: 20
PIF_VALUE: 17
PIF_VALUE: 17
PIF_VALUE: 16
PIF_VALUE: 17
PIF_VALUE: 16
PIF_VALUE: 16
PIF_VALUE: 0
PIF_VALUE: 18
PIF_VALUE: 17
PIF_VALUE: 17

## 2021-05-11 ASSESSMENT — PAIN SCALES - GENERAL
PAINLEVEL_OUTOF10: 9
PAINLEVEL_OUTOF10: 5
PAINLEVEL_OUTOF10: 7
PAINLEVEL_OUTOF10: 4
PAINLEVEL_OUTOF10: 8
PAINLEVEL_OUTOF10: 8

## 2021-05-11 ASSESSMENT — PAIN DESCRIPTION - ORIENTATION: ORIENTATION: MID

## 2021-05-11 ASSESSMENT — PAIN DESCRIPTION - DESCRIPTORS: DESCRIPTORS: ACHING;CONSTANT

## 2021-05-11 ASSESSMENT — PAIN DESCRIPTION - LOCATION
LOCATION: ABDOMEN
LOCATION: ABDOMEN

## 2021-05-11 ASSESSMENT — PAIN DESCRIPTION - ONSET: ONSET: ON-GOING

## 2021-05-11 ASSESSMENT — PAIN DESCRIPTION - PROGRESSION: CLINICAL_PROGRESSION: GRADUALLY IMPROVING

## 2021-05-11 ASSESSMENT — PAIN - FUNCTIONAL ASSESSMENT: PAIN_FUNCTIONAL_ASSESSMENT: 0-10

## 2021-05-11 ASSESSMENT — PAIN DESCRIPTION - PAIN TYPE: TYPE: SURGICAL PAIN

## 2021-05-11 NOTE — ANESTHESIA PRE PROCEDURE
Department of Anesthesiology  Preprocedure Note       Name:  Sara Wilson   Age:  77 y.o.  :  1955                                          MRN:  [id number]         Date:  2021      Surgeon: Luisito Davis):  Maria Antonia Head MD    Procedure: Procedure(s):  APPENDECTOMY LAPAROSCOPIC ROBOTIC    Medications prior to admission:   Prior to Admission medications    Medication Sig Start Date End Date Taking? Authorizing Provider   Eluxadoline (VIBERZI PO) Take by mouth daily    Historical Provider, MD   albuterol sulfate  (90 Base) MCG/ACT inhaler Inhale 2 puffs into the lungs every 6 hours as needed for Wheezing 3/31/21   Magdalene Tabares APRN - CNP   levothyroxine (SYNTHROID) 50 MCG tablet levothyroxine 50 mcg tablet  Take 3 tablets daily. 21   CHARLOTTE Moyer - CNP   clobetasol (TEMOVATE) 0.05 % cream Apply topically as needed (when she has a lichen sclerosus flare up) Apply topically 2 times daily. 12/15/20   Aston Patton MD   miconazole (MICOTIN) 2 % vaginal cream Place 1 applicator vaginally nightly Place vaginally nightly. Historical Provider, MD   NONFORMULARY Take 10 mg by mouth 2 times daily Domperidone (Jamp)     Historical Provider, MD       Current medications:    Current Facility-Administered Medications   Medication Dose Route Frequency Provider Last Rate Last Admin    lactated ringers infusion   Intravenous Continuous Opal Zaragoza MD           Allergies:     Allergies   Allergen Reactions    Aspirin Anaphylaxis and Shortness Of Breath    Cefadroxil Shortness Of Breath    Wikrjfti-Zlnozlu-Gscovw [Fluocinolone] Shortness Of Breath    Ciprofloxacin Hcl Shortness Of Breath    Codeine Shortness Of Breath    Demerol Hcl [Meperidine] Shortness Of Breath    Doxycycline Nausea Only and Other (See Comments)    Glucosamine Shortness Of Breath, Diarrhea and Swelling     Other reaction(s): Respiratory Difficulty  \"lips swell up and get severe diarrhea\" - INCLUDES IODINE    Iodine Hives, Shortness Of Breath and Itching    Pcn [Penicillins] Anaphylaxis and Shortness Of Breath    Red Dye Shortness Of Breath    Shellfish-Derived Products Shortness Of Breath, Diarrhea and Swelling     \"lips swell up and get severe diarrhea\" - INCLUDES IODINE    Sulfa Antibiotics Hives, Shortness Of Breath, Itching and Rash     OK in small amounts, but larger amounts cause \"shortness of breath. \"    Yellow Dye Shortness Of Breath     Other reaction(s): Respiratory Difficulty    Yellow Dyes (Non-Tartrazine) Shortness Of Breath    Gadolinium Rash     Moderate allergic-like reaction consisting of diffuse urticaria to ProHance gadolinium-containing contrast material    Gadolinium Derivatives Rash     Moderate allergic-like reaction consisting of diffuse urticaria to ProHance gadolinium-containing contrast material    Lac Bovis     Midazolam Nausea Only     Versed caused bad nausea.     Iodides Hives    Adhesive Tape Rash       Problem List:    Patient Active Problem List   Diagnosis Code    Benign neoplasm of skin of upper extremity D23.60    Benign neoplasm of skin of trunk, except scrotum D23.5    Rosacea L71.9    Squamous cell carcinoma of upper extremity C44.621    Neoplastic disease D49.9    Squamous cell carcinoma in situ D09.9    Squamous cell carcinoma in situ of skin of left wrist D04.62    Food allergy Z91.018    Other plastic surgery for unacceptable cosmetic appearance Z41.1    Functional diarrhea K59.1    Right upper quadrant pain R10.11    Basal cell carcinoma of upper lip C44.01    Actinic keratosis F07.2    Lichen sclerosus et atrophicus L90.0    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    Neoplasm of uncertain behavior of skin D48.5    Hypothyroidism, unspecified E03.9    Gastroparesis K31.84    Cobalamin deficiency E53.8    Acquired von Willebrand's disease (HCC) D68.0    Asthma J45.909    Benign paroxysmal positional vertigo H81.10    Cervical spondylosis without myelopathy M47.812    Diaphragmatic hernia K44.9    Diverticulosis of colon K57.30    Dyspareunia [id number]    Enthesopathy M77.9    Hip pain M25.559    History of malignant neoplasm of thyroid Z85.850    Hypoparathyroidism (HCC) E20.9    Hyperlipidemia E78.5    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    Iron deficiency anemia D50.9    Irritable bowel syndrome with diarrhea K58.0    Medial epicondylitis M77.00    Mitral valve disorder I05.9    Obstructive sleep apnea syndrome G47.33    Pain in limb M79.609    Postprocedural hypothyroidism E89.0    Scoliosis (and kyphoscoliosis), idiopathic M41.20    Sensorineural hearing loss, bilateral H90.3    Thrombocytopenia (HCC) D69.6    Vitamin D deficiency E55.9    Wrist joint pain M25.539    Gastroesophageal reflux disease K21.9    Chest pain R07.9       Past Medical History:        Diagnosis Date    Abdominal pain     RLQ    Abnormal computed tomography of abdomen and pelvis 04/16/2021    Distal appendix is mildly thickened without significant periappendiceal inflammatory change    Acquired von Willebrand's disease ([facility] [address] 10/30/2018    Allergy to food dye     yellow and red dyes    Anemia     Asthma     Basal cell carcinoma of upper lip 06/22/2017    Benign paroxysmal positional vertigo 12/09/2013    Cancer (HCC)     Thyroid and skin    Cardiac murmur     STATES SINCE CHILD BILLY- STATES SHE WAS BORN WITH A \"90 DEGREE ANGLE VALVE\"    Chest pain 09/04/2020    Cobalamin deficiency 12/26/2012    Diverticulitis     Diverticulosis     Gastroesophageal reflux disease 12/26/2012    Gastroparesis     Hiatal hernia     History of fractured kneecap     RIGHT    History of malignant neoplasm of thyroid 04/17/2012    History of squamous cell carcinoma in situ of skin 06/09/2014    Chart states left wrist, but patient denies    Hypoparathyroidism ([address] 07/03/2012    IBS (irritable bowel syndrome)     diarrhea-prominent type    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  Neoplasm of unspecified nature of bone, soft tissue, and skin     New lesions of right dorsum of hand (healed after Efudex) and lesion of right anterior thigh.  Osteoarthritis     Osteoporosis     beginning     PONV (postoperative nausea and vomiting)     Postprocedural hypothyroidism 07/16/2018    Prolonged emergence from general anesthesia     Pt states this was \"a long time ago\"     Raynaud's disease     Sensorineural hearing loss, bilateral 12/26/2012    Squamous cell carcinoma of upper extremity 04/08/2013    Scc in situ rt elbow 1/11    Thrombocytopenia ([address]     Thyroid disease     Thyroid Ca Hx. - thyroid was removed.      Vulvar dystrophy        Past Surgical History:        Procedure Laterality Date    BREAST SURGERY      Cyst removed rt. nipple     CATARACT REMOVAL Right 07/17/2019    Right eye    CHOLECYSTECTOMY  2011    COLONOSCOPY      COLONOSCOPY N/A 5/10/2021    COLONOSCOPY DIAGNOSTIC performed by Dallin Burciaga MD at [address] Bilateral     Tendonitis X2   [address]  2005    hiatal hernia repair    HYSTERECTOMY, TOTAL ABDOMINAL  92-97    [facility] 3 Surgeries    KNEE SURGERY Right 1996    W/PINS    MALIGNANT SKIN LESION EXCISION  2011    rt elbow-scc, rt shoulder-keratosis, lft leg skin with excoriation    NASAL POLYP SURGERY  2012    X2 in [facility] Right     PINCHED [facility] SKIN BIOPSY  2012    lft leg keratosis    SKIN BIOPSY  2009    rt forearm, under brst, lft leg-keratosis    SKIN BIOPSY  2008    back and rt forearm-keratosis, abdomen-hemangioma    SKIN BIOPSY  2002    rt breast, lft arm, lft brst-keratosis    SKIN CANCER EXCISION      THYROIDECTOMY  1985    Thyroid Cancer    TONSILLECTOMY AND ADENOIDECTOMY         Social History:    Social History     Tobacco Use    Smoking status: Never Smoker    Smokeless tobacco: Never Used   Substance Use Topics    Alcohol use: No     Binge frequency: Never                                Counseling given: Not Answered      Vital Signs (Current): There were no vitals filed for this visit. BP Readings from Last 3 Encounters:   05/10/21 (!) 87/52   05/10/21 (!) 148/69   04/27/21 (!) 146/73       NPO Status:                                                                                 BMI:   Wt Readings from Last 3 Encounters:   05/10/21 142 lb (64.4 kg)   04/27/21 142 lb (64.4 kg)   04/28/21 141 lb 8 oz (64.2 kg)     There is no height or weight on file to calculate BMI.    CBC:   Lab Results   Component Value Date    WBC 6.1 04/27/2021    RBC 3.79 04/27/2021    RBC 3.44 11/28/2011    HGB 11.5 04/27/2021    HCT 34.9 04/27/2021    MCV 92.0 04/27/2021    RDW 14.3 04/27/2021    PLT 94 04/27/2021    PLT 84 11/28/2011       CMP:   Lab Results   Component Value Date     04/27/2021    K 4.3 04/27/2021    CL 98 04/27/2021    CO2 28 04/27/2021    BUN 14 04/27/2021    CREATININE 0.86 04/27/2021    GFRAA >60 04/27/2021    LABGLOM >60 04/27/2021    GLUCOSE 86 04/27/2021    GLUCOSE 65 03/23/2012    PROT 7.5 09/04/2020    CALCIUM 9.2 04/27/2021    BILITOT 0.32 09/04/2020    ALKPHOS 67 09/04/2020    AST 20 09/04/2020    ALT 16 09/04/2020       POC Tests: No results for input(s): POCGLU, POCNA, POCK, POCCL, POCBUN, POCHEMO, POCHCT in the last 72 hours.     Coags:   Lab Results   Component Value Date    PROTIME 12.4 09/04/2020    INR 0.9 09/04/2020       HCG (If Applicable): No results found for: PREGTESTUR, PREGSERUM, HCG, HCGQUANT     ABGs: No results found for: PHART, PO2ART, EYS5MHS, ESP5GYO, BEART, D7JJYIPP Type & Screen (If Applicable):  No results found for: LABABO, LABRH    Drug/Infectious Status (If Applicable):  Lab Results   Component Value Date    HEPCAB NONREACTIVE 01/25/2016       COVID-19 Screening (If Applicable):   Lab Results   Component Value Date    COVID19 Not Detected 05/06/2021           Anesthesia Evaluation  Patient summary reviewed and Nursing notes reviewed   history of anesthetic complications: PONV. Airway: Mallampati: II  TM distance: >3 FB   Neck ROM: full  Mouth opening: > = 3 FB Dental:    (+) partials      Pulmonary:normal exam  breath sounds clear to auscultation  (+) sleep apnea:  asthma:                            Cardiovascular:    (+) valvular problems/murmurs: MVP,       ECG reviewed  Rhythm: regular  Rate: normal    Stress test reviewed                Neuro/Psych:   (+) CVA (left eye):, neuromuscular disease:,              ROS comment: claustrophobic GI/Hepatic/Renal:   (+) hiatal hernia, GERD:,           Endo/Other:    (+) hypothyroidism, blood dyscrasia: anemia and thrombocytopenia, arthritis:., malignancy/cancer (thyroid s/p total thyroidectomy). Abdominal:           Vascular:                                      Anesthesia Plan      general     ASA 3     (Refusing preoxygenation due to claustrophobia)  Induction: intravenous. MIPS: Postoperative opioids intended and Prophylactic antiemetics administered. Anesthetic plan and risks discussed with patient. Plan discussed with CRNA.                   Liliam Teixeira MD   5/11/2021

## 2021-05-11 NOTE — PROGRESS NOTES
Writer reviews and agrees with charting done by [employer].     Electronically signed by Juan Travis RN on 5/11/21 at 6:08 PM EDT

## 2021-05-11 NOTE — OP NOTE
Operative Note      Patient: Leisa Search  YOB: 1955  MRN: [id number]    Date of Procedure: 5/11/2021                Preoperative diagnosis: Abnormal CT abdomen pelvis abnormal appendix    Postoperative diagnosis: Same. Extensive intra-abdominal especially pelvic lower abdomen small bowel adhesions. Procedure: Diagnostic laparoscopy. Laparotomy. Extensive lysis of small bowel adhesions 75 minutes. Open appendectomy. Surgeon: Dr. Ebonie Desouza    Asst.: MALORIE Hernandez    Anesthesia: General    Preparation: Chloraprep    EBL: Less than 50 mL    Specimen: Appendix    Procedure: Informed consent was obtained. Preoperative antibiotics were given. Patient was taken to the operating room. General anesthesia was given. Tripathi catheter was placed. Abdomen was prepped and draped in usual sterile fashion. Timeout was done. Subumbilical incision was made. Pam Diss port was introduced using the open technique without any difficulty. Patient has had multiple abdominal surgeries in the past including laparoscopic hiatal hernia repair laparoscopic cholecystectomy and she has had 3 pelvic surgeries for her hysterectomy. Scope was introduced after CO2 insufflation was carried out. Patient was found to have extensive intra-abdominal small bowel adhesions. At this point I decided to convert this into a laparotomy. Midline incision was made. Abdominal cavity was entered. Adequate exposure was obtained. Extensive lysis of small bowel adhesions was performed. Appendix was fairly big. Cecum was mobile but the appendix was very long and distal portion of the appendix looked abnormal and dilated and thick-walled. Distal appendix was buried in the adhesions and deep down into the pelvis. Retrograde appendectomy was performed. Initially window was created at the base of the appendix. Base of the appendix was transected using an Endo RUTH stapler with a 45 purple load.   Staple line was found to be complete and intact. No bleeding noted. Appendicular mesentery was taken down using LigaSure. Again appendix was buried deep down into the pelvis between the adhesions and those were carefully freed up. Small bowel surrounding the appendix appeared viable and intact. No enterotomies were noted. No spillage noted. At this point I was able to free up the tip of the appendix in the pelvis and appendectomy was accomplished after the mesentery was divided. This was accomplished using the LigaSure. Specimen was submitted to pathology. Mesentery was inspected. No bleeding noted. Surrounding bowel was inspected no bleeding noted. Clinton-Eli drain was left in the pelvis was brought out and secured. Hemostasis was confirmed. Sponge needle instrument count was found to be correct. Seprafilm was left in the abdominal cavity. After confirming hemostasis sponge needle instrument count fascia was approximated using looped PDS suture x2. Wound was irrigated. Subcutaneous drain was left in place was brought out and secured. Skin was approximated using staples. All the drains were secured. Clean dressing was applied. Patient tolerated procedure well and was transferred to the recovery room in a stable condition.     -  Recommendations: Operative findings are discussed with the family. Postoperative care recovery restrictions were all discussed. Patient will be admitted to the hospital for further care.

## 2021-05-11 NOTE — PROGRESS NOTES
Spoke to Dr. Tung Walters regarding patient's home medications. Okay to restart Synthroid, and Albuterol inhaler. Orders placed.

## 2021-05-11 NOTE — PLAN OF CARE
Problem: Pain:  Goal: Pain level will decrease  Description: Pain level will decrease  Outcome: Ongoing  Note: Patient's pain is well controlled with current regimen. See MAR. Goal: Control of acute pain  Description: Control of acute pain  Outcome: Ongoing  Goal: Control of chronic pain  Description: Control of chronic pain  Outcome: Ongoing     Problem: Falls - Risk of:  Goal: Will remain free from falls  Description: Will remain free from falls  Outcome: Ongoing  Note: Patient remains free of falls and injuries throughout shift. Bed remains in the lowest position, wheels locked, call light and bedside table are within reach.    Goal: Absence of physical injury  Description: Absence of physical injury  Outcome: Ongoing     Problem: Physical Regulation:  Goal: Will remain free from infection  Description: Will remain free from infection  Outcome: Ongoing     Problem: Skin Integrity:  Goal: Demonstration of wound healing without infection will improve  Description: Demonstration of wound healing without infection will improve  Outcome: Ongoing     Problem: Skin Integrity:  Goal: Complications related to intravenous access or infusion will be avoided or minimized  Description: Complications related to intravenous access or infusion will be avoided or minimized  Outcome: Ongoing

## 2021-05-11 NOTE — ANESTHESIA POSTPROCEDURE EVALUATION
POST- ANESTHESIA EVALUATION       Pt Name: Estela Velasco  MRN: [id number]  YOB: 1955  Date of evaluation: 5/11/2021  Time:  4:20 PM      BP (!) 148/81   Pulse 72   Temp 97.4 °F (36.3 °C) (Oral)   Resp 16   Ht 5' 4\" (1.626 m)   Wt 142 lb (64.4 kg)   SpO2 93%   BMI 24.37 kg/m²      Consciousness Level  Awake  Cardiopulmonary Status  Stable  Pain Adequately Treated YES  Nausea / Vomiting  NO  Adequate Hydration  YES  Anesthesia Related Complications NONE      Electronically signed by Janelle Garcia MD on 5/11/2021 at 4:20 PM       Department of Anesthesiology  Postprocedure Note    Patient: Estela Velasco  MRN: [id number]  YOB: 1955  Date of evaluation: 5/11/2021  Time:  4:20 PM     Procedure Summary     Date: 05/11/21 Room / Location: [address] [place] [address] [facility]  / [facility]: [facility]    Anesthesia Start: 1101 Anesthesia Stop: 1355    Procedures:       ATTEMPTED APPENDECTOMY LAPAROSCOPIC ROBOTIC CONVERTED TO , DIAGNOSTIC LAPARASCOPY (N/A Abdomen)      LAPAROTOMY LYSIS OF ADHESIONS -OPEN APPENDECTOMY, LYSIS OF SMALL BOWEL ADHESIONS FOR 75 MINUTES (N/A Abdomen) Diagnosis: (ABDOMINAL PAIN)    Surgeons: Fawad Cedillo MD Responsible Provider: Janelle Garcia MD    Anesthesia Type: general ASA Status: 3          Anesthesia Type: general    Master Phase I: Master Score: 10    Master Phase II:      Last vitals: Reviewed and per EMR flowsheets.        Anesthesia Post Evaluation

## 2021-05-11 NOTE — INTERVAL H&P NOTE
Update History & Physical     The patient's History and Physical of 4-27-21 was reviewed with the patient. I personally examined the patient, I concur with above findings, there was no change EXCEPT patient completed diagnostic colonoscopy yesterday (RIGHT COLON DIVERTICULOSIS noted), patient states that she tolerated colonoscopy well without complications, still having some lose stools r/t prep. She states that she has suggested that she would like to stay over night tonight at the hospital- hx of prolonged emergence anesthesia, also was told that she should have a patch behind her ear today for nausea, which she experienced some yesterday. Had a headache earlier this morning, currently states mild h/a, r/t sinuses per patient- possibly Spring allergies, denies vision changes, not worst h/a ever. Physical assessment is unchanged except partial lower dentures are noted. She also has a few slightly erythematous areas to chest r/t ECG patch placement. + hearing aids. She does state that she forgot to mention during PAT she also has a hx of Raynaud's disease- added to chart. NPO status: Patient states they have been NPO since before midnight. Medications taken TODAY (w/sip of water): Albuterol. Anticoagulation status: Patient denies taking any anti-coagulants, including aspirin currently. Personal or family hx of complications w/anesthesia: Prolonged emergence from anesthesia, PONV. Nicotine status: Never. Denies personal hx of MRSA. Denies personal hx of blood clots. Denies CP, palpitations, dizziness, SOB, URI s/s, GI issues (except what is noted per HPI), fever/chills. Review current vital signs per RN flow sheet.   (Notation: Medications listed are not currently reconciled at the signing of this H&P note, to be reconciled in pre-op per RN)    Most recent lab work reviewed:  Lab Results   Component Value Date     04/27/2021    K 4.3 04/27/2021    CL 98 04/27/2021    CO2 28 04/27/2021    BUN 14 04/27/2021    CREATININE 0.86 04/27/2021    GLUCOSE 86 04/27/2021    CALCIUM 9.2 04/27/2021    PROT 7.5 09/04/2020    LABALBU 4.4 09/04/2020    BILITOT 0.32 09/04/2020    ALKPHOS 67 09/04/2020    AST 20 09/04/2020    ALT 16 09/04/2020    LABGLOM >60 04/27/2021    GFRAA >60 04/27/2021    GLOB NOT REPORTED 01/25/2016       Lab Results   Component Value Date    WBC 6.1 04/27/2021    HGB 11.5 (L) 04/27/2021    HCT 34.9 (L) 04/27/2021    MCV 92.0 04/27/2021    PLT 94 (L) 04/27/2021     Surgical site was confirmed per myself and the patient.   Electronically signed by CHARLOTTE Mandel CNP on 5/11/2021 at 10:15 AM

## 2021-05-11 NOTE — OP NOTE
Operative Note      Patient: Pilar Bamberger  YOB: 1955  MRN: [id number]    Date of Procedure: 5/11/2021    Pre-Op Diagnosis: ABDOMINAL PAIN    Post-Op Diagnosis: Same       Procedure(s):  ATTEMPTED APPENDECTOMY LAPAROSCOPIC ROBOTIC CONVERTED TO OPEN APPENDECTOMY, LYSIS OF SMALL BOWEL ADHESIONS FOR 75 MINUTES, DIAGNOSTIC LAPARASCOPY, LAPAROTOMY    Surgeon(s):  Cristobal Phoenix, MD    Assistant:   First Assistant: MALORIE Condon    Anesthesia: General    Estimated Blood Loss (mL): less than 50     Complications: None    Specimens:   ID Type Source Tests Collected by Time Destination   1 : URINE PER JURADO PROTOCOL Urine Bladder URINE RT REFLEX TO Sylvia Beatty MD 5/11/2021 1316    A : APPENDIX Tissue Appendix SURGICAL PATHOLOGY Cristobal Phoenix, MD 5/11/2021 1233        Implants:  Implant Name Type Inv.  Item Serial No.  Lot No. LRB No. Used Action   BARRIER ADH SHT 6X5 IN SODIUM HYALURONATE CMC SEPRAFILM  BARRIER ADH SHT 6X5 IN SODIUM HYALURONATE CMC SEPRAFILM  [employer]- [id number] N/A 2 Implanted         Drains:   Closed/Suction Drain Lateral RLQ Bulb  (Active)       Closed/Suction Drain Superior;Midline Abdomen Bulb  (Active)       Urethral Catheter 16 fr (Active)       Findings: dictated    Detailed Description of Procedure:   dictated    Electronically signed by Cristobal Phoenix, MD on 5/11/2021 at 2:01 PM

## 2021-05-12 ENCOUNTER — APPOINTMENT (OUTPATIENT)
Dept: GENERAL RADIOLOGY | Age: 66
DRG: 336 | End: 2021-05-12
Attending: SURGERY
Payer: COMMERCIAL

## 2021-05-12 LAB
ABSOLUTE EOS #: 0 K/UL (ref 0–0.4)
ABSOLUTE IMMATURE GRANULOCYTE: ABNORMAL K/UL (ref 0–0.3)
ABSOLUTE LYMPH #: 0.7 K/UL (ref 1–4.8)
ABSOLUTE MONO #: 0.5 K/UL (ref 0.1–1.3)
ANION GAP SERPL CALCULATED.3IONS-SCNC: 11 MMOL/L (ref 9–17)
BASOPHILS # BLD: 0 % (ref 0–2)
BASOPHILS ABSOLUTE: 0 K/UL (ref 0–0.2)
BUN BLDV-MCNC: 11 MG/DL (ref 8–23)
BUN/CREAT BLD: ABNORMAL (ref 9–20)
CALCIUM SERPL-MCNC: 6.1 MG/DL (ref 8.6–10.4)
CHLORIDE BLD-SCNC: 105 MMOL/L (ref 98–107)
CO2: 24 MMOL/L (ref 20–31)
CREAT SERPL-MCNC: 0.84 MG/DL (ref 0.5–0.9)
DIFFERENTIAL TYPE: ABNORMAL
EOSINOPHILS RELATIVE PERCENT: 0 % (ref 0–4)
GFR AFRICAN AMERICAN: >60 ML/MIN
GFR NON-AFRICAN AMERICAN: >60 ML/MIN
GFR SERPL CREATININE-BSD FRML MDRD: ABNORMAL ML/MIN/{1.73_M2}
GFR SERPL CREATININE-BSD FRML MDRD: ABNORMAL ML/MIN/{1.73_M2}
GLUCOSE BLD-MCNC: 96 MG/DL (ref 70–99)
HCT VFR BLD CALC: 28.8 % (ref 36–46)
HEMOGLOBIN: 9.6 G/DL (ref 12–16)
IMMATURE GRANULOCYTES: ABNORMAL %
LYMPHOCYTES # BLD: 10 % (ref 24–44)
MCH RBC QN AUTO: 30.9 PG (ref 26–34)
MCHC RBC AUTO-ENTMCNC: 33.3 G/DL (ref 31–37)
MCV RBC AUTO: 93 FL (ref 80–100)
MONOCYTES # BLD: 7 % (ref 1–7)
NRBC AUTOMATED: ABNORMAL PER 100 WBC
PDW BLD-RTO: 15.3 % (ref 11.5–14.9)
PLATELET # BLD: 96 K/UL (ref 150–450)
PLATELET ESTIMATE: ABNORMAL
PMV BLD AUTO: 11.4 FL (ref 6–12)
POTASSIUM SERPL-SCNC: 3.8 MMOL/L (ref 3.7–5.3)
RBC # BLD: 3.1 M/UL (ref 4–5.2)
RBC # BLD: ABNORMAL 10*6/UL
SEG NEUTROPHILS: 83 % (ref 36–66)
SEGMENTED NEUTROPHILS ABSOLUTE COUNT: 6.2 K/UL (ref 1.3–9.1)
SODIUM BLD-SCNC: 140 MMOL/L (ref 135–144)
SURGICAL PATHOLOGY REPORT: NORMAL
WBC # BLD: 7.4 K/UL (ref 3.5–11)
WBC # BLD: ABNORMAL 10*3/UL

## 2021-05-12 PROCEDURE — 97161 PT EVAL LOW COMPLEX 20 MIN: CPT

## 2021-05-12 PROCEDURE — 6370000000 HC RX 637 (ALT 250 FOR IP): Performed by: FAMILY MEDICINE

## 2021-05-12 PROCEDURE — 36415 COLL VENOUS BLD VENIPUNCTURE: CPT

## 2021-05-12 PROCEDURE — 97535 SELF CARE MNGMENT TRAINING: CPT

## 2021-05-12 PROCEDURE — 97166 OT EVAL MOD COMPLEX 45 MIN: CPT

## 2021-05-12 PROCEDURE — 80048 BASIC METABOLIC PNL TOTAL CA: CPT

## 2021-05-12 PROCEDURE — 1200000000 HC SEMI PRIVATE

## 2021-05-12 PROCEDURE — 6360000002 HC RX W HCPCS: Performed by: FAMILY MEDICINE

## 2021-05-12 PROCEDURE — 2500000003 HC RX 250 WO HCPCS: Performed by: SURGERY

## 2021-05-12 PROCEDURE — 94640 AIRWAY INHALATION TREATMENT: CPT

## 2021-05-12 PROCEDURE — 94761 N-INVAS EAR/PLS OXIMETRY MLT: CPT

## 2021-05-12 PROCEDURE — 6360000002 HC RX W HCPCS: Performed by: SURGERY

## 2021-05-12 PROCEDURE — 85025 COMPLETE CBC W/AUTO DIFF WBC: CPT

## 2021-05-12 PROCEDURE — 71046 X-RAY EXAM CHEST 2 VIEWS: CPT

## 2021-05-12 PROCEDURE — 2580000003 HC RX 258: Performed by: FAMILY MEDICINE

## 2021-05-12 RX ORDER — LEVOTHYROXINE SODIUM 0.05 MG/1
50 TABLET ORAL 2 TIMES DAILY
Status: DISCONTINUED | OUTPATIENT
Start: 2021-05-12 | End: 2021-05-12 | Stop reason: DRUGHIGH

## 2021-05-12 RX ORDER — ALBUTEROL SULFATE 2.5 MG/3ML
2.5 SOLUTION RESPIRATORY (INHALATION)
Status: DISCONTINUED | OUTPATIENT
Start: 2021-05-12 | End: 2021-05-12

## 2021-05-12 RX ORDER — LEVOTHYROXINE SODIUM 0.05 MG/1
50 TABLET ORAL DAILY
Status: DISCONTINUED | OUTPATIENT
Start: 2021-05-12 | End: 2021-05-12

## 2021-05-12 RX ORDER — ALBUTEROL SULFATE 2.5 MG/3ML
2.5 SOLUTION RESPIRATORY (INHALATION) 4 TIMES DAILY
Status: DISCONTINUED | OUTPATIENT
Start: 2021-05-12 | End: 2021-05-15 | Stop reason: HOSPADM

## 2021-05-12 RX ORDER — LEVOTHYROXINE SODIUM 0.05 MG/1
100 TABLET ORAL DAILY
Status: DISCONTINUED | OUTPATIENT
Start: 2021-05-13 | End: 2021-05-15 | Stop reason: HOSPADM

## 2021-05-12 RX ADMIN — FAMOTIDINE 20 MG: 10 INJECTION, SOLUTION INTRAVENOUS at 08:19

## 2021-05-12 RX ADMIN — CLINDAMYCIN PHOSPHATE 600 MG: 600 INJECTION, SOLUTION INTRAVENOUS at 11:10

## 2021-05-12 RX ADMIN — ONDANSETRON 4 MG: 2 INJECTION INTRAMUSCULAR; INTRAVENOUS at 08:50

## 2021-05-12 RX ADMIN — SODIUM CHLORIDE: 9 INJECTION, SOLUTION INTRAVENOUS at 15:12

## 2021-05-12 RX ADMIN — ALBUTEROL SULFATE 2 PUFF: 90 AEROSOL, METERED RESPIRATORY (INHALATION) at 08:19

## 2021-05-12 RX ADMIN — ALBUTEROL SULFATE 2.5 MG: 2.5 SOLUTION RESPIRATORY (INHALATION) at 19:19

## 2021-05-12 RX ADMIN — FAMOTIDINE 20 MG: 10 INJECTION, SOLUTION INTRAVENOUS at 19:49

## 2021-05-12 RX ADMIN — ALBUTEROL SULFATE 2.5 MG: 2.5 SOLUTION RESPIRATORY (INHALATION) at 14:44

## 2021-05-12 RX ADMIN — FENTANYL CITRATE 100 MCG: 50 INJECTION, SOLUTION INTRAMUSCULAR; INTRAVENOUS at 14:45

## 2021-05-12 RX ADMIN — KETOROLAC TROMETHAMINE 15 MG: 30 INJECTION, SOLUTION INTRAMUSCULAR; INTRAVENOUS at 12:38

## 2021-05-12 RX ADMIN — ALBUTEROL SULFATE 2.5 MG: 2.5 SOLUTION RESPIRATORY (INHALATION) at 10:58

## 2021-05-12 RX ADMIN — CLINDAMYCIN PHOSPHATE 600 MG: 600 INJECTION, SOLUTION INTRAVENOUS at 02:57

## 2021-05-12 RX ADMIN — FENTANYL CITRATE 100 MCG: 50 INJECTION, SOLUTION INTRAMUSCULAR; INTRAVENOUS at 00:42

## 2021-05-12 ASSESSMENT — PAIN DESCRIPTION - LOCATION: LOCATION: ABDOMEN

## 2021-05-12 ASSESSMENT — PAIN SCALES - GENERAL
PAINLEVEL_OUTOF10: 8
PAINLEVEL_OUTOF10: 8
PAINLEVEL_OUTOF10: 0

## 2021-05-12 ASSESSMENT — PAIN DESCRIPTION - PAIN TYPE: TYPE: SURGICAL PAIN

## 2021-05-12 NOTE — PROGRESS NOTES
[address] [facility]    Occupational Therapy Evaluation  Date: 21  Patient Name: Jeovany Hodges       Room:   MRN: [id number]  Account: [de-identified]   : 1955  (76 y.o.) Gender: female     Discharge Recommendations: The patient's needs are being met with no further Occupational Therapy recommended at discharge. OT Equipment Recommendations  Equipment Needed: (TBD)    Referring Practitioner: Angel Prescott MD  Diagnosis: Appendicitis  Additional Pertinent Hx: Appendectomy lapascopic converted to open 21 [place]    Treatment Diagnosis: impaired self care status  Past Medical History:  has a past medical history of Abdominal pain, Abnormal computed tomography of abdomen and pelvis, Acquired von Willebrand's disease ([address] Allergy to food dye, Anemia, Asthma, Basal cell carcinoma of upper lip, Benign paroxysmal positional vertigo, Cancer (HCC), Cardiac murmur, Chest pain, Cobalamin deficiency, Diverticulitis, Diverticulosis, Gastroesophageal reflux disease, Gastroparesis, Hiatal hernia, History of fractured kneecap, History of malignant neoplasm of thyroid, History of squamous cell carcinoma in situ of skin, Hypoparathyroidism ([address] IBS (irritable bowel syndrome), Intestinal disaccharidase deficiency, Ischemic optic neuropathy, left, Lichen sclerosus, MVP (mitral valve prolapse), Neoplasm of unspecified nature of bone, soft tissue, and skin, Neoplasm of unspecified nature of bone, soft tissue, and skin, Osteoarthritis, Osteoporosis, PONV (postoperative nausea and vomiting), Postprocedural hypothyroidism, Prolonged emergence from general anesthesia, Raynaud's disease, Seasonal allergies, Sensorineural hearing loss, bilateral, Squamous cell carcinoma of upper extremity, Thrombocytopenia ([address] Thyroid disease, Vulvar dystrophy, Wears hearing aid, and Wears partial dentures. Past Surgical History:   has a past surgical history that includes hernia repair ();  Nasal polyp surgery (2012); Thyroidectomy (1985); knee surgery (Right, 1996); skin biopsy (2012); malignant skin lesion excision (2011); skin biopsy (2009); skin biopsy (2008); skin biopsy (2002); Cholecystectomy (2011); Tonsillectomy and adenoidectomy; other surgical history (1979); Elbow surgery (Bilateral); Hysterectomy, total abdominal (92-97); Cataract removal (Right, 07/17/2019); Breast surgery; shoulder surgery (Right); Skin cancer excision; Colonoscopy; Colonoscopy (N/A, 05/10/2021); Gastric fundoplication; Upper gastrointestinal endoscopy; laparoscopy (N/A, 5/11/2021); and laparotomy (N/A, 5/11/2021). Restrictions  Restrictions/Precautions: General Precautions, Fall Risk(SAMANTHA x2, CLEAR LIQUID DIET)  Implants present? : Metal implants(4 pins R knee)  Other position/activity restrictions: up with assistance; ambulate patient  Required Braces or Orthoses  Other: Abdominal Binder  Required Braces or Orthoses?: Yes       Subjective  Subjective: \"I just expected to get up and get going like normal after surgery\"  Comments: DEEPTHI Jones ok'd OT/PT Evaluation this date. Patient up bedside chair, agreeable to participate.  Asking for an inhaler for asthma, notified DEEPTHI Anderson  Overall Orientation Status: Within Functional Limits  Vision  Vision: Impaired  Vision Exceptions: Wears glasses at all times  Hearing  Hearing: Exceptions to [facility]  Hearing Exceptions: Hard of hearing/hearing concerns, Bilateral hearing aid  Social/Functional History  Lives With: Spouse  Type of Home: House  Home Layout: One level(1 small threshold)  Bathroom Shower/Tub: Tub/Shower unit, Doors  Bathroom Toilet: Standard  Bathroom Equipment: Grab bars in shower, Hand-held shower  Bathroom Accessibility: Accessible  Home Equipment: (no DME)  ADL Assistance: Independent  Homemaking Assistance: Independent  Homemaking Responsibilities: Yes  Ambulation Assistance: Independent(no DME)  Transfer Assistance: Independent  Active : Yes(unable to state vehicle)  Occupation: Retired  Type of occupation: caregiver,   IADL Comments: sleeps in flat bed  Additional Comments:  is full time  during the day. Son Sameer Puga can check in on pt and works in evenings in . No recent PT OT. Objective      Cognition  Overall Cognitive Status: WFL   Sensation  Overall Sensation Status: WFL(pt denies)   ADL  Feeding: Setup(CLEAR LIQUID)  Grooming: Contact guard assistance(standing at sink for hand hygiene)  UE Bathing: Moderate assistance  LE Bathing: Moderate assistance  UE Dressing: Moderate assistance  LE Dressing: Maximum assistance(unable to manage hospital socks; able to thread one LE into brief-assist with the other, able to pull up over hips with CGA)  Toileting: Contact guard assistance(standard height toilet with handles)  Additional Comments: Patient participated in toileting routine and lower body dressing task this date. Patient had abdominal binder on prior to session start. Other assist levels based on clinical observation/reasoning. UE Function           LUE Strength  Gross LUE Strength: WFL  LUE Strength Comment: assessed functionally due to recent abdominal surgery     LUE Tone: Normotonic     LUE AROM (degrees)  LUE AROM : WFL     Left Hand AROM (degrees)  Left Hand AROM: WFL  RUE Strength  Gross RUE Strength: WFL  RUE Strength Comment: assessed functionally due to recent abdominal surgery      RUE Tone: Normotonic     RUE AROM (degrees)  RUE AROM : WFL     Right Hand AROM (degrees)  Right Hand AROM: WFL    Fine Motor Skills  Coordination  Movements Are Fluid And Coordinated:  Yes                           Mobility          Balance  Sitting Balance: Independent  Standing Balance: Contact guard assistance     Functional Mobility  Functional - Mobility Device: Other  Activity: Other  Assist Level: Minimal assistance  Functional Mobility Comments: Min A without device with hand held support, CGA with RW within hospital room  Bed mobility  Comment: pt up in bedside chair at start and end of session     Transfers  Sit to stand: Contact guard assistance  Stand to sit: Contact guard assistance  Transfer Comments: from bedside chair / toilet, verbal cues for hand placement, c/o nausea  Toilet Transfers  Toilet - Technique: Ambulating  Equipment Used: Raised toilet seat with rails  Toilet Transfer: Contact guard assistance      Assessment  Performance deficits / Impairments: Decreased functional mobility , Decreased safe awareness, Decreased balance, Decreased ADL status, Decreased endurance  Treatment Diagnosis: impaired self care status  Prognosis: Good  Decision Making: Low Complexity  REQUIRES OT FOLLOW UP: Yes  Activity Tolerance: Patient limited by pain, Patient limited by fatigue         Functional Outcome Measures  AM-PAC Daily Activity Inpatient   How much help for putting on and taking off regular lower body clothing?: A Lot  How much help for Bathing?: A Lot  How much help for Toileting?: A Little  How much help for putting on and taking off regular upper body clothing?: A Little  How much help for taking care of personal grooming?: A Little  How much help for eating meals?: A Little  [facility] Inpatient Daily Activity Raw Score: 16  AM-PAC Inpatient ADL T-Scale Score : 35.96  ADL Inpatient CMS 0-100% Score: 53.32  ADL Inpatient CMS G-Code Modifier : CK       Goals  Short term goals  Time Frame for Short term goals: by discharge, patient will  Short term goal 1: perform functional transfers/mobility during ADL routine with independence/modified independence using device as appropriate  Short term goal 2: increase standing tolerance to 5-7 minutes with UE support as needed during functional task  Short term goal 3: verbalize/demonstrate Good understanding of donning/doffing abdominal binder and wearing schedule for ADL purposes  Short term goal 4: verbalize/demonstrate Good understanding of compensatory techniques/adaptive equipment for increased ease and independence with lower body self care    Plan  Safety Devices  Safety Devices in place: Yes  Type of devices: Call light within reach, Left in chair, Gait belt, Nurse notified     Plan  Times per week: 3-5  Current Treatment Recommendations: Endurance Training, Equipment Evaluation, Education, & procurement, Self-Care / ADL, Balance Training, Pain Management, Functional Mobility Training, Safety Education & Training, Positioning  OT Education  OT Education: OT Role, Plan of Care, ADL Adaptive Strategies, Transfer Training, Equipment, Orientation  Patient Education: safety, activity promotion    OT Equipment Recommendations  Equipment Needed: (TBD)  OT Individual Minutes  Time In: 4122  Time Out: 0940  Minutes: 24    Electronically signed by Fish Quinn OT on 5/12/21 at 12:52 PM EDT

## 2021-05-12 NOTE — PROGRESS NOTES
[facility]                 PATIENT NAME: Raffaele Clements     TODAY'S DATE: 5/12/2021, 12:11 PM    SUBJECTIVE:  POD#1  Pt seen and examined. Afebrile, VSS. Hemoglobin and platelets low but stable, no leukocytosis. S/p attempted laparoscopic appendectomy converted to open. Patient c/o incisional soreness and some SOB this morning not relieved with her inhaler. CXR and nebulizer treatments ordered per primary. Denies flatus/BM. Tolerating small amounts of clear liquids, no N/V. Midline dressing clean, dry, intact. SAMANTHA x 2 serosanguinous. Urine output adequate; raymundo removed this morning. OBJECTIVE:   VITALS:  /63   Pulse 68   Temp 97.5 °F (36.4 °C) (Oral)   Resp 18   Ht 5' 4\" (1.626 m)   Wt 177 lb 0.5 oz (80.3 kg)   SpO2 96%   BMI 30.39 kg/m²      INTAKE/OUTPUT:      Intake/Output Summary (Last 24 hours) at 5/12/2021 1211  Last data filed at 5/12/2021 0825  Gross per 24 hour   Intake 1630 ml   Output 1385 ml   Net 245 ml                 CONSTITUTIONAL:  awake and alert.   No acute distress  HEART:   RRR  LUNGS:   Decreased air entry at bases, no wheezing  ABDOMEN:   Abdomen soft, midline incision tender, non-distended  EXTREMITIES:   Trace pedal edema    Data:  CBC:   Lab Results   Component Value Date    WBC 7.4 05/12/2021    RBC 3.10 05/12/2021    RBC 3.44 11/28/2011    HGB 9.6 05/12/2021    HCT 28.8 05/12/2021    MCV 93.0 05/12/2021    MCH 30.9 05/12/2021    MCHC 33.3 05/12/2021    RDW 15.3 05/12/2021    PLT 96 05/12/2021    PLT 84 11/28/2011    MPV 11.4 05/12/2021     BMP:    Lab Results   Component Value Date     05/12/2021    K 3.8 05/12/2021     05/12/2021    CO2 24 05/12/2021    BUN 11 05/12/2021    LABALBU 4.4 09/04/2020    LABALBU 5.0 03/23/2012    CREATININE 0.84 05/12/2021    CALCIUM 6.1 05/12/2021    GFRAA >60 05/12/2021    LABGLOM >60 05/12/2021    GLUCOSE 96 05/12/2021    GLUCOSE 65 03/23/2012       Radiology Review:  No new images to

## 2021-05-12 NOTE — PROGRESS NOTES
Physical Therapy      Facility/Department: [facility] MED SURG  Initial Assessment    NAME: Livan Clements  : 1955  MRN: [id number]    Date of Service: 2021    Discharge Recommendations:  Patient would benefit from continued therapy after discharge, Continue to assess pending progress   PT Equipment Recommendations  Equipment Needed: Yes  Mobility Devices: [place] X[place]ilt: Rolling    Assessment   Body structures, Functions, Activity limitations: Decreased functional mobility ; Decreased ROM; Decreased ADL status; Decreased strength;Decreased endurance;Decreased balance; Increased pain  Assessment: Pt requiring 1 assist for mobility - ambulation improves with RW. Pt would benefit from continued PT prior to and after d/c. Treatment Diagnosis: Impaired functional mobility 2* appendicitis  Specific instructions for Next Treatment: log rolling,progressive ambulation, step, HEP, endurance activities  Prognosis: Good  Decision Making: Low Complexity  History: s/p LAPAROTOMY LYSIS OF ADHESIONS -OPEN APPENDECTOMY, LYSIS OF SMALL BOWEL ADHESIONS on 21  Exam: ROM,MMT, transfers, amb, balance  Clinical Presentation: Pt alert, cooperative, pleasant. Barriers to Learning: none  REQUIRES PT FOLLOW UP: Yes  Activity Tolerance  Activity Tolerance: Patient limited by endurance; Patient limited by pain       Patient Diagnosis(es): There were no encounter diagnoses.      has a past medical history of Abdominal pain, Abnormal computed tomography of abdomen and pelvis, Acquired von Willebrand's disease ([address] Allergy to food dye, Anemia, Asthma, Basal cell carcinoma of upper lip, Benign paroxysmal positional vertigo, Cancer (HCC), Cardiac murmur, Chest pain, Cobalamin deficiency, Diverticulitis, Diverticulosis, Gastroesophageal reflux disease, Gastroparesis, Hiatal hernia, History of fractured kneecap, History of malignant neoplasm of thyroid, History of squamous cell carcinoma in situ of skin, Hypoparathyroidism ([address] IBS (irritable bowel syndrome), Intestinal disaccharidase deficiency, Ischemic optic neuropathy, left, Lichen sclerosus, MVP (mitral valve prolapse), Neoplasm of unspecified nature of bone, soft tissue, and skin, Neoplasm of unspecified nature of bone, soft tissue, and skin, Osteoarthritis, Osteoporosis, PONV (postoperative nausea and vomiting), Postprocedural hypothyroidism, Prolonged emergence from general anesthesia, Raynaud's disease, Seasonal allergies, Sensorineural hearing loss, bilateral, Squamous cell carcinoma of upper extremity, Thrombocytopenia ([address] Thyroid disease, Vulvar dystrophy, Wears hearing aid, and Wears partial dentures. has a past surgical history that includes hernia repair (2005); Nasal polyp surgery (2012); Thyroidectomy (1985); knee surgery (Right, 1996); skin biopsy (2012); malignant skin lesion excision (2011); skin biopsy (2009); skin biopsy (2008); skin biopsy (2002); Cholecystectomy (2011); Tonsillectomy and adenoidectomy; other surgical history (1979); Elbow surgery (Bilateral); Hysterectomy, total abdominal (92-97); Cataract removal (Right, 07/17/2019); Breast surgery; shoulder surgery (Right); Skin cancer excision; Colonoscopy; Colonoscopy (N/A, 05/10/2021); Gastric fundoplication; Upper gastrointestinal endoscopy; laparoscopy (N/A, 5/11/2021); and laparotomy (N/A, 5/11/2021).     Restrictions  Restrictions/Precautions  Restrictions/Precautions: General Precautions, Fall Risk(SAMANTHA x2, CLEAR LIQUID DIET)  Required Braces or Orthoses?: Yes  Implants present? : Metal implants(4 pins R knee)  Required Braces or Orthoses  Other: Abdominal Binder  Position Activity Restriction  Other position/activity restrictions: up with assistance; ambulate patient  Vision/Hearing  Vision: Impaired  Vision Exceptions: Wears glasses at all times  Hearing: Exceptions to [facility]  Hearing Exceptions: Hard of hearing/hearing concerns;Bilateral hearing aid     Subjective  General  Chart Reviewed: Yes  Patient assessed for rehabilitation services?: Yes  Additional Pertinent Hx: asthma, CA  Family / Caregiver Present: No  Referring Practitioner: Jessy Fowler MD  Referral Date : 05/11/21  Diagnosis: appendicitis  Follows Commands: Within Functional Limits  Subjective  Subjective: Pt in bedside chair, agreeable to PT OT co grant. RN Karen Soliman  Pain Screening  Patient Currently in Pain: Denies  Vital Signs  Patient Currently in Pain: Denies  Oxygen Therapy  SpO2: 95 %  O2 Device: None (Room air)  Patient Observation  Observations: abdominal binder with SAMANTHA x2       Orientation  Orientation  Overall Orientation Status: Within Normal Limits  Social/Functional History  Social/Functional History  Lives With: Spouse  Type of Home: House  Home Layout: One level(1 small threshold)  Bathroom Shower/Tub: Tub/Shower unit, Doors  Bathroom Toilet: Standard  Bathroom Equipment: Grab bars in shower, Hand-held shower  Bathroom Accessibility: Accessible  Home Equipment: (no DME)  ADL Assistance: Independent  Homemaking Assistance: Independent  Homemaking Responsibilities: Yes  Ambulation Assistance: Independent(no DME)  Transfer Assistance: Independent  Active : Yes(unable to state vehicle)  Occupation: Retired  Type of occupation: caregiver,   IADL Comments: sleeps in flat bed  Additional Comments:  is full time  during the day. Son Hank Olson can check in on pt and works in evenings in [place]. No recent PT OT.   Cognition        Objective          AROM RLE (degrees)  RLE AROM: WFL  AROM LLE (degrees)  LLE AROM : WFL  AROM RUE (degrees)  RUE General AROM: See OT  AROM LUE (degrees)  LUE General AROM: See OT  Strength RLE  Strength RLE: WFL  Comment: Grossly 4-/5  Strength LLE  Strength LLE: WFL  Comment: Grossly 4-/5  Strength RUE  Comment: See OT  Strength LUE  Comment: See OT     Sensation  Overall Sensation Status: WFL(pt denies)  Bed mobility  Comment: Pt up in bedside chair at start and end of session. Pt has recliner if needed at home. Pt denies difficulty out of bed to chair. Transfers  Sit to Stand: Contact guard assistance  Stand to sit: Contact guard assistance  Comment: CGA without device. Pt requires increased time to stand as well as stand prior to amb. Ambulation  Ambulation?: Yes  More Ambulation?: Yes  Ambulation 1  Surface: level tile  Device: Hand-Held Assist(R UE)  Assistance: Minimal assistance  Quality of Gait: slow patrick, B UE in high guard, small steps, no LOB  Gait Deviations: Decreased step length;Decreased step height;Slow Patrick  Distance: 20'  Comments: Mild unsteadiness improves with increased amb. Pt denies dizziness. Ambulation 2  Surface - 2: level tile  Device 2: Rolling Walker  Assistance 2: Contact guard assistance  Quality of Gait 2: improved patrick and steadiness, no LOB  Distance: 10'  Comments: Cues for technique to use RW. Pt is more steady with RW at this time. Stairs/Curb  Stairs?: No     Balance  Posture: Good  Sitting - Static: Good  Sitting - Dynamic: Good  Standing - Static: Good;-  Standing - Dynamic: Good;-  Comments: Fall risk, standing balance with RW. Improved with RW vs no device. Other exercises  Other exercises?: Yes  Other exercises 1: Education on bracing with pillow for pain reduction and use of incentive spirometer to improve breathing     Plan   Plan  Times per week: 5-7x/week  Specific instructions for Next Treatment: log rolling,progressive ambulation, step, HEP, endurance activities  Current Treatment Recommendations: Strengthening, Balance Training, Functional Mobility Training, Transfer Training, Gait Training, Stair training, Endurance Training, Equipment Evaluation, Education, & procurement, Patient/Caregiver Education & Training, Safety Education & Training, Home Exercise Program, Positioning, Pain Management  Safety Devices  Type of devices:  All fall risk precautions in place, Call light within reach, Gait belt, Patient at risk for falls, Left in chair, Nurse notified(DEEPTHI Driscoll - pt requesting  to come see her)    G-Code       OutComes Score                                                  AM-PAC Score     AM-PAC Inpatient Mobility without Stair Climbing Raw Score : 11 (05/12/21 1436)  AM-PAC Inpatient without Stair Climbing T-Scale Score : 35.66 (05/12/21 1436)  Mobility Inpatient CMS 0-100% Score: 67.36 (05/12/21 1436)  Mobility Inpatient without Stair CMS G-Code Modifier : CL (05/12/21 1436)       Goals  Short term goals  Time Frame for Short term goals: 3-4 days  Short term goal 1: Pt to demo bed mobility Mod I. Short term goal 2: Pt to demo Mod I transfers. Short term goal 3: Pt to amb 150' with device prn, Mod I. Short term goal 4: Pt to ascend/descend 1 2-4\" threshold, SBA. Short term goal 5: Pt to demo good technique for HEP for BLE strengthening. Patient Goals   Patient goals :  To go home       Therapy Time   Individual Concurrent Group Co-treatment   Time In 5 [facility]         Time Out 0938         Minutes [address], [address] [facility]

## 2021-05-12 NOTE — CONSULTS
Dr. Snehal Sprague family medicine  CHIEF COMPLAINT: No chief complaint on file. Reason for Admission: Abdominal pain    History Obtained From:  Patient     HISTORY OF PRESENT ILLNESS:      The patient is a pleasant 77 y.o. female with significant medical history of chronic abdominal pain presented with abnormal CT that showed thickened appendix after she had CT of her abdomen and was taken to surgery yesterday for open appendectomy second to her adhesions was converted from laparoscopic to an open procedure patient somewhat nauseous this morning she does say anesthesia affects a little bit longer usually she denies any chest pain or shortness of breath this morning though. Patient does take an inhaler for her shortness of breath at home albuterol which was ordered as well as she takes thyroid medication for history of thyroidectomy she denies any diabetes or any coronary artery disease. Patient gives me history that she has had multiple abdominal issues she goes to [facility] as well to see a gastroenterologist up there. She was having pain after eating and nausea as well. For a year or so she says.     Past Medical History:    Past Medical History:   Diagnosis Date    Abdominal pain     RLQ    Abnormal computed tomography of abdomen and pelvis 04/16/2021    Distal appendix is mildly thickened without significant periappendiceal inflammatory change    Acquired von Willebrand's disease ([address] 10/30/2018    Allergy to food dye     yellow and red dyes    Anemia     Asthma     Basal cell carcinoma of upper lip 06/22/2017    Benign paroxysmal positional vertigo 12/09/2013    Cancer ([address]     Thyroid and skin    Cardiac murmur     STATES SINCE CHILD BILLY- STATES SHE WAS BORN WITH A \"90 DEGREE ANGLE VALVE\"    Chest pain 09/04/2020    Cobalamin deficiency 12/26/2012    Diverticulitis     Diverticulosis     Gastroesophageal reflux disease 12/26/2012    Gastroparesis     Hiatal hernia     History of fractured kneecap     RIGHT    History of malignant neoplasm of thyroid 04/17/2012    History of squamous cell carcinoma in situ of skin 06/09/2014    Chart states left wrist, but patient denies    Hypoparathyroidism ([address] 07/03/2012    IBS (irritable bowel syndrome)     diarrhea-prominent type    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  Neoplasm of unspecified nature of bone, soft tissue, and skin     New lesions of right dorsum of hand (healed after Efudex) and lesion of right anterior thigh.  Osteoarthritis     Osteoporosis     beginning     PONV (postoperative nausea and vomiting)     Postprocedural hypothyroidism 07/16/2018    Prolonged emergence from general anesthesia     Pt states this was \"a long time ago\"     Raynaud's disease     Seasonal allergies     Sensorineural hearing loss, bilateral 12/26/2012    Squamous cell carcinoma of upper extremity 04/08/2013    Scc in situ rt elbow 1/11    Thrombocytopenia ([address]     Thyroid disease     Thyroid Ca Hx. - thyroid was removed.      Vulvar dystrophy     Wears hearing aid     Wears partial dentures     LOWER     Patient Active Problem List   Diagnosis Code    Benign neoplasm of skin of upper extremity D23.60    Benign neoplasm of skin of trunk, except scrotum D23.5    Rosacea L71.9    Squamous cell carcinoma of upper extremity C44.621    Neoplastic disease D49.9    Squamous cell carcinoma in situ D09.9    Squamous cell carcinoma in situ of skin of left wrist D04.62    Food allergy Z91.018    Other plastic surgery for unacceptable cosmetic appearance Z41.1    Functional diarrhea K59.1    Right upper quadrant pain R10.11    Basal cell carcinoma of upper lip C44.01    Actinic keratosis H71.5    Lichen sclerosus et atrophicus L90.0    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    Neoplasm of uncertain behavior of skin D48.5    Hypothyroidism, unspecified E03.9    Gastroparesis K31.84    Cobalamin deficiency E53.8    Acquired von Willebrand's disease ([address] D68.0    Asthma J45.909    Benign paroxysmal positional vertigo H81.10    Cervical spondylosis without myelopathy M47.812    Diaphragmatic hernia K44.9    Diverticulosis of colon K57.30    Dyspareunia [id number]    Enthesopathy M77.9    Hip pain M25.559    History of malignant neoplasm of thyroid Z85.850    Hypoparathyroidism (HCC) E20.9    Hyperlipidemia E78.5    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    Iron deficiency anemia D50.9    Irritable bowel syndrome with diarrhea K58.0    Medial epicondylitis M77.00    Mitral valve disorder I05.9    Obstructive sleep apnea syndrome G47.33    Pain in limb M79.609    Postprocedural hypothyroidism E89.0    Scoliosis (and kyphoscoliosis), idiopathic M41.20    Sensorineural hearing loss, bilateral H90.3    Thrombocytopenia (HCC) D69.6    Vitamin D deficiency E55.9    Wrist joint pain M25.539    Gastroesophageal reflux disease K21.9    Chest pain R07.9    Abdominal pain R10.9       Past Surgical History:       Past Surgical History:   Procedure Laterality Date    BREAST SURGERY      Cyst removed rt. nipple     CATARACT REMOVAL Right 07/17/2019    Right eye    CHOLECYSTECTOMY  2011    COLONOSCOPY      COLONOSCOPY N/A 05/10/2021    COLONOSCOPY DIAGNOSTIC performed by Jayda Bates MD at [address] Bilateral     Tendonitis X2    GASTRIC FUNDOPLICATION      \"Chronic appearing postsurgical change to the stomach suggestive of prior Nissen fundoplication\"- noted per CT ABD/pelvis 4-16-21    HERNIA REPAIR  2005    hiatal hernia repair    HYSTERECTOMY, TOTAL Intravenous PRN Jessy Fowler MD        ondansetron [facility]) injection 4 mg  4 mg Intravenous Q6H PRN Jessy Fowler MD   4 mg at 05/11/21 1818    famotidine (PEPCID) injection 20 mg  20 mg Intravenous BID Jessy Fowler MD   20 mg at 05/12/21 0819    enoxaparin (LOVENOX) injection 40 mg  40 mg Subcutaneous Daily Jessy Fowler MD        clindamycin (CLEOCIN) 600 mg in dextrose 5 % 50 mL IVPB  600 mg Intravenous Dipak Angeles MD   Stopped at 05/12/21 0327    fentaNYL (SUBLIMAZE) injection 50 mcg  50 mcg Intravenous Q2H PRN Jessy Fowler MD        fentaNYL (SUBLIMAZE) injection 100 mcg  100 mcg Intravenous Q2H PRN Jessy Fowler MD   100 mcg at 05/12/21 0042    ketorolac (TORADOL) injection 15 mg  15 mg Intravenous Q6H PRN Jessy Fowler MD   15 mg at 05/11/21 1944    albuterol sulfate  (90 Base) MCG/ACT inhaler 2 puff  2 puff Inhalation Q6H PRN Dipak Panda DO   2 puff at 05/12/21 0819    levothyroxine (SYNTHROID) tablet 100 mcg  100 mcg Oral Daily Leah Borjas DO           Allergies:  Aspirin, Cefadroxil, Rwbymizc-iehhfyb-zarduq [fluocinolone], Ciprofloxacin hcl, Codeine, Demerol hcl [meperidine], Doxycycline, Glucosamine, Iodine, Pcn [penicillins], Red dye, Shellfish-derived products, Sulfa antibiotics, Yellow dye, Yellow dyes (non-tartrazine), Gadolinium, Gadolinium derivatives, Lac bovis, Midazolam, Iodides, and Adhesive tape    Social History:    reports that she has never smoked. She has never used smokeless tobacco. She reports that she does not drink alcohol or use drugs.     Family History:   Family History   Problem Relation Age of Onset    Other Mother         pneumonia    Coronary Art Dis Mother     Liver Cancer Father         age 80    Coronary Art Dis Father     Liver Cancer Brother 59    Colon Cancer Brother         \"Bowel and liver CA\"    Diabetes Other         Father's side    Thyroid Disease Other         Mom's side       REVIEW OF SYSTEMS:  RESPIRATORY:  negative for  dry cough, dyspnea, wheezing and chest pain positive for  asthma  CARDIOVASCULAR:  negative for  chest pain, dyspnea, palpitations, orthopnea, exertional chest pressure/discomfort, fatigue, edema, syncope  GASTROINTESTINAL:  negative for nausea, vomiting, positive for all above change in bowel habits, diarrhea, constipation, abdominal pain and reflux  GENITOURINARY:  negative for frequency, dysuria, nocturia, urinary incontinence and hesitancy   HEMATOLOGIC/LYMPHATIC:  negative for easy bruising, bleeding and swelling/edemapositive for   ENDOCRINE:  negative for weight changes, change in bowel habits and diabetic symptoms including neither polyuria nor polydipsia nor blurred vision nor foot ulcerations nor neuropathypositive for   MUSCULOSKELETAL:  negative for  myalgias, arthralgias, pain, joint swelling, stiff joints and muscle weakness   NEUROLOGICAL:  negative for headaches, dizziness, memory problems, speech problems, visual disturbance, gait problems, weakness and numbness     Vitals:  /63   Pulse 68   Temp 97.5 °F (36.4 °C) (Oral)   Resp 18   Ht 5' 4\" (1.626 m)   Wt 177 lb 0.5 oz (80.3 kg)   SpO2 95%   BMI 30.39 kg/m²     PHYSICAL EXAM:    CONSTITUTIONAL:  awake, alert, cooperative, no apparent distress, and appears stated age  EYES:  Lids and lashes normal, pupils equal, round and reactive to light, extra ocular muscles intact, sclera clear, conjunctiva normal   ENT:  Normocephalic, without obvious abnormality, atraumatic, sinuses nontender on palpation, external ears without lesions, oral pharynx with moist mucus membranes, tonsils without erythema or exudates,   NECK:  Supple, symmetrical, trachea midline, no adenopathy, thyroid symmetric, not enlarged and no tenderness, skin normal   HEMATOLOGIC/LYMPHATICS:  no cervical lymphadenopathy   BACK:  Symmetric, no curvature, spinous processes are non-tender on palpation, paraspinous muscles are non-tender on palpation, no costal vertebral tenderness  LUNGS:  No increased work of breathing, good air exchange, clear to auscultation bilaterally, no crackles or wheezing  CARDIOVASCULAR:  Normal apical impulse, regular rate and rhythm, normal S1 and S2, no S3 or S4, and no murmur noted  ABDOMEN:  No scars, normal bowel sounds, soft, non-distended, non-tender, no masses palpated, no hepatosplenomegally incision is intact her drains are intact with serosanguineous. Some tenderness. GENITAL/URINARY: wnl  MUSCULOSKELETAL:  There is no redness, some edema but no calf tenderness. Full range of motion noted. Motor strength is 5 out of 5 all extremities bilaterally. Tone is normal.   NEUROLOGIC:  Awake, alert, oriented to name, place and time. Cranial nerves II-XII are grossly intact. Motor is 5 out of 5 bilaterally. Sensory is intact. gait is normal.       SKIN:  no bruising or bleeding, normal skin color, texture, turgor, no redness, warmth, or swelling and no rashes     RECENT DATA:  No results found for: CBC, BMP    DATA:  Basic Metabolic Panel w/ Reflex to MG [[id number]] (Abnormal)    Collected: 05/12/21 0608    Updated: 05/12/21 0640    Specimen Source: Blood     Glucose 96 mg/dL    BUN 11 mg/dL    CREATININE 0.84 mg/dL    Bun/Cre Ratio NOT REPORTED    Calcium 6.1Low  mg/dL    Sodium 140 mmol/L    Potassium 3.8 mmol/L    Chloride 105 mmol/L    CO2 24 mmol/L    Anion Gap 11 mmol/L    GFR Non-African American >60 mL/min    GFR African American >60 mL/min    GFR Comment         Comment: Average GFR for 61-76 years old:    85 mL/min/1.73sq m   Chronic Kidney Disease:    <60 mL/min/1.73sq m   Kidney failure:    <15 mL/min/1.73sq m               eGFR calculated using average adult body mass.  Additional eGFR calculator available at:         [email or web address]              GFR Staging NOT REPORTED   CBC Auto Differential [[id number]] (Abnormal)    Collected: 05/12/21 0608    Updated: 05/12/21 9279    Specimen Source: Blood     WBC 7.4 k/uL    RBC 3.10Low  m/uL    Hemoglobin 9.6Low  g/dL    Hematocrit 28.8Low  %    MCV 93.0 fL    MCH 30.9 pg    MCHC 33.3 g/dL    RDW 15.3High  %    Platelets 01SKQ  k/uL    MPV 11.4 fL    NRBC Automated NOT REPORTED per 100 WBC    Differential Type NOT REPORTED    Immature Granulocytes NOT REPORTED %    Absolute Immature Granulocyte NOT REPORTED k/uL    WBC Morphology NOT REPORTED    RBC Morphology NOT REPORTED    Platelet Estimate NOT REPORTED    Seg Neutrophils 83High  %    Lymphocytes 10Low  %    Monocytes 7 %    Eosinophils % 0 %    Basophils 0 %    Segs Absolute 6.20 k/uL    Absolute Lymph # 0.70Low  k/uL    Absolute Mono # 0.50 k/uL    Absolute Eos # 0.00 k/uL    Basophils Absolute 0.00 k/uL   Urinalysis Reflex to Culture [[id number]] (Abnormal)    Collected: 05/11/21 1316    Updated: 05/11/21 1950    Specimen Type: Urine    Specimen Source: Bladder     Color, UA YELLOW    Turbidity UA CLOUDYAbnormal     Glucose, Ur NEGATIVE    Bilirubin Urine NEGATIVE    Ketones, Urine NEGATIVE    Specific Gravity, UA 1.018    Urine Hgb TRACEAbnormal     pH, UA 6.0    Protein, UA NEGATIVE    Urobilinogen, Urine Normal    Nitrite, Urine NEGATIVE    Leukocyte Esterase, Urine NEGATIVE    Urinalysis Comments NOT REPORTED             ASSESSMENT:  Patient Active Problem List   Diagnosis Code    Benign neoplasm of skin of upper extremity D23.60    Benign neoplasm of skin of trunk, except scrotum D23.5    Rosacea L71.9    Squamous cell carcinoma of upper extremity C44.621    Neoplastic disease D49.9    Squamous cell carcinoma in situ D09.9    Squamous cell carcinoma in situ of skin of left wrist D04.62    Food allergy Z91.018    Other plastic surgery for unacceptable cosmetic appearance Z41.1    Functional diarrhea K59.1    Right upper quadrant pain R10.11    Basal cell carcinoma of upper lip C44.01    Actinic keratosis R80.2    Lichen sclerosus et atrophicus L90.0   

## 2021-05-12 NOTE — PROGRESS NOTES
Pt brought in home medication of Domperidone 10mg x3/day. Pt stated she takes this before her synthroid to make it easier on her stomach. Writer explained to pt this medication was not ordered & she has Protonix along w/ Zofran on. Pt is okay with this. Writer locked up in med box at this time & pt will have  bring back home tomorrow. Writer will pass along to day nurse. During report Karen/Maddy RN stated pt had also brought in her home med synthroid d/t ours being coated w/ yellow dye & her being allergic. Karen RN stated she brought med down to pharmacy.

## 2021-05-13 LAB
ABSOLUTE EOS #: 0.1 K/UL (ref 0–0.4)
ABSOLUTE IMMATURE GRANULOCYTE: ABNORMAL K/UL (ref 0–0.3)
ABSOLUTE LYMPH #: 0.6 K/UL (ref 1–4.8)
ABSOLUTE MONO #: 0.3 K/UL (ref 0.1–1.3)
ANION GAP SERPL CALCULATED.3IONS-SCNC: 12 MMOL/L (ref 9–17)
BASOPHILS # BLD: 1 % (ref 0–2)
BASOPHILS ABSOLUTE: 0 K/UL (ref 0–0.2)
BUN BLDV-MCNC: 14 MG/DL (ref 8–23)
BUN/CREAT BLD: ABNORMAL (ref 9–20)
CALCIUM SERPL-MCNC: 6.3 MG/DL (ref 8.6–10.4)
CHLORIDE BLD-SCNC: 106 MMOL/L (ref 98–107)
CO2: 22 MMOL/L (ref 20–31)
CREAT SERPL-MCNC: 0.88 MG/DL (ref 0.5–0.9)
DIFFERENTIAL TYPE: ABNORMAL
EOSINOPHILS RELATIVE PERCENT: 1 % (ref 0–4)
GFR AFRICAN AMERICAN: >60 ML/MIN
GFR NON-AFRICAN AMERICAN: >60 ML/MIN
GFR SERPL CREATININE-BSD FRML MDRD: ABNORMAL ML/MIN/{1.73_M2}
GFR SERPL CREATININE-BSD FRML MDRD: ABNORMAL ML/MIN/{1.73_M2}
GLUCOSE BLD-MCNC: 118 MG/DL (ref 70–99)
HCT VFR BLD CALC: 30.8 % (ref 36–46)
HEMOGLOBIN: 10.2 G/DL (ref 12–16)
IMMATURE GRANULOCYTES: ABNORMAL %
LYMPHOCYTES # BLD: 12 % (ref 24–44)
MCH RBC QN AUTO: 30.6 PG (ref 26–34)
MCHC RBC AUTO-ENTMCNC: 33 G/DL (ref 31–37)
MCV RBC AUTO: 92.7 FL (ref 80–100)
MONOCYTES # BLD: 7 % (ref 1–7)
NRBC AUTOMATED: ABNORMAL PER 100 WBC
PDW BLD-RTO: 15.4 % (ref 11.5–14.9)
PLATELET # BLD: 101 K/UL (ref 150–450)
PLATELET ESTIMATE: ABNORMAL
PMV BLD AUTO: 11.3 FL (ref 6–12)
POTASSIUM SERPL-SCNC: 4.1 MMOL/L (ref 3.7–5.3)
RBC # BLD: 3.33 M/UL (ref 4–5.2)
RBC # BLD: ABNORMAL 10*6/UL
SEG NEUTROPHILS: 79 % (ref 36–66)
SEGMENTED NEUTROPHILS ABSOLUTE COUNT: 3.8 K/UL (ref 1.3–9.1)
SODIUM BLD-SCNC: 140 MMOL/L (ref 135–144)
WBC # BLD: 4.8 K/UL (ref 3.5–11)
WBC # BLD: ABNORMAL 10*3/UL

## 2021-05-13 PROCEDURE — 6360000002 HC RX W HCPCS: Performed by: SURGERY

## 2021-05-13 PROCEDURE — 2500000003 HC RX 250 WO HCPCS: Performed by: SURGERY

## 2021-05-13 PROCEDURE — 2580000003 HC RX 258: Performed by: FAMILY MEDICINE

## 2021-05-13 PROCEDURE — 94640 AIRWAY INHALATION TREATMENT: CPT

## 2021-05-13 PROCEDURE — 94761 N-INVAS EAR/PLS OXIMETRY MLT: CPT

## 2021-05-13 PROCEDURE — 80048 BASIC METABOLIC PNL TOTAL CA: CPT

## 2021-05-13 PROCEDURE — 6360000002 HC RX W HCPCS: Performed by: FAMILY MEDICINE

## 2021-05-13 PROCEDURE — 36415 COLL VENOUS BLD VENIPUNCTURE: CPT

## 2021-05-13 PROCEDURE — 85025 COMPLETE CBC W/AUTO DIFF WBC: CPT

## 2021-05-13 PROCEDURE — 6370000000 HC RX 637 (ALT 250 FOR IP): Performed by: SURGERY

## 2021-05-13 PROCEDURE — 1200000000 HC SEMI PRIVATE

## 2021-05-13 PROCEDURE — 2580000003 HC RX 258: Performed by: SURGERY

## 2021-05-13 RX ORDER — ACETAMINOPHEN 325 MG/1
650 TABLET ORAL EVERY 4 HOURS PRN
Status: DISCONTINUED | OUTPATIENT
Start: 2021-05-13 | End: 2021-05-15 | Stop reason: HOSPADM

## 2021-05-13 RX ADMIN — FAMOTIDINE 20 MG: 10 INJECTION, SOLUTION INTRAVENOUS at 21:34

## 2021-05-13 RX ADMIN — FAMOTIDINE 20 MG: 10 INJECTION, SOLUTION INTRAVENOUS at 09:01

## 2021-05-13 RX ADMIN — ALBUTEROL SULFATE 2.5 MG: 2.5 SOLUTION RESPIRATORY (INHALATION) at 06:59

## 2021-05-13 RX ADMIN — SODIUM CHLORIDE, PRESERVATIVE FREE 10 ML: 5 INJECTION INTRAVENOUS at 21:34

## 2021-05-13 RX ADMIN — SODIUM CHLORIDE: 9 INJECTION, SOLUTION INTRAVENOUS at 00:46

## 2021-05-13 RX ADMIN — CALCIUM GLUCONATE 1000 MG: 98 INJECTION, SOLUTION INTRAVENOUS at 09:04

## 2021-05-13 RX ADMIN — ONDANSETRON 4 MG: 2 INJECTION INTRAMUSCULAR; INTRAVENOUS at 00:56

## 2021-05-13 RX ADMIN — ONDANSETRON 4 MG: 2 INJECTION INTRAMUSCULAR; INTRAVENOUS at 09:47

## 2021-05-13 RX ADMIN — SODIUM CHLORIDE: 9 INJECTION, SOLUTION INTRAVENOUS at 13:55

## 2021-05-13 RX ADMIN — LEVOTHYROXINE SODIUM 100 MCG: 0.05 TABLET ORAL at 09:01

## 2021-05-13 RX ADMIN — ONDANSETRON 4 MG: 2 INJECTION INTRAMUSCULAR; INTRAVENOUS at 22:17

## 2021-05-13 RX ADMIN — ACETAMINOPHEN 650 MG: 325 TABLET, FILM COATED ORAL at 13:57

## 2021-05-13 NOTE — PLAN OF CARE
Problem: Pain:  Goal: Pain level will decrease  Description: Pain level will decrease  5/13/2021 1607 by Zoila Constantino RN  Outcome: Ongoing  Note: Pain is controlled with PRN pain medications. 5/13/2021 1312 by Livan Meyer RN  Outcome: Ongoing  Goal: Control of acute pain  Description: Control of acute pain  5/13/2021 1607 by Zoila Constantino RN  Outcome: Ongoing  5/13/2021 0627 by Livan Meyer RN  Outcome: Ongoing  Goal: Control of chronic pain  Description: Control of chronic pain  5/13/2021 1607 by Zoila Constantino RN  Outcome: Ongoing  5/13/2021 0627 by Livan Meyer RN  Outcome: Ongoing     Problem: Falls - Risk of:  Goal: Will remain free from falls  Description: Will remain free from falls  5/13/2021 1607 by Zoila Constantino RN  Outcome: Ongoing  Note: No falls this shift. Call light within reach, side rails up x2, bed in lowest position. Patient safety maintained. 5/13/2021 8829 by Livan Meyer RN  Outcome: Ongoing  Goal: Absence of physical injury  Description: Absence of physical injury  5/13/2021 1607 by Zoila Constantino RN  Outcome: Ongoing  Note: No injury this shift. Patient safety maintained. 5/13/2021 0627 by Livan Meyer RN  Outcome: Ongoing     Problem: Physical Regulation:  Goal: Will remain free from infection  Description: Will remain free from infection  5/13/2021 1607 by Zoila Constantino RN  Outcome: Ongoing  Note: No signs and symptoms of infection noted. Patient remains free from infection.     5/13/2021 0627 by Livan Meyer RN  Outcome: Ongoing     Problem: Skin Integrity:  Goal: Demonstration of wound healing without infection will improve  Description: Demonstration of wound healing without infection will improve  5/13/2021 1607 by Zoila Constantino RN  Outcome: Ongoing  5/13/2021 0627 by Livan Meyer RN  Outcome: Ongoing  Goal: Complications related to intravenous access or infusion will be avoided or minimized  Description: Complications related to intravenous access or infusion will be avoided or minimized  5/13/2021 1607 by Nestor Rubin RN  Outcome: Ongoing  5/13/2021 0627 by Luiz Young RN  Outcome: Ongoing  Goal: Will show no infection signs and symptoms  Description: Will show no infection signs and symptoms  5/13/2021 1607 by Nestor Rubin RN  Outcome: Ongoing  5/13/2021 0627 by Luiz Young RN  Outcome: Ongoing  Goal: Absence of new skin breakdown  Description: Absence of new skin breakdown  5/13/2021 1607 by Nestor Rubin RN  Outcome: Ongoing  5/13/2021 0627 by Luiz Young RN  Outcome: Ongoing     Problem: Musculor/Skeletal Functional Status  Goal: Highest potential functional level  5/13/2021 1607 by Nestor Rubin RN  Outcome: Ongoing  Note: Patient is able to perform ADLs with assistance  .     5/13/2021 4275 by Luiz Young RN  Outcome: Ongoing  Goal: Absence of falls  5/13/2021 1607 by Nestor Rubin RN  Outcome: Ongoing  5/13/2021 0627 by Luiz Young RN  Outcome: Ongoing

## 2021-05-13 NOTE — CARE COORDINATION
ONGOING DISCHARGE PLAN:    Patient is alert and oriented x4. Spoke with patient regarding discharge plan and patient confirms that plan is still to return to home, no needs. POD #2 open appy. Full liquid diet - Had some nausea/vomiting    Anticipate discharge tomorrow if able to tolerate diet. Will continue to follow for additional discharge needs.     Electronically signed by Mya Olivera RN on 5/13/2021 at 12:27 PM

## 2021-05-13 NOTE — PLAN OF CARE
Problem: Pain:  Goal: Pain level will decrease  Description: Pain level will decrease  5/13/2021 0627 by Ajith Chance RN  Outcome: Ongoing  5/12/2021 1733 by Kam Bahena RN  Outcome: Ongoing  Note: Pain is controlled with PRN pain medications. Goal: Control of acute pain  Description: Control of acute pain  5/13/2021 0627 by Ajith Chance RN  Outcome: Ongoing  5/12/2021 1733 by Kam Bahena RN  Outcome: Ongoing  Goal: Control of chronic pain  Description: Control of chronic pain  5/13/2021 0627 by Ajith Chance RN  Outcome: Ongoing  5/12/2021 1733 by Kam Bahena RN  Outcome: Ongoing     Problem: Falls - Risk of:  Goal: Will remain free from falls  Description: Will remain free from falls  5/13/2021 0627 by Ajith Chance RN  Outcome: Ongoing  5/12/2021 1733 by Kam Bahena RN  Outcome: Ongoing  Note: No falls this shift. Call light within reach, side rails up x2, bed in lowest position. Patient safety maintained. Goal: Absence of physical injury  Description: Absence of physical injury  5/13/2021 2833 by Ajith Chance RN  Outcome: Ongoing  5/12/2021 1733 by Kam Bahena RN  Outcome: Ongoing     Problem: Physical Regulation:  Goal: Will remain free from infection  Description: Will remain free from infection  5/13/2021 0627 by Ajith Chance RN  Outcome: Ongoing  5/12/2021 1733 by Kam Bahena RN  Outcome: Ongoing  Note: No signs and symptoms of infection noted. Patient remains free from infection. Problem: Skin Integrity:  Goal: Demonstration of wound healing without infection will improve  Description: Demonstration of wound healing without infection will improve  5/13/2021 0627 by Ajith Chance RN  Outcome: Ongoing  5/12/2021 1733 by Kam Bahena RN  Outcome: Ongoing  Note: Skin assessment as charted.     Goal: Complications related to intravenous access or infusion will be avoided or minimized  Description: Complications related to intravenous access or infusion

## 2021-05-13 NOTE — PROGRESS NOTES
[facility]                 PATIENT NAME: Mathieu Clements     TODAY'S DATE: 5/13/2021, 11:21 AM    SUBJECTIVE:  POD#2  Pt seen and examined. Afebrile, VSS. Hemoglobin stable, no leukocytosis. Patient is doing well. Abdominal pain is well controlled. She denies flatus/BM. Tolerating full liquid diet yesterday however did have some nausea this morning so she did not eat breakfast. She has not tried Zofran yet. No emesis. Incision clean, dry, intact. SAMANTHA x 2 serosanguinous. Dressing was changed. OBJECTIVE:   VITALS:  /82   Pulse 73   Temp 98.4 °F (36.9 °C) (Oral)   Resp 18   Ht 5' 4\" (1.626 m)   Wt 160 lb 11.5 oz (72.9 kg)   SpO2 98%   BMI 27.59 kg/m²      INTAKE/OUTPUT:      Intake/Output Summary (Last 24 hours) at 5/13/2021 1121  Last data filed at 5/13/2021 0856  Gross per 24 hour   Intake 5450.39 ml   Output 875 ml   Net 4575.39 ml                 CONSTITUTIONAL:  awake and alert.   No acute distress  HEART:   RRR  LUNGS:   Decreased air entry at bases, no wheezing   ABDOMEN:   Abdomen soft, incision tender, mildly distended  EXTREMITIES:   Trace pedal edema    Data:  CBC:   Lab Results   Component Value Date    WBC 4.8 05/13/2021    RBC 3.33 05/13/2021    RBC 3.44 11/28/2011    HGB 10.2 05/13/2021    HCT 30.8 05/13/2021    MCV 92.7 05/13/2021    MCH 30.6 05/13/2021    MCHC 33.0 05/13/2021    RDW 15.4 05/13/2021     05/13/2021    PLT 84 11/28/2011    MPV 11.3 05/13/2021     BMP:    Lab Results   Component Value Date     05/13/2021    K 4.1 05/13/2021     05/13/2021    CO2 22 05/13/2021    BUN 14 05/13/2021    LABALBU 4.4 09/04/2020    LABALBU 5.0 03/23/2012    CREATININE 0.88 05/13/2021    CALCIUM 6.3 05/13/2021    GFRAA >60 05/13/2021    LABGLOM >60 05/13/2021    GLUCOSE 118 05/13/2021    GLUCOSE 65 03/23/2012       Radiology Review:  No new images to review      ASSESSMENT     Active Problems:    Abdominal pain  Resolved Problems:    * No resolved hospital problems. *      Plan  1. Continue full liquid diet until nausea is improved  2. Zofran prn nausea  3. Increase activity, pulmonary toilet  4. Surgically stable  5. Continue medical management   6. Patient was seen and examined. Starting to pass some flatus. On full liquid diet. Abdomen is soft. SAMANTHA drain is serous serosanguineous. blood work was reviewed. Patient has finished her antibiotics. BMP is normal. WBC count is normal. Hemoglobin is 10.2. Platelet count is adequate. 7. Depending on her progress tomorrow we will advance her diet. Discharge planning in the next couple of days.       Electronically signed by Tran Morillo PA-C  [id number] [address]

## 2021-05-13 NOTE — PROGRESS NOTES
[id number] [facility]      PROGRESS NOTE        Patient:  Srinivas Page  YOB: 1955    MRN: [id number]     Acct: [de-identified]     Admit date: 5/11/2021    Pt seen and Chart reviewed. Consultant notes reviewed and care evaluated. Subjective: Patient is feeling better today she says the nausea is improved. As well as the shortness of breath is gone. The pain is controlled as well. She just got up she sitting in the chair. Feels okay so far. Much better than yesterday she says. Had a chest x-ray still because shortness of breath was negative BNP was normal as well. Diet:  DIET FULL LIQUID;      Medications:Current Inpatient    Scheduled Meds:   calcium gluconate IVPB  1,000 mg Intravenous Once    albuterol  2.5 mg Nebulization 4x daily    levothyroxine  100 mcg Oral Daily    scopolamine  1 patch Transdermal Once    sodium chloride flush  10 mL Intravenous 2 times per day    famotidine (PEPCID) injection  20 mg Intravenous BID    enoxaparin  40 mg Subcutaneous Daily     Continuous Infusions:   sodium chloride 100 mL/hr at 05/13/21 0046    sodium chloride       PRN Meds:sodium chloride flush, sodium chloride, potassium chloride, magnesium sulfate, ondansetron, fentanNYL, fentanNYL, ketorolac, albuterol sulfate HFA        Physical Exam:  Vitals: /82   Pulse 73   Temp 98.4 °F (36.9 °C) (Oral)   Resp 18   Ht 5' 4\" (1.626 m)   Wt 160 lb 11.5 oz (72.9 kg)   SpO2 98%   BMI 27.59 kg/m²   24 hour intake/output:    Intake/Output Summary (Last 24 hours) at 5/13/2021 0838  Last data filed at 5/13/2021 0753  Gross per 24 hour   Intake 5450.39 ml   Output 735 ml   Net 4715.39 ml     Last 3 weights:   Wt Readings from Last 3 Encounters:   05/12/21 160 lb 11.5 oz (72.9 kg)   05/10/21 142 lb (64.4 kg)   04/27/21 142 lb (64.4 kg)       Physical Examination:   General appearance - alert, well appearing, and in no distress more comfortable today  Mental status - alert, oriented to person, place, and time  PERRLA wnl  Chest - clear to auscultation, no wheezes, rales or rhonchi, symmetric air entry  Heart - normal rate, regular rhythm, normal S1, S2, no murmurs, rubs, clicks or gallops  Abdomen - soft, tender slight distention but she has bowel sounds. Drains are intact incision intact, no masses or organomegaly  Neurological - alert, oriented, normal speech, no focal findings or movement disorder noted}  Extremities - peripheral pulses normal, showing +1 pitting edema lower extremities pedal edema, no clubbing or cyanosis  Skin - normal coloration and turgor, no rashes, no suspicious skin lesions noted   Basic Metabolic Panel [[id number]] (Abnormal)    Collected: 05/13/21 0610    Updated: 05/13/21 0631    Specimen Source: Blood     Glucose 118High  mg/dL    BUN 14 mg/dL    CREATININE 0.88 mg/dL    Bun/Cre Ratio NOT REPORTED    Calcium 6.3Low  mg/dL    Sodium 140 mmol/L    Potassium 4.1 mmol/L    Chloride 106 mmol/L    CO2 22 mmol/L    Anion Gap 12 mmol/L    GFR Non-African American >60 mL/min    GFR African American >60 mL/min    GFR Comment         Comment: Average GFR for 61-76 years old:    85 mL/min/1.73sq m   Chronic Kidney Disease:    <60 mL/min/1.73sq m   Kidney failure:    <15 mL/min/1.73sq m               eGFR calculated using average adult body mass.  Additional eGFR calculator available at:         [email or web address]              GFR Staging NOT REPORTED   CBC Auto Differential [[id number]] (Abnormal)    Collected: 05/13/21 0610    Updated: 05/13/21 0629    Specimen Source: Blood     WBC 4.8 k/uL    RBC 3.33Low  m/uL    Hemoglobin 10.2Low  g/dL    Hematocrit 30.8Low  %    MCV 92.7 fL    MCH 30.6 pg    MCHC 33.0 g/dL    RDW 15.4High  %    Platelets 154TWY  k/uL    MPV 11.3 fL    NRBC Automated NOT REPORTED per 100 WBC    Differential Type NOT REPORTED Immature Granulocytes NOT REPORTED %    Absolute Immature Granulocyte NOT REPORTED k/uL    WBC Morphology NOT REPORTED    RBC Morphology NOT REPORTED    Platelet Estimate NOT REPORTED    Seg Neutrophils 79High  %    Lymphocytes 12Low  %    Monocytes 7 %    Eosinophils % 1 %    Basophils 1 %    Segs Absolute 3.80 k/uL    Absolute Lymph # 0.60Low  k/uL    Absolute Mono # 0.30 k/uL    Absolute Eos # 0.10 k/uL    Basophils Absolute 0.00 k/uL   Surgical Pathology [[id number]]    Collected: 05/11/21 1233    Updated: 05/12/21 1534     Surgical Pathology Report --    [id number]   [address]   [phone]   Fax: [phone]   SURGICAL PATHOLOGY REPORT     Patient Name: Forest Barnes   MR#: [id number]   Specimen #[id number]         Final Diagnosis   APPENDIX, OPEN APPENDECTOMY:          APPENDIX WITH DIVERTICULOSIS OF THE DISTAL PORTION AND FIBROUS   OBLITERATION OF THE LUMEN          NEGATIVE FOR DYSPLASIA OR MALIGNANCY     Diagnosis Comment   The lumen of the appendix is replaced by spindle cells, with chronic   inflammatory cells, hypertrophied nerve bundles, adipose tissue and   collagen.  Lymphoid follicles, mucosa and crypts are only present   within the tissue of the proximal surgical margin that shows no acute   or granulomatous inflammation.         Yumiko Ritchie M.D.   **Electronically Signed Out**         js/5/12/2021     Clinical Information   Abdominal pain; attempted appendectomy laparoscopic robotic converted   to open appendectomy; formalin time: 12:47     Source:   A: Appendix     Gross Description   Received in formalin, in a container, labeled with the patient's name,   identifiers and \"appendix\" is a C-shaped appendix that upon stretching   measures 9.5 cm in length by 1.0 to 1.2 cm in diameter.  The tan-red   intact serosa shows no lesions, perforations or mucin.  The mesentery   is present in the proximal portion (3.0 x 1.2 x 0.5 cm) and in a   distal portion (3.5 x 1.5 x 1.5 cm) of the specimen.  Staple line (2.0   cm in length), securing proximal surgical margin, is removed.  The   lumen without lesions is open only in area of the proximal margin. The lumen of the appendix in the proximal and mid third appears to be   occupied by edematous light-yellow mucosa or by the homogenous   light-yellow tissue.  The distal third of the appendix is inked blue. The distal portion of the lumen (3.5 cm in length) shows yellowish tan   appearance of the contents and a probable diverticulum.  No   well-formed masses or necrosis identified.  The proximal surgical   margin beneath the staple line is inked blue.  Representative sections   of the specimen are submitted in seven cassettes as follows:   A1-proximal portion of the appendix including the blue inked surgical   margin; A2-proximal and midportion of the appendix including a blue   inked distal third of the appendix; A3-distal portion of the appendix   including potential diverticulum; A4-A7-tip of the appendix sliced,   entirely submitted.  COCO/tonja     Microscopic Description   Seven slides with H&E stained material are examined.  Microscopic   examination confirms the final pathologic diagnosis.        XR CHEST (2 VW) [[id number]]    Collected: 05/12/21 1428    Updated: 05/12/21 1443    Specimen Type: Chest    Narrative:     EXAMINATION:   TWO XRAY VIEWS OF THE CHEST     5/12/2021 2:16 pm     COMPARISON:   Chest radiograph performed 09/04/2020. HISTORY:   ORDERING SYSTEM PROVIDED HISTORY: shortness of breath   TECHNOLOGIST PROVIDED HISTORY:   shortness of breath   Reason for Exam: shortness of breath   Acuity: Acute   Type of Exam: Initial     FINDINGS:   There is no acute consolidation or effusion.  There is no pneumothorax.  The   mediastinal structures are unremarkable.  The upper abdomen is unremarkable.    The extrathoracic soft tissues are unremarkable. Sydelle Piggs is no acute osseous abnormality. Impression:     No acute cardiopulmonary process. Current IP Meds          Assessment:  Active Problems:    Abdominal pain  Resolved Problems:    * No resolved hospital problems.  *     Benign neoplasm of skin of upper extremity D23.60    Benign neoplasm of skin of trunk, except scrotum D23.5    Rosacea L71.9    Squamous cell carcinoma of upper extremity C44.621    Neoplastic disease D49.9    Squamous cell carcinoma in situ D09.9    Squamous cell carcinoma in situ of skin of left wrist D04.62    Food allergy Z91.018    Other plastic surgery for unacceptable cosmetic appearance Z41.1    Functional diarrhea K59.1    Right upper quadrant pain R10.11    Basal cell carcinoma of upper lip C44.01    Actinic keratosis R06.3    Lichen sclerosus et atrophicus L90.0    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    Neoplasm of uncertain behavior of skin D48.5    Hypothyroidism, unspecified E03.9    Gastroparesis K31.84    Cobalamin deficiency E53.8    Acquired von Willebrand's disease (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    Iron deficiency anemia D50.9    Irritable bowel syndrome with diarrhea K58.0    Medial epicondylitis M77.00    Mitral valve disorder I05.9    Obstructive sleep apnea syndrome G47.33    Pain in limb M79.609    Postprocedural hypothyroidism E89.0    Scoliosis (and kyphoscoliosis), idiopathic M41.20    Sensorineural hearing loss, bilateral H90.3    Thrombocytopenia (HCC) D69.6    Vitamin D deficiency E55.9    Wrist joint pain M25.539    Gastroesophageal reflux disease K21.9    Chest pain R07.9  Abdominal pain R10.9      · That is post open appendectomy  ·    · Nausea this morning  ·    · History of asthma  ·    · History of hypothyroidism  ·    · History of abdominal pain chronic  · Diverticulosis  ·    · History of GERD  · Nausea improved  ·   · Shortness of breath improved           Plan:  1. Continue with the current treatment and monitor her p.o. intake  2.   3. [facility] should be advanced her diet today. 4. We will decrease her IV fluid to about 75 mL an hour secondary edema  5.  We will continue to follow as long as she is inpatient    Reji Sands DO  [facility]           5/13/2021, 8:38 AM

## 2021-05-13 NOTE — PROGRESS NOTES
Physical Therapy    DATE: 2021    NAME: Jeovany Hodges  MRN: [id number]   : 1955      Patient not seen this date for Physical Therapy due to: 9:30 a.m Pt refused this a.m, report feeling bad. Nursing staff notified. Will attempt to see in later if possible.           Electronically signed by Oj Pavon PTA on 2021 at 10:12 AM

## 2021-05-14 ENCOUNTER — APPOINTMENT (OUTPATIENT)
Dept: GENERAL RADIOLOGY | Age: 66
DRG: 336 | End: 2021-05-14
Attending: SURGERY
Payer: COMMERCIAL

## 2021-05-14 LAB
ABSOLUTE EOS #: 0.2 K/UL (ref 0–0.4)
ABSOLUTE IMMATURE GRANULOCYTE: ABNORMAL K/UL (ref 0–0.3)
ABSOLUTE LYMPH #: 0.5 K/UL (ref 1–4.8)
ABSOLUTE MONO #: 0.4 K/UL (ref 0.1–1.3)
ANION GAP SERPL CALCULATED.3IONS-SCNC: 11 MMOL/L (ref 9–17)
BASOPHILS # BLD: 1 % (ref 0–2)
BASOPHILS ABSOLUTE: 0 K/UL (ref 0–0.2)
BUN BLDV-MCNC: 12 MG/DL (ref 8–23)
BUN/CREAT BLD: ABNORMAL (ref 9–20)
CALCIUM SERPL-MCNC: 6.2 MG/DL (ref 8.6–10.4)
CHLORIDE BLD-SCNC: 100 MMOL/L (ref 98–107)
CO2: 24 MMOL/L (ref 20–31)
CREAT SERPL-MCNC: 0.83 MG/DL (ref 0.5–0.9)
DIFFERENTIAL TYPE: ABNORMAL
EOSINOPHILS RELATIVE PERCENT: 5 % (ref 0–4)
GFR AFRICAN AMERICAN: >60 ML/MIN
GFR NON-AFRICAN AMERICAN: >60 ML/MIN
GFR SERPL CREATININE-BSD FRML MDRD: ABNORMAL ML/MIN/{1.73_M2}
GFR SERPL CREATININE-BSD FRML MDRD: ABNORMAL ML/MIN/{1.73_M2}
GLUCOSE BLD-MCNC: 109 MG/DL (ref 70–99)
HCT VFR BLD CALC: 31.7 % (ref 36–46)
HEMOGLOBIN: 10.7 G/DL (ref 12–16)
IMMATURE GRANULOCYTES: ABNORMAL %
LYMPHOCYTES # BLD: 14 % (ref 24–44)
MCH RBC QN AUTO: 30.9 PG (ref 26–34)
MCHC RBC AUTO-ENTMCNC: 33.6 G/DL (ref 31–37)
MCV RBC AUTO: 92 FL (ref 80–100)
MONOCYTES # BLD: 10 % (ref 1–7)
NRBC AUTOMATED: ABNORMAL PER 100 WBC
PDW BLD-RTO: 15 % (ref 11.5–14.9)
PLATELET # BLD: 125 K/UL (ref 150–450)
PLATELET ESTIMATE: ABNORMAL
PMV BLD AUTO: 12.2 FL (ref 6–12)
POTASSIUM SERPL-SCNC: 3.3 MMOL/L (ref 3.7–5.3)
POTASSIUM SERPL-SCNC: 4 MMOL/L (ref 3.7–5.3)
RBC # BLD: 3.45 M/UL (ref 4–5.2)
RBC # BLD: ABNORMAL 10*6/UL
SEG NEUTROPHILS: 70 % (ref 36–66)
SEGMENTED NEUTROPHILS ABSOLUTE COUNT: 2.5 K/UL (ref 1.3–9.1)
SODIUM BLD-SCNC: 135 MMOL/L (ref 135–144)
WBC # BLD: 3.6 K/UL (ref 3.5–11)
WBC # BLD: ABNORMAL 10*3/UL

## 2021-05-14 PROCEDURE — 2500000003 HC RX 250 WO HCPCS: Performed by: SURGERY

## 2021-05-14 PROCEDURE — 6360000002 HC RX W HCPCS: Performed by: SURGERY

## 2021-05-14 PROCEDURE — 85025 COMPLETE CBC W/AUTO DIFF WBC: CPT

## 2021-05-14 PROCEDURE — 97110 THERAPEUTIC EXERCISES: CPT

## 2021-05-14 PROCEDURE — 97116 GAIT TRAINING THERAPY: CPT

## 2021-05-14 PROCEDURE — 2580000003 HC RX 258: Performed by: SURGERY

## 2021-05-14 PROCEDURE — 80048 BASIC METABOLIC PNL TOTAL CA: CPT

## 2021-05-14 PROCEDURE — 1200000000 HC SEMI PRIVATE

## 2021-05-14 PROCEDURE — 2580000003 HC RX 258: Performed by: FAMILY MEDICINE

## 2021-05-14 PROCEDURE — 6360000002 HC RX W HCPCS: Performed by: PHYSICIAN ASSISTANT

## 2021-05-14 PROCEDURE — 94761 N-INVAS EAR/PLS OXIMETRY MLT: CPT

## 2021-05-14 PROCEDURE — 84132 ASSAY OF SERUM POTASSIUM: CPT

## 2021-05-14 PROCEDURE — 36415 COLL VENOUS BLD VENIPUNCTURE: CPT

## 2021-05-14 PROCEDURE — 74018 RADEX ABDOMEN 1 VIEW: CPT

## 2021-05-14 RX ORDER — ONDANSETRON 4 MG/1
TABLET, FILM COATED ORAL
Qty: 20 TABLET | Refills: 0 | Status: SHIPPED | OUTPATIENT
Start: 2021-05-14 | End: 2021-08-26 | Stop reason: ALTCHOICE

## 2021-05-14 RX ORDER — METOCLOPRAMIDE HYDROCHLORIDE 5 MG/ML
10 INJECTION INTRAMUSCULAR; INTRAVENOUS EVERY 6 HOURS
Status: DISCONTINUED | OUTPATIENT
Start: 2021-05-14 | End: 2021-05-15 | Stop reason: HOSPADM

## 2021-05-14 RX ORDER — POTASSIUM CHLORIDE 20 MEQ/1
20 TABLET, EXTENDED RELEASE ORAL ONCE
Status: DISCONTINUED | OUTPATIENT
Start: 2021-05-14 | End: 2021-05-15 | Stop reason: HOSPADM

## 2021-05-14 RX ORDER — KETOROLAC TROMETHAMINE 10 MG/1
10 TABLET, FILM COATED ORAL EVERY 6 HOURS PRN
Qty: 20 TABLET | Refills: 0 | Status: SHIPPED | OUTPATIENT
Start: 2021-05-14 | End: 2021-08-26

## 2021-05-14 RX ORDER — CLINDAMYCIN HYDROCHLORIDE 300 MG/1
300 CAPSULE ORAL 3 TIMES DAILY
Qty: 21 CAPSULE | Refills: 0 | Status: SHIPPED | OUTPATIENT
Start: 2021-05-14 | End: 2021-05-21

## 2021-05-14 RX ADMIN — SODIUM CHLORIDE, PRESERVATIVE FREE 10 ML: 5 INJECTION INTRAVENOUS at 20:32

## 2021-05-14 RX ADMIN — POTASSIUM CHLORIDE 10 MEQ: 7.46 INJECTION, SOLUTION INTRAVENOUS at 08:03

## 2021-05-14 RX ADMIN — ONDANSETRON 4 MG: 2 INJECTION INTRAMUSCULAR; INTRAVENOUS at 17:12

## 2021-05-14 RX ADMIN — FAMOTIDINE 20 MG: 10 INJECTION, SOLUTION INTRAVENOUS at 08:02

## 2021-05-14 RX ADMIN — METOCLOPRAMIDE 10 MG: 5 INJECTION, SOLUTION INTRAMUSCULAR; INTRAVENOUS at 14:15

## 2021-05-14 RX ADMIN — SODIUM CHLORIDE: 9 INJECTION, SOLUTION INTRAVENOUS at 03:56

## 2021-05-14 RX ADMIN — METOCLOPRAMIDE 10 MG: 5 INJECTION, SOLUTION INTRAMUSCULAR; INTRAVENOUS at 08:03

## 2021-05-14 RX ADMIN — POTASSIUM CHLORIDE 10 MEQ: 7.46 INJECTION, SOLUTION INTRAVENOUS at 06:44

## 2021-05-14 RX ADMIN — ONDANSETRON 4 MG: 2 INJECTION INTRAMUSCULAR; INTRAVENOUS at 08:11

## 2021-05-14 ASSESSMENT — PAIN DESCRIPTION - FREQUENCY: FREQUENCY: INTERMITTENT

## 2021-05-14 ASSESSMENT — PAIN DESCRIPTION - LOCATION: LOCATION: ABDOMEN

## 2021-05-14 ASSESSMENT — PAIN DESCRIPTION - ONSET: ONSET: ON-GOING

## 2021-05-14 ASSESSMENT — PAIN - FUNCTIONAL ASSESSMENT: PAIN_FUNCTIONAL_ASSESSMENT: PREVENTS OR INTERFERES SOME ACTIVE ACTIVITIES AND ADLS

## 2021-05-14 NOTE — PROGRESS NOTES
[id number] [employer]      PROGRESS NOTE        Patient:  Pilar Bamberger  YOB: 1955    MRN: [id number]     Acct: [de-identified]     Admit date: 5/11/2021    Pt seen and Chart reviewed. Consultant notes reviewed and care evaluated. Subjective: Patient states she just feels uncomfortable she wants to go home she says she rather be at home she will feel more comfortable at home she states she did have BMs. She has little bit of nausea. But no vomiting she is on full liquid diet my understanding. Divorce is a clear but the RN says she is on file. She denies any chest pain or shortness of breath. Diet:  DIET FULL LIQUID;      Medications:Current Inpatient    Scheduled Meds:   potassium chloride  20 mEq Oral Once    metoclopramide  10 mg Intravenous Q6H    albuterol  2.5 mg Nebulization 4x daily    levothyroxine  100 mcg Oral Daily    scopolamine  1 patch Transdermal Once    sodium chloride flush  10 mL Intravenous 2 times per day    famotidine (PEPCID) injection  20 mg Intravenous BID    enoxaparin  40 mg Subcutaneous Daily     Continuous Infusions:   sodium chloride 75 mL/hr at 05/14/21 0356    sodium chloride       PRN Meds:acetaminophen, sodium chloride flush, sodium chloride, potassium chloride, magnesium sulfate, ondansetron, fentanNYL, fentanNYL, ketorolac, albuterol sulfate HFA        Physical Exam:  Vitals: /75   Pulse 82   Temp 97.9 °F (36.6 °C) (Oral)   Resp 12   Ht 5' 4\" (1.626 m)   Wt 160 lb 11.5 oz (72.9 kg)   SpO2 95%   BMI 27.59 kg/m²   24 hour intake/output:    Intake/Output Summary (Last 24 hours) at 5/14/2021 0804  Last data filed at 5/14/2021 0757  Gross per 24 hour   Intake 939.1 ml   Output 1615 ml   Net -675.9 ml     Last 3 weights:   Wt Readings from Last 3 Encounters:   05/12/21 160 lb 11.5 oz (72.9 kg)   05/10/21 142 lb (64.4 kg)   04/27/21 142 lb (64.4 kg)       Physical Examination:   General appearance - alert, well appearing, and in no distress  Mental status - alert, oriented to person, place, and time  PERRLA wnl patient wears a hearing aid  Chest - clear to auscultation, no wheezes, rales or rhonchi, symmetric air entry  Heart - normal rate, regular rhythm, normal S1, S2, no murmurs, rubs, clicks or gallops  Abdomen - soft, postop tenderness and some distention noted today he does have bowel sounds drains are intact incision intact, no masses or organomegaly  Neurological - alert, oriented, normal speech, no focal findings or movement disorder noted}  Extremities - peripheral pulses normal, mild possible edema, no clubbing or cyanosis  Skin - normal coloration and turgor, no rashes, no suspicious skin lesions noted     Basic Metabolic Panel [[id number]] (Abnormal)    Collected: 05/14/21 0549    Updated: 05/14/21 0629    Specimen Source: Blood     Glucose 109High  mg/dL    BUN 12 mg/dL    CREATININE 0.83 mg/dL    Bun/Cre Ratio NOT REPORTED    Calcium 6.2Low  mg/dL    Sodium 135 mmol/L    Potassium 3.3Low  mmol/L    Chloride 100 mmol/L    CO2 24 mmol/L    Anion Gap 11 mmol/L    GFR Non-African American >60 mL/min    GFR African American >60 mL/min    GFR Comment         Comment: Average GFR for 61-76 years old:    85 mL/min/1.73sq m   Chronic Kidney Disease:    <60 mL/min/1.73sq m   Kidney failure:    <15 mL/min/1.73sq m               eGFR calculated using average adult body mass. Additional eGFR calculator available at:         [email or web address]              GFR Staging NOT REPORTED   CBC Auto Differential [[id number]] (Abnormal)    Collected: 05/14/21 0549    Updated: 05/14/21 0625    Specimen Source: Blood     WBC 3.6 k/uL    RBC 3.45Low  m/uL    Hemoglobin 10.7Low  g/dL    Hematocrit 31.7Low  %    MCV 92.0 fL    MCH 30.9 pg    MCHC 33.6 g/dL    RDW 15.0High  %    Platelets 652DPL  k/uL    MPV 12. 2High  fL    NRBC Automated NOT REPORTED per 100 WBC    Differential Type NOT REPORTED    Seg Neutrophils 70High  %    Lymphocytes 14Low  %    Monocytes 10High  %    Eosinophils % 5High  %    Basophils 1 %    Immature Granulocytes NOT REPORTED %    Segs Absolute 2.50 k/uL    Absolute Lymph # 0.50Low  k/uL    Absolute Mono # 0.40 k/uL    Absolute Eos # 0.20 k/uL    Basophils Absolute 0.00 k/uL    Absolute Immature Granulocyte NOT REPORTED k/uL    WBC Morphology NOT REPORTED    RBC Morphology NOT REPORTED    Platelet Estimate NOT REPORTED         Assessment:  Active Problems:    Abdominal pain  Resolved Problems:    * No resolved hospital problems.  *      Benign neoplasm of skin of upper extremity D23.60    Benign neoplasm of skin of trunk, except scrotum D23.5    Rosacea L71.9    Squamous cell carcinoma of upper extremity C44.621    Neoplastic disease D49.9    Squamous cell carcinoma in situ D09.9    Squamous cell carcinoma in situ of skin of left wrist D04.62    Food allergy Z91.018    Other plastic surgery for unacceptable cosmetic appearance Z41.1    Functional diarrhea K59.1    Right upper quadrant pain R10.11    Basal cell carcinoma of upper lip C44.01    Actinic keratosis N03.8    Lichen sclerosus et atrophicus L90.0    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    Neoplasm of uncertain behavior of skin D48.5    Hypothyroidism, unspecified E03.9    Gastroparesis K31.84    Cobalamin deficiency E53.8    [facility] D68.0    Asthma J45.909    Benign paroxysmal positional vertigo H81.10    Cervical spondylosis without myelopathy M47.812    Diaphragmatic hernia K44.9    Diverticulosis of colon K57.30    Dyspareunia UXA0103    Enthesopathy M77.9    Hip pain M25.559    History of malignant neoplasm of thyroid Z85.850    Hypoparathyroidism (HCC) E20.9    Hyperlipidemia E78.5    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    Iron deficiency anemia D50.9    Irritable bowel syndrome with diarrhea K58.0    Medial epicondylitis M77.00    Mitral valve disorder I05.9    Obstructive sleep apnea syndrome G47.33    Pain in limb M79.609    Postprocedural hypothyroidism E89.0    Scoliosis (and kyphoscoliosis), idiopathic M41.20    Sensorineural hearing loss, bilateral H90.3    Thrombocytopenia (HCC) D69.6    Vitamin D deficiency E55.9    Wrist joint pain M25.539    Gastroesophageal reflux disease K21.9    Chest pain R07.9    Abdominal pain R10.9      · That is post open appendectomy  ·    · Nausea this morning  ·    · History of asthma  ·    · History of hypothyroidism  ·    · History of abdominal pain chronic  · Diverticulosis  ·    · History of GERD  · Nausea improved  ·    · Shortness of breath improved  · Abdominal distention this morning  · Hypokalemia               Plan:  1. We will get a KUB  2.   3. Continue with her other treatment  4. Potassium is being replaced  5.  Discharge planning per surgery    Brad Olivarez DO [facility]            5/14/2021, 8:04 AM

## 2021-05-14 NOTE — CARE COORDINATION
ONGOING DISCHARGE PLAN:    Patient is alert and oriented x4. Spoke with patient regarding discharge plan and patient confirms that plan is still to return to home - She now wants [facility] and chose [facility] - Referral sent. KUB today    POD #3 open appy    Hopeful for discharge home later today. Will continue to follow for additional discharge needs.     Electronically signed by Catherine Hendrix RN on 5/14/2021 at 1:15 PM

## 2021-05-14 NOTE — PROGRESS NOTES
PT declined 4pm tx. Pt visiting with  and does not appear to be in any respiratory distress.  SpO2 on room air 100% Hr 89

## 2021-05-14 NOTE — PROGRESS NOTES
Pt. Refusing any more IV potassium replacement, will recheck potassium in a hour. Spoke with [place], [place] regarding KUB results.

## 2021-05-14 NOTE — PROGRESS NOTES
prolapse), Neoplasm of unspecified nature of bone, soft tissue, and skin, Neoplasm of unspecified nature of bone, soft tissue, and skin, Osteoarthritis, Osteoporosis, PONV (postoperative nausea and vomiting), Postprocedural hypothyroidism, Prolonged emergence from general anesthesia, Raynaud's disease, Seasonal allergies, Sensorineural hearing loss, bilateral, Squamous cell carcinoma of upper extremity, Thrombocytopenia ([address] Thyroid disease, Vulvar dystrophy, Wears hearing aid, and Wears partial dentures. has a past surgical history that includes hernia repair (2005); Nasal polyp surgery (2012); Thyroidectomy (1985); knee surgery (Right, 1996); skin biopsy (2012); malignant skin lesion excision (2011); skin biopsy (2009); skin biopsy (2008); skin biopsy (2002); Cholecystectomy (2011); Tonsillectomy and adenoidectomy; other surgical history (1979); Elbow surgery (Bilateral); Hysterectomy, total abdominal (92-97); Cataract removal (Right, 07/17/2019); Breast surgery; shoulder surgery (Right); Skin cancer excision; Colonoscopy; Colonoscopy (N/A, 05/10/2021); Gastric fundoplication; Upper gastrointestinal endoscopy; laparoscopy (N/A, 5/11/2021); and laparotomy (N/A, 5/11/2021). Restrictions  Restrictions/Precautions  Restrictions/Precautions: General Precautions, Fall Risk(SAMANTHA x2, CLEAR LIQUID DIET)  Required Braces or Orthoses?: Yes  Implants present? : Metal implants(4 pins R knee)  Required Braces or Orthoses  Other: Abdominal Binder  Position Activity Restriction  Other position/activity restrictions: up with assistance; ambulate patient  Subjective   General  Chart Reviewed: Yes  Additional Pertinent Hx: asthma, CA  Response To Previous Treatment: Patient with no complaints from previous session.   Family / Caregiver Present: No  Referring Practitioner: Zeus Tse MD  Subjective  Subjective: Pt in bed, agreeable to PT tx. RN Eulis Schilder OKs  Pain Screening  Patient Currently in Pain: Yes  Pain Assessment  Pain Assessment: 0-10  Pain Level: 6  Pain Type: Acute pain  Pain Location: Abdomen  Pain Descriptors: Discomfort  Pain Frequency: Intermittent(with movement)  Pain Onset: On-going  Clinical Progression: Gradually worsening  Functional Pain Assessment: Prevents or interferes some active activities and ADLs  Non-Pharmaceutical Pain Intervention(s): Ambulation/Increased Activity; Distraction;Repositioned; Rest  Response to Pain Intervention: Patient Satisfied  Vital Signs  Patient Currently in Pain: Yes  Oxygen Therapy  O2 Device: None (Room air)  Patient Observation  Observations: abdominal binder with SAMANTHA x2       Orientation  Orientation  Overall Orientation Status: Within Normal Limits  Cognition      Objective   Bed mobility  Rolling to Left: Stand by assistance  Supine to Sit: Stand by assistance  Sit to Supine: Unable to assess  Scooting: Stand by assistance  Comment: HOB slightly elevated, cues as needed. Pt upin chair end of session. Transfers  Sit to Stand: Contact guard assistance;Stand by assistance  Stand to sit: Contact guard assistance;Stand by assistance  Comment: CGA/SBA with RW. Cues for technique. Pt up in chair end of session. Ambulation  Ambulation?: Yes  More Ambulation?: No  Ambulation 1  Surface: level tile  Device: Rolling Walker(R UE)  Assistance: Stand by assistance  Quality of Gait: slow patrick, slight forward posture, no LOB, steady gait  Gait Deviations: Slow Patrick  Distance: 76'  Comments: Good use of RW. Mild increase in pain and fatigue with ambulation  Stairs/Curb  Stairs?: No     Balance  Posture: Good  Sitting - Static: Good  Sitting - Dynamic: Good  Standing - Static: Good;-  Standing - Dynamic: Good;-  Comments: Fall risk, standing balance with RW  Other exercises  Other exercises?: Yes  Other exercises 1: Seated program BLE x15 reps, green tband. HEP given for seated/standing program with demo.  Pt too fatigued to complete entire seated program so demo was given of Individual Concurrent Group Co-treatment   Time In 0903         Time Out 0926         Minutes 23         Timed Code Treatment Minutes: 23 Minutes       Viki Galan, PT

## 2021-05-14 NOTE — PLAN OF CARE
Problem: Pain:  Goal: Control of acute pain  Description: Control of acute pain  Outcome: Ongoing   Pt. Pain is adequately controlled, see MAR. Problem: Falls - Risk of:  Goal: Will remain free from falls  Description: Will remain free from falls  Outcome: Ongoing   Pt. Remains free of falls, appropriate fall precautions in place.      Problem: Physical Regulation:  Goal: Will remain free from infection  Description: Will remain free from infection  Outcome: Ongoing     Problem: Musculor/Skeletal Functional Status  Goal: Absence of falls  Outcome: Ongoing

## 2021-05-14 NOTE — DISCHARGE INSTR - COC
Continuity of Care Form    Patient Name: Freddy Bahena   :  1955  MRN:  [id number]    Admit date:  2021  Discharge date:  5/15/21    Code Status Order: Full Code   Advance Directives:   Advance Care Flowsheet Documentation       Date/Time Healthcare Directive Type of Healthcare Directive Copy in [address] Agent's Name Healthcare Agent's Phone Number    21 1524  No, patient does not have an advance directive for healthcare treatment -- -- -- -- --            Admitting Physician:  Clayton Bass MD  PCP: Georgia Cline APRN - CNP    Discharging Nurse: [facility] Unit/Room#: 2077/2077-01  Discharging Unit Phone Number: [phone]    Emergency Contact:   Extended Emergency Contact Information  Primary Emergency Contact: Kaitlynn Perla  Address: [address] [place] [address] Phone: [phone]  Mobile Phone: [phone]  Relation: Spouse    Past Surgical History:  Past Surgical History:   Procedure Laterality Date    BREAST SURGERY      Cyst removed rt. nipple     CATARACT REMOVAL Right 2019    Right eye    CHOLECYSTECTOMY      COLONOSCOPY      COLONOSCOPY N/A 05/10/2021    COLONOSCOPY DIAGNOSTIC performed by Clayton Bass MD at [address] Bilateral     Tendonitis X2    GASTRIC FUNDOPLICATION      \"Chronic appearing postsurgical change to the stomach suggestive of prior Nissen fundoplication\"- noted per CT ABD/pelvis 21    HERNIA REPAIR  2005    hiatal hernia repair    HYSTERECTOMY, TOTAL ABDOMINAL  92-97    BAIRON 3 Surgeries    KNEE SURGERY Right     W/PINS    LAPAROSCOPY N/A 2021    ATTEMPTED APPENDECTOMY LAPAROSCOPIC ROBOTIC CONVERTED TO , DIAGNOSTIC LAPARASCOPY performed by Clayton Bass MD at [address] [facility] [place], [place] 2021    LAPAROTOMY LYSIS OF ADHESIONS -OPEN APPENDECTOMY, LYSIS OF SMALL BOWEL ADHESIONS FOR 75 MINUTES performed by Billie Lauren MD at [facility]  2011    rt elbow-scc, rt shoulder-keratosis, lft leg skin with excoriation    NASAL POLYP SURGERY  2012    X2 in [facility] Right     PIN[facility]ED [facility] SKIN BIOPSY  2012    lft leg keratosis    SKIN BIOPSY  2009    rt forearm, under brst, lft leg-keratosis    SKIN BIOPSY  2008    back and rt forearm-keratosis, abdomen-hemangioma    SKIN BIOPSY  2002    rt breast, lft arm, lft brst-keratosis    SKIN CANCER EXCISION      THYROIDECTOMY  1985    Thyroid Cancer    TONSILLECTOMY AND ADENOIDECTOMY      UPPER GASTROINTESTINAL ENDOSCOPY      Noted per Care Everywhere       Immunization History:   Immunization History   Administered Date(s) Administered    COVID-19, Moderna, PF, 100mcg/0.5mL 03/16/2021, 04/13/2021    Influenza Virus Vaccine 11/01/2011, 12/27/2012, 11/19/2013, 11/14/2014       Active Problems:  Patient Active Problem List   Diagnosis Code    Benign neoplasm of skin of upper extremity D23.60    Benign neoplasm of skin of trunk, except scrotum D23.5    Rosacea L71.9    Squamous cell carcinoma of upper extremity C44.621    Neoplastic disease D49.9    Squamous cell carcinoma in situ D09.9    Squamous cell carcinoma in situ of skin of left wrist D04.62    Food allergy Z91.018    Other plastic surgery for unacceptable cosmetic appearance Z41.1    Functional diarrhea K59.1    Right upper quadrant pain R10.11    Basal cell carcinoma of upper lip C44.01    Actinic keratosis S38.1    Lichen sclerosus et atrophicus L90.0    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    Neoplasm of uncertain behavior of skin D48.5    Hypothyroidism, unspecified E03.9    Gastroparesis K31.84    Cobalamin deficiency E53.8    Acquired [facility]brand's disease ([facility] D68.0    Asthma J45.909    Benign paroxysmal positional vertigo H81.10    Cervical spondylosis without myelopathy M47.812    Diaphragmatic hernia K44.9    Diverticulosis of colon K57.30    Dyspareunia [id number]    Enthesopathy M77.9    Hip pain M25.559    History of malignant neoplasm of thyroid Z85.850    Hypoparathyroidism (HCC) E20.9    Hyperlipidemia E78.5    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    Iron deficiency anemia D50.9    Irritable bowel syndrome with diarrhea K58.0    Medial epicondylitis M77.00    Mitral valve disorder I05.9    Obstructive sleep apnea syndrome G47.33    Pain in limb M79.609    Postprocedural hypothyroidism E89.0    Scoliosis (and kyphoscoliosis), idiopathic M41.20    Sensorineural hearing loss, bilateral H90.3    Thrombocytopenia (HCC) D69.6    Vitamin D deficiency E55.9    Wrist joint pain M25.539    Gastroesophageal reflux disease K21.9    Chest pain R07.9    Abdominal pain R10.9       Isolation/Infection:   Isolation            No Isolation          Patient Infection Status       Infection Onset Added Last Indicated Last Indicated By Review Planned Expiration Resolved Resolved By    None active    Resolved    COVID-19 Rule Out 05/06/21 05/06/21 05/06/21 COVID-19 (Ordered)   05/07/21 Rule-Out Test Resulted    COVID-19 Rule Out 01/25/21 01/25/21 01/20/21 COVID-19 Ambulatory (Ordered)   01/25/21 Rule-Out Test Resulted            Nurse Assessment:  Last Vital Signs: /75   Pulse 82   Temp 97.9 °F (36.6 °C) (Oral)   Resp 12   Ht 5' 4\" (1.626 m)   Wt 160 lb 11.5 oz (72.9 kg)   SpO2 95%   BMI 27.59 kg/m²     Last documented pain score (0-10 scale): Pain Level: 6  Last Weight:   Wt Readings from Last 1 Encounters:   05/12/21 160 lb 11.5 oz (72.9 kg)     Mental Status:  oriented and alert    IV Access:  - None    Nursing Mobility/ADLs:  Walking   Independent  Transfer  Independent  Bathing  Independent  Dressing  Independent  Toileting  Independent  Feeding  Independent  Med Admin  Independent  Med Delivery   whole    Wound Care Documentation and Therapy: Elimination:  Continence:   · Bowel: Yes  · Bladder: Yes  Urinary Catheter: None   Colostomy/Ileostomy/Ileal Conduit: No       Date of Last BM: 5/15/21    Intake/Output Summary (Last 24 hours) at 5/14/2021 1318  Last data filed at 5/14/2021 1213  Gross per 24 hour   Intake 939.1 ml   Output 1485 ml   Net -545.9 ml     I/O last 3 completed shifts: In: 964.4 [I.V.:964.4]  Out: 8850 [Urine:1400; Drains:215]    Safety Concerns: At Risk for Falls    Impairments/Disabilities:      Vision and Hearing    Nutrition Therapy:  Current Nutrition Therapy:   - Oral Diet:  low fiber diet    Routes of Feeding: Oral  Liquids: Thin Liquids  Daily Fluid Restriction: no  Last Modified Barium Swallow with Video (Video Swallowing Test): not done    Treatments at the Time of Hospital Discharge:   Respiratory Treatments: inhaler  Oxygen Therapy:  is not on home oxygen therapy.   Ventilator:    - No ventilator support    Rehab Therapies: Physical Therapy and Occupational Therapy  Weight Bearing Status/Restrictions: No weight bearing restirctions  Other Medical Equipment (for information only, NOT a DME order):  None  Other Treatments: Skilled Nursing Assessment, Medication Education and Monitoring    S/P open appendectomy site assessment and care; SAMANTHA Drains site assessment and care     Patient's personal belongings (please select all that are sent with patient):  Glasses, Hearing Aides bilateral, Dentures lower    RN SIGNATURE:  Electronically signed by Jose Monae RN on 5/15/21 at 9:25 AM EDT    CASE MANAGEMENT/SOCIAL WORK SECTION    Inpatient Status Date: 5/11/2021    Readmission Risk Assessment Score:  Readmission Risk              Risk of Unplanned Readmission:        9           Discharging to Facility/ [address] [facility] [address]  Phone [phone]  Fax  [phone]        / signature: Electronically signed by Janelle Brock RN on 5/14/21 at 1:19 PM EDT    PHYSICIAN SECTION    Prognosis: Good    Condition at Discharge: Stable    Rehab Potential (if transferring to Rehab): Good    Recommended Labs or Other Treatments After Discharge: No lifting more than 5 to 10 pounds for 1 month. Regular diet. Patient may shower. No driving until off pain medications. Change dressing daily. Monitor drain output. Physician Certification: I certify the above information and transfer of Sara Wilson  is necessary for the continuing treatment of the diagnosis listed and that she requires [address] for less 30 days.      Update Admission H&P: No change in H&P    PHYSICIAN SIGNATURE:  Electronically signed by Maria Antonia Head MD on 5/14/21 at 4:36 PM EDT

## 2021-05-14 NOTE — PROGRESS NOTES
[facility] [address]                 PATIENT NAME: Octavia Clements     TODAY'S DATE: 5/14/2021, 2:14 PM    SUBJECTIVE:  POD#3  Pt seen and examined. Afebrile, VSS. No leukocytosis, hemoglobins table. Potassium being replaced. Patient is feeling better this morning. C/o mild abdominal bloating however has had two bowel movements. KUB ordered per primary. Abdominal pain controlled. Tolerating full liquids, no N/V. Incision clean, dry, intact. SAMANTHA x 2 serosanguinous. OBJECTIVE:   VITALS:  BP (!) 140/80   Pulse 82   Temp 97.2 °F (36.2 °C) (Oral)   Resp 16   Ht 5' 4\" (1.626 m)   Wt 160 lb 11.5 oz (72.9 kg)   SpO2 99%   BMI 27.59 kg/m²      INTAKE/OUTPUT:      Intake/Output Summary (Last 24 hours) at 5/14/2021 1414  Last data filed at 5/14/2021 1213  Gross per 24 hour   Intake 939.1 ml   Output 1285 ml   Net -345.9 ml                 CONSTITUTIONAL:  awake and alert. No acute distress  HEART:   RRR  LUNGS:   Decreased air entry at bases, no wheezing   ABDOMEN:   Abdomen soft, mild incisional tenderness, non-distended  EXTREMITIES:   No pedal edema    Data:  CBC:   Lab Results   Component Value Date    WBC 3.6 05/14/2021    RBC 3.45 05/14/2021    RBC 3.44 11/28/2011    HGB 10.7 05/14/2021    HCT 31.7 05/14/2021    MCV 92.0 05/14/2021    MCH 30.9 05/14/2021    MCHC 33.6 05/14/2021    RDW 15.0 05/14/2021     05/14/2021    PLT 84 11/28/2011    MPV 12.2 05/14/2021     BMP:    Lab Results   Component Value Date     05/14/2021    K 4.0 05/14/2021     05/14/2021    CO2 24 05/14/2021    BUN 12 05/14/2021    LABALBU 4.4 09/04/2020    LABALBU 5.0 03/23/2012    CREATININE 0.83 05/14/2021    CALCIUM 6.2 05/14/2021    GFRAA >60 05/14/2021    LABGLOM >60 05/14/2021    GLUCOSE 109 05/14/2021    GLUCOSE 65 03/23/2012       Radiology Review:  No new images to review      ASSESSMENT     Active Problems:    Abdominal pain  Resolved Problems:    * No resolved hospital problems. *      Plan  1. Advance to low fiber diet if KUB is negative  2. Bowels are moving  3. Increase activity, IS  4. Surgically stable  5. Continue medical management   6. Anticipate discharge this evening if tolerates diet advancement  7. Patient was seen and examined. On a full liquid diet. Passing flatus. Had 2 bowel movements last night. Abdomen is distended mildly tympanic. Abdominal x-ray shows ileus. Potassium was replaced and has improved. 8. Low fiber diet tonight. Reevaluate in the morning. Patient overall feels better and really wants to go home but I have advised against that at this time. Patient is on IV Reglan. Continue to ambulate. Pulmonary toilet. DVT prophylaxis. Recheck blood work in the morning. Reevaluate tomorrow.       Electronically signed by Ro Cohen PA-C  [id number] [address]

## 2021-05-15 VITALS
OXYGEN SATURATION: 95 % | HEIGHT: 64 IN | DIASTOLIC BLOOD PRESSURE: 73 MMHG | SYSTOLIC BLOOD PRESSURE: 131 MMHG | TEMPERATURE: 97.5 F | BODY MASS INDEX: 27.44 KG/M2 | WEIGHT: 160.72 LBS | HEART RATE: 74 BPM | RESPIRATION RATE: 16 BRPM

## 2021-05-15 LAB
ABSOLUTE BANDS #: 0.78 K/UL (ref 0–1)
ABSOLUTE EOS #: 0.19 K/UL (ref 0–0.4)
ABSOLUTE IMMATURE GRANULOCYTE: ABNORMAL K/UL (ref 0–0.3)
ABSOLUTE LYMPH #: 0.47 K/UL (ref 1–4.8)
ABSOLUTE MONO #: 0.4 K/UL (ref 0.1–1.3)
ANION GAP SERPL CALCULATED.3IONS-SCNC: 14 MMOL/L (ref 9–17)
BANDS: 25 % (ref 0–10)
BASOPHILS # BLD: 1 % (ref 0–2)
BASOPHILS ABSOLUTE: 0.03 K/UL (ref 0–0.2)
BUN BLDV-MCNC: 13 MG/DL (ref 8–23)
BUN/CREAT BLD: ABNORMAL (ref 9–20)
CALCIUM SERPL-MCNC: 5.8 MG/DL (ref 8.6–10.4)
CHLORIDE BLD-SCNC: 106 MMOL/L (ref 98–107)
CO2: 21 MMOL/L (ref 20–31)
CREAT SERPL-MCNC: 0.79 MG/DL (ref 0.5–0.9)
DIFFERENTIAL TYPE: ABNORMAL
EOSINOPHILS RELATIVE PERCENT: 6 % (ref 0–4)
GFR AFRICAN AMERICAN: >60 ML/MIN
GFR NON-AFRICAN AMERICAN: >60 ML/MIN
GFR SERPL CREATININE-BSD FRML MDRD: ABNORMAL ML/MIN/{1.73_M2}
GFR SERPL CREATININE-BSD FRML MDRD: ABNORMAL ML/MIN/{1.73_M2}
GLUCOSE BLD-MCNC: 84 MG/DL (ref 70–99)
HCT VFR BLD CALC: 27.6 % (ref 36–46)
HEMOGLOBIN: 9.1 G/DL (ref 12–16)
IMMATURE GRANULOCYTES: ABNORMAL %
LYMPHOCYTES # BLD: 15 % (ref 24–44)
MCH RBC QN AUTO: 30.3 PG (ref 26–34)
MCHC RBC AUTO-ENTMCNC: 33.2 G/DL (ref 31–37)
MCV RBC AUTO: 91.2 FL (ref 80–100)
METAMYELOCYTES ABSOLUTE COUNT: 0.06 K/UL
METAMYELOCYTES: 2 %
MONOCYTES # BLD: 13 % (ref 1–7)
MORPHOLOGY: ABNORMAL
NRBC AUTOMATED: ABNORMAL PER 100 WBC
PDW BLD-RTO: 14.9 % (ref 11.5–14.9)
PLATELET # BLD: 95 K/UL (ref 150–450)
PLATELET ESTIMATE: ABNORMAL
PMV BLD AUTO: 11.2 FL (ref 6–12)
POTASSIUM SERPL-SCNC: 3.5 MMOL/L (ref 3.7–5.3)
RBC # BLD: 3.02 M/UL (ref 4–5.2)
RBC # BLD: ABNORMAL 10*6/UL
SEG NEUTROPHILS: 38 % (ref 36–66)
SEGMENTED NEUTROPHILS ABSOLUTE COUNT: 1.17 K/UL (ref 1.3–9.1)
SODIUM BLD-SCNC: 141 MMOL/L (ref 135–144)
WBC # BLD: 3.1 K/UL (ref 3.5–11)
WBC # BLD: ABNORMAL 10*3/UL

## 2021-05-15 PROCEDURE — 85025 COMPLETE CBC W/AUTO DIFF WBC: CPT

## 2021-05-15 PROCEDURE — 6360000002 HC RX W HCPCS: Performed by: PHYSICIAN ASSISTANT

## 2021-05-15 PROCEDURE — 36415 COLL VENOUS BLD VENIPUNCTURE: CPT

## 2021-05-15 PROCEDURE — 80048 BASIC METABOLIC PNL TOTAL CA: CPT

## 2021-05-15 RX ADMIN — METOCLOPRAMIDE 10 MG: 5 INJECTION, SOLUTION INTRAMUSCULAR; INTRAVENOUS at 00:40

## 2021-05-15 NOTE — PROGRESS NOTES
Patient refuses to have continuous IV fluids. Ambulated in [place] with walker and standby assist. Tolerated well.

## 2021-05-15 NOTE — DISCHARGE SUMMARY
Physician Discharge Summary     Date of admission: 5/11/2021    Discharge date: 5/15/2021    Admission Diagnosis: Appendicitis [K37]    Discharge Diagnosis: Abnormal CT abdomen pelvis with abnormal appendix. Intra-abdominal adhesions. Brief Hospitalization Details:  Morena Werner is a 77 y.o. female who was admitted for the management of <principal problem not specified>    69-year-old female with an abnormal CT scan of the abdomen and pelvis abnormal appendix underwent laparotomy extensive lysis of adhesions and open appendectomy. Postoperatively patient recovered well. Diet was advanced. Bowel function returned. She had a brief episode of ileus that resolved with conservative management. This morning she is doing very well. Tolerating diet bowels are moving passing flatus. Blood work revealed normal WBC count. Hemoglobin is 9.1. Platelet count is adequate. Potassium is 3.5. Creatinine is normal.  Hemoglobin drop is postoperative change and dilutional.  No signs of bleeding. Overall patient is stable and doing well. She will be discharged home in a stable condition. Discharge instructions in the chart. RAJAT signed. Prescriptions called in. Outpatient follow-up was discussed.     Current Discharge Medication List      START taking these medications    Details   ondansetron (ZOFRAN) 4 MG tablet Take every six hours as needed  Qty: 20 tablet, Refills: 0      clindamycin (CLEOCIN) 300 MG capsule Take 1 capsule by mouth 3 times daily for 7 days  Qty: 21 capsule, Refills: 0      ketorolac (TORADOL) 10 MG tablet Take 1 tablet by mouth every 6 hours as needed for Pain  Qty: 20 tablet, Refills: 0         CONTINUE these medications which have NOT CHANGED    Details   Eluxadoline (VIBERZI PO) Take by mouth daily      albuterol sulfate  (90 Base) MCG/ACT inhaler Inhale 2 puffs into the lungs every 6 hours as needed for Wheezing  Qty: 2 Inhaler, Refills: 2    Associated Diagnoses: COPD exacerbation ([facility]      levothyroxine (SYNTHROID) 50 MCG tablet levothyroxine 50 mcg tablet  Take 3 tablets daily. Qty: 270 tablet, Refills: 1    Associated Diagnoses: Hypothyroidism, unspecified type      NONFORMULARY Take 10 mg by mouth 2 times daily Domperidone (Jamp)       clobetasol (TEMOVATE) 0.05 % cream Apply topically as needed (when she has a lichen sclerosus flare up) Apply topically 2 times daily. Qty: 1 Tube, Refills: 1      miconazole (MICOTIN) 2 % vaginal cream Place 1 applicator vaginally nightly Place vaginally nightly.              Condition at Discharge: good    Electronically signed by Diaz Hogan MD on 5/15/2021 at 8:46 AM

## 2021-05-15 NOTE — PLAN OF CARE
Problem: Pain:  Goal: Pain level will decrease  Description: Pain level will decrease  Outcome: Ongoing     Problem: Pain:  Goal: Control of acute pain  Description: Control of acute pain  5/14/2021 2204 by Lois Phoenix RN  Outcome: Ongoing     Problem: Pain:  Goal: Control of chronic pain  Description: Control of chronic pain  Outcome: Ongoing     Problem: Falls - Risk of:  Goal: Will remain free from falls  Description: Will remain free from falls  5/14/2021 2204 by Lois Phoenix RN  Outcome: Ongoing     Problem: Falls - Risk of:  Goal: Absence of physical injury  Description: Absence of physical injury  Outcome: Ongoing     Problem: Physical Regulation:  Goal: Will remain free from infection  Description: Will remain free from infection  5/14/2021 2204 by Lois Phoenix RN  Outcome: Ongoing     Problem: Skin Integrity:  Goal: Demonstration of wound healing without infection will improve  Description: Demonstration of wound healing without infection will improve  Outcome: Ongoing     Problem: Skin Integrity:  Goal: Complications related to intravenous access or infusion will be avoided or minimized  Description: Complications related to intravenous access or infusion will be avoided or minimized  Outcome: Ongoing     Problem: Skin Integrity:  Goal: Will show no infection signs and symptoms  Description: Will show no infection signs and symptoms  Outcome: Ongoing     Problem: Skin Integrity:  Goal: Absence of new skin breakdown  Description: Absence of new skin breakdown  Outcome: Ongoing

## 2021-05-15 NOTE — PROGRESS NOTES
Writer went over discharged instructions. All questions and concerns answered. Patient discharged with belongings by wheelchair. Patient in no distress.

## 2021-05-15 NOTE — PROGRESS NOTES
Writer talked to Dr. Tammi Ugalde regarding calcium level of 5.8. New order received for 2 grams of calcium gluconate IV. Patient informed of new order. Patient is refusing IV medication. Writer put a page out for Dr. Tammi Ugalde and waiting for a call back.

## 2021-05-15 NOTE — PROGRESS NOTES
Writer talked to Dr. Georgina Cason regarding patient refusing IV calcium. Dr. Georgina Cason stated to teach patient and encourage calcium with vitamin D PO daily. No new order for calcium. Writer reeducated about calcium. Patient still refusing IV calcium. Patient educated to take calcium with vitamin D PO daily at home. Patient understood.

## 2021-05-15 NOTE — PLAN OF CARE
infection signs and symptoms  5/15/2021 0631 by Donna Juarez RN  Outcome: Ongoing  5/14/2021 2204 by Maida Harkins RN  Outcome: Ongoing  Goal: Absence of new skin breakdown  Description: Absence of new skin breakdown  5/15/2021 0631 by Donna Juaerz RN  Outcome: Ongoing  5/14/2021 2204 by Maida Harkins RN  Outcome: Ongoing

## 2021-05-15 NOTE — CARE COORDINATION
[address] Encounter Date/Time: 2021 [address] Account: [de-identified]    MRN: [id number]    Patient: Justin Wheeler    Contact Serial #: [id number]      ENCOUNTER          Patient Class: I Private Enc? No Unit RM BD: José Antonio 15    Hospital Service: Med/Surg   Encounter DX: Appendicitis Kalin Aus   ADM Provider: Fredy Collier MD   Procedure: FL LAP,APPENDECTOMY [695*   ATT Provider: Fredy Collier MD   REF Provider: Shaina Chavez  Admission DX: Appendicitis and codes: [address]      PATIENT                 Name: Justin Wheeler : 1955 (65 yrs)   Address: [address] [place] [address] 163 Sex: Female   City: [address] [place] [address]         Marital Status:    Employer: RETIRED         Spiritism: [employer]   Primary Care Provider: Cherre Phalen Buckles, APRN -*         Primary Phone: [phone]   EMERGENCY CONTACT   Contact Name Legal Guardian? Relationship to Patient Home Phone Work Phone   1. Marcelino Clements  2. *No Contact Specified*      Spouse    [phone]                 GUARANTOR            Guarantor: Jonathan Clements     : 1955   Address: [facility] [address] Sex: Female     [place] [address]     Relation to Patient: Self       Home Phone: [phone]   Guarantor ID: [id number]       Work Phone:     Guarantor Employer: RETIRED         Status: RETIRED      COVERAGE        PRIMARY INSURANCE   Payor: [facility] Plan: [facility]   Payor Address:  [id number], [address]       Group Number: [id number] Insurance Type: [id number] Name: Gail Dowd : 1957   Subscriber ID: [id number] Saul Yu. Rel. to Sub: Spouse   SECONDARY INSURANCE   Payor: MEDICARE Plan: MEDICARE PART A   Payor Address:   [address] [address]          Group Number:   Insurance Type: INDEMNITY   Subscriber Name: Jessi Cancino : 1955   Subscriber ID: [id number] Pat.  Rel. to Sub: SELF         Current Discharge Medication List    START taking these medications    Medication Dose   ondansetron (ZOFRAN) 4 MG tablet    Take every six hours as needed   Quantity: 20 tablet Refills: 0       clindamycin (CLEOCIN) 300 MG capsule 300 mg   Take 1 capsule by mouth 3 times daily for 7 days   Quantity: 21 capsule Refills: 0       ketorolac (TORADOL) 10 MG tablet 10 mg   Take 1 tablet by mouth every 6 hours as needed for Pain   Quantity: 20 tablet Refills: 0           CONTINUE these medications which have NOT CHANGED    Medication Dose   Eluxadoline (VIBERZI PO)    Take by mouth daily       albuterol sulfate  (90 Base) MCG/ACT inhaler 2 puffs   Inhale 2 puffs into the lungs every 6 hours as needed for Wheezing   Quantity: 2 Inhaler Refills: 2       levothyroxine (SYNTHROID) 50 MCG tablet    levothyroxine 50 mcg tablet  Take 3 tablets daily. Quantity: 270 tablet Refills: 1       NONFORMULARY 10 mg   Take 10 mg by mouth 2 times daily Domperidone (Jamp)        clobetasol (TEMOVATE) 0.05 % cream    Apply topically as needed (when she has a lichen sclerosus flare up) Apply topically 2 times daily. Quantity: 1 Tube Refills: 1       miconazole (MICOTIN) 2 % vaginal cream 1 applicator   Place 1 applicator vaginally nightly Place vaginally nightly.          Continuity of Care Form    Patient Name: Jeovany Hodges   :  1955  MRN:  [id number]    Admit date:  2021  Discharge date:  5/15/21    Code Status Order: Full Code   Advance Directives:   [address] [facility] Documentation       Date/Time Healthcare Directive Type of Healthcare Directive Copy in [address] [place] [address] [address] Agent's Name Healthcare Agent's Phone Number    21 1524  No, patient does not have an advance directive for healthcare treatment -- -- -- -- --            Admitting Physician:  Angel Prescott MD  PCP: CHARLOTTE Ash CNP    [facility]77[facility]2077-01  Discharging Unit Phone Number: [phone]    Emergency Contact:   Extended Emergency Contact Information  Primary Emergency Contact: Dana Cadet  Address: [address] [facility], [address] [place] [address] Phone: [phone]  Mobile Phone: [phone]  Relation: Spouse    Past Surgical History:  Past Surgical History:   Procedure Laterality Date    BREAST SURGERY      Cyst removed rt. nipple     CATARACT REMOVAL Right 07/17/2019    Right eye    CHOLECYSTECTOMY  2011    COLONOSCOPY      COLONOSCOPY N/A 05/10/2021    COLONOSCOPY DIAGNOSTIC performed by Nia Garcia MD at [address] Bilateral     Tendonitis X2    GASTRIC FUNDOPLICATION      \"Chronic appearing postsurgical change to the stomach suggestive of prior Nissen fundoplication\"- noted per CT ABD/pelvis 4-16-21    HERNIA REPAIR  2005    hiatal hernia repair    HYSTERECTOMY, TOTAL ABDOMINAL  92-97    BAIRON 3 Surgeries    KNEE SURGERY Right 1996    W/PINS    LAPAROSCOPY N/A 5/11/2021    ATTEMPTED APPENDECTOMY LAPAROSCOPIC ROBOTIC CONVERTED TO , DIAGNOSTIC LAPARASCOPY performed by Nia Garcia MD at [address] [facility] 5/11/2021    LAPAROTOMY LYSIS OF ADHESIONS -OPEN APPENDECTOMY, LYSIS OF SMALL BOWEL ADHESIONS FOR 75 MINUTES performed by Nia Garcia MD at [facility] [address]  2011    rt elbow-scc, rt shoulder-keratosis, lft leg skin with excoriation    NASAL POLYP SURGERY  2012    X2 in [facility] Right     PINCHED [facility] SKIN BIOPSY  2012    lft leg keratosis    SKIN BIOPSY  2009    rt forearm, under brst, lft leg-keratosis    SKIN BIOPSY  2008    back and rt forearm-keratosis, abdomen-hemangioma    SKIN BIOPSY  2002    rt breast, lft arm, lft brst-keratosis    SKIN CANCER EXCISION      THYROIDECTOMY  1985    Thyroid Cancer    TONSILLECTOMY AND ADENOIDECTOMY      UPPER GASTROINTESTINAL ENDOSCOPY      Noted per Care Everywhere       Immunization History:   Immunization History   Administered Date(s) Administered    COVID-19, Moderna, PF, 100mcg/0.5mL 03/16/2021, 04/13/2021    Influenza Virus Vaccine 11/01/2011, 12/27/2012, 11/19/2013, 11/14/2014       Active Problems:  Patient Active Problem List   Diagnosis Code    Benign neoplasm of skin of upper extremity D23.60    Benign neoplasm of skin of trunk, except scrotum D23.5    Rosacea L71.9    Squamous cell carcinoma of upper extremity C44.621    Neoplastic disease D49.9    Squamous cell carcinoma in situ D09.9    Squamous cell carcinoma in situ of skin of left wrist D04.62    Food allergy Z91.018    Other plastic surgery for unacceptable cosmetic appearance Z41.1    Functional diarrhea K59.1    Right upper quadrant pain R10.11    Basal cell carcinoma of upper lip C44.01    Actinic keratosis W59.3    Lichen sclerosus et atrophicus L90.0    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    Neoplasm of uncertain behavior of skin D48.5    Hypothyroidism, unspecified E03.9    Gastroparesis K31.84    Cobalamin deficiency E53.8    Acquired von Willebrand's disease ([facility] D68.0    Asthma J45.909    Benign paroxysmal positional vertigo H81.10    Cervical spondylosis without myelopathy M47.812    Diaphragmatic hernia K44.9    Diverticulosis of colon K57.30    Dyspareunia [id number]    Enthesopathy M77.9    Hip pain M25.559    History of malignant neoplasm of thyroid Z85.850    Hypoparathyroidism (HCC) E20.9    Hyperlipidemia E78.5    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    Iron deficiency anemia D50.9    Irritable bowel syndrome with diarrhea K58.0    Medial epicondylitis M77.00    Mitral valve disorder I05.9    Obstructive sleep apnea syndrome G47.33    Pain in limb M79.609    Postprocedural hypothyroidism E89.0    Scoliosis (and kyphoscoliosis), idiopathic M41.20    Sensorineural hearing loss, bilateral H90.3    Thrombocytopenia (HCC) D69.6    Vitamin D deficiency E55.9    Wrist joint pain M25.539    Gastroesophageal reflux disease K21.9    Chest pain R07.9    Abdominal pain R10.9       Isolation/Infection:   Isolation            No Isolation          Patient Infection Status       Infection Onset Added Last Indicated Last Indicated By Review Planned Expiration Resolved Resolved By    None active    Resolved    COVID-19 Rule Out 05/06/21 05/06/21 05/06/21 COVID-19 (Ordered)   05/07/21 Rule-Out Test Resulted    COVID-19 Rule Out 01/25/21 01/25/21 01/20/21 COVID-19 Ambulatory (Ordered)   01/25/21 Rule-Out Test Resulted            Nurse Assessment:  Last Vital Signs: /75   Pulse 82   Temp 97.9 °F (36.6 °C) (Oral)   Resp 12   Ht 5' 4\" (1.626 m)   Wt 160 lb 11.5 oz (72.9 kg)   SpO2 95%   BMI 27.59 kg/m²     Last documented pain score (0-10 scale): Pain Level: 6  Last Weight:   Wt Readings from Last 1 Encounters:   05/12/21 160 lb 11.5 oz (72.9 kg)     Mental Status:  oriented and alert    IV Access:  - None    Nursing Mobility/ADLs:  Walking   Independent  Transfer  Independent  Bathing  Independent  Dressing  Independent  Toileting  Independent  Feeding  [address]  Independent  Med Delivery   whole    Wound Care Documentation and Therapy:        Elimination:  Continence:   · Bowel: Yes  · Bladder: Yes  Urinary Catheter: None   Colostomy/Ileostomy/Ileal Conduit: No       Date of Last BM: 5/15/21    Intake/Output Summary (Last 24 hours) at 5/14/2021 1318  Last data filed at 5/14/2021 1213  Gross per 24 hour   Intake 939.1 ml   Output 1485 ml   Net -545.9 ml     I/O last 3 completed shifts: In: 964.4 [I.V.:964.4]  Out: 6770 [Urine:1400; Drains:215]    Safety Concerns: At Risk for Falls    Impairments/Disabilities:      Vision and Hearing    Nutrition Therapy:  Current Nutrition Therapy:   - Oral Diet:  low fiber diet    Routes of Feeding: Oral  Liquids:  Thin Liquids  Daily Fluid Restriction: no  Last Modified Barium Swallow with Video (Video Swallowing Test): not done    Treatments at the Time of Hospital Discharge:   Respiratory Treatments: inhaler  Oxygen Therapy:  is not on home oxygen therapy. Ventilator:    - No ventilator support    Rehab Therapies: Physical Therapy and Occupational Therapy  Weight Bearing Status/Restrictions: No weight bearing restirctions  Other Medical Equipment (for information only, NOT a DME order):  None  Other Treatments: Skilled Nursing Assessment, Medication Education and Monitoring    S/P open appendectomy site assessment and care; SAMANTHA Drains site assessment and care     Patient's personal belongings (please select all that are sent with patient):  Glasses, Hearing Aides bilateral, Dentures lower    RN SIGNATURE:  Electronically signed by Mahin Yo RN on 5/15/21 at 9:25 AM EDT    CASE MANAGEMENT/SOCIAL WORK SECTION    Inpatient Status Date: 5/11/2021    Readmission Risk Assessment Score:  Readmission Risk              Risk of Unplanned Readmission:        9           Discharging to Facility/ [address] [address]  Phone [phone]  Fax  [phone]        / signature: Electronically signed by Malik Hui RN on 5/14/21 at 1:19 PM EDT    PHYSICIAN SECTION    Prognosis: Good    Condition at Discharge: Stable    Rehab Potential (if transferring to Rehab): Good    Recommended Labs or Other Treatments After Discharge: No lifting more than 5 to 10 pounds for 1 month. Regular diet. Patient may shower. No driving until off pain medications. Change dressing daily. Monitor drain output. Physician Certification: I certify the above information and transfer of Justin Wheeler  is necessary for the continuing treatment of the diagnosis listed and that she requires [address] for less 30 days.      Update Admission H&P: No change in H&P    PHYSICIAN SIGNATURE:  Electronically signed by Fredy Collier MD on 5/14/21 at 4:36 PM EDT

## 2021-07-21 ENCOUNTER — HOSPITAL ENCOUNTER (OUTPATIENT)
Age: 66
Setting detail: SPECIMEN
Discharge: HOME | End: 2021-07-21
Payer: MEDICARE

## 2021-07-21 ENCOUNTER — HOSPITAL ENCOUNTER (OUTPATIENT)
Age: 66
Discharge: HOME | End: 2021-07-21
Payer: COMMERCIAL

## 2021-07-21 DIAGNOSIS — N39.0 URINARY TRACT INFECTION WITHOUT HEMATURIA, SITE UNSPECIFIED: ICD-10-CM

## 2021-07-21 LAB — TSH SERPL DL<=0.05 MIU/L-ACNC: 3.41 MIU/L (ref 0.3–5)

## 2021-07-21 PROCEDURE — 36415 COLL VENOUS BLD VENIPUNCTURE: CPT

## 2021-07-21 PROCEDURE — 84443 ASSAY THYROID STIM HORMONE: CPT

## 2021-07-22 LAB
CULTURE: NORMAL
Lab: NORMAL
SPECIMEN DESCRIPTION: NORMAL

## 2021-08-26 ENCOUNTER — OFFICE VISIT (OUTPATIENT)
Dept: PRIMARY CARE CLINIC | Age: 66
End: 2021-08-26
Payer: COMMERCIAL

## 2021-08-26 VITALS
OXYGEN SATURATION: 98 % | WEIGHT: 134.4 LBS | HEART RATE: 83 BPM | HEIGHT: 64 IN | SYSTOLIC BLOOD PRESSURE: 124 MMHG | BODY MASS INDEX: 22.94 KG/M2 | DIASTOLIC BLOOD PRESSURE: 78 MMHG

## 2021-08-26 DIAGNOSIS — D68.00 VON WILLEBRAND'S DISEASE: ICD-10-CM

## 2021-08-26 DIAGNOSIS — H91.93 BILATERAL HEARING LOSS, UNSPECIFIED HEARING LOSS TYPE: ICD-10-CM

## 2021-08-26 DIAGNOSIS — M53.3 SACROILIAC JOINT PAIN: Primary | ICD-10-CM

## 2021-08-26 DIAGNOSIS — G57.02 PIRIFORMIS SYNDROME OF LEFT SIDE: ICD-10-CM

## 2021-08-26 PROCEDURE — 99214 OFFICE O/P EST MOD 30 MIN: CPT | Performed by: FAMILY MEDICINE

## 2021-08-26 SDOH — ECONOMIC STABILITY: FOOD INSECURITY: WITHIN THE PAST 12 MONTHS, THE FOOD YOU BOUGHT JUST DIDN'T LAST AND YOU DIDN'T HAVE MONEY TO GET MORE.: NEVER TRUE

## 2021-08-26 SDOH — ECONOMIC STABILITY: FOOD INSECURITY: WITHIN THE PAST 12 MONTHS, YOU WORRIED THAT YOUR FOOD WOULD RUN OUT BEFORE YOU GOT MONEY TO BUY MORE.: NEVER TRUE

## 2021-08-26 ASSESSMENT — PATIENT HEALTH QUESTIONNAIRE - PHQ9
SUM OF ALL RESPONSES TO PHQ QUESTIONS 1-9: 0
2. FEELING DOWN, DEPRESSED OR HOPELESS: 0
SUM OF ALL RESPONSES TO PHQ QUESTIONS 1-9: 0
SUM OF ALL RESPONSES TO PHQ9 QUESTIONS 1 & 2: 0
1. LITTLE INTEREST OR PLEASURE IN DOING THINGS: 0
SUM OF ALL RESPONSES TO PHQ QUESTIONS 1-9: 0

## 2021-08-26 ASSESSMENT — SOCIAL DETERMINANTS OF HEALTH (SDOH): HOW HARD IS IT FOR YOU TO PAY FOR THE VERY BASICS LIKE FOOD, HOUSING, MEDICAL CARE, AND HEATING?: NOT HARD AT ALL

## 2021-08-26 NOTE — PROGRESS NOTES
[facility] PRIMARY CARE  [address] [address]  Dept: [phone]    Sergio Jasso is a 77 y.o. female New patient, who presents today for her medical conditions/complaintsas noted below. Chief Complaint   Patient presents with   Lenoard Suraj Patient     pt c/o trouble with hearing aid, ENT referral closer to [place],  hip has arthritis wants cortisone shot       HPI:     HPI    Right ear crackling [place]  She would like an ENT closer to this area    Left post hip pain    Reviewed prior notes None  Reviewed previous Labs, Imaging and Hospital Records    Had appendix out earlier this year.   LDL Cholesterol (mg/dL)   Date Value   12/22/2020 72   04/28/2020 106       (goal LDL is <100)   AST (U/L)   Date Value   09/04/2020 20     ALT (U/L)   Date Value   09/04/2020 16     BUN (mg/dL)   Date Value   05/15/2021 13     TSH (mIU/L)   Date Value   07/21/2021 3.41     BP Readings from Last 3 Encounters:   08/26/21 124/78   07/26/21 124/76   07/21/21 122/64          (goal 120/80)    Past Medical History:   Diagnosis Date    Abdominal pain     RLQ    Abnormal computed tomography of abdomen and pelvis 04/16/2021    Distal appendix is mildly thickened without significant periappendiceal inflammatory change    Acquired von Willebrand's disease ([facility] [address] 10/30/2018    Allergy to food dye     yellow and red dyes    Anemia     Asthma     Basal cell carcinoma of upper lip 06/22/2017    Benign paroxysmal positional vertigo 12/09/2013    Cancer (HCC)     Thyroid and skin    Cardiac murmur     STATES SINCE CHILD BILLY- STATES SHE WAS BORN WITH A \"90 DEGREE ANGLE VALVE\"    Chest pain 09/04/2020    Cobalamin deficiency 12/26/2012    Diverticulitis     Diverticulosis     Gastroesophageal reflux disease 12/26/2012    Gastroparesis     Hiatal hernia     History of fractured kneecap     RIGHT    History of malignant neoplasm of thyroid Surgeries    KNEE SURGERY Right 1996    W/PINS    LAPAROSCOPY N/A 5/11/2021    ATTEMPTED APPENDECTOMY LAPAROSCOPIC ROBOTIC CONVERTED TO , DIAGNOSTIC LAPARASCOPY performed by Bryan Olmstead MD at [address] 5/11/2021    LAPAROTOMY LYSIS OF ADHESIONS -OPEN APPENDECTOMY, LYSIS OF SMALL BOWEL ADHESIONS FOR 75 MINUTES performed by Bryan Olmstead MD at [address]  2011    rt elbow-scc, rt shoulder-keratosis, lft leg skin with excoriation    NASAL POLYP SURGERY  2012    X2 in [facility] Right     PINCHED [address] SKIN BIOPSY  2012    lft leg keratosis    SKIN BIOPSY  2009    rt forearm, under brst, lft leg-keratosis    SKIN BIOPSY  2008    back and rt forearm-keratosis, abdomen-hemangioma    SKIN BIOPSY  2002    rt breast, lft arm, lft brst-keratosis    SKIN CANCER EXCISION      THYROIDECTOMY  1985    Thyroid Cancer    TONSILLECTOMY AND ADENOIDECTOMY      UPPER GASTROINTESTINAL ENDOSCOPY      Noted per Care Everywhere       Family History   Problem Relation Age of Onset    Other Mother         pneumonia    Coronary Art Dis Mother     Liver Cancer Father         age 80    Coronary Art Dis Father     Liver Cancer Brother 59    Colon Cancer Brother         \"Bowel and liver CA\"    Diabetes Other         Father's side    Thyroid Disease Other         Mom's side       Social History     Tobacco Use    Smoking status: Never Smoker    Smokeless tobacco: Never Used   Substance Use Topics    Alcohol use: No      Current Outpatient Medications   Medication Sig Dispense Refill    levothyroxine (SYNTHROID) 50 MCG tablet levothyroxine 50 mcg tablet  Take 3 tablets daily. 270 tablet 1    albuterol sulfate  (90 Base) MCG/ACT inhaler Inhale 2 puffs into the lungs every 6 hours as needed for Wheezing 2 Inhaler 2     No current facility-administered medications for this visit. cancer screen colonoscopy  05/10/2031    COVID-19 Vaccine  Completed    Hepatitis C screen  Completed    Hepatitis A vaccine  Aged Out    Hepatitis B vaccine  Aged Out    Hib vaccine  Aged Out    Meningococcal (ACWY) vaccine  Aged Out       Subjective:      Review of Systems   Neurological:        Her legs sometimes get numb if she sits in a certain position too long. Her right shoulder started bothering her after the did a trip where [place] in the airport [place]. Objective:     /78   Pulse 83   Ht 5' 4\" (1.626 m)   Wt 134 lb 6.4 oz (61 kg)   SpO2 98%   BMI 23.07 kg/m²   Physical Exam  Vitals and nursing note reviewed. Constitutional:       General: She is not in acute distress. Appearance: She is well-developed. She is not ill-appearing. HENT:      Head: Normocephalic and atraumatic. Right Ear: External ear normal.      Left Ear: External ear normal.   Eyes:      General: No scleral icterus. Right eye: No discharge. Left eye: No discharge. Conjunctiva/sclera: Conjunctivae normal.      Pupils: Pupils are equal, round, and reactive to light. Neck:      Thyroid: No thyromegaly. Trachea: No tracheal deviation. Cardiovascular:      Rate and Rhythm: Normal rate and regular rhythm. Heart sounds: Normal heart sounds. Pulmonary:      Effort: Pulmonary effort is normal. No respiratory distress. Breath sounds: Normal breath sounds. No wheezing. Musculoskeletal:      Comments: Tender SI joints and below them left more than right. Left hip range of motion is within normal limits. Lumbar range of motion is acceptable. Her left piriformis muscle stretching does cause pain in the area she is having the issues with. \  Spastic tender right trapezius   Lymphadenopathy:      Cervical: No cervical adenopathy. Skin:     General: Skin is warm. Findings: No rash.    Neurological:      Mental Status: She is alert and oriented to person, place, and time. Psychiatric:         Mood and Affect: Mood normal.         Behavior: Behavior normal.         Thought Content: Thought content normal.         Assessment:       Diagnosis Orders   1. Sacroiliac joint pain     2. Piriformis syndrome of left side     3. 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 Plain mucinex and ear wax drops   See ENT  If shoulder and piriformis and back home exercises not helping try PT      Orders Placed This Encounter   Procedures    External Referral To ENT     Referral Priority:   Routine     Referral Type:   Eval and Treat     Referral Reason:   Specialty Services Required     Requested Specialty:   Otolaryngology     Number of Visits Requested:   1     No orders of the defined types were placed in this encounter. Patient given educationalmaterials - see patient instructions. Discussed use, benefit, and side effectsof prescribed medications. All patient questions answered. Pt voiced understanding. Reviewed health maintenance. Instructed to continue current medications, diet andexercise. Patient agreed with treatment plan. Follow up as directed.      Electronicallysigned by Troy Cárdenas MD on 8/26/2021 at 2:28 PM

## 2021-08-26 NOTE — PATIENT INSTRUCTIONS
Patient Education        Low Back Pain: Exercises  Introduction  Here are some examples of exercises for you to try. The exercises may be suggested for a condition or for rehabilitation. Start each exercise slowly. Ease off the exercises if you start to have pain. You will be told when to start these exercises and which ones will work best for you. How to do the exercises  Press-up   1. Lie on your stomach, supporting your body with your forearms. 2. Press your elbows down into the floor to raise your upper back. As you do this, relax your stomach muscles and allow your back to arch without using your back muscles. As your press up, do not let your hips or pelvis come off the floor. 3. Hold for 15 to 30 seconds, then relax. 4. Repeat 2 to 4 times. Alternate arm and leg (bird dog) exercise   Do this exercise slowly. Try to keep your body straight at all times, and do not let one hip drop lower than the other. 1. Start on the floor, on your hands and knees. 2. Tighten your belly muscles. 3. Raise one leg off the floor, and hold it straight out behind you. Be careful not to let your hip drop down, because that will twist your trunk. 4. Hold for about 6 seconds, then lower your leg and switch to the other leg. 5. Repeat 8 to 12 times on each leg. 6. Over time, work up to holding for 10 to 30 seconds each time. 7. If you feel stable and secure with your leg raised, try raising the opposite arm straight out in front of you at the same time. Knee-to-chest exercise   1. Lie on your back with your knees bent and your feet flat on the floor. 2. Bring one knee to your chest, keeping the other foot flat on the floor (or keeping the other leg straight, whichever feels better on your lower back). 3. Keep your lower back pressed to the floor. Hold for at least 15 to 30 seconds. 4. Relax, and lower the knee to the starting position. 5. Repeat with the other leg. Repeat 2 to 4 times with each leg.   6. To get more stretch, put your other leg flat on the floor while pulling your knee to your chest.    Curl-ups   1. Lie on the floor on your back with your knees bent at a 90-degree angle. Your feet should be flat on the floor, about 12 inches from your buttocks. 2. Cross your arms over your chest. If this bothers your neck, try putting your hands behind your neck (not your head), with your elbows spread apart. 3. Slowly tighten your belly muscles and raise your shoulder blades off the floor. 4. Keep your head in line with your body, and do not press your chin to your chest.  5. Hold this position for 1 or 2 seconds, then slowly lower yourself back down to the floor. 6. Repeat 8 to 12 times. Pelvic tilt exercise   1. Lie on your back with your knees bent. 2. \"Brace\" your stomach. This means to tighten your muscles by pulling in and imagining your belly button moving toward your spine. You should feel like your back is pressing to the floor and your hips and pelvis are rocking back. 3. Hold for about 6 seconds while you breathe smoothly. 4. Repeat 8 to 12 times. Heel dig bridging   1. Lie on your back with both knees bent and your ankles bent so that only your heels are digging into the floor. Your knees should be bent about 90 degrees. 2. Then push your heels into the floor, squeeze your buttocks, and lift your hips off the floor until your shoulders, hips, and knees are all in a straight line. 3. Hold for about 6 seconds as you continue to breathe normally, and then slowly lower your hips back down to the floor and rest for up to 10 seconds. 4. Do 8 to 12 repetitions. Hamstring stretch in doorway   1. Lie on your back in a doorway, with one leg through the open door. 2. Slide your leg up the wall to straighten your knee. You should feel a gentle stretch down the back of your leg. 3. Hold the stretch for at least 15 to 30 seconds. Do not arch your back, point your toes, or bend either knee.  Keep one heel touching the floor and the other heel touching the wall. 4. Repeat with your other leg. 5. Do 2 to 4 times for each leg. Hip flexor stretch   1. Kneel on the floor with one knee bent and one leg behind you. Place your forward knee over your foot. Keep your other knee touching the floor. 2. Slowly push your hips forward until you feel a stretch in the upper thigh of your rear leg. 3. Hold the stretch for at least 15 to 30 seconds. Repeat with your other leg. 4. Do 2 to 4 times on each side. Wall sit   1. Stand with your back 10 to 12 inches away from a wall. 2. Lean into the wall until your back is flat against it. 3. Slowly slide down until your knees are slightly bent, pressing your lower back into the wall. 4. Hold for about 6 seconds, then slide back up the wall. 5. Repeat 8 to 12 times. Follow-up care is a key part of your treatment and safety. Be sure to make and go to all appointments, and call your doctor if you are having problems. It's also a good idea to know your test results and keep a list of the medicines you take. Where can you learn more? Go to https://blinkbox music.PayTouch. org and sign in to your [employer] account. Enter Y345 in the [employer] box to learn more about \"Low Back Pain: Exercises. \"     If you do not have an account, please click on the \"Sign Up Now\" link. Current as of: November 16, 2020               Content Version: 12.9  © 2006-2021 Healthwise, Incorporated. Care instructions adapted under license by [facility] ([facility]). If you have questions about a medical condition or this instruction, always ask your healthcare professional. [place] [address] any warranty or liability for your use of this information. Patient Education        Piriformis Syndrome: Exercises  Introduction  Here are some examples of exercises for you to try. The exercises may be suggested for a condition or for rehabilitation. Start each exercise slowly. Ease off the exercises if you start to have pain. You will be told when to start these exercises and which ones will work best for you. How to do the exercises  Hip rotator stretch   1. Lie on your back with both knees bent and your feet flat on the floor. 2. Put the ankle of your affected leg on your opposite thigh near your knee. 3. Use your hand to gently push your knee (on your affected leg) away from your body until you feel a gentle stretch around your hip. 4. Hold the stretch for 15 to 30 seconds. 5. Repeat 2 to 4 times. 6. Switch legs and repeat steps 1 through 5. Piriformis stretch   1. Lie on your back with your legs straight. 2. Lift your affected leg and bend your knee. With your opposite hand, reach across your body, and then gently pull your knee toward your opposite shoulder. 3. Hold the stretch for 15 to 30 seconds. 4. Repeat with your other leg. 5. Repeat 2 to 4 times on each side. Lower abdominal strengthening   1. Lie on your back with your knees bent and your feet flat on the floor. 2. Tighten your belly muscles by pulling your belly button in toward your spine. 3. Lift one foot off the floor and bring your knee toward your chest, so that your knee is straight above your hip and your leg is bent like the letter \"L. \"  4. Lift the other knee up to the same position. 5. Lower one leg at a time to the starting position. 6. Keep alternating legs until you have lifted each leg 8 to 12 times. 7. Be sure to keep your belly muscles tight and your back still as you are moving your legs. Be sure to breathe normally. Follow-up care is a key part of your treatment and safety. Be sure to make and go to all appointments, and call your doctor if you are having problems. It's also a good idea to know your test results and keep a list of the medicines you take. Current as of: November 16, 2020               Content Version: 12.9  © 2006-2021 Healthwise, Incorporated.    Care instructions adapted under license by [facility] ([facility]). If you have questions about a medical condition or this instruction, always ask your healthcare professional. [place] [address] any warranty or liability for your use of this information. Patient Education        Shoulder Blade: Exercises  Introduction  Here are some examples of exercises for you to try. The exercises may be suggested for a condition or for rehabilitation. Start each exercise slowly. Ease off the exercises if you start to have pain. You will be told when to start these exercises and which ones will work best for you. How to do the exercises  Shoulder roll   1. Stand tall with your chin slightly tucked. Imagine that a string at the top of your head is pulling you straight up. 2. Keep your arms relaxed. All motion will be in your shoulders. 3. Shrug your shoulders up toward your ears, then up and back. [place] your shoulders down and back, like you're sliding your hands down into your back pants pockets. 4. Repeat the circles at least 2 to 4 times. 5. This exercise is also helpful anytime you want to relax. Lower neck and upper back stretch   1. With your arms about shoulder height, clasp your hands in front of you. 2. Drop your chin toward your chest.  3. Reach straight forward so you are rounding your upper back. Think about pulling your shoulder blades apart. Shaylee Yung feel a stretch across your upper back and shoulders. Hold for at least 6 seconds. 4. Repeat 2 to 4 times. Triceps stretch   1. Reach your arm straight up. 2. Keeping your elbow in place, bend your arm and reach your hand down behind your back. 3. With your other hand, apply gentle pressure to the bent elbow. Shaylee Yung feel a stretch at the back of your upper arm and shoulder. Hold about 6 seconds. 4. Repeat 2 to 4 times with each arm. Shoulder stretch   1. Relax your shoulders.   2. Raise one arm to shoulder height, and reach it across your chest.  3. Pull the and relax your shoulders. 3. Reach your arms straight out to the sides. If you don't feel a mild stretch in your shoulders and across your chest, use a foam roll or a tightly rolled blanket under your spine, from your tailbone to your head. 4. Relax in this position for at least 15 to 30 seconds while you breathe normally. Repeat 2 to 4 times. 5. As you get used to this stretch, keep adding a little more time until you are able relax in this position for 2 or 3 minutes. When you can relax for at least 2 minutes, you only need to do the exercise 1 time per session. Chest goalpost stretch   1. Lie on your back. Raise your knees so they are bent. Plant your feet on the floor, hip-width apart. 2. Tuck your chin, and relax your shoulders. 3. Reach your arms straight out to the sides. 4. Bend your arms at the elbows, with your hands pointed toward the top of your head. Your arms should make an L on either side of your head. Your palms should be facing up. 5. If you don't feel a mild stretch in your shoulders and across your chest, use a foam roll or tightly rolled blanket under your spine, from your tailbone to your head. 6. Relax in this position for at least 15 to 30 seconds while you breathe normally. Repeat 2 to 4 times. 7. Each day you do this exercise, add a little more time until you can relax in this position for 2 or 3 minutes. When you can relax for at least 2 minutes, you only need to do the exercise 1 time per session. Follow-up care is a key part of your treatment and safety. Be sure to make and go to all appointments, and call your doctor if you are having problems. It's also a good idea to know your test results and keep a list of the medicines you take. Where can you learn more? Go to https://WhereverTVmarquezVersartis.StepUp. org and sign in to your [employer] account. Enter [phone] in the [employer] box to learn more about \"Shoulder Blade: Exercises. \"     If you do not have an account, please click on the \"Sign Up Now\" link. Current as of: November 16, 2020               Content Version: 12.9  © 2006-2021 Healthwise, Incorporated. Care instructions adapted under license by [facility] ([facility]). If you have questions about a medical condition or this instruction, always ask your healthcare professional. [address] any warranty or liability for your use of this information.

## 2021-09-02 ENCOUNTER — TELEPHONE (OUTPATIENT)
Dept: PRIMARY CARE CLINIC | Age: 66
End: 2021-09-02

## 2021-09-02 DIAGNOSIS — E83.51 HYPOCALCEMIA: Chronic | ICD-10-CM

## 2021-09-02 DIAGNOSIS — J44.1 COPD EXACERBATION (HCC): ICD-10-CM

## 2021-09-02 RX ORDER — ANTACID TABLETS 648 MG/1
2 TABLET, CHEWABLE ORAL 3 TIMES DAILY
Qty: 180 TABLET | Refills: 2 | COMMUNITY
Start: 2021-09-02 | End: 2022-07-25 | Stop reason: SDUPTHER

## 2021-09-02 NOTE — TELEPHONE ENCOUNTER
I would like her to take DELVIS's 2 tablets 3 times a day for now ( DELVIS's is calcium carbonate)  Make sure to be eating enough protein also ( meat, chicken, eggs, fish)  Check calcium again in 4-5 days.    Orders in the chart: I want her to have calcium and ionized calcium checked  If still low she will need to see a kidney specialist.

## 2021-09-02 NOTE — TELEPHONE ENCOUNTER
Pt notified and scheduled her B12 injection on 9/8/21. Pt said that the specialist wanted you to address the calcium issue.

## 2021-09-02 NOTE — TELEPHONE ENCOUNTER
Ok to set up B12 1000 mcgm once a month. Was her specialist going to treat the low calcium level ?  It' needs to be treated

## 2021-09-02 NOTE — TELEPHONE ENCOUNTER
Patient states Hematologist at [facility] said B12 is low and would like patient to get monthly B12 injections. Patient notified we need to get the order from you before we can schedule. Okay to set up for nurse visit? She also wanted me to let you know her calcium is low.

## 2021-09-03 RX ORDER — ALBUTEROL SULFATE 90 UG/1
2 AEROSOL, METERED RESPIRATORY (INHALATION) EVERY 6 HOURS PRN
Qty: 2 EACH | Refills: 1 | Status: SHIPPED | OUTPATIENT
Start: 2021-09-03 | End: 2021-10-19 | Stop reason: SDUPTHER

## 2021-09-07 ENCOUNTER — HOSPITAL ENCOUNTER (OUTPATIENT)
Age: 66
Discharge: HOME | End: 2021-09-07
Payer: COMMERCIAL

## 2021-09-07 DIAGNOSIS — E83.51 HYPOCALCEMIA: Chronic | ICD-10-CM

## 2021-09-07 LAB
CALCIUM IONIZED: 1.05 MMOL/L (ref 1.13–1.33)
CALCIUM SERPL-MCNC: 8.6 MG/DL (ref 8.6–10.4)

## 2021-09-07 PROCEDURE — 36415 COLL VENOUS BLD VENIPUNCTURE: CPT

## 2021-09-07 PROCEDURE — 82310 ASSAY OF CALCIUM: CPT

## 2021-09-07 PROCEDURE — 82330 ASSAY OF CALCIUM: CPT

## 2021-09-08 ENCOUNTER — NURSE ONLY (OUTPATIENT)
Dept: PRIMARY CARE CLINIC | Age: 66
End: 2021-09-08
Payer: COMMERCIAL

## 2021-09-08 ENCOUNTER — TELEPHONE (OUTPATIENT)
Dept: PRIMARY CARE CLINIC | Age: 66
End: 2021-09-08

## 2021-09-08 DIAGNOSIS — E03.9 HYPOTHYROIDISM, UNSPECIFIED TYPE: ICD-10-CM

## 2021-09-08 DIAGNOSIS — E53.8 B12 DEFICIENCY: Primary | ICD-10-CM

## 2021-09-08 PROCEDURE — 96372 THER/PROPH/DIAG INJ SC/IM: CPT | Performed by: FAMILY MEDICINE

## 2021-09-08 RX ORDER — CYANOCOBALAMIN 1000 UG/ML
1000 INJECTION INTRAMUSCULAR; SUBCUTANEOUS ONCE
Status: COMPLETED | OUTPATIENT
Start: 2021-09-08 | End: 2021-09-08

## 2021-09-08 RX ADMIN — CYANOCOBALAMIN 1000 MCG: 1000 INJECTION INTRAMUSCULAR; SUBCUTANEOUS at 14:17

## 2021-09-08 NOTE — PROGRESS NOTES
After obtaining consent, and per orders of Dr. Devin Dhillon, injection of B-12 given in Right arm by Abelardo Loredo MA.

## 2021-09-08 NOTE — TELEPHONE ENCOUNTER
Patient is having trouble finding folic acid without yellow dye in it - is there an rx she can get that does not contain yellow dye? Please advise.

## 2021-09-09 RX ORDER — LEVOTHYROXINE SODIUM 0.05 MG/1
TABLET ORAL
Qty: 270 TABLET | Refills: 1 | Status: SHIPPED | OUTPATIENT
Start: 2021-09-09 | End: 2021-12-21 | Stop reason: SDUPTHER

## 2021-09-09 NOTE — TELEPHONE ENCOUNTER
I don't see any prescription folic acid that is NOT yellow.  She may have to look on line ie amazon and see if she can find any otc folic acid

## 2021-09-16 NOTE — TELEPHONE ENCOUNTER
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

## 2021-09-16 NOTE — TELEPHONE ENCOUNTER
Patient states she tried OTC at [facility] and it still had yellow dye, and has looked online can not find any. Would like to know what she should do about this.

## 2021-09-23 NOTE — TELEPHONE ENCOUNTER
Patient said she does not want to see Allergist, she said she done that in the past and does not want to do that right now. Patient said she is still looking for liquid or chewable forms for the folic acid, once she finds it she said she will call and let you know.

## 2021-10-08 ENCOUNTER — NURSE ONLY (OUTPATIENT)
Dept: PRIMARY CARE CLINIC | Age: 66
End: 2021-10-08
Payer: COMMERCIAL

## 2021-10-08 DIAGNOSIS — E53.8 B12 DEFICIENCY: Primary | ICD-10-CM

## 2021-10-08 PROCEDURE — 96372 THER/PROPH/DIAG INJ SC/IM: CPT | Performed by: FAMILY MEDICINE

## 2021-10-08 RX ORDER — CYANOCOBALAMIN 1000 UG/ML
1000 INJECTION INTRAMUSCULAR; SUBCUTANEOUS ONCE
Status: COMPLETED | OUTPATIENT
Start: 2021-10-08 | End: 2021-10-08

## 2021-10-08 RX ADMIN — CYANOCOBALAMIN 1000 MCG: 1000 INJECTION INTRAMUSCULAR; SUBCUTANEOUS at 10:23

## 2021-10-08 NOTE — PROGRESS NOTES
After obtaining consent, and per orders of Dr. David Cali, injection of vitamin B12 given in Right deltoid by Leyla Shepard MA. Patient tolerated well.

## 2021-10-12 ENCOUNTER — TELEPHONE (OUTPATIENT)
Dept: PRIMARY CARE CLINIC | Age: 66
End: 2021-10-12

## 2021-10-12 NOTE — TELEPHONE ENCOUNTER
PLEASE RESCHEDULE PATIENT'S B12 SCHEDULED FOR 11/10/2021 - [facility] WILL NO LONGER BE OFFERING NURSE VISITS ON WEDNESDAYS.

## 2021-10-19 ENCOUNTER — OFFICE VISIT (OUTPATIENT)
Dept: PRIMARY CARE CLINIC | Age: 66
End: 2021-10-19
Payer: COMMERCIAL

## 2021-10-19 VITALS
OXYGEN SATURATION: 96 % | HEART RATE: 88 BPM | HEIGHT: 64 IN | BODY MASS INDEX: 23.01 KG/M2 | SYSTOLIC BLOOD PRESSURE: 122 MMHG | WEIGHT: 134.8 LBS | DIASTOLIC BLOOD PRESSURE: 70 MMHG

## 2021-10-19 DIAGNOSIS — N90.89 VULVAR IRRITATION: ICD-10-CM

## 2021-10-19 DIAGNOSIS — R14.0 BLOATING: ICD-10-CM

## 2021-10-19 DIAGNOSIS — Z12.31 BREAST CANCER SCREENING BY MAMMOGRAM: ICD-10-CM

## 2021-10-19 DIAGNOSIS — K43.9 VENTRAL HERNIA WITHOUT OBSTRUCTION OR GANGRENE: Primary | ICD-10-CM

## 2021-10-19 DIAGNOSIS — J45.30 MILD PERSISTENT ASTHMA WITHOUT COMPLICATION: ICD-10-CM

## 2021-10-19 DIAGNOSIS — E03.9 HYPOTHYROIDISM, UNSPECIFIED TYPE: ICD-10-CM

## 2021-10-19 PROCEDURE — 99214 OFFICE O/P EST MOD 30 MIN: CPT | Performed by: FAMILY MEDICINE

## 2021-10-19 RX ORDER — CLOTRIMAZOLE AND BETAMETHASONE DIPROPIONATE 10; .64 MG/G; MG/G
CREAM TOPICAL
Qty: 15 G | Refills: 1 | Status: SHIPPED | OUTPATIENT
Start: 2021-10-19 | End: 2022-03-24

## 2021-10-19 RX ORDER — ALBUTEROL SULFATE 90 UG/1
2 AEROSOL, METERED RESPIRATORY (INHALATION) EVERY 6 HOURS PRN
Qty: 2 EACH | Refills: 1 | Status: SHIPPED | OUTPATIENT
Start: 2021-10-19 | End: 2021-11-15 | Stop reason: SDUPTHER

## 2021-10-19 RX ORDER — ELUXADOLINE 75 MG/1
TABLET, FILM COATED ORAL
COMMUNITY
Start: 2021-09-27 | End: 2021-12-21

## 2021-10-19 NOTE — PROGRESS NOTES
[facility] PRIMARY CARE  [address] [facility] [address]  Dept: [phone]    Tisha Roberts is a 74 y.o. female Established patient, who presents today for her medical conditions/complaintsas noted below. Chief Complaint   Patient presents with    Other     pt c/o swelling after appendix removal    Medication Refill     inhaler    Vaginitis     pt c/o yeast infection pt hasn't tried any OTC    Referral - General     pt wants an OBGYN close to [place]       HPI:     HPI    Abdomen still feels bloated since her appy. Tender in upper abdomen  Is dong all her own ADL's  BM daily : good size daily. Noticing more hair loss    Vagina: uses mini pads for urine leakage.    Outer vaginal irritation    Reviewed prior notes None  Reviewed previous Labs    LDL Cholesterol (mg/dL)   Date Value   12/22/2020 72   04/28/2020 106       (goal LDL is <100)   AST (U/L)   Date Value   09/04/2020 20     ALT (U/L)   Date Value   09/04/2020 16     BUN (mg/dL)   Date Value   05/15/2021 13     TSH (mIU/L)   Date Value   07/21/2021 3.41     BP Readings from Last 3 Encounters:   10/19/21 122/70   08/26/21 124/78   07/26/21 124/76          (goal 120/80)    Past Medical History:   Diagnosis Date    Abdominal pain     RLQ    Abnormal computed tomography of abdomen and pelvis 04/16/2021    Distal appendix is mildly thickened without significant periappendiceal inflammatory change    Acquired von Willebrand's disease ([facility] [address] 10/30/2018    Allergy to food dye     yellow and red dyes    Anemia     Asthma     Basal cell carcinoma of upper lip 06/22/2017    Benign paroxysmal positional vertigo 12/09/2013    Cancer (HCC)     Thyroid and skin    Cardiac murmur     STATES SINCE CHILD BILLY- STATES SHE WAS BORN WITH A \"90 DEGREE ANGLE VALVE\"    Chest pain 09/04/2020    Cobalamin deficiency 12/26/2012    Diverticulitis     Diverticulosis     Gastroesophageal reflux disease 12/26/2012    Gastroparesis     Hiatal hernia     History of fractured kneecap     RIGHT    History of malignant neoplasm of thyroid 04/17/2012    History of squamous cell carcinoma in situ of skin 06/09/2014    Chart states left wrist, but patient denies    Hypoparathyroidism ([address] 07/03/2012    IBS (irritable bowel syndrome)     diarrhea-prominent type    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  Neoplasm of unspecified nature of bone, soft tissue, and skin     New lesions of right dorsum of hand (healed after Efudex) and lesion of right anterior thigh.  Osteoarthritis     Osteoporosis     beginning     PONV (postoperative nausea and vomiting)     Postprocedural hypothyroidism 07/16/2018    Prolonged emergence from general anesthesia     Pt states this was \"a long time ago\"     Raynaud's disease     Seasonal allergies     Sensorineural hearing loss, bilateral 12/26/2012    Squamous cell carcinoma of upper extremity 04/08/2013    Scc in situ rt elbow 1/11    Thrombocytopenia ([address]     Thyroid disease     Thyroid Ca Hx. - thyroid was removed.      Vulvar dystrophy     Wears hearing aid     Wears partial dentures     LOWER      Past Surgical History:   Procedure Laterality Date    APPENDECTOMY  05/11/2021    BREAST SURGERY      Cyst removed rt. nipple     CATARACT REMOVAL Right 07/17/2019    Right eye    CHOLECYSTECTOMY  2011    COLONOSCOPY      COLONOSCOPY N/A 05/10/2021    COLONOSCOPY DIAGNOSTIC performed by Abbi Mendoza MD at [address] Bilateral     Tendonitis X2    GASTRIC FUNDOPLICATION      \"Chronic appearing postsurgical change to the stomach suggestive of prior Nissen fundoplication\"- noted per CT ABD/pelvis 21    HERNIA REPAIR  2005    hiatal hernia repair    HYSTERECTOMY, TOTAL ABDOMINAL  92-97    BAIRON 3 Surgeries    KNEE SURGERY Right     W/PINS    LAPAROSCOPY N/A 2021    ATTEMPTED APPENDECTOMY LAPAROSCOPIC ROBOTIC CONVERTED TO , DIAGNOSTIC LAPARASCOPY performed by Alia Galeano MD at [address] 2021    LAPAROTOMY LYSIS OF ADHESIONS -OPEN APPENDECTOMY, LYSIS OF SMALL BOWEL ADHESIONS FOR 75 MINUTES performed by Alia Galeano MD at [facility] [address]      rt elbow-scc, rt shoulder-keratosis, lft leg skin with excoriation    NASAL POLYP SURGERY  2012    X2 in [facility] Right     PINCHED [address] [facility] SKIN BIOPSY      lft leg keratosis    SKIN BIOPSY      rt forearm, under brst, lft leg-keratosis    SKIN BIOPSY      back and rt forearm-keratosis, abdomen-hemangioma    SKIN BIOPSY      rt breast, lft arm, lft brst-keratosis    SKIN CANCER EXCISION      THYROIDECTOMY  1985    Thyroid Cancer    TONSILLECTOMY AND ADENOIDECTOMY      UPPER GASTROINTESTINAL ENDOSCOPY      Noted per Care Everywhere       Family History   Problem Relation Age of Onset    Other Mother         pneumonia    Coronary Art Dis Mother     Liver Cancer Father         age 80    Coronary Art Dis Father     Liver Cancer Brother 59    Colon Cancer Brother         \"Bowel and liver CA\"    Diabetes Other         Father's side    Thyroid Disease Other         Mom's side       Social History     Tobacco Use    Smoking status: Never Smoker    Smokeless tobacco: Never Used   Substance Use Topics    Alcohol use: No      Current Outpatient Medications   Medication Sig Dispense Refill    VIBERZI 75 MG TABS TAKE 75 MG BY MOUTH DAILY FOR 30 DAYS.       albuterol sulfate  (90 Base) MCG/ACT inhaler Inhale 2 puffs into the lungs every 6 hours as needed for Wheezing or Shortness of Breath 2 each 1    clotrimazole-betamethasone (LOTRISONE) 1-0.05 % cream Apply topically 2 times daily. 15 g 1    levothyroxine (SYNTHROID) 50 MCG tablet levothyroxine 50 mcg tablet  Take 3 tablets daily. 270 tablet 1    calcium carbonate 648 MG TABS Take 2 tablets by mouth 3 times daily 180 tablet 2     No current facility-administered medications for this visit. Allergies   Allergen Reactions    Aspirin Anaphylaxis and Shortness Of Breath    Cefadroxil Shortness Of Breath    Zoqegwvh-Zbuydox-Gbqjuf [Fluocinolone] Shortness Of Breath    Ciprofloxacin Hcl Shortness Of Breath    Codeine Shortness Of Breath    Demerol Hcl [Meperidine] Shortness Of Breath    Doxycycline Nausea Only and Other (See Comments)    Glucosamine Shortness Of Breath, Diarrhea and Swelling     Other reaction(s): Respiratory Difficulty  \"lips swell up and get severe diarrhea\" - INCLUDES IODINE    Iodine Hives, Shortness Of Breath and Itching    Pcn [Penicillins] Anaphylaxis and Shortness Of Breath    Red Dye Shortness Of Breath    Shellfish-Derived Products Shortness Of Breath, Diarrhea and Swelling     \"lips swell up and get severe diarrhea\" - INCLUDES IODINE    Sulfa Antibiotics Hives, Shortness Of Breath, Itching and Rash     OK in small amounts, but larger amounts cause \"shortness of breath. \"    Yellow Dye Shortness Of Breath     Other reaction(s): Respiratory Difficulty    Yellow Dyes (Non-Tartrazine) Shortness Of Breath    Gadolinium Rash     Moderate allergic-like reaction consisting of diffuse urticaria to ProHance gadolinium-containing contrast material    Gadolinium Derivatives Rash     Moderate allergic-like reaction consisting of diffuse urticaria to ProHance gadolinium-containing contrast material    Lac Bovis     Midazolam Nausea Only     Versed caused bad nausea.     Iodides Hives    Adhesive Tape Rash       Health Maintenance   Topic Date Due    DEXA (modify frequency epigastric and superior healing scar. The scar does look red and dry. Ventral upper abdomen hernia above and including the upper abdomen scar   Lymphadenopathy:      Cervical: No cervical adenopathy. Skin:     General: Skin is warm. Findings: No rash. Neurological:      Mental Status: She is alert and oriented to person, place, and time. Psychiatric:         Mood and Affect: Mood normal.         Behavior: Behavior normal.         Thought Content: Thought content normal.         Assessment:       Diagnosis Orders   1. Ventral hernia without obstruction or gangrene     2. Breast cancer screening by mammogram  RANULFO DIGITAL SCREEN W OR WO CAD BILATERAL   3. Bloating  XR ABDOMEN (KUB) (SINGLE AP VIEW)   4. Hypothyroidism, unspecified type     5. Vulvar irritation  clotrimazole-betamethasone (LOTRISONE) 1-0.05 % cream        Plan:      No follow-ups on file. Make appointment for a GYN visit. Discussed ventral hernia. She does not have to have this repaired. Orders Placed This Encounter   Procedures    RANULFO DIGITAL SCREEN W OR WO CAD BILATERAL     Standing Status:   Future     Standing Expiration Date:   10/19/2022     Scheduling Instructions:      Dx Code  Z12.31     Order Specific Question:   Reason for exam:     Answer:   screening    XR ABDOMEN (KUB) (SINGLE AP VIEW)     Standing Status:   Future     Standing Expiration Date:   10/19/2022     Orders Placed This Encounter   Medications    albuterol sulfate  (90 Base) MCG/ACT inhaler     Sig: Inhale 2 puffs into the lungs every 6 hours as needed for Wheezing or Shortness of Breath     Dispense:  2 each     Refill:  1    clotrimazole-betamethasone (LOTRISONE) 1-0.05 % cream     Sig: Apply topically 2 times daily. Dispense:  15 g     Refill:  1       Patient given educationalmaterials - see patient instructions. Discussed use, benefit, and side effectsof prescribed medications. All patient questions answered. Pt voiced understanding. Reviewed health maintenance. Instructed to continue current medications, diet andexercise. Patient agreed with treatment plan. Follow up as directed.      Electronicallysigned by Rhina Murphy MD on 10/19/2021 at 12:13 PM

## 2021-11-04 ENCOUNTER — OFFICE VISIT (OUTPATIENT)
Dept: PRIMARY CARE CLINIC | Age: 66
End: 2021-11-04
Payer: COMMERCIAL

## 2021-11-04 VITALS
BODY MASS INDEX: 22.88 KG/M2 | SYSTOLIC BLOOD PRESSURE: 122 MMHG | HEART RATE: 84 BPM | WEIGHT: 134 LBS | DIASTOLIC BLOOD PRESSURE: 70 MMHG | OXYGEN SATURATION: 97 % | HEIGHT: 64 IN

## 2021-11-04 DIAGNOSIS — N76.3 CHRONIC VULVITIS: ICD-10-CM

## 2021-11-04 DIAGNOSIS — E53.8 B12 DEFICIENCY: ICD-10-CM

## 2021-11-04 DIAGNOSIS — G89.29 CHRONIC PAIN OF RIGHT KNEE: ICD-10-CM

## 2021-11-04 DIAGNOSIS — M25.561 CHRONIC PAIN OF RIGHT KNEE: ICD-10-CM

## 2021-11-04 DIAGNOSIS — Z01.419 ENCOUNTER FOR GYNECOLOGICAL EXAMINATION WITHOUT ABNORMAL FINDING: Primary | ICD-10-CM

## 2021-11-04 PROCEDURE — 96372 THER/PROPH/DIAG INJ SC/IM: CPT | Performed by: FAMILY MEDICINE

## 2021-11-04 PROCEDURE — S0612 ANNUAL GYNECOLOGICAL EXAMINA: HCPCS | Performed by: FAMILY MEDICINE

## 2021-11-04 RX ORDER — CLOTRIMAZOLE AND BETAMETHASONE DIPROPIONATE 10; .64 MG/G; MG/G
CREAM TOPICAL
Qty: 45 G | Refills: 1 | Status: SHIPPED | OUTPATIENT
Start: 2021-11-04 | End: 2021-11-22

## 2021-11-04 RX ORDER — CYANOCOBALAMIN 1000 UG/ML
1000 INJECTION INTRAMUSCULAR; SUBCUTANEOUS ONCE
Status: COMPLETED | OUTPATIENT
Start: 2021-11-04 | End: 2021-11-04

## 2021-11-04 RX ADMIN — CYANOCOBALAMIN 1000 MCG: 1000 INJECTION INTRAMUSCULAR; SUBCUTANEOUS at 11:01

## 2021-11-04 ASSESSMENT — ENCOUNTER SYMPTOMS: BACK PAIN: 1

## 2021-11-04 NOTE — PATIENT INSTRUCTIONS
Patient Education        Vaginal Prolapse: Care Instructions  Your Care Instructions     When the top of the vagina sags near or through the opening of the vagina, it is called vaginal prolapse. This may happen after surgery to remove the uterus. This is because the uterus no longer supports the vagina. This problem may cause you to leak urine or stool. Or you may have trouble passing urine or stool. You may feel pain during sex. Or you may feel pressure on your genitals. Medicine may help you feel better. You can also talk to your doctor about a device you put in your vagina. It may help with symptoms. Follow-up care is a key part of your treatment and safety. Be sure to make and go to all appointments, and call your doctor if you are having problems. It's also a good idea to know your test results and keep a list of the medicines you take. How can you care for yourself at home? · Do not do activities that put pressure on your pelvic muscles. This includes heavy lifting and straining. · Do exercises to tighten and strengthen your pelvic muscles. These are called Kegel exercises. To do them:  ? Squeeze the muscles you use to stop urine. Your belly and thighs should not move. ? Hold the squeeze for 3 seconds. Then relax for 3 seconds. ? Start with 3 seconds. Then add 1 second each week until you are able to squeeze for 10 seconds. ? Repeat this 10 to 15 times. Do these exercises 3 or more times a day. · Take an over-the-counter pain medicine, such as acetaminophen (Tylenol), ibuprofen (Advil, Motrin), or naproxen (Aleve), to relieve pain. Read and follow all instructions on the label. · Do not take two or more pain medicines at the same time unless the doctor told you to. Many pain medicines have acetaminophen, which is Tylenol. Too much acetaminophen (Tylenol) can be harmful. · Talk with your doctor about a vaginal pessary. This is a device that you put in your vagina to support it.  Your doctor can teach you how and when to remove it. You will also learn how to clean it and put it back in.  · If your doctor prescribes estrogen cream for your vagina, use it exactly as prescribed. · To relieve pressure on your vagina, lie down and put a pillow under your knees. Or you can lie on your side and bring your knees up to your chest.  · If you are overweight, talk to your doctor about safe ways to lose weight. When should you call for help? Watch closely for changes in your health, and be sure to contact your doctor if:    · You have new urinary symptoms. These may include leaking urine, having pain when urinating, or feeling like you need to urinate often.     · You have trouble passing stool.     · You have pain or a feeling of fullness in your vagina.     · You do not get better as expected. Where can you learn more? Go to https://TOA TechnologiespeEUDOWEBeb.Jalousier. org and sign in to your Loopback account. Enter Q231 in the Talenz box to learn more about \"Vaginal Prolapse: Care Instructions. \"     If you do not have an account, please click on the \"Sign Up Now\" link. Current as of: February 11, 2021               Content Version: 13.0  © 2006-2021 [employer]. Care instructions adapted under license by [facility] ([facility]). If you have questions about a medical condition or this instruction, always ask your healthcare professional. [place] [address] any warranty or liability for your use of this information. Patient Education        Pessary: Care Instructions  Overview     A pessary is a device that fits into your vagina and supports the pelvic organs. It may be used if a pelvic organ sags or moves out of its normal position (prolapse). For some women, wearing a pessary means that they may not need to have surgery to fix a prolapse. A pessary also may help a woman who has trouble controlling her urine (incontinence).  Or it may be used during a pregnancy to hold the uterus in place. There are many sizes and types of pessaries. Which type you use depends on the problem you have. Your doctor will make sure the pessary is just right for you. You may need to try different kinds to find the best fit. The pessary should hold the pelvic organ in place without causing pain or pressure. You may be able to have sex with your pessary in place. It depends on the type of pessary and your comfort level. Follow-up care is a key part of your treatment and safety. Be sure to make and go to all appointments, and call your doctor if you are having problems. It's also a good idea to know your test results and keep a list of the medicines you take. How can you care for yourself at home? · If you get a vaginal discharge while you have a pessary, talk to your doctor about ways to reduce the discharge and smell. A pessary, in some cases, rubs the vagina and may cause irritation and discharge. If your vagina feels sore, talk to your doctor about a cream or gel to protect the vagina. · Follow your doctor's advice on how long you can wear your pessary before it needs to be cleaned. You may be able to remove and clean it yourself. Or your doctor may want to do this during an office visit. · If you clean your pessary, wash it with mild soap and water. Follow your doctor's advice on inserting the pessary. · Do not douche or use a vaginal wash unless your doctor tells you to do so. · Do not smoke. Smoking can cause a cough, which makes a prolapse worse. If you need help quitting, talk to your doctor about stop-smoking programs and medicines. These can increase your chances of quitting for good. To help support your pelvic organs  · Avoid activities that put pressure on your pelvic muscles, such as heavy lifting. · Do pelvic floor (Kegel) exercises, which tighten and strengthen pelvic muscles. To do Kegel exercises:  ? Squeeze the same muscles you would use to stop your urine.  Your belly and thighs to all appointments, and call your doctor if you are having problems. It's also a good idea to know your test results and keep a list of the medicines you take. How can you care for yourself at home? · Get screening tests that you and your doctor decide on. Screening helps find diseases before any symptoms appear. · Eat healthy foods. Choose fruits, vegetables, whole grains, protein, and low-fat dairy foods. Limit fat, especially saturated fat. Reduce salt in your diet. · Limit alcohol. If you are a man, have no more than 2 drinks a day or 14 drinks a week. If you are a woman, have no more than 1 drink a day or 7 drinks a week. Since alcohol affects older adults differently, you may want to limit alcohol even more. Or you may not want to drink at all. · Get at least 30 minutes of exercise on most days of the week. Walking is a good choice. You also may want to do other activities, such as running, swimming, cycling, or playing tennis or team sports. · Reach and stay at a healthy weight. This will lower your risk for many problems, such as obesity, diabetes, heart disease, and high blood pressure. · Do not smoke. Smoking can make health problems worse. If you need help quitting, talk to your doctor about stop-smoking programs and medicines. These can increase your chances of quitting for good. · Care for your mental health. It is easy to get weighed down by worry and stress. Learn strategies to manage stress, like deep breathing and mindfulness, and stay connected with your family and community. If you find you often feel sad or hopeless, talk with your doctor. Treatment can help. · Talk to your doctor about whether you have any risk factors for sexually transmitted infections (STIs). You can help prevent STIs if you wait to have sex with a new partner (or partners) until you've each been tested for STIs.  It also helps if you use condoms (male or female condoms) and if you limit your sex partners to one person who only has sex with you. Vaccines are available for some STIs. · If you think you may have a problem with alcohol or drug use, talk to your doctor. This includes prescription medicines (such as amphetamines and opioids) and illegal drugs (such as cocaine and methamphetamine). Your doctor can help you figure out what type of treatment is best for you. · Protect your skin from too much sun. When you're outdoors from 10 a.m. to 4 p.m., stay in the shade or cover up with clothing and a hat with a wide brim. Wear sunglasses that block UV rays. Even when it's cloudy, put broad-spectrum sunscreen (SPF 30 or higher) on any exposed skin. · See a dentist one or two times a year for checkups and to have your teeth cleaned. · Wear a seat belt in the car. When should you call for help? Watch closely for changes in your health, and be sure to contact your doctor if you have any problems or symptoms that concern you. Where can you learn more? Go to https://FusionAdspeTablo Publishing.Verold. org and sign in to your TeachTown account. Enter K553 in the Overture Services box to learn more about \"Well Visit, Over 65: Care Instructions. \"     If you do not have an account, please click on the \"Sign Up Now\" link. Current as of: February 11, 2021               Content Version: 13.0  © 8791-6322 [employer]. Care instructions adapted under license by [facility] ([facility]). If you have questions about a medical condition or this instruction, always ask your healthcare professional. [address] any warranty or liability for your use of this information.

## 2021-11-04 NOTE — PROGRESS NOTES
fractured kneecap     RIGHT    History of malignant neoplasm of thyroid 04/17/2012    History of squamous cell carcinoma in situ of skin 06/09/2014    Chart states left wrist, but patient denies    Hypoparathyroidism ([address] 07/03/2012    IBS (irritable bowel syndrome)     diarrhea-prominent type    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  Neoplasm of unspecified nature of bone, soft tissue, and skin     New lesions of right dorsum of hand (healed after Efudex) and lesion of right anterior thigh.  Osteoarthritis     Osteoporosis     beginning     PONV (postoperative nausea and vomiting)     Postprocedural hypothyroidism 07/16/2018    Prolonged emergence from general anesthesia     Pt states this was \"a long time ago\"     Raynaud's disease     Seasonal allergies     Sensorineural hearing loss, bilateral 12/26/2012    Squamous cell carcinoma of upper extremity 04/08/2013    Scc in situ rt elbow 1/11    Thrombocytopenia ([address]     Thyroid disease     Thyroid Ca Hx. - thyroid was removed.      Vulvar dystrophy     Wears hearing aid     Wears partial dentures     LOWER     Past Surgical History:   Procedure Laterality Date    APPENDECTOMY  05/11/2021    BREAST SURGERY      Cyst removed rt. nipple     CATARACT REMOVAL Right 07/17/2019    Right eye    CHOLECYSTECTOMY  2011    COLONOSCOPY      COLONOSCOPY N/A 05/10/2021    COLONOSCOPY DIAGNOSTIC performed by Jed Dixon MD at [address] Bilateral     Tendonitis X2    GASTRIC FUNDOPLICATION      \"Chronic appearing postsurgical change to the stomach suggestive of prior Nissen fundoplication\"- noted per CT ABD/pelvis 4-16-21   [facility] HERNIA REPAIR  2005 hiatal hernia repair    HYSTERECTOMY, TOTAL ABDOMINAL  92-97    [facility] 3 Surgeries    KNEE SURGERY Right 1996    W/PINS    LAPAROSCOPY N/A 5/11/2021    ATTEMPTED APPENDECTOMY LAPAROSCOPIC ROBOTIC CONVERTED TO , DIAGNOSTIC LAPARASCOPY performed by Leisa Guerra MD at [address] 5/11/2021    LAPAROTOMY LYSIS OF ADHESIONS -OPEN APPENDECTOMY, LYSIS OF SMALL BOWEL ADHESIONS FOR 75 MINUTES performed by Leisa Guerra MD at [address]  2011    rt elbow-scc, rt shoulder-keratosis, lft leg skin with excoriation    NASAL POLYP SURGERY  2012    X2 in [facility] Right     PINCHED [address] [facility] SKIN BIOPSY  2012    lft leg keratosis    SKIN BIOPSY  2009    rt forearm, under brst, lft leg-keratosis    SKIN BIOPSY  2008    back and rt forearm-keratosis, abdomen-hemangioma    SKIN BIOPSY  2002    rt breast, lft arm, lft brst-keratosis    SKIN CANCER EXCISION      THYROIDECTOMY  1985    Thyroid Cancer    TONSILLECTOMY AND ADENOIDECTOMY      UPPER GASTROINTESTINAL ENDOSCOPY      Noted per Care Everywhere       Family History   Problem Relation Age of Onset    Other Mother         pneumonia    Coronary Art Dis Mother     Liver Cancer Father         age 80    Coronary Art Dis Father     Liver Cancer Brother 59    Colon Cancer Brother         \"Bowel and liver CA\"    Diabetes Other         Father's side    Thyroid Disease Other         Mom's side       Social History     Tobacco Use    Smoking status: Never Smoker    Smokeless tobacco: Never Used   Substance Use Topics    Alcohol use: No      Current Outpatient Medications   Medication Sig Dispense Refill    clotrimazole-betamethasone (LOTRISONE) 1-0.05 % cream Apply topically 2 times daily vulva area 45 g 1    VIBERZI 75 MG TABS TAKE 75 MG BY MOUTH DAILY FOR 30 DAYS.       albuterol sulfate  (90 Base) MCG/ACT inhaler Inhale 2 puffs into the lungs every 6 hours as needed for Wheezing or Shortness of Breath 2 each 1    clotrimazole-betamethasone (LOTRISONE) 1-0.05 % cream Apply topically 2 times daily. 15 g 1    levothyroxine (SYNTHROID) 50 MCG tablet levothyroxine 50 mcg tablet  Take 3 tablets daily. 270 tablet 1    calcium carbonate 648 MG TABS Take 2 tablets by mouth 3 times daily 180 tablet 2     No current facility-administered medications for this visit. Allergies   Allergen Reactions    Aspirin Anaphylaxis and Shortness Of Breath    Cefadroxil Shortness Of Breath    Xmhbcvyf-Caayyai-Fzzekm [Fluocinolone] Shortness Of Breath    Ciprofloxacin Hcl Shortness Of Breath    Codeine Shortness Of Breath    Demerol Hcl [Meperidine] Shortness Of Breath    Doxycycline Nausea Only and Other (See Comments)    Glucosamine Shortness Of Breath, Diarrhea and Swelling     Other reaction(s): Respiratory Difficulty  \"lips swell up and get severe diarrhea\" - INCLUDES IODINE    Iodine Hives, Shortness Of Breath and Itching    Pcn [Penicillins] Anaphylaxis and Shortness Of Breath    Red Dye Shortness Of Breath    Shellfish-Derived Products Shortness Of Breath, Diarrhea and Swelling     \"lips swell up and get severe diarrhea\" - INCLUDES IODINE    Sulfa Antibiotics Hives, Shortness Of Breath, Itching and Rash     OK in small amounts, but larger amounts cause \"shortness of breath. \"    Yellow Dye Shortness Of Breath     Other reaction(s): Respiratory Difficulty    Yellow Dyes (Non-Tartrazine) Shortness Of Breath    Gadolinium Rash     Moderate allergic-like reaction consisting of diffuse urticaria to ProHance gadolinium-containing contrast material    Gadolinium Derivatives Rash     Moderate allergic-like reaction consisting of diffuse urticaria to ProHance gadolinium-containing contrast material    Lac Bovis     Midazolam Nausea Only     Versed caused bad nausea.     Iodides Hives    Adhesive Tape Rash       Health Maintenance   Topic Date Due  DEXA (modify frequency per FRAX score)  Never done    Pneumococcal 65+ years Vaccine (1 of 1 - PPSV23) Never done    Annual Wellness Visit (AWV)  Never done    Breast cancer screen  08/20/2021    DTaP/Tdap/Td vaccine (1 - Tdap) 04/28/2022 (Originally 10/3/1972)    Flu vaccine (1) 10/19/2022 (Originally 9/1/2021)    Shingles Vaccine (1 of 2) 04/28/2026 (Originally 10/3/2003)    TSH testing  07/21/2022    Lipid screen  12/22/2025    Colon cancer screen colonoscopy  05/10/2031    COVID-19 Vaccine  Completed    Hepatitis C screen  Completed    Hepatitis A vaccine  Aged Out    Hepatitis B vaccine  Aged Out    Hib vaccine  Aged Out    Meningococcal (ACWY) vaccine  Aged Out       Subjective:     Review of Systems   Constitutional: Positive for fatigue. Cardiovascular:        She sees cardiologist soon. No palpitations   Musculoskeletal: Positive for arthralgias and back pain. Objective:     /70   Pulse 84   Ht 5' 4\" (1.626 m)   Wt 134 lb (60.8 kg)   SpO2 97%   BMI 23.00 kg/m²   Physical Exam  Vitals and nursing note reviewed. Constitutional:       Appearance: Normal appearance. HENT:      Head: Normocephalic and atraumatic. Pulmonary:      Effort: No respiratory distress. Genitourinary:     Vagina: No vaginal discharge. Comments: No cervix present on her speculum exam.  Vagina has decreased rugae and is dry. Scant urine present. Urethra slightly red. Labia minora and vaginal introitus red, slightly swollen, cracking skin at the posterior edge of the vaginal introitus. Musculoskeletal:      Right lower leg: No edema. Left lower leg: No edema. Skin:     General: Skin is warm. Findings: Rash present. Neurological:      Mental Status: She is alert and oriented to person, place, and time. Psychiatric:         Mood and Affect: Mood normal.         Behavior: Behavior normal.         Thought Content:  Thought content normal.         Assessment:       Diagnosis Orders   1. Encounter for gynecological examination without abnormal finding     2. B12 deficiency  cyanocobalamin injection 1,000 mcg   3. Chronic pain of right knee  Alis White DO, Orthopedic Surgery, [place]   4. Chronic vulvitis  clotrimazole-betamethasone (LOTRISONE) 1-0.05 % cream        Plan:      No follow-ups on file. Soy milk BID and estroven tablets for hot flashes. Come in for back and leg pain and fatigue. Lotrisone cream for vulvitis. See orthopedics regarding the knee pain. Orders Placed This Encounter   Procedures    Alis White DO, Orthopedic Surgery, [place]     Referral Priority:   Routine     Referral Type:   Eval and Treat     Referral Reason:   Specialty Services Required     Referred to Provider:   Natalie Olson DO     Requested Specialty:   Orthopedic Surgery     Number of Visits Requested:   1     Orders Placed This Encounter   Medications    cyanocobalamin injection 1,000 mcg    clotrimazole-betamethasone (LOTRISONE) 1-0.05 % cream     Sig: Apply topically 2 times daily vulva area     Dispense:  45 g     Refill:  1       Patient given educational materials - see patient instructions. Discussed use, benefit, and side effects of prescribed medications. All patient questions answered. Pt voiced understanding. Reviewed health maintenance. Instructed to continue current medications, diet . Patient agreed with treatment plan. Follow up as directed.      Electronicallysigned by Anselmo Goodwin MD on 11/4/2021 at 12:04 PM

## 2021-11-15 ENCOUNTER — TELEPHONE (OUTPATIENT)
Dept: PRIMARY CARE CLINIC | Age: 66
End: 2021-11-15

## 2021-11-15 RX ORDER — ALBUTEROL SULFATE 90 UG/1
2 AEROSOL, METERED RESPIRATORY (INHALATION) EVERY 6 HOURS PRN
Qty: 2 EACH | Refills: 1 | Status: SHIPPED | OUTPATIENT
Start: 2021-11-15 | End: 2022-03-31 | Stop reason: SDUPTHER

## 2021-11-15 RX ORDER — ALBUTEROL SULFATE 90 UG/1
2 AEROSOL, METERED RESPIRATORY (INHALATION) EVERY 6 HOURS PRN
Qty: 2 EACH | Refills: 1 | OUTPATIENT
Start: 2021-11-15

## 2021-11-17 ENCOUNTER — HOSPITAL ENCOUNTER (OUTPATIENT)
Dept: WOMENS IMAGING | Age: 66
Discharge: HOME | End: 2021-11-19
Payer: COMMERCIAL

## 2021-11-17 DIAGNOSIS — Z12.31 BREAST CANCER SCREENING BY MAMMOGRAM: ICD-10-CM

## 2021-11-17 PROCEDURE — 77067 SCR MAMMO BI INCL CAD: CPT

## 2021-11-22 ENCOUNTER — OFFICE VISIT (OUTPATIENT)
Dept: PRIMARY CARE CLINIC | Age: 66
End: 2021-11-22
Payer: COMMERCIAL

## 2021-11-22 VITALS
HEIGHT: 64 IN | DIASTOLIC BLOOD PRESSURE: 82 MMHG | HEART RATE: 89 BPM | SYSTOLIC BLOOD PRESSURE: 136 MMHG | WEIGHT: 137.4 LBS | OXYGEN SATURATION: 98 % | BODY MASS INDEX: 23.46 KG/M2

## 2021-11-22 DIAGNOSIS — N95.1 HOT FLASHES DUE TO MENOPAUSE: Primary | ICD-10-CM

## 2021-11-22 DIAGNOSIS — M19.90 ARTHRITIS: ICD-10-CM

## 2021-11-22 PROCEDURE — G8427 DOCREV CUR MEDS BY ELIG CLIN: HCPCS | Performed by: FAMILY MEDICINE

## 2021-11-22 PROCEDURE — 4040F PNEUMOC VAC/ADMIN/RCVD: CPT | Performed by: FAMILY MEDICINE

## 2021-11-22 PROCEDURE — 3017F COLORECTAL CA SCREEN DOC REV: CPT | Performed by: FAMILY MEDICINE

## 2021-11-22 PROCEDURE — 1090F PRES/ABSN URINE INCON ASSESS: CPT | Performed by: FAMILY MEDICINE

## 2021-11-22 PROCEDURE — G8400 PT W/DXA NO RESULTS DOC: HCPCS | Performed by: FAMILY MEDICINE

## 2021-11-22 PROCEDURE — G8420 CALC BMI NORM PARAMETERS: HCPCS | Performed by: FAMILY MEDICINE

## 2021-11-22 PROCEDURE — 1123F ACP DISCUSS/DSCN MKR DOCD: CPT | Performed by: FAMILY MEDICINE

## 2021-11-22 PROCEDURE — 99214 OFFICE O/P EST MOD 30 MIN: CPT | Performed by: FAMILY MEDICINE

## 2021-11-22 PROCEDURE — 1036F TOBACCO NON-USER: CPT | Performed by: FAMILY MEDICINE

## 2021-11-22 PROCEDURE — G8484 FLU IMMUNIZE NO ADMIN: HCPCS | Performed by: FAMILY MEDICINE

## 2021-11-22 RX ORDER — CONJUGATED ESTROGENS 0.62 MG/G
0.5 CREAM VAGINAL
Qty: 30 G | Refills: 3 | Status: SHIPPED | OUTPATIENT
Start: 2021-11-22 | End: 2021-11-22

## 2021-11-22 ASSESSMENT — ENCOUNTER SYMPTOMS: BACK PAIN: 1

## 2021-11-22 NOTE — PROGRESS NOTES
[facility] PRIMARY CARE  [address]  [facility] [address]  Dept: [phone]    Benjamin Mccauley is a 74 y.o. female Established patient, who presents today for her medical conditions/complaintsas noted below. Chief Complaint   Patient presents with    Leg Pain    Back Pain    Fatigue    Other     pt wants progesterone cream       HPI:     HPI    Seeing Dr Mir Haynes for knee pain  ( ortho)  Right more than left  Hx of right knee fracture. 1986. Has 4 pins    Left knee: pain, no hx of injuries. xrays of knees and hips reviewed. Had cortisone injection treatment in knees. Lower back pain 2019 xray    Sees Dr William Garay for podiatrist.  Where pad in shoes. Had  cancer, thyroid removed. 1987 had radiative iodine treatment. She thinks a lot of her joint and back pain is from her radioactive iodine treatment back then.     Reviewed prior notes None  Reviewed previous Labs and Imaging    For hot flashes she is interested in progesterone cream, her friend has it that she puts it on her wrist.  LDL Cholesterol (mg/dL)   Date Value   12/22/2020 72   04/28/2020 106       (goal LDL is <100)   AST (U/L)   Date Value   09/04/2020 20     ALT (U/L)   Date Value   09/04/2020 16     BUN (mg/dL)   Date Value   05/15/2021 13     TSH (mIU/L)   Date Value   07/21/2021 3.41     BP Readings from Last 3 Encounters:   11/22/21 136/82   11/04/21 122/70   10/19/21 122/70          (goal 120/80)    Past Medical History:   Diagnosis Date    Abdominal pain     RLQ    Abnormal computed tomography of abdomen and pelvis 04/16/2021    Distal appendix is mildly thickened without significant periappendiceal inflammatory change    Acquired von Willebrand's disease ([address] 10/30/2018    Allergy to food dye     yellow and red dyes    Anemia     Asthma     Basal cell carcinoma of upper lip 06/22/2017    Benign paroxysmal positional vertigo 12/09/2013    Cancer ([address]     Thyroid and skin  Cardiac murmur     STATES SINCE CHILD BILLY- STATES SHE WAS BORN WITH A \"90 DEGREE ANGLE VALVE\"    Chest pain 09/04/2020    Cobalamin deficiency 12/26/2012    Diverticulitis     Diverticulosis     Gastroesophageal reflux disease 12/26/2012    Gastroparesis     Hiatal hernia     History of fractured kneecap     RIGHT    History of malignant neoplasm of thyroid 04/17/2012    History of squamous cell carcinoma in situ of skin 06/09/2014    Chart states left wrist, but patient denies    Hypoparathyroidism ([address] 07/03/2012    IBS (irritable bowel syndrome)     diarrhea-prominent type    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  Neoplasm of unspecified nature of bone, soft tissue, and skin     New lesions of right dorsum of hand (healed after Efudex) and lesion of right anterior thigh.  Osteoarthritis     Osteoporosis     beginning     PONV (postoperative nausea and vomiting)     Postprocedural hypothyroidism 07/16/2018    Prolonged emergence from general anesthesia     Pt states this was \"a long time ago\"     Raynaud's disease     Seasonal allergies     Sensorineural hearing loss, bilateral 12/26/2012    Squamous cell carcinoma of upper extremity 04/08/2013    Scc in situ rt elbow 1/11    Thrombocytopenia ([address]     Thyroid disease     Thyroid Ca Hx. - thyroid was removed.      Vulvar dystrophy     Wears hearing aid     Wears partial dentures     LOWER      Past Surgical History:   Procedure Laterality Date    APPENDECTOMY  05/11/2021    BREAST SURGERY      Cyst removed rt. nipple     CATARACT REMOVAL Right 07/17/2019    Right eye    CHOLECYSTECTOMY  2011    COLONOSCOPY      COLONOSCOPY N/A 05/10/2021 COLONOSCOPY DIAGNOSTIC performed by Lauro Macario MD at [address] Bilateral     Tendonitis X2    GASTRIC FUNDOPLICATION      \"Chronic appearing postsurgical change to the stomach suggestive of prior Nissen fundoplication\"- noted per CT ABD/pelvis 4-16-21    HERNIA REPAIR  2005    hiatal hernia repair    HYSTERECTOMY, TOTAL ABDOMINAL  92-97    [facility] 3 Surgeries    KNEE SURGERY Right 1996    W/PINS    LAPAROSCOPY N/A 5/11/2021    ATTEMPTED APPENDECTOMY LAPAROSCOPIC ROBOTIC CONVERTED TO , DIAGNOSTIC LAPARASCOPY performed by Lauro Macario MD at [address] [facility] 5/11/2021    LAPAROTOMY LYSIS OF ADHESIONS -OPEN APPENDECTOMY, LYSIS OF SMALL BOWEL ADHESIONS FOR 75 MINUTES performed by Lauro Macario MD at [facility] [address]  2011    rt elbow-scc, rt shoulder-keratosis, lft leg skin with excoriation    NASAL POLYP SURGERY  2012    X2 in [facility] Right     PINCHED [address] [facility] SKIN BIOPSY  2012    lft leg keratosis    SKIN BIOPSY  2009    rt forearm, under brst, lft leg-keratosis    SKIN BIOPSY  2008    back and rt forearm-keratosis, abdomen-hemangioma    SKIN BIOPSY  2002    rt breast, lft arm, lft brst-keratosis    SKIN CANCER EXCISION      THYROIDECTOMY  1985    Thyroid Cancer    TONSILLECTOMY AND ADENOIDECTOMY      UPPER GASTROINTESTINAL ENDOSCOPY      Noted per Care Everywhere       Family History   Problem Relation Age of Onset    Other Mother         pneumonia    Coronary Art Dis Mother     Liver Cancer Father         age 80    Coronary Art Dis Father     Liver Cancer Brother 59    Colon Cancer Brother         \"Bowel and liver CA\"    Diabetes Other         Father's side    Thyroid Disease Other         Mom's side       Social History     Tobacco Use    Smoking status: Never Smoker    Smokeless tobacco: Never Used   Substance Use Topics    Alcohol use: No      Current diffuse urticaria to ProHance gadolinium-containing contrast material    Gadolinium Derivatives Rash     Moderate allergic-like reaction consisting of diffuse urticaria to ProHance gadolinium-containing contrast material    Lac Bovis     Midazolam Nausea Only     Versed caused bad nausea.  Iodides Hives    Adhesive Tape Rash       Health Maintenance   Topic Date Due    DEXA (modify frequency per FRAX score)  Never done    Pneumococcal 65+ years Vaccine (1 of 1 - PPSV23) Never done   [employer] Visit (AWV)  Never done    COVID-19 Vaccine (3 - Booster for Moderna series) 10/13/2021    DTaP/Tdap/Td vaccine (1 - Tdap) 04/28/2022 (Originally 10/3/1972)    Flu vaccine (1) 10/19/2022 (Originally 9/1/2021)    Shingles Vaccine (1 of 2) 04/28/2026 (Originally 10/3/2003)    TSH testing  07/21/2022    Breast cancer screen  11/17/2023    Lipid screen  12/22/2025    Colon cancer screen colonoscopy  05/10/2031    Hepatitis C screen  Completed    Hepatitis A vaccine  Aged Out    Hepatitis B vaccine  Aged Out    Hib vaccine  Aged Out    Meningococcal (ACWY) vaccine  Aged Out       Subjective:      Review of Systems   Musculoskeletal: Positive for arthralgias and back pain. Abdomen wall: hernia and pain. increased stress: spouse sometimes verbal and he is having some memory issues. Objective:     /82   Pulse 89   Ht 5' 4\" (1.626 m)   Wt 137 lb 6.4 oz (62.3 kg)   SpO2 98%   BMI 23.58 kg/m²   Physical Exam  Musculoskeletal:      Comments: Scoliosis  Lumbar ROM limited with pain in sacrum and left side of sacrum     Neurological:      Deep Tendon Reflexes:      Reflex Scores:       Brachioradialis reflexes are 0 on the right side. Patellar reflexes are 0 on the right side and 0 on the left side. Achilles reflexes are 0 on the right side and 0 on the left side. Assessment:       Diagnosis Orders   1.  Hot flashes due to menopause  conjugated estrogens (PREMARIN) 0.625

## 2021-11-22 NOTE — PATIENT INSTRUCTIONS
Patient Education        Back Stretches: Exercises  Introduction  Here are some examples of exercises for stretching your back. Start each exercise slowly. Ease off the exercise if you start to have pain. Your doctor or physical therapist will tell you when you can start these exercises and which ones will work best for you. How to do the exercises  Overhead stretch    1. Stand comfortably with your feet shoulder-width apart. 2. Looking straight ahead, raise both arms over your head and reach toward the ceiling. Do not allow your head to tilt back. 3. Hold for 15 to 30 seconds, then lower your arms to your sides. 4. Repeat 2 to 4 times. Side stretch    1. Stand comfortably with your feet shoulder-width apart. 2. Raise one arm over your head, and then lean to the other side. 3. Slide your hand down your leg as you let the weight of your arm gently stretch your side muscles. Hold for 15 to 30 seconds. 4. Repeat 2 to 4 times on each side. Press-up    1. Lie on your stomach, supporting your body with your forearms. 2. Press your elbows down into the floor to raise your upper back. As you do this, relax your stomach muscles and allow your back to arch without using your back muscles. As your press up, do not let your hips or pelvis come off the floor. 3. Hold for 15 to 30 seconds, then relax. 4. Repeat 2 to 4 times. Relax and rest    1. Lie on your back with a rolled towel under your neck and a pillow under your knees. Extend your arms comfortably to your sides. 2. Relax and breathe normally. 3. Remain in this position for about 10 minutes. 4. If you can, do this 2 or 3 times each day. Follow-up care is a key part of your treatment and safety. Be sure to make and go to all appointments, and call your doctor if you are having problems. It's also a good idea to know your test results and keep a list of the medicines you take. Where can you learn more? Go to https://liueb.[employer]CoinHoldings. org and sign in to your [employer] account. Enter Q383 in the [employer] box to learn more about \"Back Stretches: Exercises. \"     If you do not have an account, please click on the \"Sign Up Now\" link. Current as of: July 1, 2021               Content Version: 13.0  © 2006-2021 Healthwise, Incorporated. Care instructions adapted under license by [facility] If you have questions about a medical condition or this instruction, always ask your healthcare professional. [place] [address] any warranty or liability for your use of this information. Patient Education        Low Back Pain: Exercises  Introduction  Here are some examples of exercises for you to try. The exercises may be suggested for a condition or for rehabilitation. Start each exercise slowly. Ease off the exercises if you start to have pain. You will be told when to start these exercises and which ones will work best for you. How to do the exercises  Press-up    1. Lie on your stomach, supporting your body with your forearms. 2. Press your elbows down into the floor to raise your upper back. As you do this, relax your stomach muscles and allow your back to arch without using your back muscles. As your press up, do not let your hips or pelvis come off the floor. 3. Hold for 15 to 30 seconds, then relax. 4. Repeat 2 to 4 times. Alternate arm and leg (bird dog) exercise    Do this exercise slowly. Try to keep your body straight at all times, and do not let one hip drop lower than the other. 1. Start on the floor, on your hands and knees. 2. Tighten your belly muscles. 3. Raise one leg off the floor, and hold it straight out behind you. Be careful not to let your hip drop down, because that will twist your trunk. 4. Hold for about 6 seconds, then lower your leg and switch to the other leg. 5. Repeat 8 to 12 times on each leg. 6. Over time, work up to holding for 10 to 30 seconds each time.   7. If you feel stable and secure with your leg raised, try raising the opposite arm straight out in front of you at the same time. Knee-to-chest exercise    1. Lie on your back with your knees bent and your feet flat on the floor. 2. Bring one knee to your chest, keeping the other foot flat on the floor (or keeping the other leg straight, whichever feels better on your lower back). 3. Keep your lower back pressed to the floor. Hold for at least 15 to 30 seconds. 4. Relax, and lower the knee to the starting position. 5. Repeat with the other leg. Repeat 2 to 4 times with each leg. 6. To get more stretch, put your other leg flat on the floor while pulling your knee to your chest.  Curl-ups    1. Lie on the floor on your back with your knees bent at a 90-degree angle. Your feet should be flat on the floor, about 12 inches from your buttocks. 2. Cross your arms over your chest. If this bothers your neck, try putting your hands behind your neck (not your head), with your elbows spread apart. 3. Slowly tighten your belly muscles and raise your shoulder blades off the floor. 4. Keep your head in line with your body, and do not press your chin to your chest.  5. Hold this position for 1 or 2 seconds, then slowly lower yourself back down to the floor. 6. Repeat 8 to 12 times. Pelvic tilt exercise    1. Lie on your back with your knees bent. 2. \"Brace\" your stomach. This means to tighten your muscles by pulling in and imagining your belly button moving toward your spine. You should feel like your back is pressing to the floor and your hips and pelvis are rocking back. 3. Hold for about 6 seconds while you breathe smoothly. 4. Repeat 8 to 12 times. Heel dig bridging    1. Lie on your back with both knees bent and your ankles bent so that only your heels are digging into the floor. Your knees should be bent about 90 degrees.   2. Then push your heels into the floor, squeeze your buttocks, and lift your hips off the floor until your shoulders, hips, and knees are all in a straight line. 3. Hold for about 6 seconds as you continue to breathe normally, and then slowly lower your hips back down to the floor and rest for up to 10 seconds. 4. Do 8 to 12 repetitions. Hamstring stretch in doorway    1. Lie on your back in a doorway, with one leg through the open door. 2. Slide your leg up the wall to straighten your knee. You should feel a gentle stretch down the back of your leg. 3. Hold the stretch for at least 15 to 30 seconds. Do not arch your back, point your toes, or bend either knee. Keep one heel touching the floor and the other heel touching the wall. 4. Repeat with your other leg. 5. Do 2 to 4 times for each leg. Hip flexor stretch    1. Kneel on the floor with one knee bent and one leg behind you. Place your forward knee over your foot. Keep your other knee touching the floor. 2. Slowly push your hips forward until you feel a stretch in the upper thigh of your rear leg. 3. Hold the stretch for at least 15 to 30 seconds. Repeat with your other leg. 4. Do 2 to 4 times on each side. Wall sit    1. Stand with your back 10 to 12 inches away from a wall. 2. Lean into the wall until your back is flat against it. 3. Slowly slide down until your knees are slightly bent, pressing your lower back into the wall. 4. Hold for about 6 seconds, then slide back up the wall. 5. Repeat 8 to 12 times. Follow-up care is a key part of your treatment and safety. Be sure to make and go to all appointments, and call your doctor if you are having problems. It's also a good idea to know your test results and keep a list of the medicines you take. Where can you learn more? Go to https://[employer].Skoovy. org and sign in to your [employer] account. Enter A349 in the [employer] box to learn more about \"Low Back Pain: Exercises. \"     If you do not have an account, please click on the \"Sign Up Now\" link.   Current as of: July 1, 2021               Content Version: 13.0  © 3621-2645 Healthwise, Incorporated. Care instructions adapted under license by [facility] ([facility]). If you have questions about a medical condition or this instruction, always ask your healthcare professional. [address] any warranty or liability for your use of this information.

## 2021-11-30 ENCOUNTER — TELEPHONE (OUTPATIENT)
Dept: PRIMARY CARE CLINIC | Age: 66
End: 2021-11-30

## 2021-12-01 ENCOUNTER — OFFICE VISIT (OUTPATIENT)
Dept: ORTHOPEDIC SURGERY | Age: 66
End: 2021-12-01
Payer: COMMERCIAL

## 2021-12-01 VITALS — RESPIRATION RATE: 12 BRPM | BODY MASS INDEX: 23.39 KG/M2 | HEIGHT: 64 IN | WEIGHT: 137 LBS

## 2021-12-01 DIAGNOSIS — M25.561 RIGHT KNEE PAIN, UNSPECIFIED CHRONICITY: Primary | ICD-10-CM

## 2021-12-01 DIAGNOSIS — M25.562 LEFT KNEE PAIN, UNSPECIFIED CHRONICITY: ICD-10-CM

## 2021-12-01 PROCEDURE — 99204 OFFICE O/P NEW MOD 45 MIN: CPT | Performed by: ORTHOPAEDIC SURGERY

## 2021-12-01 RX ORDER — METHYLPREDNISOLONE 4 MG/1
TABLET ORAL
Qty: 1 KIT | Refills: 0 | Status: CANCELLED | OUTPATIENT
Start: 2021-12-01 | End: 2021-12-07

## 2021-12-01 RX ORDER — PREDNISONE 1 MG/1
5 TABLET ORAL DAILY
Qty: 7 TABLET | Refills: 0 | Status: SHIPPED | OUTPATIENT
Start: 2021-12-01 | End: 2021-12-08

## 2021-12-01 ASSESSMENT — ENCOUNTER SYMPTOMS
COUGH: 0
VOMITING: 0
CHEST TIGHTNESS: 0
APNEA: 0
ABDOMINAL PAIN: 0

## 2021-12-01 NOTE — PROGRESS NOTES
[facility]  [address] [facility] [address]  Dept: [phone]    Ambulatory Orthopedic Consult      CHIEF COMPLAINT:    Chief Complaint   Patient presents with    Knee Pain     bilateral knee pain left worse       HISTORY OF PRESENT ILLNESS:      The patient is a 74 y.o. female who is being seen at the request of  Rhina Murphy MD for consultation and evaluation of bilateral knee pain. The patient reports that she starting have bilateral knee pain 2 weeks ago after prolonged walking. She reports that the left knee hurts worse than the right knee. The patient denies any injuries to the knees. The patient has pain with weightbearing and often has to sit down to alleviate her pain. The patient cannot take oral antiinflammatories due to many allergies and intolerances to medications.          Past Medical History:    Past Medical History:   Diagnosis Date    Abdominal pain     RLQ    Abnormal computed tomography of abdomen and pelvis 04/16/2021    Distal appendix is mildly thickened without significant periappendiceal inflammatory change    Acquired von Willebrand's disease ([facility] 10/30/2018    Allergy to food dye     yellow and red dyes    Anemia     Asthma     Basal cell carcinoma of upper lip 06/22/2017    Benign paroxysmal positional vertigo 12/09/2013    Cancer (HCC)     Thyroid and skin    Cardiac murmur     STATES SINCE CHILD BILLY- STATES SHE WAS BORN WITH A \"90 DEGREE ANGLE VALVE\"    Chest pain 09/04/2020    Cobalamin deficiency 12/26/2012    Diverticulitis     Diverticulosis     Gastroesophageal reflux disease 12/26/2012    Gastroparesis     Hiatal hernia     History of fractured kneecap     RIGHT    History of malignant neoplasm of thyroid 04/17/2012    History of squamous cell carcinoma in situ of skin 06/09/2014    Chart states left wrist, but patient denies    Hypoparathyroidism ([address] 07/03/2012    IBS (irritable bowel syndrome)     diarrhea-prominent type    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  Neoplasm of unspecified nature of bone, soft tissue, and skin     New lesions of right dorsum of hand (healed after Efudex) and lesion of right anterior thigh.  Osteoarthritis     Osteoporosis     beginning     PONV (postoperative nausea and vomiting)     Postprocedural hypothyroidism 07/16/2018    Prolonged emergence from general anesthesia     Pt states this was \"a long time ago\"     Raynaud's disease     Seasonal allergies     Sensorineural hearing loss, bilateral 12/26/2012    Squamous cell carcinoma of upper extremity 04/08/2013    Scc in situ rt elbow 1/11    Thrombocytopenia ([address]     Thyroid disease     Thyroid Ca Hx. - thyroid was removed.      Vulvar dystrophy     Wears hearing aid     Wears partial dentures     LOWER       Past Surgical History:    Past Surgical History:   Procedure Laterality Date    APPENDECTOMY  05/11/2021    BREAST SURGERY      Cyst removed rt. nipple     CATARACT REMOVAL Right 07/17/2019    Right eye    CHOLECYSTECTOMY  2011    COLONOSCOPY      COLONOSCOPY N/A 05/10/2021    COLONOSCOPY DIAGNOSTIC performed by Madisyn Clark MD at [address] Bilateral     Tendonitis X2    GASTRIC FUNDOPLICATION      \"Chronic appearing postsurgical change to the stomach suggestive of prior Nissen fundoplication\"- noted per CT ABD/pelvis 4-16-21    HERNIA REPAIR  2005    hiatal hernia repair    HYSTERECTOMY, TOTAL ABDOMINAL  92-97    BAIRON 3 Surgeries    KNEE SURGERY Right 1996    W/PINS    LAPAROSCOPY N/A 5/11/2021    ATTEMPTED APPENDECTOMY LAPAROSCOPIC ROBOTIC CONVERTED TO , DIAGNOSTIC LAPARASCOPY performed by Skye Garcia MD at [facility] 5/11/2021    LAPAROTOMY LYSIS OF ADHESIONS -OPEN APPENDECTOMY, LYSIS OF SMALL BOWEL ADHESIONS FOR 75 MINUTES performed by Skye Garcia MD at [facility] [address]  2011    rt elbow-scc, rt shoulder-keratosis, lft leg skin with excoriation    NASAL POLYP SURGERY  2012    X2 in [facility] Right     PIN[facility]ED [facility] SKIN BIOPSY  2012    lft leg keratosis    SKIN BIOPSY  2009    rt forearm, under brst, lft leg-keratosis    SKIN BIOPSY  2008    back and rt forearm-keratosis, abdomen-hemangioma    SKIN BIOPSY  2002    rt breast, lft arm, lft brst-keratosis    SKIN CANCER EXCISION      THYROIDECTOMY  1985    Thyroid Cancer    TONSILLECTOMY AND ADENOIDECTOMY      UPPER GASTROINTESTINAL ENDOSCOPY      Noted per Care Everywhere       Current Medications:   Current Outpatient Medications   Medication Sig Dispense Refill    predniSONE (DELTASONE) 5 MG tablet Take 1 tablet by mouth daily for 7 days 7 tablet 0    estrogens, conjugated, (PREMARIN) 0.9 MG tablet Take 1 tablet by mouth daily 30 tablet 5    albuterol sulfate  (90 Base) MCG/ACT inhaler Inhale 2 puffs into the lungs every 6 hours as needed for Wheezing or Shortness of Breath 2 each 1    VIBERZI 75 MG TABS TAKE 75 MG BY MOUTH DAILY FOR 30 DAYS.  clotrimazole-betamethasone (LOTRISONE) 1-0.05 % cream Apply topically 2 times daily. (Patient taking differently: as needed Apply topically 2 times daily. ) 15 g 1    levothyroxine (SYNTHROID) 50 MCG tablet levothyroxine 50 mcg tablet  Take 3 tablets daily. 270 tablet 1    calcium carbonate 648 MG TABS Take 2 tablets by mouth 3 times daily 180 tablet 2     No current facility-administered medications for this visit.        Allergies:    Aspirin, Cefadroxil, Echznwsd-eiiahmy-avwezg [fluocinolone], Ciprofloxacin hcl, Codeine, Demerol hcl [meperidine], Doxycycline, Glucosamine, Iodine, Pcn [penicillins], Red dye, Shellfish-derived products, Sulfa antibiotics, Yellow dye, Yellow dyes (non-tartrazine), Gadolinium, Gadolinium derivatives, Lac bovis, Midazolam, Iodides, and Adhesive tape    Social History:   Social History     Socioeconomic History    Marital status:      Spouse name: Not on file    Number of children: Not on file    Years of education: Not on file    Highest education level: Not on file   Occupational History    Not on file   Tobacco Use    Smoking status: Never Smoker    Smokeless tobacco: Never Used   Vaping Use    Vaping Use: Never used   Substance and Sexual Activity    Alcohol use: No    Drug use: No    Sexual activity: Not Currently   Other Topics Concern    Not on file   Social History Narrative    Not on file     Social Determinants of Health     Financial Resource Strain: Low Risk     Difficulty of Paying Living Expenses: Not hard at all   Food Insecurity: No Food Insecurity    Worried About Running Out of Food in the Last Year: Never true    Ananda of Food in the Last Year: Never true   Transportation Needs:     Lack of Transportation (Medical): Not on file    Lack of Transportation (Non-Medical):  Not on file   Physical Activity:     Days of Exercise per Week: Not on file    Minutes of Exercise per Session: Not on file   Stress:     Feeling of Stress : Not on file   Social Connections:     Frequency of Communication with Friends and Family: Not on file    Frequency of Social Gatherings with Friends and Family: Not on file    Attends Restoration Services: Not on file    Active Member of Clubs or Organizations: Not on file    Attends Club or Organization Meetings: Not on file    Marital Status: Not on file   Intimate Partner Violence:     Fear of Current or Ex-Partner: Not on file    Emotionally Abused: Not on file    Physically Abused: Not on file   Iveth Sutton Sexually Abused: Not on file   Housing Stability:     Unable to Pay for Housing in the Last Year: Not on file    Number of Places Lived in the Last Year: Not on file    Unstable Housing in the Last Year: Not on file       Family History:  Family History   Problem Relation Age of Onset    Other Mother         pneumonia    Coronary Art Dis Mother     Liver Cancer Father         age 80    Coronary Art Dis Father     Liver Cancer Brother 59    Colon Cancer Brother         \"Bowel and liver CA\"    Diabetes Other         Father's side    Thyroid Disease Other         Mom's side         REVIEW OF SYSTEMS:  Review of Systems   Constitutional: Negative for chills and fever. Respiratory: Negative for apnea, cough and chest tightness. Cardiovascular: Negative for chest pain and palpitations. Gastrointestinal: Negative for abdominal pain and vomiting. Genitourinary: Negative for difficulty urinating. Musculoskeletal: Positive for arthralgias (bilateral knees). Negative for gait problem, joint swelling and myalgias. Neurological: Negative for dizziness, weakness and numbness. I have reviewed the CC, HPI, ROS, PMH, FHX, Social History, and if not present in this note, I have reviewed in the patient's chart. I agree with the documentation provided by other staff and have reviewed their documentation prior to providing my signature indicating agreement. PHYSICAL EXAM:  Resp 12   Ht 5' 4\" (1.626 m)   Wt 137 lb (62.1 kg)   BMI 23.52 kg/m²  Body mass index is 23.52 kg/m². Physical Exam  Gen: alert and oriented to person and place. Psych:  Appropriate affect; Appropriate knowledge base; Appropriate mood; No hallucinations; Head: normocephalic, atraumatic   Chest: symmetric chest excursion; nonlabored respiratory effort. Pelvis: stable; no obvious pelvis deformity  Ortho Exam  Extremity: No significant outward deformity of the bilateral knees are appreciated.   No significant knee effusion is noted bilaterally. Negative hip log roll bilaterally. Well healed incisional scar noted to the right knee. No significant swelling noted to the left knee. No provocative meniscal signs to the bilateral knees are appreciated. Calves are supple bilaterally. Motor, sensory, vascular examination of bilateral lower extremities is grossly intact without focal deficits. No ligamentous instability to bilateral knees is appreciated. Radiology:     XR KNEE LEFT (1-2 VIEWS)    Result Date: 12/1/2021  History:   Left knee pain Findings:   Standing AP/Lateral/Tunnel/Merchant view xrays of the Left done in the office today shows mild  medial joint space narrowing, tricompartmental osteophytosis, joint line sclerosis medially. No evidence of fracture, subluxation, dislocation, radioopaque foreign body/tumor is noted. Lateral subluxation of the tibia is appreciated. Impression:   Left knee mild  degenerative changes as described above. XR KNEE RIGHT (MIN 4 VIEWS)    Result Date: 12/1/2021  History:   Right knee pain Findings:   Standing AP/Lateral/Tunnel/Merchant view xrays of the Right done in the office today shows mild  medial joint space narrowing, tricompartmental osteophytosis, joint line sclerosis medially. No evidence of fracture, subluxation, dislocation, radioopaque foreign body/tumor is noted. Lateral subluxation of the tibia is appreciated. Metallic suture anchors are noted within the anterior aspect of the patella without signs of loosening or complication. Impression:   Right knee mild  degenerative changes as described above. Retained metallic hardware right patella as described above. ASSESSMENT:     1. Right knee pain, unspecified chronicity    2. Left knee pain, unspecified chronicity         PLAN:       Reviewed x-ray imaging with the patient today in the office. Discussed etiology and natural history of very minimal arthritis to bilateral knees.    The treatment options may include oral anti-inflammatories, bracing, injections, advanced imaging, activity modification, physical therapy and/or surgical intervention. The patient would like to proceed prednisone 5 mg by mouth 7 days. Oral medication was ordered this way due to patient expressing that she cannot take many pills a day. The patient will follow up in the office as needed. We discussed that the patient should call us with any concerns or questions. Return if symptoms worsen or fail to improve. Orders Placed This Encounter   Medications    predniSONE (DELTASONE) 5 MG tablet     Sig: Take 1 tablet by mouth daily for 7 days     Dispense:  7 tablet     Refill:  0       Orders Placed This Encounter   Procedures    XR KNEE RIGHT (MIN 4 VIEWS)     Standing Status:   Future     Number of Occurrences:   1     Standing Expiration Date:   11/30/2022    XR KNEE LEFT (1-2 VIEWS)     Standing Status:   Future     Number of Occurrences:   1     Standing Expiration Date:   12/1/2022     Ev JENKINS LPN am scribing for and in the presence of Dr. Bar Jamison  12/1/2021 2:12 PM      I have reviewed and made changes accordingly to the work scribed by Ev Corbin LPN. The documentation accurately reflects work and decisions made by me. I have also reviewed documentation completed by clinical staff.     Bar Jamison DO, [facility]  12/1/2021 2:14 PM    This note is created with the assistance of a speech recognition program.  While intending to generate a document that actually reflects the content of the visit, the document can still have some errors including those of syntax and sound a like substitutions which may escape proof reading.  In such instances, actual meaning can be extrapolated by contextual diversion      Electronically signed by Francis Estrada DO, FAOAO on 12/1/2021 at 2:12 PM

## 2021-12-08 PROBLEM — D64.9 ANEMIA: Status: ACTIVE | Noted: 2021-09-01

## 2021-12-08 PROBLEM — H61.21 RIGHT EAR IMPACTED CERUMEN: Status: ACTIVE | Noted: 2021-09-20

## 2021-12-08 PROBLEM — H93.13 BILATERAL TINNITUS: Status: ACTIVE | Noted: 2021-09-20

## 2021-12-08 PROBLEM — H91.93 BILATERAL HEARING LOSS: Status: ACTIVE | Noted: 2021-09-20

## 2021-12-08 PROBLEM — Z20.822 CONTACT WITH AND (SUSPECTED) EXPOSURE TO COVID-19: Status: ACTIVE | Noted: 2021-01-20

## 2021-12-21 ENCOUNTER — OFFICE VISIT (OUTPATIENT)
Dept: PRIMARY CARE CLINIC | Age: 66
End: 2021-12-21
Payer: COMMERCIAL

## 2021-12-21 VITALS
HEIGHT: 64 IN | OXYGEN SATURATION: 97 % | DIASTOLIC BLOOD PRESSURE: 74 MMHG | BODY MASS INDEX: 23.8 KG/M2 | WEIGHT: 139.4 LBS | HEART RATE: 74 BPM | SYSTOLIC BLOOD PRESSURE: 120 MMHG

## 2021-12-21 DIAGNOSIS — E53.8 B12 DEFICIENCY: Primary | ICD-10-CM

## 2021-12-21 DIAGNOSIS — M79.672 PAIN IN BOTH FEET: ICD-10-CM

## 2021-12-21 DIAGNOSIS — E03.9 HYPOTHYROIDISM, UNSPECIFIED TYPE: ICD-10-CM

## 2021-12-21 DIAGNOSIS — M79.671 PAIN IN BOTH FEET: ICD-10-CM

## 2021-12-21 DIAGNOSIS — K58.2 IRRITABLE BOWEL SYNDROME WITH BOTH CONSTIPATION AND DIARRHEA: ICD-10-CM

## 2021-12-21 PROCEDURE — 99213 OFFICE O/P EST LOW 20 MIN: CPT | Performed by: FAMILY MEDICINE

## 2021-12-21 PROCEDURE — 96372 THER/PROPH/DIAG INJ SC/IM: CPT | Performed by: FAMILY MEDICINE

## 2021-12-21 RX ORDER — HYOSCYAMINE SULFATE 0.125 MG
125 TABLET ORAL 4 TIMES DAILY PRN
Qty: 120 TABLET | Refills: 3 | Status: SHIPPED | OUTPATIENT
Start: 2021-12-21 | End: 2022-01-13

## 2021-12-21 RX ORDER — CYANOCOBALAMIN 1000 UG/ML
1000 INJECTION INTRAMUSCULAR; SUBCUTANEOUS ONCE
Status: COMPLETED | OUTPATIENT
Start: 2021-12-21 | End: 2021-12-21

## 2021-12-21 RX ORDER — PREDNISONE 1 MG/1
5 TABLET ORAL DAILY
Qty: 7 TABLET | Refills: 0 | Status: SHIPPED | OUTPATIENT
Start: 2021-12-21 | End: 2022-03-24

## 2021-12-21 RX ORDER — PREDNISONE 1 MG/1
5 TABLET ORAL DAILY
COMMUNITY
End: 2021-12-21 | Stop reason: SDUPTHER

## 2021-12-21 RX ORDER — LEVOTHYROXINE SODIUM 0.05 MG/1
TABLET ORAL
Qty: 270 TABLET | Refills: 1 | Status: SHIPPED | OUTPATIENT
Start: 2021-12-21 | End: 2022-07-14 | Stop reason: SDUPTHER

## 2021-12-21 RX ADMIN — CYANOCOBALAMIN 1000 MCG: 1000 INJECTION INTRAMUSCULAR; SUBCUTANEOUS at 11:51

## 2021-12-21 ASSESSMENT — ENCOUNTER SYMPTOMS: BACK PAIN: 1

## 2021-12-21 NOTE — PROGRESS NOTES
[facility] PRIMARY CARE  [address] [facility] [address]  Dept: [phone]    Zhanna Lane is a 74 y.o. female Established patient, who presents today for her medical conditions/complaintsas noted below. Chief Complaint   Patient presents with    Discuss Medications     Pt here today to discuss medications due to switching insurance. Pt asking for refill for prednisone.  Referral - General     Pt looking for a podiatrist in [place] or [facility].  Injections     Pt askin for B12 injection due to missing her appt       HPI:     HPI  Her meds are too expensive and needs generics. She has been using imodium for some of the time for her diarrhea. She won't be able to afford the Viberzi. She would like to try something else for her IBS/diarhea    She's been on prednisone from Dr Suzanne Diaz for a week. And would like another week.    Reviewed prior notes None  Reviewed previous Labs and Imaging    LDL Cholesterol (mg/dL)   Date Value   12/22/2020 72   04/28/2020 106       (goal LDL is <100)   AST (U/L)   Date Value   09/04/2020 20     ALT (U/L)   Date Value   09/04/2020 16     BUN (mg/dL)   Date Value   05/15/2021 13     TSH (mIU/L)   Date Value   07/21/2021 3.41     BP Readings from Last 3 Encounters:   12/21/21 120/74   12/09/21 129/86   11/22/21 136/82          (goal 120/80)    Past Medical History:   Diagnosis Date    Abdominal pain     RLQ    Abnormal computed tomography of abdomen and pelvis 04/16/2021    Distal appendix is mildly thickened without significant periappendiceal inflammatory change    Acquired von Willebrand's disease ([facility] 10/30/2018    Allergy to food dye     yellow and red dyes    Anemia     Asthma     Basal cell carcinoma of upper lip 06/22/2017    Benign paroxysmal positional vertigo 12/09/2013    Cancer (HCC)     Thyroid and skin    Cardiac murmur     STATES SINCE CHILD BILLY- STATES SHE WAS BORN WITH A \"90 DEGREE ANGLE VALVE\"  Chest pain 09/04/2020    Cobalamin deficiency 12/26/2012    Diverticulitis     Diverticulosis     Gastroesophageal reflux disease 12/26/2012    Gastroparesis     Hiatal hernia     History of fractured kneecap     RIGHT    History of malignant neoplasm of thyroid 04/17/2012    History of squamous cell carcinoma in situ of skin 06/09/2014    Chart states left wrist, but patient denies    Hypoparathyroidism ([address] 07/03/2012    IBS (irritable bowel syndrome)     diarrhea-prominent type    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  Neoplasm of unspecified nature of bone, soft tissue, and skin     New lesions of right dorsum of hand (healed after Efudex) and lesion of right anterior thigh.  Osteoarthritis     Osteoporosis     beginning     PONV (postoperative nausea and vomiting)     Postprocedural hypothyroidism 07/16/2018    Prolonged emergence from general anesthesia     Pt states this was \"a long time ago\"     Raynaud's disease     Seasonal allergies     Sensorineural hearing loss, bilateral 12/26/2012    Squamous cell carcinoma of upper extremity 04/08/2013    Scc in situ rt elbow 1/11    Thrombocytopenia ([address]     Thyroid disease     Thyroid Ca Hx. - thyroid was removed.      Vulvar dystrophy     Wears hearing aid     Wears partial dentures     LOWER      Past Surgical History:   Procedure Laterality Date    APPENDECTOMY  05/11/2021    BREAST SURGERY      Cyst removed rt. nipple     CATARACT REMOVAL Right 07/17/2019    Right eye    CHOLECYSTECTOMY  2011    COLONOSCOPY      COLONOSCOPY N/A 05/10/2021    COLONOSCOPY DIAGNOSTIC performed by Gennaro Maher MD at [address] [facility] Bilateral Tendonitis X2    GASTRIC FUNDOPLICATION      \"Chronic appearing postsurgical change to the stomach suggestive of prior Nissen fundoplication\"- noted per CT ABD/pelvis 4-16-21    HERNIA REPAIR  2005    hiatal hernia repair    HYSTERECTOMY, TOTAL ABDOMINAL  92-97    BAIRON 3 Surgeries    KNEE SURGERY Right 1996    W/PINS    LAPAROSCOPY N/A 5/11/2021    ATTEMPTED APPENDECTOMY LAPAROSCOPIC ROBOTIC CONVERTED TO , DIAGNOSTIC LAPARASCOPY performed by Roma Hernández MD at [facility] 5/11/2021    LAPAROTOMY LYSIS OF ADHESIONS -OPEN APPENDECTOMY, LYSIS OF SMALL BOWEL ADHESIONS FOR 75 MINUTES performed by Roma Hernández MD at [facility] [address]  2011    rt elbow-scc, rt shoulder-keratosis, lft leg skin with excoriation    NASAL POLYP SURGERY  2012    X2 in [facility] Right     PINCHED [facility] SKIN BIOPSY  2012    lft leg keratosis    SKIN BIOPSY  2009    rt forearm, under brst, lft leg-keratosis    SKIN BIOPSY  2008    back and rt forearm-keratosis, abdomen-hemangioma    SKIN BIOPSY  2002    rt breast, lft arm, lft brst-keratosis    SKIN CANCER EXCISION      THYROIDECTOMY  1985    Thyroid Cancer    TONSILLECTOMY AND ADENOIDECTOMY      UPPER GASTROINTESTINAL ENDOSCOPY      Noted per Care Everywhere       Family History   Problem Relation Age of Onset    Other Mother         pneumonia    Coronary Art Dis Mother     Liver Cancer Father         age 80    Coronary Art Dis Father     Liver Cancer Brother 59    Colon Cancer Brother         \"Bowel and liver CA\"    Diabetes Other         Father's side    Thyroid Disease Other         Mom's side       Social History     Tobacco Use    Smoking status: Never Smoker    Smokeless tobacco: Never Used   Substance Use Topics    Alcohol use: No      Current Outpatient Medications   Medication Sig Dispense Refill    levothyroxine (SYNTHROID) 50 MCG tablet levothyroxine 50 mcg tablet  Take 3 tablets daily. 270 tablet 1    hyoscyamine (ANASPAZ;LEVSIN) 125 MCG tablet Take 1 tablet by mouth 4 times daily as needed for Cramping or Diarrhea 120 tablet 3    predniSONE (DELTASONE) 5 MG tablet Take 1 tablet by mouth daily 7 tablet 0    albuterol sulfate  (90 Base) MCG/ACT inhaler Inhale 2 puffs into the lungs every 6 hours as needed for Wheezing or Shortness of Breath 2 each 1    clotrimazole-betamethasone (LOTRISONE) 1-0.05 % cream Apply topically 2 times daily. (Patient taking differently: as needed Apply topically 2 times daily. ) 15 g 1    calcium carbonate 648 MG TABS Take 2 tablets by mouth 3 times daily 180 tablet 2     No current facility-administered medications for this visit. Allergies   Allergen Reactions    Aspirin Anaphylaxis and Shortness Of Breath    Cefadroxil Shortness Of Breath    Hgxjivvu-Mpqhulv-Xfxdnq [Fluocinolone] Shortness Of Breath    Ciprofloxacin Hcl Shortness Of Breath    Codeine Shortness Of Breath    Demerol Hcl [Meperidine] Shortness Of Breath    Doxycycline Nausea Only and Other (See Comments)    Glucosamine Shortness Of Breath, Diarrhea and Swelling     Other reaction(s): Respiratory Difficulty  \"lips swell up and get severe diarrhea\" - INCLUDES IODINE    Iodine Hives, Shortness Of Breath and Itching    Pcn [Penicillins] Anaphylaxis and Shortness Of Breath    Red Dye Shortness Of Breath    Shellfish-Derived Products Shortness Of Breath, Diarrhea and Swelling     \"lips swell up and get severe diarrhea\" - INCLUDES IODINE    Sulfa Antibiotics Hives, Shortness Of Breath, Itching and Rash     OK in small amounts, but larger amounts cause \"shortness of breath. \"    Yellow Dye Shortness Of Breath     Other reaction(s): Respiratory Difficulty    Yellow Dyes (Non-Tartrazine) Shortness Of Breath    Gadolinium Rash     Moderate allergic-like reaction consisting of diffuse urticaria to ProHance gadolinium-containing contrast material    Gadolinium Derivatives Rash     Moderate allergic-like reaction consisting of diffuse urticaria to ProHance gadolinium-containing contrast material    Lac Bovis     Midazolam Nausea Only     Versed caused bad nausea.  Iodides Hives    Adhesive Tape Rash       Health Maintenance   Topic Date Due    DEXA (modify frequency per FRAX score)  Never done    Pneumococcal 65+ years Vaccine (1 of 1 - PPSV23) Never done    COVID-19 Vaccine (3 - Booster for Moderna series) 10/13/2021    DTaP/Tdap/Td vaccine (1 - Tdap) 04/28/2022 (Originally 10/3/1974)    Flu vaccine (1) 10/19/2022 (Originally 9/1/2021)    Shingles Vaccine (1 of 2) 04/28/2026 (Originally 10/3/2003)    TSH testing  07/21/2022    Breast cancer screen  11/17/2023    Lipid screen  12/22/2025    Colon cancer screen colonoscopy  05/10/2031    Hepatitis C screen  Completed    Hepatitis A vaccine  Aged Out    Hepatitis B vaccine  Aged Out    Hib vaccine  Aged Out    Meningococcal (ACWY) vaccine  Aged Out       Subjective:      Review of Systems   Constitutional: Negative. Musculoskeletal: Positive for arthralgias and back pain. Objective:     /74   Pulse 74   Ht 5' 4\" (1.626 m)   Wt 139 lb 6.4 oz (63.2 kg)   SpO2 97%   BMI 23.93 kg/m²   Physical Exam  Vitals and nursing note reviewed. Constitutional:       General: She is not in acute distress. Appearance: She is well-developed. She is not ill-appearing. HENT:      Head: Normocephalic and atraumatic. Right Ear: External ear normal.      Left Ear: External ear normal.   Eyes:      General: No scleral icterus. Right eye: No discharge. Left eye: No discharge. Conjunctiva/sclera: Conjunctivae normal.   Neck:      Thyroid: No thyromegaly. Trachea: No tracheal deviation. Cardiovascular:      Rate and Rhythm: Normal rate and regular rhythm. Heart sounds: Normal heart sounds.    Pulmonary:      Effort: Pulmonary effort is normal. No respiratory distress. Breath sounds: Normal breath sounds. No wheezing. Lymphadenopathy:      Cervical: No cervical adenopathy. Skin:     General: Skin is warm. Findings: No rash. Neurological:      Mental Status: She is alert and oriented to person, place, and time. Psychiatric:         Mood and Affect: Mood normal.         Behavior: Behavior normal.         Thought Content: Thought content normal.         Assessment:       Diagnosis Orders   1. B12 deficiency  cyanocobalamin injection 1,000 mcg   2. Hypothyroidism, unspecified type  levothyroxine (SYNTHROID) 50 MCG tablet   3. Irritable bowel syndrome with both constipation and diarrhea  hyoscyamine (ANASPAZ;LEVSIN) 125 MCG tablet   4. Pain in both feet  Alis Cavanaugh DPM, Podiatry, [place]        Plan:      No follow-ups on file. Premarin will be be too expensive  Try soy milk BID for hot flashes  She needs another podiatrist.   Orders Placed This Encounter   Procedures   [address] [place] [address], [facility], [facility], Podiatry, [place]     Referral Priority:   Routine     Referral Type:   Eval and Treat     Referral Reason:   Specialty Services Required     Referred to Provider:   Cameron Vera DPM     Requested Specialty:   Podiatry     Number of Visits Requested:   1     Orders Placed This Encounter   Medications    levothyroxine (SYNTHROID) 50 MCG tablet     Sig: levothyroxine 50 mcg tablet  Take 3 tablets daily. Dispense:  270 tablet     Refill:  1    cyanocobalamin injection 1,000 mcg    hyoscyamine (ANASPAZ;LEVSIN) 125 MCG tablet     Sig: Take 1 tablet by mouth 4 times daily as needed for Cramping or Diarrhea     Dispense:  120 tablet     Refill:  3    predniSONE (DELTASONE) 5 MG tablet     Sig: Take 1 tablet by mouth daily     Dispense:  7 tablet     Refill:  0       Patient given educationalmaterials - see patient instructions. Discussed use, benefit, and side effectsof prescribed medications.   All patient questions answered. Pt voiced understanding. Reviewed health maintenance. Instructed to continue current medications, diet andexercise. Patient agreed with treatment plan. Follow up as directed.      Electronicallysigned by Jhony Ramos MD on 12/21/2021 at 12:13 PM

## 2021-12-22 ENCOUNTER — HOSPITAL ENCOUNTER (OUTPATIENT)
Dept: CT IMAGING | Facility: CLINIC | Age: 66
Discharge: HOME | End: 2021-12-24
Payer: COMMERCIAL

## 2021-12-22 DIAGNOSIS — R10.84 ABDOMINAL PAIN, GENERALIZED: ICD-10-CM

## 2021-12-22 PROCEDURE — 74176 CT ABD & PELVIS W/O CONTRAST: CPT

## 2022-01-13 DIAGNOSIS — K58.2 IRRITABLE BOWEL SYNDROME WITH BOTH CONSTIPATION AND DIARRHEA: ICD-10-CM

## 2022-01-13 RX ORDER — HYOSCYAMINE SULFATE 0.125 MG
125 TABLET ORAL 4 TIMES DAILY PRN
Qty: 120 TABLET | Refills: 3 | Status: SHIPPED | OUTPATIENT
Start: 2022-01-13 | End: 2022-04-11

## 2022-01-25 ENCOUNTER — NURSE ONLY (OUTPATIENT)
Dept: PRIMARY CARE CLINIC | Age: 67
End: 2022-01-25
Payer: MEDICARE

## 2022-01-25 DIAGNOSIS — E53.8 B12 DEFICIENCY: Primary | ICD-10-CM

## 2022-01-25 PROCEDURE — 96372 THER/PROPH/DIAG INJ SC/IM: CPT | Performed by: FAMILY MEDICINE

## 2022-01-25 RX ORDER — CYANOCOBALAMIN 1000 UG/ML
1000 INJECTION INTRAMUSCULAR; SUBCUTANEOUS ONCE
Status: COMPLETED | OUTPATIENT
Start: 2022-01-25 | End: 2022-01-25

## 2022-01-25 RX ADMIN — CYANOCOBALAMIN 1000 MCG: 1000 INJECTION INTRAMUSCULAR; SUBCUTANEOUS at 10:45

## 2022-01-25 NOTE — PROGRESS NOTES
After obtaining consent, and per orders of Dr. Vernie Goltz, injection of vitamin B12 given in Right deltoid by Chloe Toledo MA. Patient tolerated well.

## 2022-02-15 ENCOUNTER — OFFICE VISIT (OUTPATIENT)
Dept: PRIMARY CARE CLINIC | Age: 67
End: 2022-02-15
Payer: MEDICARE

## 2022-02-15 VITALS
HEIGHT: 64 IN | HEART RATE: 67 BPM | BODY MASS INDEX: 24.52 KG/M2 | SYSTOLIC BLOOD PRESSURE: 126 MMHG | OXYGEN SATURATION: 97 % | DIASTOLIC BLOOD PRESSURE: 84 MMHG | WEIGHT: 143.6 LBS

## 2022-02-15 DIAGNOSIS — M25.562 CHRONIC PAIN OF BOTH KNEES: ICD-10-CM

## 2022-02-15 DIAGNOSIS — G89.29 CHRONIC PAIN OF BOTH KNEES: ICD-10-CM

## 2022-02-15 DIAGNOSIS — M17.10 ARTHRITIS OF KNEE: Primary | ICD-10-CM

## 2022-02-15 DIAGNOSIS — E20.9 HYPOPARATHYROIDISM, UNSPECIFIED HYPOPARATHYROIDISM TYPE (HCC): ICD-10-CM

## 2022-02-15 DIAGNOSIS — M70.52 BURSITIS OF BOTH KNEES, UNSPECIFIED BURSA: ICD-10-CM

## 2022-02-15 DIAGNOSIS — E53.8 B12 DEFICIENCY: ICD-10-CM

## 2022-02-15 DIAGNOSIS — M70.51 BURSITIS OF BOTH KNEES, UNSPECIFIED BURSA: ICD-10-CM

## 2022-02-15 DIAGNOSIS — D69.6 THROMBOCYTOPENIA (HCC): ICD-10-CM

## 2022-02-15 DIAGNOSIS — M25.561 CHRONIC PAIN OF BOTH KNEES: ICD-10-CM

## 2022-02-15 PROBLEM — R07.9 CHEST PAIN: Status: RESOLVED | Noted: 2020-09-04 | Resolved: 2022-02-15

## 2022-02-15 PROBLEM — Z20.822 CONTACT WITH AND (SUSPECTED) EXPOSURE TO COVID-19: Status: RESOLVED | Noted: 2021-01-20 | Resolved: 2022-02-15

## 2022-02-15 PROBLEM — D49.9 NEOPLASTIC DISEASE: Status: RESOLVED | Noted: 2018-11-08 | Resolved: 2022-02-15

## 2022-02-15 PROBLEM — H61.21 RIGHT EAR IMPACTED CERUMEN: Status: RESOLVED | Noted: 2021-09-20 | Resolved: 2022-02-15

## 2022-02-15 PROCEDURE — 1036F TOBACCO NON-USER: CPT | Performed by: FAMILY MEDICINE

## 2022-02-15 PROCEDURE — 4040F PNEUMOC VAC/ADMIN/RCVD: CPT | Performed by: FAMILY MEDICINE

## 2022-02-15 PROCEDURE — G8420 CALC BMI NORM PARAMETERS: HCPCS | Performed by: FAMILY MEDICINE

## 2022-02-15 PROCEDURE — 1090F PRES/ABSN URINE INCON ASSESS: CPT | Performed by: FAMILY MEDICINE

## 2022-02-15 PROCEDURE — 3017F COLORECTAL CA SCREEN DOC REV: CPT | Performed by: FAMILY MEDICINE

## 2022-02-15 PROCEDURE — 1123F ACP DISCUSS/DSCN MKR DOCD: CPT | Performed by: FAMILY MEDICINE

## 2022-02-15 PROCEDURE — G8484 FLU IMMUNIZE NO ADMIN: HCPCS | Performed by: FAMILY MEDICINE

## 2022-02-15 PROCEDURE — G8400 PT W/DXA NO RESULTS DOC: HCPCS | Performed by: FAMILY MEDICINE

## 2022-02-15 PROCEDURE — 99212 OFFICE O/P EST SF 10 MIN: CPT | Performed by: FAMILY MEDICINE

## 2022-02-15 PROCEDURE — 96372 THER/PROPH/DIAG INJ SC/IM: CPT | Performed by: FAMILY MEDICINE

## 2022-02-15 PROCEDURE — G8427 DOCREV CUR MEDS BY ELIG CLIN: HCPCS | Performed by: FAMILY MEDICINE

## 2022-02-15 RX ORDER — CYANOCOBALAMIN 1000 UG/ML
1000 INJECTION INTRAMUSCULAR; SUBCUTANEOUS ONCE
Status: COMPLETED | OUTPATIENT
Start: 2022-02-15 | End: 2022-02-15

## 2022-02-15 RX ORDER — LIDOCAINE HCL 4% 4 G/100G
CREAM TOPICAL
COMMUNITY

## 2022-02-15 RX ADMIN — CYANOCOBALAMIN 1000 MCG: 1000 INJECTION INTRAMUSCULAR; SUBCUTANEOUS at 11:25

## 2022-02-15 NOTE — PATIENT INSTRUCTIONS
Patient Education        Knee Arthritis: Exercises  Introduction  Here are some examples of exercises for you to try. The exercises may be suggested for a condition or for rehabilitation. Start each exercise slowly. Ease off the exercises if you start to have pain. You will be told when to start these exercises and which ones will work best for you. How to do the exercises  Knee flexion with heel slide    1. Lie on your back with your knees bent. 2. Slide your heel back by bending your affected knee as far as you can. Then hook your other foot around your ankle to help pull your heel even farther back. 3. Hold for about 6 seconds, then rest for up to 10 seconds. 4. Repeat 8 to 12 times. 5. Switch legs and repeat steps 1 through 4, even if only one knee is sore. Quad sets    1. Sit with your affected leg straight and supported on the floor or a firm bed. Place a small, rolled-up towel under your knee. Your other leg should be bent, with that foot flat on the floor. 2. Tighten the thigh muscles of your affected leg by pressing the back of your knee down into the towel. 3. Hold for about 6 seconds, then rest for up to 10 seconds. 4. Repeat 8 to 12 times. 5. Switch legs and repeat steps 1 through 4, even if only one knee is sore. Straight-leg raises to the front    1. Lie on your back with your good knee bent so that your foot rests flat on the floor. Your affected leg should be straight. Make sure that your low back has a normal curve. You should be able to slip your hand in between the floor and the small of your back, with your palm touching the floor and your back touching the back of your hand. 2. Tighten the thigh muscles in your affected leg by pressing the back of your knee flat down to the floor. Hold your knee straight. 3. Keeping the thigh muscles tight and your leg straight, lift your affected leg up so that your heel is about 12 inches off the floor.  Hold for about 6 seconds, then lower slowly. 4. Relax for up to 10 seconds between repetitions. 5. Repeat 8 to 12 times. 6. Switch legs and repeat steps 1 through 5, even if only one knee is sore. Active knee flexion    1. Lie on your stomach with your knees straight. If your kneecap is uncomfortable, roll up a washcloth and put it under your leg just above your kneecap. 2. Lift the foot of your affected leg by bending the knee so that you bring the foot up toward your buttock. If this motion hurts, try it without bending your knee quite as far. This may help you avoid any painful motion. 3. Slowly move your leg up and down. 4. Repeat 8 to 12 times. 5. Switch legs and repeat steps 1 through 4, even if only one knee is sore. Quadriceps stretch (facedown)    1. Lie flat on your stomach, and rest your face on the floor. 2. Wrap a towel or belt strap around the lower part of your affected leg. Then use the towel or belt strap to slowly pull your heel toward your buttock until you feel a stretch. 3. Hold for about 15 to 30 seconds, then relax your leg against the towel or belt strap. 4. Repeat 2 to 4 times. 5. Switch legs and repeat steps 1 through 4, even if only one knee is sore. Stationary exercise bike    1. If you do not have a stationary exercise bike at home, you can find one to ride at your local health club or community center. 2. Adjust the height of the bike seat so that your knee is slightly bent when your leg is extended downward. If your knee hurts when the pedal reaches the top, you can raise the seat so that your knee does not bend as much. 3. Start slowly. At first, try to do 5 to 10 minutes of cycling with little to no resistance. Then increase your time and the resistance bit by bit until you can do 20 to 30 minutes without pain. 4. If you start to have pain, rest your knee until your pain gets back to the level that is normal for you. Or cycle for less time or with less effort.   Follow-up care is a key part of your treatment and safety. Be sure to make and go to all appointments, and call your doctor if you are having problems. It's also a good idea to know your test results and keep a list of the medicines you take. Where can you learn more? Go to https://liueb.[employer]. org and sign in to your [employer] account. Enter C159 in the [employer] box to learn more about \"Knee Arthritis: Exercises. \"     If you do not have an account, please click on the \"Sign Up Now\" link. Current as of: July 1, 2021               Content Version: 13.1  © 8116-6502 [employer]wise, Incorporated. Care instructions adapted under license by [facility] ([facility]). If you have questions about a medical condition or this instruction, always ask your healthcare professional. [address] any warranty or liability for your use of this information.

## 2022-02-15 NOTE — PROGRESS NOTES
Joann Yung is a 74 y.o. femalewho presents today for her medical conditions/complaints as noted below. Chief Complaint   Patient presents with    Pain     Pt here today for bilateral knee pain         HPI:     HPI   Bilateral knee pains worse on left, for a couple of months. Saw podiatry and had foot pads for 2 months: were in the front of foot along the balls of the feet. She stopped using the foot pads First of January. She thought it was making her knee pains worse. Reviewed ortho notes for knee pains, + arthritis on xrays. Has been using salonpas and tylenol and it helps some. Current Outpatient Medications   Medication Sig Dispense Refill    Lidocaine HCl (LIDOCAINE PLUS) 4 % CREA Apply topically      hyoscyamine (ANASPAZ;LEVSIN) 125 MCG tablet TAKE 1 TABLET BY MOUTH 4 TIMES DAILY AS NEEDED FOR CRAMPING OR DIARRHEA 120 tablet 3    levothyroxine (SYNTHROID) 50 MCG tablet levothyroxine 50 mcg tablet  Take 3 tablets daily. 270 tablet 1    albuterol sulfate  (90 Base) MCG/ACT inhaler Inhale 2 puffs into the lungs every 6 hours as needed for Wheezing or Shortness of Breath 2 each 1    clotrimazole-betamethasone (LOTRISONE) 1-0.05 % cream Apply topically 2 times daily. (Patient taking differently: as needed Apply topically 2 times daily. ) 15 g 1    calcium carbonate 648 MG TABS Take 2 tablets by mouth 3 times daily 180 tablet 2    predniSONE (DELTASONE) 5 MG tablet Take 1 tablet by mouth daily (Patient not taking: Reported on 2/15/2022) 7 tablet 0     No current facility-administered medications for this visit.      Allergies   Allergen Reactions    Aspirin Anaphylaxis and Shortness Of Breath    Cefadroxil Shortness Of Breath    Uvjokdau-Jbkhhbg-Sbpatk [Fluocinolone] Shortness Of Breath    Ciprofloxacin Hcl Shortness Of Breath    Codeine Shortness Of Breath    Demerol Hcl [Meperidine] Shortness Of Breath    Doxycycline Nausea Only and Other (See Comments)    Glucosamine Shortness Of Breath, Diarrhea and Swelling     Other reaction(s): Respiratory Difficulty  \"lips swell up and get severe diarrhea\" - INCLUDES IODINE    Iodine Hives, Shortness Of Breath and Itching    Pcn [Penicillins] Anaphylaxis and Shortness Of Breath    Red Dye Shortness Of Breath    Shellfish-Derived Products Shortness Of Breath, Diarrhea and Swelling     \"lips swell up and get severe diarrhea\" - INCLUDES IODINE    Sulfa Antibiotics Hives, Shortness Of Breath, Itching and Rash     OK in small amounts, but larger amounts cause \"shortness of breath. \"    Yellow Dye Shortness Of Breath     Other reaction(s): Respiratory Difficulty    Yellow Dyes (Non-Tartrazine) Shortness Of Breath    Gadolinium Rash     Moderate allergic-like reaction consisting of diffuse urticaria to ProHance gadolinium-containing contrast material    Gadolinium Derivatives Rash     Moderate allergic-like reaction consisting of diffuse urticaria to ProHance gadolinium-containing contrast material    Lac Bovis     Midazolam Nausea Only     Versed caused bad nausea.  Iodides Hives    Adhesive Tape Rash       Subjective:     Review of Systems   Constitutional: Negative. Objective:     /84   Pulse 67   Ht 5' 4\" (1.626 m)   Wt 143 lb 9.6 oz (65.1 kg)   SpO2 97%   BMI 24.65 kg/m²   Physical Exam  Vitals and nursing note reviewed. Constitutional:       Appearance: Normal appearance. HENT:      Head: Normocephalic and atraumatic. Musculoskeletal:      Right lower leg: No edema. Left lower leg: No edema. Comments: Left knee has osteoarthritis changes more so than the right. No increase in warmth, no redness, no effusion bilaterally. Both knees are tender on the lateral fibular area below and including the joint line laterally. Left lateral knee and proximal lateral fibula  more painful and swollen  than right. Tender left patella with lateral stress.   Slightly tender medial knees joint pains left more than right. Normal extension both knees. Neurological:      Mental Status: She is alert and oriented to person, place, and time. Psychiatric:         Mood and Affect: Mood normal.         Behavior: Behavior normal.         Thought Content: Thought content normal.         Assessment:       Diagnosis Orders   1. Arthritis of knee     2. Bursitis of both knees, unspecified bursa     3. Chronic pain of both knees     4. Thrombocytopenia ([address]     5. Hypoparathyroidism, unspecified hypoparathyroidism type ([address]     6. B12 deficiency  cyanocobalamin injection 1,000 mcg        Plan:      No follow-ups on file. Heat to ice on sore areas  And knee exercises. If not better see PT. No orders of the defined types were placed in this encounter.     Orders Placed This Encounter   Medications    cyanocobalamin injection 1,000 mcg             Electronically signed by Alexandre Cheung MD on 2/15/2022 at 11:44 AM

## 2022-02-18 ENCOUNTER — TELEPHONE (OUTPATIENT)
Dept: PRIMARY CARE CLINIC | Age: 67
End: 2022-02-18

## 2022-02-18 NOTE — TELEPHONE ENCOUNTER
Pt notified. Instructions given. States that is doable. Mentioned that she can't take iron due to it making her gum's  bleed. Will increase her iron foods. FYI.

## 2022-02-18 NOTE — TELEPHONE ENCOUNTER
She needs to increase to 3 tabs daily, if she can't take all 3 at one then try 2 in the morning and 1 at bedtime

## 2022-02-18 NOTE — TELEPHONE ENCOUNTER
Pt states that she is only taking 2 tabs and can't take more than that, said it's hard to even take the two. Trying to wait an hr after she takes it, to eat. I advised that she needs  to do that. Please advise.

## 2022-03-24 ENCOUNTER — OFFICE VISIT (OUTPATIENT)
Dept: PRIMARY CARE CLINIC | Age: 67
End: 2022-03-24
Payer: MEDICARE

## 2022-03-24 VITALS
HEIGHT: 64 IN | OXYGEN SATURATION: 98 % | HEART RATE: 78 BPM | BODY MASS INDEX: 24.79 KG/M2 | SYSTOLIC BLOOD PRESSURE: 138 MMHG | DIASTOLIC BLOOD PRESSURE: 78 MMHG | WEIGHT: 145.2 LBS

## 2022-03-24 DIAGNOSIS — E53.8 B12 DEFICIENCY: ICD-10-CM

## 2022-03-24 DIAGNOSIS — Z00.00 INITIAL MEDICARE ANNUAL WELLNESS VISIT: Primary | ICD-10-CM

## 2022-03-24 PROCEDURE — 4040F PNEUMOC VAC/ADMIN/RCVD: CPT | Performed by: FAMILY MEDICINE

## 2022-03-24 PROCEDURE — G0402 INITIAL PREVENTIVE EXAM: HCPCS | Performed by: FAMILY MEDICINE

## 2022-03-24 PROCEDURE — 3017F COLORECTAL CA SCREEN DOC REV: CPT | Performed by: FAMILY MEDICINE

## 2022-03-24 PROCEDURE — 1123F ACP DISCUSS/DSCN MKR DOCD: CPT | Performed by: FAMILY MEDICINE

## 2022-03-24 PROCEDURE — G8484 FLU IMMUNIZE NO ADMIN: HCPCS | Performed by: FAMILY MEDICINE

## 2022-03-24 PROCEDURE — 96372 THER/PROPH/DIAG INJ SC/IM: CPT | Performed by: FAMILY MEDICINE

## 2022-03-24 RX ORDER — CYANOCOBALAMIN 1000 UG/ML
1000 INJECTION INTRAMUSCULAR; SUBCUTANEOUS ONCE
Status: COMPLETED | OUTPATIENT
Start: 2022-03-24 | End: 2022-03-24

## 2022-03-24 RX ADMIN — CYANOCOBALAMIN 1000 MCG: 1000 INJECTION INTRAMUSCULAR; SUBCUTANEOUS at 13:34

## 2022-03-24 ASSESSMENT — PATIENT HEALTH QUESTIONNAIRE - PHQ9
SUM OF ALL RESPONSES TO PHQ9 QUESTIONS 1 & 2: 0
SUM OF ALL RESPONSES TO PHQ QUESTIONS 1-9: 0
SUM OF ALL RESPONSES TO PHQ QUESTIONS 1-9: 0
1. LITTLE INTEREST OR PLEASURE IN DOING THINGS: 0
SUM OF ALL RESPONSES TO PHQ QUESTIONS 1-9: 0
2. FEELING DOWN, DEPRESSED OR HOPELESS: 0
SUM OF ALL RESPONSES TO PHQ QUESTIONS 1-9: 0

## 2022-03-24 ASSESSMENT — LIFESTYLE VARIABLES: HOW OFTEN DO YOU HAVE A DRINK CONTAINING ALCOHOL: NEVER

## 2022-03-24 NOTE — PATIENT INSTRUCTIONS
Personalized Preventive Plan for Anastasiya Galvan - 3/24/2022  Medicare offers a range of preventive health benefits. Some of the tests and screenings are paid in full while other may be subject to a deductible, co-insurance, and/or copay. Some of these benefits include a comprehensive review of your medical history including lifestyle, illnesses that may run in your family, and various assessments and screenings as appropriate. After reviewing your medical record and screening and assessments performed today your provider may have ordered immunizations, labs, imaging, and/or referrals for you. A list of these orders (if applicable) as well as your Preventive Care list are included within your After Visit Summary for your review. Other Preventive Recommendations:    · A preventive eye exam performed by an eye specialist is recommended every 1-2 years to screen for glaucoma; cataracts, macular degeneration, and other eye disorders. · A preventive dental visit is recommended every 6 months. · Try to get at least 150 minutes of exercise per week or 10,000 steps per day on a pedometer . · Order or download the FREE \"Exercise & Physical Activity: Your Everyday Guide\" from [employer]. Call 3-348.317.8981 or search [employer] online. · You need 9282-5944 mg of calcium and 2259-8437 IU of vitamin D per day. It is possible to meet your calcium requirement with diet alone, but a vitamin D supplement is usually necessary to meet this goal.  · When exposed to the sun, use a sunscreen that protects against both UVA and UVB radiation with an SPF of 30 or greater. Reapply every 2 to 3 hours or after sweating, drying off with a towel, or swimming. Always wear a seat belt when traveling in a car. Always wear a helmet when riding a bicycle or motorcycle. Patient Education        A Healthy Lifestyle: Care Instructions  Your Care Instructions     A healthy lifestyle can help you feel good, stay at a healthy weight, and have plenty of energy for both work and play. A healthy lifestyle is something you can share with your whole family. A healthy lifestyle also can lower your risk for serious health problems, such as high blood pressure, heart disease, and diabetes. You can follow a few steps listed below to improve your health and the health of your family. Follow-up care is a key part of your treatment and safety. Be sure to make and go to all appointments, and call your doctor if you are having problems. It's also a good idea to know your test results and keep a list of the medicines you take. How can you care for yourself at home? Do not eat too much sugar, fat, or fast foods. You can still have dessert and treats now and then. The goal is moderation. Start small to improve your eating habits. Pay attention to portion sizes, drink less juice and soda pop, and eat more fruits and vegetables. Eat a healthy amount of food. A 3-ounce serving of meat, for example, is about the size of a deck of cards. Fill the rest of your plate with vegetables and whole grains. Limit the amount of soda and sports drinks you have every day. Drink more water when you are thirsty. Eat plenty of fruits and vegetables every day. Have an apple or some carrot sticks as an afternoon snack instead of a candy bar. Try to have fruits and/or vegetables at every meal.  Make exercise part of your daily routine. You may want to start with simple activities, such as walking, bicycling, or slow swimming. Try to be active 30 to 60 minutes every day. You do not need to do all 30 to 60 minutes all at once. For example, you can exercise 3 times a day for 10 or 20 minutes. Moderate exercise is safe for most people, but it is always a good idea to talk to your doctor before starting an exercise program.  Keep moving. Ladoris Makos the lawn, work in the garden, or QCoefficient. Take the stairs instead of the elevator at work.   If you smoke, quit. People who smoke have an increased risk for heart attack, stroke, cancer, and other lung illnesses. Quitting is hard, but there are ways to boost your chance of quitting tobacco for good. Use nicotine gum, patches, or lozenges. Ask your doctor about stop-smoking programs and medicines. Keep trying. In addition to reducing your risk of diseases in the future, you will notice some benefits soon after you stop using tobacco. If you have shortness of breath or asthma symptoms, they will likely get better within a few weeks after you quit. Limit how much alcohol you drink. Moderate amounts of alcohol (up to 2 drinks a day for men, 1 drink a day for women) are okay. But drinking too much can lead to liver problems, high blood pressure, and other health problems. Family health  If you have a family, there are many things you can do together to improve your health. Eat meals together as a family as often as possible. Eat healthy foods. This includes fruits, vegetables, lean meats and dairy, and whole grains. Include your family in your fitness plan. Most people think of activities such as jogging or tennis as the way to fitness, but there are many ways you and your family can be more active. Anything that makes you breathe hard and gets your heart pumping is exercise. Here are some tips:  Walk to do errands or to take your child to school or the bus. Go for a family bike ride after dinner instead of watching TV. Where can you learn more? Go to https://Energy[employer] PropanemarquezSpoondate.healthApisphere. org and sign in to your Therma Flite account. Enter M050 in the [facility] box to learn more about \"A Healthy Lifestyle: Care Instructions. \"     If you do not have an account, please click on the \"Sign Up Now\" link. Current as of: June 16, 2021               Content Version: 13.1  © 2006-2021 Healthwise, Incorporated. Care instructions adapted under license by [facility] ([facility]).  If you have questions about a medical condition or this instruction, always ask your healthcare professional. [address] any warranty or liability for your use of this information. Patient Education        Learning About Vinny Parks  What is a living will? A living will, also called a declaration, is a legal form. It tells your family and your doctor your wishes when you can't speak for yourself. It's used by the health professionals who will treat you as you near the end of your life or if you get seriously hurt or ill. If you put your wishes in writing, your loved ones and others will know what kind of care you want. They won't need to guess. This can ease your mind and be helpful to others. And you can change or cancel your living will at any time. A living will is not the same as an estate or property will. An estate will explains what you want to happen with your money and property after you die. How do you use it? A living will is used to describe the kinds of treatment or life support you want as you near the end of your life or if you get seriously hurt or ill. Keep these facts in mind about living rudd. Your living will is used only if you can't speak or make decisions for yourself. Most often, one or more doctors must certify that you can't speak or decide for yourself before your living will takes effect. If you get better and can speak for yourself again, you can accept or refuse any treatment. It doesn't matter what you said in your living will. Some states may limit your right to refuse treatment in certain cases. For example, you may need to clearly state in your living will that you don't want artificial hydration and nutrition, such as being fed through a tube. Is a living will a legal document? A living will is a legal document. Each state has its own laws about living rudd. And a living will may be called something else in your state.   Here are some things to know about living rudd.  Anne Cortes don't need an  to complete a living will. But legal advice can be helpful if your state's laws are unclear. It can also help if your health history is complicated or your family can't agree on what should be in your living will. You can change your living will at any time. Some people find that their wishes about end-of-life care change as their health changes. If you make big changes to your living will, complete a new form. If you move to another state, make sure that your living will is legal in the state where you now live. In most cases, doctors will respect your wishes even if you have a form from a different state. You might use a universal form that has been approved by many states. This kind of form can sometimes be filled out and stored online. Your digital copy will then be available wherever you have a connection to the internet. The doctors and nurses who need to treat you can find it right away. Your state may offer an online registry. This is another place where you can store your living will online. It's a good idea to get your living will notarized. This means using a person called a [employer] to watch two people sign, or witness, your living will. What should you know when you create a living will? Here are some questions to ask yourself as you make your living will:  Do you know enough about life support methods that might be used? If not, talk to your doctor so you know what might be done if you can't breathe on your own, your heart stops, or you can't swallow. What things would you still want to be able to do after you receive life-support methods? Would you want to be able to walk? To speak? To eat on your own? To live without the help of machines? Do you want certain Uatsdin practices performed if you become very ill? If you have a choice, where do you want to be cared for? In your home? At a hospital or nursing home?   If you have a choice at the end of your life, where would you prefer to die? At home? In a hospital or nursing home? Somewhere else? Would you prefer to be buried or cremated? Do you want your organs to be donated after you die? What should you do with your living will? Make sure that your family members and your health care agent have copies of your living will (also called a declaration). Give your doctor a copy of your living will. Ask him or her to keep it as part of your medical record. If you have more than one doctor, make sure that each one has a copy. Put a copy of your living will where it can be easily found. For example, some people may put a copy on their refrigerator door. If you are using a digital copy, be sure your doctor, family members, and health care agent know how to find and access it. Where can you learn more? Go to https://chpepiceweb.[employer] Index. org and sign in to your WeFi account. Enter F201 in the [facility] box to learn more about \"Learning About Living Perderrek. \"     If you do not have an account, please click on the \"Sign Up Now\" link. Current as of: March 17, 2021               Content Version: 13.1  © 2006-2021 [employer]. Care instructions adapted under license by [facility] ([facility]). If you have questions about a medical condition or this instruction, always ask your healthcare professional. [place] [address] any warranty or liability for your use of this information. Patient Education        Knee Arthritis: Exercises  Introduction  Here are some examples of exercises for you to try. The exercises may be suggested for a condition or for rehabilitation. Start each exercise slowly. Ease off the exercises if you start to have pain. You will be told when to start these exercises and which ones will work best for you. How to do the exercises  Knee flexion with heel slide    Lie on your back with your knees bent.   Slide your heel back by bending your affected knee as far as you can. Then hook your other foot around your ankle to help pull your heel even farther back. Hold for about 6 seconds, then rest for up to 10 seconds. Repeat 8 to 12 times. Switch legs and repeat steps 1 through 4, even if only one knee is sore. Quad [place] & Palacios with your affected leg straight and supported on the floor or a firm bed. Place a small, rolled-up towel under your knee. Your other leg should be bent, with that foot flat on the floor. Tighten the thigh muscles of your affected leg by pressing the back of your knee down into the towel. Hold for about 6 seconds, then rest for up to 10 seconds. Repeat 8 to 12 times. Switch legs and repeat steps 1 through 4, even if only one knee is sore. Straight-leg raises to the front    Lie on your back with your good knee bent so that your foot rests flat on the floor. Your affected leg should be straight. Make sure that your low back has a normal curve. You should be able to slip your hand in between the floor and the small of your back, with your palm touching the floor and your back touching the back of your hand. Tighten the thigh muscles in your affected leg by pressing the back of your knee flat down to the floor. Hold your knee straight. Keeping the thigh muscles tight and your leg straight, lift your affected leg up so that your heel is about 12 inches off the floor. Hold for about 6 seconds, then lower slowly. Relax for up to 10 seconds between repetitions. Repeat 8 to 12 times. Switch legs and repeat steps 1 through 5, even if only one knee is sore. Active knee flexion    Lie on your stomach with your knees straight. If your kneecap is uncomfortable, roll up a washcloth and put it under your leg just above your kneecap. Lift the foot of your affected leg by bending the knee so that you bring the foot up toward your buttock. If this motion hurts, try it without bending your knee quite as far.  This may help you avoid any painful motion. Slowly move your leg up and down. Repeat 8 to 12 times. Switch legs and repeat steps 1 through 4, even if only one knee is sore. Quadriceps stretch (facedown)    Lie flat on your stomach, and rest your face on the floor. Wrap a towel or belt strap around the lower part of your affected leg. Then use the towel or belt strap to slowly pull your heel toward your buttock until you feel a stretch. Hold for about 15 to 30 seconds, then relax your leg against the towel or belt strap. Repeat 2 to 4 times. Switch legs and repeat steps 1 through 4, even if only one knee is sore. Stationary exercise bike    If you do not have a stationary exercise bike at home, you can find one to ride at your local health club or community center. Adjust the height of the bike seat so that your knee is slightly bent when your leg is extended downward. If your knee hurts when the pedal reaches the top, you can raise the seat so that your knee does not bend as much. Start slowly. At first, try to do 5 to 10 minutes of cycling with little to no resistance. Then increase your time and the resistance bit by bit until you can do 20 to 30 minutes without pain. If you start to have pain, rest your knee until your pain gets back to the level that is normal for you. Or cycle for less time or with less effort. Follow-up care is a key part of your treatment and safety. Be sure to make and go to all appointments, and call your doctor if you are having problems. It's also a good idea to know your test results and keep a list of the medicines you take. Where can you learn more? Go to https://Emp[employer] Systemsgonzales.Rapportive. org and sign in to your AnyPresence account. Enter C159 in the [employer] box to learn more about \"Knee Arthritis: Exercises. \"     If you do not have an account, please click on the \"Sign Up Now\" link. Current as of: July 1, 2021               Content Version: 13.1  © 1269-7625 Healthwise, Incorporated.

## 2022-03-24 NOTE — PROGRESS NOTES
Medicare Annual Wellness Visit    Ramon Gaxiola is here for Medicare AWV, Injections (Pt here today for b12 injection), and Medication Check (Pt states that she is only taking 1 thyroid tab daily. She is unable to take all 3)    Assessment & Plan      Initial Medicare annual wellness visit    B12 deficiency  -     cyanocobalamin injection 1,000 mcg; 1,000 mcg, IntraMUSCular, ONCE, 1 dose, On Thu 3/24/22 at 1345    Earwax drops     Recommendations for Preventive Services Due: see orders and patient instructions/AVS.  Recommended screening schedule for the next 5-10 years is provided to the patient in written form: see Patient Instructions/AVS.     Return in 6 months (on 9/24/2022) for Medicare Annual Wellness Visit in 1 year. Subjective   The following acute and/or chronic problems were also addressed today:  B12    Patient's complete Health Risk Assessment and screening values have been reviewed and are found in Flowsheets. The following problems were reviewed today and where indicated follow up appointments were made and/or referrals ordered. Positive Risk Factor Screenings with Interventions:               General Health and ACP:  General  In general, how would you say your health is?: Very Good  In the past 7 days, have you experienced any of the following: New or Increased Pain, New or Increased Fatigue, Loneliness, Social Isolation, Stress or Anger?: No  Do you get the social and emotional support that you need?: Yes  Do you have a Living Will?: (!) No    Advance Directives     Power of  Living Will ACP-Advance Directive ACP-Power of     Not on File Not on File Not on File Not on File      General Health Risk Interventions:  · No Living Will: information sheets given.     · Spouse would be first designate: Diana Beckwith  · 2nd designate is daughter Bradley Juárez    Ear problems: wax  Eating healthy  Some exercise: walking in the house  Sees dentist.   ·      Hearing/Vision:  Do you or your family notice any trouble with your hearing that hasn't been managed with hearing aids?: No (Pt has hearing aids)  Do you have difficulty driving, watching TV, or doing any of your daily activities because of your eyesight?: (!) Yes (Pt has a cataract in her LT eye)  Have you had an eye exam within the past year?: Appointment is scheduled  No exam data present    Hearing/Vision Interventions:  · Vision concerns:  patient encouraged to make appointment with his/her eye specialist            Objective   Vitals:    03/24/22 1321   BP: 138/78   Pulse: 78   SpO2: 98%   Weight: 145 lb 3.2 oz (65.9 kg)   Height: 5' 4\" (1.626 m)      Body mass index is 24.92 kg/m². Physical Exam  Vitals and nursing note reviewed. Constitutional:       General: She is not in acute distress. Appearance: She is well-developed. She is not ill-appearing. HENT:      Head: Normocephalic and atraumatic. Right Ear: Ear canal and external ear normal.      Left Ear: Ear canal and external ear normal.      Ears:      Comments: Right TM is scarred and retracted, left TM partly obscured with wax. Eyes:      General: No scleral icterus. Right eye: No discharge. Left eye: No discharge. Conjunctiva/sclera: Conjunctivae normal.   Neck:      Thyroid: No thyromegaly. Trachea: No tracheal deviation. Cardiovascular:      Rate and Rhythm: Normal rate and regular rhythm. Heart sounds: Normal heart sounds. Pulmonary:      Effort: Pulmonary effort is normal. No respiratory distress. Breath sounds: Normal breath sounds. No wheezing. Musculoskeletal:      Right lower leg: No edema. Left lower leg: No edema. Lymphadenopathy:      Cervical: No cervical adenopathy. Skin:     General: Skin is warm. Findings: No rash. Neurological:      Mental Status: She is alert and oriented to person, place, and time.    Psychiatric:         Mood and Affect: Mood normal.         Behavior: Behavior normal. albuterol sulfate  (90 Base) MCG/ACT inhaler Inhale 2 puffs into the lungs every 6 hours as needed for Wheezing or Shortness of Breath Yes Lazarus Barefoot, MD   calcium carbonate 648 MG TABS Take 2 tablets by mouth 3 times daily Yes Lazarus Barefoot, MD   predniSONE (DELTASONE) 5 MG tablet Take 1 tablet by mouth daily  Patient not taking: Reported on 2/15/2022  Lazarus Barefoot, MD   clotrimazole-betamethasone (LOTRISONE) 1-0.05 % cream Apply topically 2 times daily.   Patient not taking: Reported on 3/24/2022  Lazarus Barefoot, MD     Come back for pneumonia Vaccine    CareTeam (Including outside providers/suppliers regularly involved in providing care):   Patient Care Team:  Lazarus Barefoot, MD as PCP - General (Family Medicine)  Lazarus Barefoot, MD as PCP - [facility] Empaneled Provider    Reviewed and updated this visit:  Tobacco  Allergies  Meds  Problems  Med Hx  Surg Hx  Soc Hx  Fam Hx

## 2022-03-31 RX ORDER — ALBUTEROL SULFATE 90 UG/1
2 AEROSOL, METERED RESPIRATORY (INHALATION) EVERY 6 HOURS PRN
Qty: 2 EACH | Refills: 1 | Status: SHIPPED | OUTPATIENT
Start: 2022-03-31 | End: 2022-06-07 | Stop reason: SDUPTHER

## 2022-04-10 DIAGNOSIS — K58.2 IRRITABLE BOWEL SYNDROME WITH BOTH CONSTIPATION AND DIARRHEA: ICD-10-CM

## 2022-04-11 RX ORDER — HYOSCYAMINE SULFATE 0.125 MG
125 TABLET ORAL 4 TIMES DAILY PRN
Qty: 360 TABLET | Refills: 1 | Status: SHIPPED | OUTPATIENT
Start: 2022-04-11 | End: 2022-10-12

## 2022-04-22 ENCOUNTER — NURSE ONLY (OUTPATIENT)
Dept: PRIMARY CARE CLINIC | Age: 67
End: 2022-04-22
Payer: MEDICARE

## 2022-04-22 VITALS
SYSTOLIC BLOOD PRESSURE: 124 MMHG | HEART RATE: 74 BPM | DIASTOLIC BLOOD PRESSURE: 78 MMHG | BODY MASS INDEX: 24.89 KG/M2 | WEIGHT: 145 LBS | OXYGEN SATURATION: 98 %

## 2022-04-22 DIAGNOSIS — E53.8 B12 DEFICIENCY: Primary | ICD-10-CM

## 2022-04-22 PROCEDURE — 96372 THER/PROPH/DIAG INJ SC/IM: CPT | Performed by: FAMILY MEDICINE

## 2022-04-22 RX ORDER — CYANOCOBALAMIN 1000 UG/ML
1000 INJECTION INTRAMUSCULAR; SUBCUTANEOUS ONCE
Status: COMPLETED | OUTPATIENT
Start: 2022-04-22 | End: 2022-04-22

## 2022-04-22 RX ADMIN — CYANOCOBALAMIN 1000 MCG: 1000 INJECTION INTRAMUSCULAR; SUBCUTANEOUS at 13:57

## 2022-04-22 NOTE — PROGRESS NOTES
After obtaining consent, and per orders of Dr. Niya Hernandez injection of b12 given in Right deltoid by Fred Norman MA. Patient instructed to remain in clinic for 15 minutes afterwards, and to report any adverse reaction to me immediately. Patient tolerated injection well with no questions or concerns.

## 2022-05-16 DIAGNOSIS — R35.0 URINE FREQUENCY: Primary | ICD-10-CM

## 2022-05-16 DIAGNOSIS — B37.9 YEAST INFECTION: ICD-10-CM

## 2022-05-16 RX ORDER — NYSTATIN 100000 U/G
CREAM TOPICAL 2 TIMES DAILY PRN
Qty: 30 G | Refills: 1 | Status: SHIPPED | OUTPATIENT
Start: 2022-05-16 | End: 2022-07-14 | Stop reason: SDUPTHER

## 2022-05-16 NOTE — TELEPHONE ENCOUNTER
Patient is stating she is has a uti and would like a refill , Pharm on file .  Patient stating she needs it before she leave for vacation on 5/18/22

## 2022-05-16 NOTE — TELEPHONE ENCOUNTER
She needs a U/A and culture done: will put in the order.  She should try to get this done tonite if possible

## 2022-05-17 NOTE — TELEPHONE ENCOUNTER
Pt states she already picked up the medication and it is helping her UTI. Advised her it's not for UTI and that she needed to do UA and culture, she declined stating she is leaving at 5 am and will be gone for 2 weeks.

## 2022-06-06 ENCOUNTER — TELEPHONE (OUTPATIENT)
Dept: PRIMARY CARE CLINIC | Age: 67
End: 2022-06-06

## 2022-06-06 NOTE — TELEPHONE ENCOUNTER
Per hospital records: TSH was high due to patient not taking her full dose of synthroid. Calcium levels were low. Magnesium low end of normal.   She will need repeat TSH in 6 weeks  reheck BMP and Mag this week.    Will review tomorrow during appt

## 2022-06-06 NOTE — TELEPHONE ENCOUNTER
----- Message from Agustina Shanks sent at 6/6/2022  9:23 AM EDT -----  Subject: Message to Provider    QUESTIONS  Information for Provider? Frank Huang spouse is calling and wanted us to know   that his spouse was in the hospital from 05/25-05/27/2022 in the [address]. She fell and broke her left arm. Her arm is   in a sling and it will be in a sling for 6-8 weeks. She does have an   appointment tomorrow 06/07 and he will provide more information. Please   call him back if you have any other questions. Thank you.  ---------------------------------------------------------------------------  --------------  CALL BACK INFO  What is the best way for the office to contact you? OK to leave message on   voicemail  Preferred Call Back Phone Number? [phone]  ---------------------------------------------------------------------------  --------------  SCRIPT ANSWERS  Relationship to Patient? Other  Representative Name? Marcelino----Spouse  Is the Representative on the appropriate HIPAA document in Epic?  Yes

## 2022-06-07 ENCOUNTER — OFFICE VISIT (OUTPATIENT)
Dept: PRIMARY CARE CLINIC | Age: 67
End: 2022-06-07
Payer: MEDICARE

## 2022-06-07 VITALS
SYSTOLIC BLOOD PRESSURE: 126 MMHG | OXYGEN SATURATION: 98 % | WEIGHT: 140.6 LBS | DIASTOLIC BLOOD PRESSURE: 78 MMHG | BODY MASS INDEX: 24.01 KG/M2 | HEART RATE: 73 BPM | HEIGHT: 64 IN

## 2022-06-07 DIAGNOSIS — S42.302D CLOSED FRACTURE OF LEFT UPPER EXTREMITY WITH ROUTINE HEALING, SUBSEQUENT ENCOUNTER: ICD-10-CM

## 2022-06-07 DIAGNOSIS — E87.8 ELECTROLYTE DISORDER: ICD-10-CM

## 2022-06-07 DIAGNOSIS — J45.909 ASTHMA, UNSPECIFIED ASTHMA SEVERITY, UNSPECIFIED WHETHER COMPLICATED, UNSPECIFIED WHETHER PERSISTENT: ICD-10-CM

## 2022-06-07 DIAGNOSIS — E53.8 B12 DEFICIENCY: Primary | ICD-10-CM

## 2022-06-07 LAB
ANION GAP SERPL CALCULATED.3IONS-SCNC: 16 MMOL/L (ref 9–17)
BUN BLDV-MCNC: 15 MG/DL (ref 8–23)
BUN/CREAT BLD: ABNORMAL (ref 9–20)
CALCIUM SERPL-MCNC: 6.2 MG/DL (ref 8.6–10.4)
CHLORIDE BLD-SCNC: 96 MMOL/L (ref 98–107)
CO2: 25 MMOL/L (ref 20–31)
CREAT SERPL-MCNC: 0.93 MG/DL (ref 0.5–0.9)
GFR AFRICAN AMERICAN: >60 ML/MIN
GFR NON-AFRICAN AMERICAN: 60 ML/MIN
GFR SERPL CREATININE-BSD FRML MDRD: ABNORMAL ML/MIN/{1.73_M2}
GFR SERPL CREATININE-BSD FRML MDRD: ABNORMAL ML/MIN/{1.73_M2}
GLUCOSE BLD-MCNC: 85 MG/DL (ref 70–99)
MAGNESIUM: 1.8 MG/DL (ref 1.6–2.6)
POTASSIUM SERPL-SCNC: 3.9 MMOL/L (ref 3.7–5.3)
SODIUM BLD-SCNC: 137 MMOL/L (ref 135–144)

## 2022-06-07 PROCEDURE — 1036F TOBACCO NON-USER: CPT | Performed by: FAMILY MEDICINE

## 2022-06-07 PROCEDURE — 99214 OFFICE O/P EST MOD 30 MIN: CPT | Performed by: FAMILY MEDICINE

## 2022-06-07 PROCEDURE — 3017F COLORECTAL CA SCREEN DOC REV: CPT | Performed by: FAMILY MEDICINE

## 2022-06-07 PROCEDURE — 1090F PRES/ABSN URINE INCON ASSESS: CPT | Performed by: FAMILY MEDICINE

## 2022-06-07 PROCEDURE — 1123F ACP DISCUSS/DSCN MKR DOCD: CPT | Performed by: FAMILY MEDICINE

## 2022-06-07 PROCEDURE — G8427 DOCREV CUR MEDS BY ELIG CLIN: HCPCS | Performed by: FAMILY MEDICINE

## 2022-06-07 PROCEDURE — G8420 CALC BMI NORM PARAMETERS: HCPCS | Performed by: FAMILY MEDICINE

## 2022-06-07 PROCEDURE — G8400 PT W/DXA NO RESULTS DOC: HCPCS | Performed by: FAMILY MEDICINE

## 2022-06-07 PROCEDURE — 96372 THER/PROPH/DIAG INJ SC/IM: CPT | Performed by: FAMILY MEDICINE

## 2022-06-07 RX ORDER — CYANOCOBALAMIN 1000 UG/ML
1000 INJECTION INTRAMUSCULAR; SUBCUTANEOUS ONCE
Status: COMPLETED | OUTPATIENT
Start: 2022-06-07 | End: 2022-06-07

## 2022-06-07 RX ORDER — ALBUTEROL SULFATE 90 UG/1
2 AEROSOL, METERED RESPIRATORY (INHALATION) EVERY 6 HOURS PRN
Qty: 2 EACH | Refills: 1 | Status: SHIPPED | OUTPATIENT
Start: 2022-06-07 | End: 2022-07-14

## 2022-06-07 RX ADMIN — CYANOCOBALAMIN 1000 MCG: 1000 INJECTION INTRAMUSCULAR; SUBCUTANEOUS at 15:16

## 2022-06-07 NOTE — PROGRESS NOTES
[facility] PRIMARY CARE  [address]  [facility] [address]  Dept: [phone]    Oskar Woods is a 74 y.o. female Established patient, who presents today for her medical conditions/complaintsas noted below. Chief Complaint   Patient presents with    Loss of Consciousness     Pt here today after she passed out and for [place] hospital f/u    Arm Injury       HPI:     HPI    Reviewed prior notes None  Reviewed previous Labs, [place] records: TSH was too high: she is not taking full dose of synthroid. She passed out at 5 am getting up and fractured humerus. Itching a lot last night. Has benadryl prn. She states she only takes 2 Synthroid at a time. If she takes 3 it gives her diarrhea.   She missed taking her Synthroid pills for about 10 days when they were traveling due to their irregular eating schedule    LDL Cholesterol (mg/dL)   Date Value   12/22/2020 72   04/28/2020 106       (goal LDL is <100)   AST (U/L)   Date Value   09/04/2020 20     ALT (U/L)   Date Value   09/04/2020 16     BUN (mg/dL)   Date Value   05/15/2021 13     TSH (mIU/L)   Date Value   07/21/2021 3.41     BP Readings from Last 3 Encounters:   06/07/22 126/78   04/22/22 124/78   03/24/22 138/78          (goal 120/80)    Past Medical History:   Diagnosis Date    Abdominal pain     RLQ    Abnormal computed tomography of abdomen and pelvis 04/16/2021    Distal appendix is mildly thickened without significant periappendiceal inflammatory change    Acquired von Willebrand's disease ([facility] [address] 10/30/2018    Allergy to food dye     yellow and red dyes    Anemia     Asthma     Basal cell carcinoma of upper lip 06/22/2017    Benign paroxysmal positional vertigo 12/09/2013    Cancer (HCC)     Thyroid and skin    Cardiac murmur     STATES SINCE CHILD BILLY- STATES SHE WAS BORN WITH A \"90 DEGREE ANGLE VALVE\"    Chest pain 09/04/2020    Cobalamin deficiency 12/26/2012    Diverticulitis     Diverticulosis     Gastroesophageal reflux disease 12/26/2012    Gastroparesis     Hiatal hernia     History of fractured kneecap     RIGHT    History of malignant neoplasm of thyroid 04/17/2012    History of squamous cell carcinoma in situ of skin 06/09/2014    Chart states left wrist, but patient denies    Hypoparathyroidism ([address] 07/03/2012    IBS (irritable bowel syndrome)     diarrhea-prominent type    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  Neoplasm of unspecified nature of bone, soft tissue, and skin     New lesions of right dorsum of hand (healed after Efudex) and lesion of right anterior thigh.  Osteoarthritis     Osteoporosis     beginning     PONV (postoperative nausea and vomiting)     Postprocedural hypothyroidism 07/16/2018    Prolonged emergence from general anesthesia     Pt states this was \"a long time ago\"     Raynaud's disease     Seasonal allergies     Sensorineural hearing loss, bilateral 12/26/2012    Squamous cell carcinoma of upper extremity 04/08/2013    Scc in situ rt elbow 1/11    Thrombocytopenia ([address]     Thyroid disease     Thyroid Ca Hx. - thyroid was removed.      Vulvar dystrophy     Wears hearing aid     Wears partial dentures     LOWER      Past Surgical History:   Procedure Laterality Date    APPENDECTOMY  05/11/2021    BREAST SURGERY      Cyst removed rt. nipple     CATARACT REMOVAL Right 07/17/2019    Right eye    CHOLECYSTECTOMY  2011    COLONOSCOPY      COLONOSCOPY N/A 05/10/2021    COLONOSCOPY DIAGNOSTIC performed by Chrissie Lopez MD at [address] Bilateral     Tendonitis X2    GASTRIC FUNDOPLICATION      \"Chronic appearing postsurgical change to the stomach suggestive of prior Nissen fundoplication\"- noted per CT ABD/pelvis 4-16-21    HERNIA REPAIR  2005    hiatal hernia repair    HYSTERECTOMY, TOTAL ABDOMINAL  92-97    BAIRON 3 Surgeries    KNEE SURGERY Right 1996    W/PINS    LAPAROSCOPY N/A 5/11/2021    ATTEMPTED APPENDECTOMY LAPAROSCOPIC ROBOTIC CONVERTED TO , DIAGNOSTIC LAPARASCOPY performed by Alma Eduardo MD at [facility] 5/11/2021    LAPAROTOMY LYSIS OF ADHESIONS -OPEN APPENDECTOMY, LYSIS OF SMALL BOWEL ADHESIONS FOR 75 MINUTES performed by Alma Eduardo MD at [address]  2011    rt elbow-scc, rt shoulder-keratosis, lft leg skin with excoriation    NASAL POLYP SURGERY  2012    X2 in [facility] Right     PINCHED [facility] SKIN BIOPSY  2012    lft leg keratosis    SKIN BIOPSY  2009    rt forearm, under brst, lft leg-keratosis    SKIN BIOPSY  2008    back and rt forearm-keratosis, abdomen-hemangioma    SKIN BIOPSY  2002    rt breast, lft arm, lft brst-keratosis    SKIN CANCER EXCISION      THYROIDECTOMY  1985    Thyroid Cancer    TONSILLECTOMY AND ADENOIDECTOMY      UPPER GASTROINTESTINAL ENDOSCOPY      Noted per Care Everywhere       Family History   Problem Relation Age of Onset    Other Mother         pneumonia    Coronary Art Dis Mother     Liver Cancer Father         age 80    Coronary Art Dis Father     Liver Cancer Brother 59    Colon Cancer Brother         \"Bowel and liver CA\"    Diabetes Other         Father's side    Thyroid Disease Other         Mom's side       Social History     Tobacco Use    Smoking status: Never Smoker    Smokeless tobacco: Never Used   Substance Use Topics    Alcohol use: No      Current Outpatient Medications   Medication Sig Dispense Refill    albuterol sulfate HFA (PROVENTIL;VENTOLIN;PROAIR) 108 (90 Base) MCG/ACT inhaler Inhale 2 puffs into the lungs every 6 hours as needed for Wheezing or Shortness of Breath 2 each 1    nystatin (MYCOSTATIN) 533340 UNIT/GM cream Apply topically 2 times daily as needed for Dry Skin Apply topically 2 times daily. 30 g 1    hyoscyamine (ANASPAZ;LEVSIN) 125 MCG tablet TAKE 1 TABLET BY MOUTH 4 TIMES DAILY AS NEEDED FOR CRAMPING OR DIARRHEA 360 tablet 1    Lidocaine HCl (LIDOCAINE PLUS) 4 % CREA Apply topically      levothyroxine (SYNTHROID) 50 MCG tablet levothyroxine 50 mcg tablet  Take 3 tablets daily. (Patient taking differently: levothyroxine 50 mcg tablet  Take 2 tablets daily.) 270 tablet 1    calcium carbonate 648 MG TABS Take 2 tablets by mouth 3 times daily 180 tablet 2     No current facility-administered medications for this visit. Allergies   Allergen Reactions    Aspirin Anaphylaxis and Shortness Of Breath    Cefadroxil Shortness Of Breath    Tldaugpe-Hxyzqsd-Xswzif [Fluocinolone] Shortness Of Breath    Ciprofloxacin Hcl Shortness Of Breath    Codeine Shortness Of Breath    Demerol Hcl [Meperidine] Shortness Of Breath    Doxycycline Nausea Only and Other (See Comments)    Glucosamine Shortness Of Breath, Diarrhea and Swelling     Other reaction(s): Respiratory Difficulty  \"lips swell up and get severe diarrhea\" - INCLUDES IODINE    Iodine Hives, Shortness Of Breath and Itching    Pcn [Penicillins] Anaphylaxis and Shortness Of Breath    Red Dye Shortness Of Breath    Shellfish-Derived Products Shortness Of Breath, Diarrhea and Swelling     \"lips swell up and get severe diarrhea\" - INCLUDES IODINE    Sulfa Antibiotics Hives, Shortness Of Breath, Itching and Rash     OK in small amounts, but larger amounts cause \"shortness of breath. \"    Yellow Dye Shortness Of Breath     Other reaction(s): Respiratory Difficulty    Yellow Dyes (Non-Tartrazine) Shortness Of Breath    Gadolinium Rash     Moderate allergic-like reaction consisting of diffuse urticaria to ProHance gadolinium-containing contrast material    Gadolinium Derivatives Rash     Moderate allergic-like reaction consisting of diffuse urticaria to ProHance gadolinium-containing contrast material    Lac Bovis     Midazolam Nausea Only     Versed caused bad nausea.  Iodides Hives    Adhesive Tape Rash       Health Maintenance   Topic Date Due    Pneumococcal 65+ years Vaccine (1 - PCV) Never done    DTaP/Tdap/Td vaccine (1 - Tdap) Never done    DEXA (modify frequency per FRAX score)  Never done    Flu vaccine (Season Ended) 10/19/2022 (Originally 9/1/2022)    Shingles vaccine (1 of 2) 04/28/2026 (Originally 10/3/2003)    Depression Screen  03/24/2023    Annual Wellness Visit (AWV)  03/25/2023    Breast cancer screen  11/17/2023    Lipids  12/22/2025    Colorectal Cancer Screen  05/10/2031    COVID-19 Vaccine  Completed    Hepatitis C screen  Completed    Hepatitis A vaccine  Aged Out    Hepatitis B vaccine  Aged Out    Hib vaccine  Aged Out    Meningococcal (ACWY) vaccine  Aged Out       Subjective:      Review of Systems   Constitutional: Negative. Passed out at 5 am per spouse, she doesn't remember. She wasn't really with it for 30 or so minutes. she remember talking to the EMS in the squad    Objective:     /78   Pulse 73   Ht 5' 4\" (1.626 m)   Wt 140 lb 9.6 oz (63.8 kg)   SpO2 98%   BMI 24.13 kg/m²   Physical Exam  Vitals and nursing note reviewed. Constitutional:       General: She is not in acute distress. Appearance: She is well-developed. She is not ill-appearing. HENT:      Head: Normocephalic and atraumatic. Right Ear: External ear normal.      Left Ear: External ear normal.   Eyes:      General: No scleral icterus. Right eye: No discharge. Left eye: No discharge. Conjunctiva/sclera: Conjunctivae normal.   Neck:      Thyroid: No thyromegaly. Trachea: No tracheal deviation.    Cardiovascular:      Rate and Rhythm: Normal rate and regular rhythm. Heart sounds: Normal heart sounds. Pulmonary:      Effort: Pulmonary effort is normal. No respiratory distress. Breath sounds: Normal breath sounds. No wheezing. Musculoskeletal:      Comments: Swelling and bruising left upper arm   Lymphadenopathy:      Cervical: No cervical adenopathy. Skin:     General: Skin is warm. Findings: No rash. Neurological:      Mental Status: She is alert and oriented to person, place, and time. Psychiatric:         Mood and Affect: Mood normal.         Behavior: Behavior normal.         Thought Content: Thought content normal.         Assessment:       Diagnosis Orders   1. B12 deficiency  cyanocobalamin injection 1,000 mcg   2. Asthma, unspecified asthma severity, unspecified whether complicated, unspecified whether persistent  albuterol sulfate HFA (PROVENTIL;VENTOLIN;PROAIR) 108 (90 Base) MCG/ACT inhaler   3. Closed fracture of left upper extremity with routine healing, subsequent encounter  Dennis Blanco MD, Orthopedic Surgery, [place]   4. Electrolyte disorder  Basic Metabolic Panel    Magnesium        Plan:      Return in about 5 weeks (around 7/12/2022) for thyroid and electrolyte disorder. .  She needs to try to take 3 tablets a day of Synthroid, take 2 in the morning and 1 at night. If she still has problems with that then we will have to try giving her a T3 tablet in addition to the T4 tablets. Check labs this week or next week regarding her electrolytes.   Recheck TSH in 6 weeks  Orthopedics regarding her humerus fracture  Orders Placed This Encounter   Procedures    Basic Metabolic Panel     Standing Status:   Future     Number of Occurrences:   1     Standing Expiration Date:   6/7/2023    Magnesium     Standing Status:   Future     Number of Occurrences:   1     Standing Expiration Date:   6/7/2023   Dennis Blanco MD, Orthopedic Surgery, [place]     Referral Priority:   Routine     Referral Type:   Eval and Treat     Referral Reason:   Specialty Services Required     Referred to Provider:   Iván Mendoza MD     Requested Specialty:   Orthopedic Surgery     Number of Visits Requested:   1     Orders Placed This Encounter   Medications    albuterol sulfate HFA (PROVENTIL;VENTOLIN;PROAIR) 108 (90 Base) MCG/ACT inhaler     Sig: Inhale 2 puffs into the lungs every 6 hours as needed for Wheezing or Shortness of Breath     Dispense:  2 each     Refill:  1    cyanocobalamin injection 1,000 mcg       Patient given educationalmaterials - see patient instructions. Discussed use, benefit, and side effectsof prescribed medications. All patient questions answered. Pt voiced understanding. Reviewed health maintenance. Instructed to continue current medications, diet. Patient agreed with treatment plan. Follow up as directed.      Electronicallysigned by Cameron Laird MD on 6/7/2022 at 3:33 PM

## 2022-06-09 DIAGNOSIS — E87.8 ELECTROLYTE DISORDER: Primary | ICD-10-CM

## 2022-06-09 DIAGNOSIS — E83.51 HYPOCALCEMIA: ICD-10-CM

## 2022-06-13 ENCOUNTER — OFFICE VISIT (OUTPATIENT)
Dept: ORTHOPEDIC SURGERY | Age: 67
End: 2022-06-13
Payer: MEDICARE

## 2022-06-13 VITALS — BODY MASS INDEX: 24.24 KG/M2 | HEIGHT: 64 IN | WEIGHT: 142 LBS

## 2022-06-13 DIAGNOSIS — M25.512 LEFT SHOULDER PAIN, UNSPECIFIED CHRONICITY: Primary | ICD-10-CM

## 2022-06-13 PROCEDURE — G8427 DOCREV CUR MEDS BY ELIG CLIN: HCPCS | Performed by: ORTHOPAEDIC SURGERY

## 2022-06-13 PROCEDURE — G8400 PT W/DXA NO RESULTS DOC: HCPCS | Performed by: ORTHOPAEDIC SURGERY

## 2022-06-13 PROCEDURE — 3017F COLORECTAL CA SCREEN DOC REV: CPT | Performed by: ORTHOPAEDIC SURGERY

## 2022-06-13 PROCEDURE — 99203 OFFICE O/P NEW LOW 30 MIN: CPT | Performed by: ORTHOPAEDIC SURGERY

## 2022-06-13 PROCEDURE — 1123F ACP DISCUSS/DSCN MKR DOCD: CPT | Performed by: ORTHOPAEDIC SURGERY

## 2022-06-13 PROCEDURE — G8420 CALC BMI NORM PARAMETERS: HCPCS | Performed by: ORTHOPAEDIC SURGERY

## 2022-06-13 PROCEDURE — 1090F PRES/ABSN URINE INCON ASSESS: CPT | Performed by: ORTHOPAEDIC SURGERY

## 2022-06-13 PROCEDURE — 1036F TOBACCO NON-USER: CPT | Performed by: ORTHOPAEDIC SURGERY

## 2022-06-13 PROCEDURE — 23600 CLTX PROX HUMRL FX W/O MNPJ: CPT | Performed by: ORTHOPAEDIC SURGERY

## 2022-06-13 NOTE — LETTER
6/13/2022    [facility] [address],  [facility] [address]    RE: Jesusa Phalen    Dear Dr. Lashanda Joiner,    Thank you for allowing me to participate in the care of Ms. Clements. I had the opportunity to evaluate the patient on 6/13/2022. Attached you will find my evaluation and recommendations. Thanks again for the confidence you have expressed in me by allowing my participation in the care of your patient. I will keep you apprised of further developments in the patients treatment course as it progresses. If I can be of further assistance in any fashion, please feel free to contact me at your convenience.     Sincerely,         Radha Salter  Shoulder and Elbow Surgery

## 2022-06-13 NOTE — PROGRESS NOTES
ORTHOPEDIC PATIENT EVALUATION      HPI / Chief Complaint  Yehuda Ryan is a 74 y.o. right-hand-dominant female who presents for evaluation of her left shoulder. While out in [place] about 3 weeks ago on 5/25/2022 she was going to the bathroom early in the morning and believes that she passed out and fell hurting her left shoulder. She was seen at an outside facility in [place] where she was diagnosed with a proximal humerus fracture. She was placed in a sling and presents today for further evaluation and treatment. Her pain remains localized to the left shoulder. She denies any numbness or tingling. Past Medical History  Trae Thapa  has a past medical history of Abdominal pain, Abnormal computed tomography of abdomen and pelvis, Acquired von Willebrand's disease ([address] Allergy to food dye, Anemia, Asthma, Basal cell carcinoma of upper lip, Benign paroxysmal positional vertigo, Cancer (HCC), Cardiac murmur, Chest pain, Cobalamin deficiency, Diverticulitis, Diverticulosis, Gastroesophageal reflux disease, Gastroparesis, Hiatal hernia, History of fractured kneecap, History of malignant neoplasm of thyroid, History of squamous cell carcinoma in situ of skin, Hypoparathyroidism ([address] IBS (irritable bowel syndrome), Intestinal disaccharidase deficiency, Ischemic optic neuropathy, left, Lichen sclerosus, MVP (mitral valve prolapse), Neoplasm of unspecified nature of bone, soft tissue, and skin, Neoplasm of unspecified nature of bone, soft tissue, and skin, Osteoarthritis, Osteoporosis, PONV (postoperative nausea and vomiting), Postprocedural hypothyroidism, Prolonged emergence from general anesthesia, Raynaud's disease, Seasonal allergies, Sensorineural hearing loss, bilateral, Squamous cell carcinoma of upper extremity, Thrombocytopenia ([address] Thyroid disease, Vulvar dystrophy, Wears hearing aid, and Wears partial dentures.     Past Surgical History  Trae Thapa  has a past surgical history that includes hernia repair (2005); Nasal polyp surgery (2012); Thyroidectomy (1985); knee surgery (Right, 1996); skin biopsy (2012); malignant skin lesion excision (2011); skin biopsy (2009); skin biopsy (2008); skin biopsy (2002); Cholecystectomy (2011); Tonsillectomy and adenoidectomy; other surgical history (1979); Elbow surgery (Bilateral); Hysterectomy, total abdominal (92-97); Cataract removal (Right, 07/17/2019); Breast surgery; shoulder surgery (Right); Skin cancer excision; Colonoscopy; Colonoscopy (N/A, 05/10/2021); Gastric fundoplication; Upper gastrointestinal endoscopy; laparoscopy (N/A, 5/11/2021); laparotomy (N/A, 5/11/2021); and Appendectomy (05/11/2021). Current Medications  Current Outpatient Medications   Medication Sig Dispense Refill    albuterol sulfate HFA (PROVENTIL;VENTOLIN;PROAIR) 108 (90 Base) MCG/ACT inhaler Inhale 2 puffs into the lungs every 6 hours as needed for Wheezing or Shortness of Breath 2 each 1    nystatin (MYCOSTATIN) 510618 UNIT/GM cream Apply topically 2 times daily as needed for Dry Skin Apply topically 2 times daily. 30 g 1    hyoscyamine (ANASPAZ;LEVSIN) 125 MCG tablet TAKE 1 TABLET BY MOUTH 4 TIMES DAILY AS NEEDED FOR CRAMPING OR DIARRHEA 360 tablet 1    Lidocaine HCl (LIDOCAINE PLUS) 4 % CREA Apply topically      levothyroxine (SYNTHROID) 50 MCG tablet levothyroxine 50 mcg tablet  Take 3 tablets daily. (Patient taking differently: levothyroxine 50 mcg tablet  Take 2 tablets daily.) 270 tablet 1    calcium carbonate 648 MG TABS Take 2 tablets by mouth 3 times daily 180 tablet 2     No current facility-administered medications for this visit. Allergies  Allergies have been reviewed.   Yann Grant is allergic to aspirin, cefadroxil, voolnsrc-zbquzgo-arxoww [fluocinolone], ciprofloxacin hcl, codeine, demerol hcl [meperidine], doxycycline, glucosamine, iodine, pcn [penicillins], red dye, shellfish-derived products, sulfa antibiotics, yellow dye, yellow dyes (non-tartrazine), gadolinium, gadolinium derivatives, lac bovis, midazolam, iodides, and adhesive tape. Social History  Markel Grimaldo  reports that she has never smoked. She has never used smokeless tobacco. She reports that she does not drink alcohol and does not use drugs. Family History  Yuli's family history includes Colon Cancer in her brother; Coronary Art Dis in her father and mother; Diabetes in an other family member; Og Macey in her father; Liver Cancer (age of onset: 59) in her brother; Other in her mother; Thyroid Disease in an other family member. Review of Systems   History obtained from the patient. REVIEW OF SYSTEMS:   Constitution: negative for fever, chills, weight loss or malaise   Musculoskeletal: As noted in the HPI   Neurologic: As noted in the HPI    Physical Exam  Ht 5' 4\" (1.626 m)   Wt 142 lb (64.4 kg)   BMI 24.37 kg/m²    General Appearance: alert, well appearing, and in no distress  Mental Status: alert, oriented to person, place, and time  Evaluation of the patient's left shoulder and upper extremity demonstrates intact skin with moderate swelling and diffuse ecchymosis. Sensation is grossly intact light touch in all dermatomes and she has a 2+ radial pulse with brisk capillary refill in her fingers. Active and passive range of motion through her shoulder is limited due to pain. Imaging Studies  4 x-ray views of the left shoulder completed on 6/13/2022 were reviewed independently demonstrating a fracture through the surgical neck of the proximal humerus with some varus angulation. No obvious dislocation or subluxation noted. X-rays of the left shoulder completed at an outside facility on 5/25/2022 were also made available and reviewed independently demonstrating once again fracture through the surgical necks of the proximal humerus with varus angulation. No significant change in alignment on these x-rays compared to the x-rays obtained today as outlined above.     Assessment and Plan  Lynette Alfred Best Jim is a 74 y.o. old female with a closed left proximal humerus fracture. She is just shy of 3 weeks from the injury. I had a discussion with the patient and her  educating them both about the nature and extent of this injury and discussing treatment options available to her. I believe it is reasonably aligned and can be managed appropriately conservatively. Consequently we can have her continue on with her sling immobilization for another 10 days. She can begin pendulum exercises right away and she was instructed on how to perform these. She is to do this 3 times a day. I will see her back for reevaluation in 10 days time but she may return or call earlier with any questions or concerns. This note is created with the assistance of a speech recognition program.  While intending to generate adocument that actually reflects the content of the visit, the document can still have some errors including those of syntax and sound a like substitutions which may escape proof reading. It such instances, actual meaningcan be extrapolated by contextual diversion.

## 2022-06-16 DIAGNOSIS — E87.8 ELECTROLYTE DISORDER: ICD-10-CM

## 2022-06-16 DIAGNOSIS — E83.51 HYPOCALCEMIA: ICD-10-CM

## 2022-06-16 LAB
CALCIUM IONIZED: 0.92 MMOL/L (ref 1.13–1.33)
CALCIUM SERPL-MCNC: 7.3 MG/DL (ref 8.6–10.4)

## 2022-06-17 DIAGNOSIS — E83.51 HYPOCALCEMIA: Primary | ICD-10-CM

## 2022-06-22 ENCOUNTER — OFFICE VISIT (OUTPATIENT)
Dept: ORTHOPEDIC SURGERY | Age: 67
End: 2022-06-22

## 2022-06-22 VITALS — BODY MASS INDEX: 24.24 KG/M2 | HEIGHT: 64 IN | WEIGHT: 142 LBS

## 2022-06-22 DIAGNOSIS — S42.295D OTHER CLOSED NONDISPLACED FRACTURE OF PROXIMAL END OF LEFT HUMERUS WITH ROUTINE HEALING, SUBSEQUENT ENCOUNTER: Primary | ICD-10-CM

## 2022-06-22 PROCEDURE — 99024 POSTOP FOLLOW-UP VISIT: CPT | Performed by: ORTHOPAEDIC SURGERY

## 2022-06-22 NOTE — PROGRESS NOTES
HPI: Too Mayo is a 74 y.o. old female who is approximately 4 weeks out from a closed left proximal humerus fracture. she has been in a shoulder immobilizer and working on pendulum exercises since she was last seen. She states that her shoulder is feeling better. She rates her pain as a 4/10. Its primarily in the posterior aspect of the upper arm occasionally radiating distally. Physical Exam:  Ht 5' 4\" (1.626 m)   Wt 142 lb (64.4 kg)   BMI 24.37 kg/m²   General Appearance: alert, well appearing, and in no distress  Mental Status: alert, oriented to person, place, and time  Evaluation of the patient's left shoulder and upper extremity demonstrates intact skin with mild ecchymosis still present and some mild diffuse swelling still present. Sensation is grossly intact light touch in all dermatomes and she has a 2+ radial pulse with brisk capillary refill in her fingers. She can actively flex, extend, abduct and adduct all her fingers. Imaging Studies: 4 x-ray views of the left shoulder were obtained and reviewed independently today demonstrating maintained alignment of the proximal humerus fracture without any interval displacement. No obvious significant callus formation or consolidation is appreciated at this time. Impression and plan: Too Mayo is a 74 y.o. old female who is approximately 4 weeks out from a closed left proximal humerus fracture. As noted above she remains in acceptable alignment. Consequently we'll continue with conservative management. To this end she is to discontinue the immobilizer and will begin formal physical therapy working on gradual progressive range of motion. No strengthening until she is 8 weeks out from the injury with appropriate healing. she may use the arm for light activities of daily living. No heavy lifting, pushing, or pulling.  I'll have her follow up in 4 weeks for re-evaluation but she was instructed to return or call earlier with any questions or concerns.

## 2022-06-23 ENCOUNTER — HOSPITAL ENCOUNTER (OUTPATIENT)
Dept: PHYSICAL THERAPY | Age: 67
Setting detail: THERAPIES SERIES
Discharge: HOME | End: 2022-06-23
Payer: MEDICARE

## 2022-06-23 PROCEDURE — 97110 THERAPEUTIC EXERCISES: CPT

## 2022-06-23 PROCEDURE — 97162 PT EVAL MOD COMPLEX 30 MIN: CPT

## 2022-06-23 NOTE — CONSULTS
[x] [facility] ([facility]) - [facility] & Therapy  [address]  [place]: [phone]   F: [phone]     Physical Therapy Upper Extremity Evaluation    Date:  2022  Patient: Ly Johnson  : 1955  MRN: [id number]  Physician: Rogers Jean Baptiste MD   Insurance: Medicare/Medical [place] -- based on [place]   Medical Diagnosis: S42.295D (ICD-10-CM) - Other closed nondisplaced fracture of proximal end of left humerus with routine healing, subsequent encounter   Rehab Codes: R25 pain , M25.60 stiffness, R53.1 weakness, R60.9 Edema  Onset Date: 22   Next 's appt: 22- Dr. Zafar Hernandez      Precautions: No strengthening at this time-- hold until 8 weeks (~), [facility]     Subjective:   CC: L humeral fracture     Pt notes she fell during a syncopal episode 22 and sustained a L humeral fracture. When to the ER and gave her a sling and had her return home. She notes she had it evaluated by othropedics in [place] and they are wanting to proceed conservatively at this time. She notes she has been doing pendulums 2x/day. She notes she moves the shoulder best in the shower and feels better with heat. She notes she is follow up with ortho in 4 weeks. Per notes she is to hold on strengthening until the 8 week montse. She reports a pain in the upper L arm with crocheting but this was present prior to her injury. She notes she has been fearful of trying any reaching with the L arm. Tends to hold it to the side       Per notes with referring physician: \"While out in [place] about 3 weeks ago on 2022 she was going to the bathroom early in the morning and believes that she passed out and fell hurting her left shoulder. She was seen at an outside facility in [place] where she was diagnosed with a proximal humerus fracture. She was placed in a sling and presents today for further evaluation and treatment. Her pain remains localized to the left shoulder. She denies any numbness or tingling. \"  From Note 6/22/22: \"Linda Neale Councilman is a 74 y.o. old female who is approximately 4 weeks out from a closed left proximal humerus fracture. she has been in a shoulder immobilizer and working on pendulum exercises since she was last seen. She states that her shoulder is feeling better. She rates her pain as a 4/10. Its primarily in the posterior aspect of the upper arm occasionally radiating distally. 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 Alleviating factors: shower warm water, heating pad     Prior Imaging: Xray from 6/22/22  4 x-ray views of the left shoulder were obtained and reviewed independently today demonstrating maintained alignment of the proximal humerus fracture without any interval displacement. No obvious significant callus formation or consolidation is appreciated at this time.        Previous Treatment: none     Medications: [] Refer to full medical record [] None [x] Other: taking tylenol x2 pills twice a day   Allergies:      [x] Refer to full medical record [] None [x] Other: pool chemcials, penicillin, Asprin       Work Status: retired     Prior Level of Function:      Functional Status Previous level of function Current level of function Comments Bathing  [x] Independent  [] Deficit [x] Independent  [] Deficit Limited use with L arm    Dress/grooming [x] Independent  [] Deficit [] Independent  [x] Deficit Difficulty getting tops on,  assists   Driving [x] Independent  [] Deficit [] Independent  [x] Deficit      Housekeeping/Meal Preparation [x] Independent  [] Deficit [] Independent  [x] Deficit     Lifting/Carrying [x] Independent  [] Deficit [] Independent  [x] Deficit Unable    Reaching [x] Independent  [] Deficit [] Independent  [x] Deficit Difficulty with range   Grasping [x] Independent  [] Deficit [] Independent  [x] Deficit Mild weakness    Writing/Typing [x] Independent  [] Deficit [] Independent  [x] Deficit Just doing it R handed    Other:  [] Independent  [] Deficit [] Independent  [] Deficit      Patient Goals/Rehab Expectations: more movement         Objective:    Observations:   - tends to hold L arm IR and elbow flexed against body  - has mild swelling in L hand and wrist. Unable to get her wedding ring on.    - moderate L shoulder depression     Cervical Clearing:  [x] Not tested            [] No Deficit              [] Deficit:      Palpation:   - TTP all around the L humeral head  -  TTP around the L deltoid insertion   - tender to light palpation at L upper trap    Sensation:  [x] No Deficit         [] Deficit:     Fall risk:  [x] No            [] Yes:    Eli Fall Risk Assessment     Risk Factor Scale   Score   History of Falls [x] Yes -- but related to syncope so 0 pt will be given   [] No 25  0 0   Secondary Diagnosis [] Yes  [x] No 15  0 0   Ambulatory Aid [] Furniture  [] Crutches/cane/walker  [x] None/bedrest/wheelchair/nurse 30  15  0 0   IV/Heparin Lock [] Yes  [x] No 20  0 0   Gait/Transferring [] Impaired  [] Weak  [x] Normal/bedrest/immobile 20  10  0 0   Mental Status [] Forgets limitations  [x] Oriented to own ability 15  0 0         Total: 0      Based on the Assessment score: check the appropriate box.     [x]         No intervention needed                                  Low =              Score of 0-24      Range of Motion  Left Range of Motion  Right Strength  Left Strength  Right   Shoulder Flexion P: 80 deg   No strength assessment given limited range and guidance for no strengthening  5   Shoulder Abduction 40 deg   5   Shoulder Extension 10 deg   5   Shoulder ER  Arm at side 15 (+p)   5   Shoulder IR  Arm at side  40 (+p)   5   Elbow Flexion 140 140 3+ 5   Elbow Extension -45 initially; improves to 0 with PROM  0 4- 5   Pronation   4 - pain in elbow 5   Supination   4 -pain in eblow 5   Wrist Flexion   4 pain in L lateral elbow 5   Wrist Extension   5 5   Apley- Cross Body Reach       Apley- Gross ER       Apley- Gross IR        Strength   20# 30#          Comments:   - all ranges are passive to the level of pain   - held on L shoulder muscle group testing due to limited range and pain. Could give at least 1+/5 for muscle groups as there is some activation   - good contraction of scap retractors -- notes this improves pain     Joint Mobility: guarded due to pain     Gait: Independent [x]           Modified Independent []            Not independent []  Comments:       Assessment:  Pt presents with L humeral fx that is being treated conservatively. Pt presents with moderate pain, decreased ROM in L shoulder & elbow, decreased strength, swelling distally, and a depressed and forward shoulder posture that has impacted her ability to use her LUE for ADLs. Very minimal motion of L shoulder due to pain and guarding. Cleophus Hanover Her L elbow was lacking extension initially but improves to full extension with PROM indicating some guarding. With all PROM a heat pack was placed on the lateral L upper arm to control pain. Pt also reports some pain in the L biceps that was present prior to her fall. She notes she has been crocheting a lot so this could be some signs of a tendinopathy that can be addressed during her POC. Due forward rounded posture provided KT taping to the joint to promote alignment and help control pain. Overall feel this patient has room for progress to regain functional use of her LUE. Feel this Patient would continue to benefit from skilled physical therapy in order to: improve L shoulder strength, ROM, and better control swelling to allow her to return to using her LUE with ADLs. Problems:    [x] ? Pain:  [x] ? ROM:  [x] ? Strength:  [x] ? Function:  [x] Other: edema in L hand and wrist     STG: (to be met in 8 treatments)  1. Pt will self report worst pain no greater than 3/10 in order to better tolerate ADLs/work activities with minimal dysfunction  2. Pt will improve PROM in L shoulder to >160 deg flexion and abduction to improve her ability to reach overhead. 3. Pt will improve AROM in L shoulder flexion and abduction to >/= 90 deg in order to demonstrate ability in progressing reach in all planes to complete ADLs. 4. Pt will report no feelings of stiffness in the L hand with  indicated controlled swelling. LTG: (to be met in 16 treatments)  1. Pt will improve Apley reach bilaterally to posterior shoulder, ER to T3 and IR to L4 for efficient dressing at prior level  2. Pt will demonstrate improved L UE strength to 4+/5 or greater in order to demonstrate improved stability/strength necessary for unrestricted ADLs. 3. Pt will increase score on UEFS to >/= 65/80 in order to demonstrate improved functional tolerances with ADLs with minimal restriction/dysfunction  4.  Pt will demonstrate independence with a long term HEP for continued progress/maintenance after completion of PT  ** further goals may be established based on progress**       Functional Assessment Used: UEFS   Current Status Score: 52/80   Goal Status Score: 65/80     Evaluation Complexity:  History (Personal factors, comorbidities) [] 0 [x] 1-2 [] 3+   Exam (limitations, restrictions) [] 1-2 [x] 3 [] 4+   Clinical presentation (progression) [] Stable [x] Evolving  [] Unstable   Decision Making [] Low [x] Moderate [] High    [] Low Complexity [x] Moderate Complexity [] High Complexity     Rehab Potential:  [x] Good  [] Fair  [] Poor   Suggested Professional Referral:  [x] No  [] Yes:  Barriers to Goal Achievement[de-identified]  [x] No  [] Yes:  Domestic Concerns:  [x] No  [] Yes:    Pt. Education:  [x] Plans/Goals, Risks/Benefits discussed  [x] Home exercise program  Method of Education: [x] Verbal  [x] Demo  [] Written  Comprehension of Education:  [x] Verbalizes understanding. [x] Demonstrates understanding. [] Needs Review. [x] Demonstrates/verbalizes understanding of HEP/Ed previously given. Treatment Plan:  [x] Therapeutic Exercise   27706  [] Iontophoresis: 4 mg/mL Dexamethasone Sodium Phosphate  mAmin  28301   [x] Therapeutic Activity  77792 [x] Vasopneumatic cold with compression  29531    [] Gait Training   44125 [] Ultrasound   69399   [x] Neuromuscular Re-education  53406 [] Electrical Stimulation Unattended  61622   [x] Manual Therapy  61620 [] Electrical Stimulation Attended  38867   [x] Instruction in HEP  [] Lumbar/Cervical Traction  52914   [] Aquatic Therapy   89473 [x] Cold/hotpack    [] Massage   09147      [] Dry Needling, 1 or 2 muscles  97026   [] Biofeedback, first 15 minutes   18661  [] Biofeedback, additional 15 minutes   76770 [] Dry Needling, 3 or more muscles  99104     []  Medication allergies reviewed for use of    Dexamethasone Sodium Phosphate 4mg/ml     with iontophoresis treatments. Pt is not allergic.        Frequency: 2 x/week for 16 visits    Todays Treatment:  Modalities:     Exercises:  Exercise Reps/ Time Weight/ Level Comments   PROM -- L shoulder flexion, abuduction, ER  & L elbow flex/ext 8'  Heat pack placed on the mid humeral region to control pain to allow for greater PROM    Education  2'  Educated on importance of movement of the L arm and continuing with pendulums    KT taping for shoulder

## 2022-06-27 ENCOUNTER — HOSPITAL ENCOUNTER (OUTPATIENT)
Dept: PHYSICAL THERAPY | Age: 67
Setting detail: THERAPIES SERIES
Discharge: HOME | End: 2022-06-27
Payer: MEDICARE

## 2022-06-27 PROCEDURE — 97110 THERAPEUTIC EXERCISES: CPT

## 2022-06-27 NOTE — FLOWSHEET NOTE
[facility]   [address]   Phone: [phone]   Fax: [phone]    Physical Therapy Daily Treatment Note      Date:  2022  Patient Name:  Tonny Castrejon    :  1955  MRN: [id number]  Physician: Billy Ward MD                               Insurance: Medicare/Medical [place] -- based on [place]   Medical Diagnosis: S42.295D (ICD-10-CM) - Other closed nondisplaced fracture of proximal end of left humerus with routine healing, subsequent encounter     Rehab Codes: R25 pain , M25.60 stiffness, R53.1 weakness, R60.9 Edema  Onset Date: 22               Next 's appt: 22- Dr. Clinton Monahan   Visit# / total visits:   Cancels/No Shows: 0/0      Precautions: No strengthening at this time-- hold until 8 weeks (~), [place]     Subjective:  Patient reporting that KT did not last very long and does not wish to try it again. Patient reporting she had stim performed on her low back and that helped with her pain. Pain:  [x] Yes  [] No Location: l shoulder Pain Rating: (0-10 scale) 5/10  Pain altered Tx:  [] No  [] Yes  Action:  Comments:    Objective:  Modalities: 5' MHP at the beginning of session to decrease tightness to improve PROM  Precautions: No strengthening at this time-- hold until 8 weeks (~), [place]   PATINET DOES NOT TOLERATE LAYING FLAT   Exercises: Started in reclined position but patient was not comfortable and transitioned into seated.     Exercise Reps/ Time Weight/ Level Comments Completed   PROM -- L shoulder flexion, abuduction, ER  & L elbow flex/ext 10'   Heat pack placed on the mid humeral region to control pain to allow for greater PROM; pt demos increased guarding   X   MAT       AAROM: Flexion, ABD, ER 10x 3\" hold   X   Bicep curls pronated, neutral, supinated 10x   X   Education     Proper sitting posture throughout exercises    KT taping for shoulder approximation  5'   With 3 strips -- 1) lateral cervical spine along supraspinatus down to deltoid tuberosity with 20-30% stretch 2) anterior acromion to lateral border of scap over [address]% stretch 3) anterior humeral head to lateral border of scap 80% stretch                           Other:    Access Code: [email or web address]  URL: [email or web address]  Date: 06/27/2022  Prepared by: Hafsa Saeed    Exercises  Seated Shoulder External Rotation AAROM with Dowel - 1 x daily - 7 x weekly - 3 sets - 10 reps - 3-5 second hold  Seated Shoulder Flexion Self PROM - 1 x daily - 7 x weekly - 3 sets - 10 reps - 3-5 second hold  Seated Shoulder Abduction AAROM with Dowel - 1 x daily - 7 x weekly - 3 sets - 10 reps - 3-5 second hold  Seated Biceps Curl - 1 x daily - 7 x weekly - 3 sets - 10 reps       Specific Instructions for next treatment: continue with passive ROM. Teach AAROM for HEP, work on  and wrist strengthening, can KT tape if felt it helped pain, consider vaso for swelling (pt does do better with heat so need to see if she can tolerate)         Assessment: [] Progressing toward goals. [] No change. [] Other:    [x] Patient would continue to benefit from skilled physical therapy services in order to: improve L shoulder strength, ROM, and better control swelling to allow her to return to using her LUE with ADLs.       6/27: Began session with MHP to LUE as charted above. PROM took an extended amount of time as patient was heavily educated on the importance of relaxation to prevent guarding and to improve ROM. Followed with [facility] HEP education with dowl david. However, kept patient at self AAROM shoulder flexion as she was able to accomplish more ROM than with dowl david. Educated and cued patient on proper sitting posture and the importance of performing full ROM. Patient reported feeling good at the end of session as the stretching helped relieve some discomfort.   Patient was also educated on how ice can help after exercises for pain management and how to making icing 8124    Electronically signed by:  Jaimie Puga PTA

## 2022-06-29 ENCOUNTER — HOSPITAL ENCOUNTER (OUTPATIENT)
Dept: PHYSICAL THERAPY | Age: 67
Setting detail: THERAPIES SERIES
Discharge: HOME | End: 2022-06-29
Payer: MEDICARE

## 2022-06-29 PROCEDURE — 97016 VASOPNEUMATIC DEVICE THERAPY: CPT

## 2022-06-29 PROCEDURE — 97110 THERAPEUTIC EXERCISES: CPT

## 2022-06-29 NOTE — FLOWSHEET NOTE
[facility]   [address]   Phone: [phone]   Fax: [phone]    Physical Therapy Daily Treatment Note      Date:  2022  Patient Name:  Loretta Biggs    :  1955  MRN: [id number]  Physician: Sulma Escobedo MD                               Insurance: Medicare/Medical [place] -- based [place]   Medical Diagnosis: S42.295D (ICD-10-CM) - Other closed nondisplaced fracture of proximal end of left humerus with routine healing, subsequent encounter     Rehab Codes: R25 pain , M25.60 stiffness, R53.1 weakness, R60.9 Edema  Onset Date: 22               Next 's appt: 22- Dr. Flor Garibay   Visit# / total visits: 3/16  Cancels/No Shows: 0/0      Precautions: No strengthening at this time-- hold until 8 weeks (~), [place]     Subjective:  Patient arrives noting she has only been able to complete 2x/day. She notes she has been icing and feels that it has been more  Beneficial than heat. Her L hand and wrist are more swollen so educated at icing will help with this. Pt notes pain is getting more bearable. Pain:  [x] Yes  [] No Location: l shoulder Pain Rating: (0-10 scale) 5/10  Pain altered Tx:  [] No  [] Yes  Action:  Comments:    Objective:    Precautions: No strengthening at this time-- hold until 8 weeks (~), [place]   PATINET DOES NOT TOLERATE LAYING FLAT     Exercises: Started in reclined position but patient was not comfortable and transitioned into seated.     MODALITIES  Time  Weight/ Level Comments  Completed    vasocompression - L shoulder 15' Low, 36 deg  Seated  x                               INTERVENTION Reps/ Time Weight/ Level Comments Completed   PROM -- L shoulder flexion, abuduction, ER  & L elbow flex/ext 10'   Semi recline X          MAT       AAROM: Flexion, ABD, ER 10x 3\" hold  Semi-reclined  X   Table slides flexion x20 Hold 3s  x   Table slides scaption       Abduction rolls on bolster x20 Hold 3s  x   Posterior shoulder roll  x15 Hold 3s  Seated  x   Bicep curls pronated, neutral, supinated 10x  Seated  X   Education     Proper sitting posture throughout exercises                          Other:     HEP Access Code: [email or web address]  Date: 06/27/2022  Prepared by: Roque Benitez  Exercises  Seated Shoulder External Rotation AAROM with Dowel - 1 x daily - 7 x weekly - 3 sets - 10 reps - 3-5 second hold  Seated Shoulder Flexion Self PROM - 1 x daily - 7 x weekly - 3 sets - 10 reps - 3-5 second hold  Seated Shoulder Abduction AAROM with Dowel - 1 x daily - 7 x weekly - 3 sets - 10 reps - 3-5 second hold  Seated Biceps Curl - 1 x daily - 7 x weekly - 3 sets - 10 reps       Specific Instructions for next treatment: continue with passive ROM. Teach AAROM for HEP, work on  and wrist strengthening, consider vaso for swelling (pt does do better with heat so need to see if she can tolerate)          Assessment: [x] Progressing toward goals. Began with some PROM. Educated patient that better to try these in semi-reclined as this promotes better alignment to reduce forward rounded sitting. Still limited in all planes of motion. Continued with review of AAROM with dowel to ensure pt is appropriately completing a home program. Added in table slides for flexion, scaption, and abduction for greater ROM work. Added in posterior shoulder rolls to keep the scapular muscles activated. Ended with vasocompression due to increased swelling in the wrist and hand. Educated on the benefits of cold vs heat in the early stages of healing. Discussed with an Ice pack at home to complete for 20 minutes. [] No change. [] Other:    [x] Patient would continue to benefit from skilled physical therapy services in order to: improve L shoulder strength, ROM, and better control swelling to allow her to return to using her LUE with ADLs. STG: (to be met in 8 treatments)  1.  Pt will self report worst pain no greater than 3/10 in order to better tolerate ADLs/work activities with minimal dysfunction  2. Pt will improve PROM in L shoulder to >160 deg flexion and abduction to improve her ability to reach overhead. 3. Pt will improve AROM in L shoulder flexion and abduction to >/= 90 deg in order to demonstrate ability in progressing reach in all planes to complete ADLs. 4. Pt will report no feelings of stiffness in the L hand with  indicated controlled swelling. LTG: (to be met in 16 treatments)  1. Pt will improve Apley reach bilaterally to posterior shoulder, ER to T3 and IR to L4 for efficient dressing at prior level  2. Pt will demonstrate improved L UE strength to 4+/5 or greater in order to demonstrate improved stability/strength necessary for unrestricted ADLs. 3. Pt will increase score on UEFS to >/= 65/80 in order to demonstrate improved functional tolerances with ADLs with minimal restriction/dysfunction  4. Pt will demonstrate independence with a long term HEP for continued progress/maintenance after completion of PT  ** further goals may be established based on progress**     Pt. Education:  [x] Yes  [] No  [] Reviewed Prior HEP/Ed  Method of Education: [x] Verbal  [] Demo  [x] Written  Comprehension of Education:  [x] Verbalizes understanding. [] Demonstrates understanding. [x] Needs review. [] Demonstrates/verbalizes HEP/Ed previously given. Plan: [x] Continue per plan of care.    [] Other:      Treatment Charges: Mins Units   []  Modalities     [x]  Ther Exercise 44 3   []  Manual Therapy     []  Ther Activities     []  Aquatics     []  Neuromuscular     [x] Vasocompression 15 1   [] Gait Training     [] Dry needling        [] 1 or 2 muscles        [] 3 or more muscles     []  Other     Total Treatment time 59 4     Time DE:8811            Time Out: 7355    Electronically signed by:  Wyatt Knott, PT

## 2022-07-01 ENCOUNTER — HOSPITAL ENCOUNTER (OUTPATIENT)
Dept: PHYSICAL THERAPY | Age: 67
Setting detail: THERAPIES SERIES
Discharge: HOME | End: 2022-07-01
Payer: MEDICARE

## 2022-07-01 PROCEDURE — 97016 VASOPNEUMATIC DEVICE THERAPY: CPT

## 2022-07-01 PROCEDURE — 97110 THERAPEUTIC EXERCISES: CPT

## 2022-07-01 NOTE — FLOWSHEET NOTE
[facility] Outpatient Physical Therapy   [address]   Phone: [phone]   Fax: [phone]    Physical Therapy Daily Treatment Note      Date:  2022  Patient Name:  Lucila Russell    :  1955  MRN: [id number]  Physician: Jessica Blackwell MD                               Insurance: Medicare/Medical [place]   Medical Diagnosis: S42.295D (ICD-10-CM) - Other closed nondisplaced fracture of proximal end of left humerus with routine healing, subsequent encounter     Rehab Codes: R25 pain , M25.60 stiffness, R53.1 weakness, R60.9 Edema  Onset Date: 22               Next 's appt: 22- Dr. Inez Zhao   Visit# / total visits: 3/16  Cancels/No Shows: 0/0      Precautions: No strengthening at this time-- hold until 8 weeks (~), [place]     Subjective:  Patient reporting she has noticed improved ROM. Patient states she is able to dress herself without assistance. Pain:  [x] Yes  [] No Location: l shoulder Pain Rating: (0-10 scale) 4/10- Tylenol  Pain altered Tx:  [] No  [] Yes  Action:  Comments:    Objective:    Precautions: No strengthening at this time-- hold until 8 weeks (~), [place]   PATINET DOES NOT TOLERATE LAYING FLAT     Exercises: Started in reclined position but patient was not comfortable and transitioned into seated.     MODALITIES  Time  Weight/ Level Comments  Completed    vasocompression - L shoulder 15' Low, 36 deg  Seated with pillow case X                               INTERVENTION Reps/ Time Weight/ Level Comments Completed   PROM -- L shoulder flexion, abuduction, ER  & L elbow flex/ext 10'   Semi recline X          MAT       AAROM: Flexion, ABD, ER 10x 3\" hold  Semi-reclined  X   Table slides flexion x20 Hold 3s 7/1 10x only X   Table slides scaption       Abduction rolls on bolster x20 Hold 3s 7/1 10x only X   Posterior shoulder roll  x15 Hold 3s  Seated     Bicep curls pronated, neutral, supinated 10x  Seated  X   Education     Proper sitting posture throughout exercises                          Other:     HEP Access Code: [email or web address]  Date: 06/27/2022  Prepared by: Radha Olivarez  Exercises  Seated Shoulder External Rotation AAROM with Dowel - 1 x daily - 7 x weekly - 3 sets - 10 reps - 3-5 second hold  Seated Shoulder Flexion Self PROM - 1 x daily - 7 x weekly - 3 sets - 10 reps - 3-5 second hold  Seated Shoulder Abduction AAROM with Dowel - 1 x daily - 7 x weekly - 3 sets - 10 reps - 3-5 second hold  Seated Biceps Curl - 1 x daily - 7 x weekly - 3 sets - 10 reps       Specific Instructions for next treatment: continue with passive ROM. Teach AAROM for HEP, work on  and wrist strengthening, UBE and finger ladder          Assessment: [x] Progressing toward goals. 7/1 Continued with ROM exercises this session to patient's tolerance. Patient continues to demonstrate guarding with all PROM and AAROM exercises. Frequent cues needed to correct patient's posture and techique throughout session. Due to increased soreness, patient only tolerating 1 set of each exercise as charted above. [] No change. [] Other:    [x] Patient would continue to benefit from skilled physical therapy services in order to: improve L shoulder strength, ROM, and better control swelling to allow her to return to using her LUE with ADLs. STG: (to be met in 8 treatments)  1. Pt will self report worst pain no greater than 3/10 in order to better tolerate ADLs/work activities with minimal dysfunction  2. Pt will improve PROM in L shoulder to >160 deg flexion and abduction to improve her ability to reach overhead. 3. Pt will improve AROM in L shoulder flexion and abduction to >/= 90 deg in order to demonstrate ability in progressing reach in all planes to complete ADLs. 4. Pt will report no feelings of stiffness in the L hand with  indicated controlled swelling. LTG: (to be met in 16 treatments)  1.  Pt will improve Apley reach bilaterally to posterior shoulder, ER to T3 and IR to L4 for efficient dressing at prior level  2. Pt will demonstrate improved L UE strength to 4+/5 or greater in order to demonstrate improved stability/strength necessary for unrestricted ADLs. 3. Pt will increase score on UEFS to >/= 65/80 in order to demonstrate improved functional tolerances with ADLs with minimal restriction/dysfunction  4. Pt will demonstrate independence with a long term HEP for continued progress/maintenance after completion of PT  ** further goals may be established based on progress**     Pt. Education:  [x] Yes  [] No  [] Reviewed Prior HEP/Ed  Method of Education: [x] Verbal  [] Demo  [x] Written  Comprehension of Education:  [x] Verbalizes understanding. [] Demonstrates understanding. [x] Needs review. [] Demonstrates/verbalizes HEP/Ed previously given. Plan: [x] Continue per plan of care.    [] Other:      Treatment Charges: Mins Units   []  Modalities     [x]  Ther Exercise 40 3   []  Manual Therapy     []  Ther Activities     []  Aquatics     []  Neuromuscular     [x] Vasocompression 15 1   [] Gait Training     [] Dry needling        [] 1 or 2 muscles        [] 3 or more muscles     []  Other     Total Treatment time 55 4     Time In:0905          Time Out: 1000     Electronically signed by:  Rachel Burr PTA

## 2022-07-05 ENCOUNTER — HOSPITAL ENCOUNTER (OUTPATIENT)
Dept: PHYSICAL THERAPY | Age: 67
Setting detail: THERAPIES SERIES
Discharge: HOME | End: 2022-07-05
Payer: MEDICARE

## 2022-07-05 PROCEDURE — 97016 VASOPNEUMATIC DEVICE THERAPY: CPT

## 2022-07-05 PROCEDURE — 97110 THERAPEUTIC EXERCISES: CPT

## 2022-07-05 NOTE — FLOWSHEET NOTE
[facility] Outpatient Physical Therapy   [facility]   Phone: [phone]   Fax: [phone]    Physical Therapy Daily Treatment Note      Date:  2022  Patient Name:  Angela Duran    :  1955  MRN: [id number]  Physician: Anjel Rodriguez MD                               Insurance: Medicare/Medical [place]   Medical Diagnosis: S42.295D (ICD-10-CM) - Other closed nondisplaced fracture of proximal end of left humerus with routine healing, subsequent encounter     Rehab Codes: R25 pain , M25.60 stiffness, R53.1 weakness, R60.9 Edema  Onset Date: 22               Next 's appt: 22- Dr. Mariam Dunbar   Visit# / total visits:  (corrected)  Cancels/No Shows: 0/0      Precautions: No strengthening at this time-- hold until 8 weeks (~), [place]     Subjective:  Patient reporting she has noticed she did not sleep well due to the pain in the L shoulder and weather. She notes pain was about 7/10. She notes she has been having trouble in the mornings due to keeping it in one position in the morning. Discussed using a pillow to support the arm at night.      Pain:  [x] Yes  [] No Location:Ll shoulder (about mid humeral region) Pain Rating: (0-10 scale) 7/10  Pain altered Tx:  [] No  [] Yes  Action:  Comments:    Objective:    Precautions: No strengthening at this time-- hold until 8 weeks (~), [place]   PATINET DOES NOT TOLERATE LAYING FLAT     Exercises:  MODALITIES  Time  Weight/ Level Comments  Completed    vasocompression - L shoulder 10' Low, 36 deg  Seated with pillow case X                               INTERVENTION Reps/ Time Weight/ Level Comments Completed   UBE: F/B  3'/3' Level 1   x                 PROM -- L shoulder flexion, abuduction, ER  10'   Semi recline; ER at 30 deg abd X          MAT       AAROM: Flexion, ABD, ER 10x 3\" hold  Semi-reclined  X   Table slides flexion x20 Hold 3s  X   Table slides scaption    x   Abduction rolls on bolster x20 x   Posterior shoulder roll  x15 Hold 5s  Seated  x   Bicep curls pronated, neutral, supinated 10x  Seated  X   Education     Proper sitting posture throughout exercises                          Other:   7/5: provided with yellow putty for gripping work. HEP Access Code: [email or web address]  Date: 06/27/2022  Prepared by: Jose Maria Alexandre  Exercises  Seated Shoulder External Rotation AAROM with Dowel - 1 x daily - 7 x weekly - 3 sets - 10 reps - 3-5 second hold  Seated Shoulder Flexion Self PROM - 1 x daily - 7 x weekly - 3 sets - 10 reps - 3-5 second hold  Seated Shoulder Abduction AAROM with Dowel - 1 x daily - 7 x weekly - 3 sets - 10 reps - 3-5 second hold  Seated Biceps Curl - 1 x daily - 7 x weekly - 3 sets - 10 reps       Specific Instructions for next treatment: continue with passive ROM. Teach AAROM for HEP, work on  and wrist strengthening, UBE and finger ladder          Assessment: [x] Progressing toward goals. Pt arrives with increase soreness due to weather. Added in UBE for greater ROM work for the L shoulder. Continued with PROM and AAROM to maximize ranges. Reaches about 100 deg flexion, 60 deg ABD, and ~40 deg ER with exercises. Still very limited by pain and needs frequent cues for motivation to continue with the interventions. Pt does note some soreness post exercises. Ended with vaso for pain and edema control of the LUE.     [] No change. [] Other:    [x] Patient would continue to benefit from skilled physical therapy services in order to: improve L shoulder strength, ROM, and better control swelling to allow her to return to using her LUE with ADLs. STG: (to be met in 8 treatments)  1. Pt will self report worst pain no greater than 3/10 in order to better tolerate ADLs/work activities with minimal dysfunction  2. Pt will improve PROM in L shoulder to >160 deg flexion and abduction to improve her ability to reach overhead.    3. Pt will improve AROM in L shoulder flexion and abduction to >/= 90 deg in order to demonstrate ability in progressing reach in all planes to complete ADLs. 4. Pt will report no feelings of stiffness in the L hand with  indicated controlled swelling. LTG: (to be met in 16 treatments)  1. Pt will improve Apley reach bilaterally to posterior shoulder, ER to T3 and IR to L4 for efficient dressing at prior level  2. Pt will demonstrate improved L UE strength to 4+/5 or greater in order to demonstrate improved stability/strength necessary for unrestricted ADLs. 3. Pt will increase score on UEFS to >/= 65/80 in order to demonstrate improved functional tolerances with ADLs with minimal restriction/dysfunction  4. Pt will demonstrate independence with a long term HEP for continued progress/maintenance after completion of PT  ** further goals may be established based on progress**     Pt. Education:  [x] Yes  [] No  [] Reviewed Prior HEP/Ed  Method of Education: [x] Verbal  [] Demo  [x] Written  Comprehension of Education:  [x] Verbalizes understanding. [] Demonstrates understanding. [x] Needs review. [] Demonstrates/verbalizes HEP/Ed previously given. Plan: [x] Continue per plan of care.    [] Other:      Treatment Charges: Mins Units   []  Modalities     [x]  Ther Exercise 41 3   []  Manual Therapy     []  Ther Activities     []  Aquatics     []  Neuromuscular     [x] Vasocompression 10 1   [] Gait Training     [] Dry needling        [] 1 or 2 muscles        [] 3 or more muscles     []  Other     Total Treatment time 51 4     Time PM:7678         Time Out: 0693      Electronically signed by:  Jennie Castleman, PT

## 2022-07-08 ENCOUNTER — HOSPITAL ENCOUNTER (OUTPATIENT)
Dept: PHYSICAL THERAPY | Age: 67
Setting detail: THERAPIES SERIES
Discharge: HOME | End: 2022-07-08
Payer: MEDICARE

## 2022-07-08 PROCEDURE — 97016 VASOPNEUMATIC DEVICE THERAPY: CPT

## 2022-07-08 PROCEDURE — 97110 THERAPEUTIC EXERCISES: CPT

## 2022-07-08 NOTE — FLOWSHEET NOTE
[facility] Outpatient Physical Therapy   [address]   Phone: [phone]   Fax: [phone]    Physical Therapy Daily Treatment Note      Date:  2022  Patient Name:  Emma Freeman    :  1955  MRN: [id number]  Physician: Stephen Saravia MD                               Insurance: Medicare/Medical [place]   Medical Diagnosis: S42.295D (ICD-10-CM) - Other closed nondisplaced fracture of proximal end of left humerus with routine healing, subsequent encounter     Rehab Codes: R25 pain , M25.60 stiffness, R53.1 weakness, R60.9 Edema  Onset Date: 22               Next 's appt: 22- Dr. Orantes Bath   Visit# / total visits:  (corrected)  Cancels/No Shows: 0/0      Precautions: No strengthening at this time-- hold until 8 weeks (~), [place]     Subjective:  Patient reporting to therapy stating she has a headache coming in to this session. Patient utilizing a towel roll when she sleeps.     Pain:  [x] Yes  [] No Location:L shoulder (about mid humeral region) Pain Rating: (0-10 scale) 4-5/10  Pain altered Tx:  [] No  [] Yes  Action:  Comments:    Objective:    Precautions: No strengthening at this time-- hold until 8 weeks (~), [place]   PATINET DOES NOT TOLERATE LAYING FLAT     Exercises:  MODALITIES  Time  Weight/ Level Comments  Completed    vasocompression - L shoulder 15' Low, 34 deg  Seated with pillow case X          INTERVENTION Reps/ Time Weight/ Level Comments Completed   UBE: F/B  3'/3' Level 1  Cues to alt direction X                 PROM -- L shoulder flexion, abuduction, ER  8'   78 semi reclined X          MAT       AAROM: Flexion, ABD, ER 10x 3\" hold  Semi-reclined  X   Table slides flexion x20 Hold 3s     Table slides scaption       Abduction rolls on bolster x20      Posterior shoulder roll  x15 Hold 5s  Seated     Bicep curls pronated, neutral, supinated 15x  Seated  inc reps X   Education     Proper sitting posture throughout exercises     Manual: gentle STM to L shoulder and biceps 5'    X              Other:   7/5: provided with yellow putty for gripping work. HEP Access Code: [email or web address]  Date: 06/27/2022  Prepared by: Radha Olivarez  Exercises  Seated Shoulder External Rotation AAROM with Dowel - 1 x daily - 7 x weekly - 3 sets - 10 reps - 3-5 second hold  Seated Shoulder Flexion Self PROM - 1 x daily - 7 x weekly - 3 sets - 10 reps - 3-5 second hold  Seated Shoulder Abduction AAROM with Dowel - 1 x daily - 7 x weekly - 3 sets - 10 reps - 3-5 second hold  Seated Biceps Curl - 1 x daily - 7 x weekly - 3 sets - 10 reps       Specific Instructions for next treatment: continue with passive ROM. Teach AAROM for HEP, work on  and wrist strengthening, UBE and finger ladder          Assessment: [x] Progressing toward goals. 7/8:  Patient arrives to session with inc stiffness. Began with UBE and followed with PROM. Patient continues to demonstrate guarding but is able to relax with cueing. Patient demonstrating improved ROM this session with cues for proper breathing technique to promote relaxation. Educated patient on using MHP at home before exercises to decrease tightness and to improve ROM. [] No change. [] Other:    [x] Patient would continue to benefit from skilled physical therapy services in order to: improve L shoulder strength, ROM, and better control swelling to allow her to return to using her LUE with ADLs. STG: (to be met in 8 treatments)  1. Pt will self report worst pain no greater than 3/10 in order to better tolerate ADLs/work activities with minimal dysfunction  2. Pt will improve PROM in L shoulder to >160 deg flexion and abduction to improve her ability to reach overhead. 3. Pt will improve AROM in L shoulder flexion and abduction to >/= 90 deg in order to demonstrate ability in progressing reach in all planes to complete ADLs.    4. Pt will report no feelings of stiffness in the L hand with  indicated controlled swelling. LTG: (to be met in 16 treatments)  1. Pt will improve Apley reach bilaterally to posterior shoulder, ER to T3 and IR to L4 for efficient dressing at prior level  2. Pt will demonstrate improved L UE strength to 4+/5 or greater in order to demonstrate improved stability/strength necessary for unrestricted ADLs. 3. Pt will increase score on UEFS to >/= 65/80 in order to demonstrate improved functional tolerances with ADLs with minimal restriction/dysfunction  4. Pt will demonstrate independence with a long term HEP for continued progress/maintenance after completion of PT  ** further goals may be established based on progress**     Pt. Education:  [x] Yes  [] No  [] Reviewed Prior HEP/Ed  Method of Education: [x] Verbal  [] Demo  [x] Written  Comprehension of Education:  [x] Verbalizes understanding. [] Demonstrates understanding. [x] Needs review. [] Demonstrates/verbalizes HEP/Ed previously given. Plan: [x] Continue per plan of care.    [] Other:      Treatment Charges: Mins Units   []  Modalities     [x]  Ther Exercise 30 2   [x]  Manual Therapy 5 -   []  Ther Activities     []  Aquatics     []  Neuromuscular     [x] Vasocompression 15 1   [] Gait Training     [] Dry needling        [] 1 or 2 muscles        [] 3 or more muscles     []  Other     Total Treatment time 50 3     Time In:0905         Time Out: 9768      Electronically signed by:  Yoan Mota PTA

## 2022-07-11 ENCOUNTER — HOSPITAL ENCOUNTER (OUTPATIENT)
Dept: PHYSICAL THERAPY | Age: 67
Setting detail: THERAPIES SERIES
Discharge: HOME | End: 2022-07-11
Payer: MEDICARE

## 2022-07-11 ENCOUNTER — APPOINTMENT (OUTPATIENT)
Dept: PHYSICAL THERAPY | Age: 67
End: 2022-07-11
Payer: MEDICARE

## 2022-07-11 PROCEDURE — 97016 VASOPNEUMATIC DEVICE THERAPY: CPT

## 2022-07-11 PROCEDURE — 97110 THERAPEUTIC EXERCISES: CPT

## 2022-07-11 NOTE — FLOWSHEET NOTE
[facility] Outpatient Physical Therapy   [address]   Phone: [phone]   Fax: [phone]    Physical Therapy Daily Treatment Note      Date:  2022  Patient Name:  Vale Duarte    :  1955  MRN: [id number]  Physician: Jack Hector MD                               Insurance: Medicare/Medical [place]   Medical Diagnosis: S42.295D (ICD-10-CM) - Other closed nondisplaced fracture of proximal end of left humerus with routine healing, subsequent encounter     Rehab Codes: R25 pain , M25.60 stiffness, R53.1 weakness, R60.9 Edema  Onset Date: 22               Next 's appt: 22- Dr. Helga Rogers   Visit# / total visits:   Cancels/No Shows: 0/0      Precautions: No strengthening at this time-- hold until 8 weeks (~), [facility]     Subjective:  Patient reporting she feels she did something when trying to get up this morning. Feels that the massage she is doing to her upper arm has helped her.      Pain:  [x] Yes  [] No Location:L shoulder (about mid humeral region) Pain Rating: (0-10 scale) 4-5/10  Pain altered Tx:  [] No  [] Yes  Action:  Comments:    Objective:    Precautions: No strengthening at this time-- hold until 8 weeks (~), [facility]   PATINET DOES NOT TOLERATE LAYING FLAT     Exercises:  MODALITIES  Time  Weight/ Level Comments  Completed    vasocompression - L shoulder 10' Low, 36 deg  Seated with pillow case x          INTERVENTION Reps/ Time Weight/ Level Comments Completed   UBE: F/B  3'/3' Level 1   X                 PROM -- L shoulder flexion, abuduction, ER at 45*, scaption  8'   semi reclined X          MAT       AAROM: Flexion, ABD, ER 10x 3\" hold  Semi-reclined  X   Table slides flexion x20 Hold 3s     Table slides scaption       Abduction rolls on bolster x20      Posterior shoulder roll  x15 Hold 5s  Seated  x   Seated rows  x15  Reach and pull motion no resistance; tactile cues  x   Finger ladder flexion  x10  Gets to 15  x Finger ladder scaption  x10  Gets to 12  x          Bicep curls pronated, neutral, supinated 15x  Seated 7/87 inc reps    Education     Proper sitting posture throughout exercises     Manual: gentle STM to L shoulder and biceps 5'                  Other:   7/5: provided with yellow putty for gripping work. HEP Access Code: [email or web address]  Date: 06/27/2022  Prepared by: Sammy Diaz  Exercises  Seated Shoulder External Rotation AAROM with Dowel - 1 x daily - 7 x weekly - 3 sets - 10 reps - 3-5 second hold  Seated Shoulder Flexion Self PROM - 1 x daily - 7 x weekly - 3 sets - 10 reps - 3-5 second hold  Seated Shoulder Abduction AAROM with Dowel - 1 x daily - 7 x weekly - 3 sets - 10 reps - 3-5 second hold  Seated Biceps Curl - 1 x daily - 7 x weekly - 3 sets - 10 reps       Specific Instructions for next treatment: continue with passive ROM, work on  and wrist strengthening, UBE and finger ladder, may consider adding pulleys           Assessment: [x] Progressing toward goals. Began with UBE and followed with PROM. Added in scaption plane motion with pt able to get her arm elevated above had level with no pain. Still most limited in Abduction but progressing to above 90 deg passively. Added in Rows without resistance for scap activation. Also added finger ladder to progress range in a functional position. Pt does note a soreness and pain by end of session but it subsides with rest.  Ended session with vasocompression for pain and edema control. [] No change. [] Other:    [x] Patient would continue to benefit from skilled physical therapy services in order to: improve L shoulder strength, ROM, and better control swelling to allow her to return to using her LUE with ADLs. STG: (to be met in 8 treatments)  1. Pt will self report worst pain no greater than 3/10 in order to better tolerate ADLs/work activities with minimal dysfunction  2.  Pt will improve PROM in L shoulder to >160 deg flexion and abduction to improve her ability to reach overhead. 3. Pt will improve AROM in L shoulder flexion and abduction to >/= 90 deg in order to demonstrate ability in progressing reach in all planes to complete ADLs. 4. Pt will report no feelings of stiffness in the L hand with  indicated controlled swelling. LTG: (to be met in 16 treatments)  1. Pt will improve Apley reach bilaterally to posterior shoulder, ER to T3 and IR to L4 for efficient dressing at prior level  2. Pt will demonstrate improved L UE strength to 4+/5 or greater in order to demonstrate improved stability/strength necessary for unrestricted ADLs. 3. Pt will increase score on UEFS to >/= 65/80 in order to demonstrate improved functional tolerances with ADLs with minimal restriction/dysfunction  4. Pt will demonstrate independence with a long term HEP for continued progress/maintenance after completion of PT  ** further goals may be established based on progress**     Pt. Education:  [x] Yes  [] No  [] Reviewed Prior HEP/Ed  Method of Education: [x] Verbal  [] Demo  [x] Written  Comprehension of Education:  [x] Verbalizes understanding. [] Demonstrates understanding. [x] Needs review. [] Demonstrates/verbalizes HEP/Ed previously given. Plan: [x] Continue per plan of care.    [] Other:      Treatment Charges: Mins Units   []  Modalities     [x]  Ther Exercise 38 3   [x]  Manual Therapy     []  Ther Activities     []  Aquatics     []  Neuromuscular     [x] Vasocompression 10 1   [] Gait Training     [] Dry needling        [] 1 or 2 muscles        [] 3 or more muscles     []  Other     Total Treatment time 48 3     Time In:3:35p         Time Out: 4:23    Electronically signed by:  Jennie Castleman, PT

## 2022-07-13 ENCOUNTER — HOSPITAL ENCOUNTER (OUTPATIENT)
Dept: PHYSICAL THERAPY | Age: 67
Setting detail: THERAPIES SERIES
Discharge: HOME | End: 2022-07-13
Payer: MEDICARE

## 2022-07-13 PROCEDURE — 97110 THERAPEUTIC EXERCISES: CPT

## 2022-07-13 PROCEDURE — 97016 VASOPNEUMATIC DEVICE THERAPY: CPT

## 2022-07-13 NOTE — PROGRESS NOTES
weeks (~7/20), ADELIA SAHNI DOES NOT TOLERATE LAYING FLAT     Exercises:  MODALITIES  Time  Weight/ Level Comments  Completed    vasocompression - L shoulder 15' Low, 36 deg  Seated with pillow case x          INTERVENTION Reps/ Time Weight/ Level Comments Completed   UBE: F/B  3'/3' Level 1   X   Pulleys: flexion/scaption 2' ea   x   IR towel stretch x10  Hold 10   x          PROM -- L shoulder flexion, abuduction, ER at 45*, scaption  8'   semi reclined           MAT       AAROM: Flexion, ABD, ER 10x 3\" hold  Semi-reclined     Table slides flexion x20 Hold 3s     Table slides scaption       Abduction rolls on bolster x20      Posterior shoulder roll  x15 Hold 5s  Seated     Seated rows  2x15  Reach and pull motion no resistance; tactile cues  x   Seated horizontal abduction x15    x   Finger ladder flexion  x10  Gets to 15  x   Finger ladder scaption  x10  Gets to 12     W ball rolls  2x5 ea    x   Seated with flexed over yellow ball, IYT  x10 ea   Tactile cues for scap activation  x   Bicep curls pronated, neutral, supinated 15x  Seated 7/87 inc reps    Education     Proper sitting posture throughout exercises     Manual: gentle STM to L shoulder and biceps 5'                  Other:   7/5: provided with yellow putty for gripping work. [facility] Access Code: [email or web address]  Date: 06/27/2022  Prepared by: Yvette Wiley  Exercises  Seated Shoulder External Rotation AAROM with Dowel - 1 x daily - 7 x weekly - 3 sets - 10 reps - 3-5 second hold  Seated Shoulder Flexion Self PROM - 1 x daily - 7 x weekly - 3 sets - 10 reps - 3-5 second hold  Seated Shoulder Abduction AAROM with Dowel - 1 x daily - 7 x weekly - 3 sets - 10 reps - 3-5 second hold  Seated Biceps Curl - 1 x daily - 7 x weekly - 3 sets - 10 reps       Specific Instructions for next treatment: continue with passive ROM, work on  and wrist strengthening, UBE and finger ladder, pulley; add in scapular strengthening as able.           Assessment: [x] Progressing toward goals. Pt was initially evaluated on 6/23/22 post L humeral fracture that is being traeted conservatively. She has completed x8 total visits and is progressing well toward all goals. Began with BRINAE and followed with GLENN with use of pulleys. Re-assessed measures with some good improvements in all active and passive ROMs of the L shoulder. At times does try to compensate with trunk leans so focused more on cues for this to greater isolate ranges. Her range is progressing but still limiting her in being able to reach behind her back and overhead. Added in IR stretch to begin working this range. Additionally focused on scapular mm activation to normalize the scapulohumeral rhythm to maximize her ROM. No strength assessed due limitations but will be able to get a better gauge of this at upcoming sessions as pt will be 8 weeks post humeral fracture. Ended session with vasocompression for pain and edema control. Feel at this time pt is on track with reaching goals and has room for improvement. Feel range and strength will improve with continued therapy as outlined in current POC. Will continue with program as planned. [] No change. [] Other:    [x] Patient would continue to benefit from skilled physical therapy services in order to: improve L shoulder strength, ROM, and better control swelling to allow her to return to using her LUE with ADLs. STG: (to be met in 8 treatments)  1. Pt will self report worst pain no greater than 3/10 in order to better tolerate ADLs/work activities with minimal dysfunction   - 7/13: Met -- 3/10 noted today    2. Pt will improve PROM in L shoulder to >160 deg flexion and abduction to improve her ability to reach overhead.   -7/13: progressing -- improves by 38 deg to 118 deg    3.  Pt will improve AROM in L shoulder flexion and abduction to >/= 90 deg in order to demonstrate ability in progressing reach in all planes to complete ADLs.   - 7/13: progressing -- reaches 90 deg actively flexion, 73 deg actively abd   4. Pt will report no feelings of stiffness in the L hand with  indicated controlled swelling.    -7/13: MET -- no stiff reported   LTG: (to be met in 16 treatments) -- will assess closer to end of POC   1. Pt will improve Apley reach bilaterally to posterior shoulder, ER to T3 and IR to L4 for efficient dressing at prior level  2. Pt will demonstrate improved L UE strength to 4+/5 or greater in order to demonstrate improved stability/strength necessary for unrestricted ADLs. 3. Pt will increase score on UEFS to >/= 65/80 in order to demonstrate improved functional tolerances with ADLs with minimal restriction/dysfunction  4. Pt will demonstrate independence with a long term HEP for continued progress/maintenance after completion of PT  ** further goals may be established based on progress**     Pt. Education:  [x] Yes  [] No  [] Reviewed Prior HEP/Ed  Method of Education: [x] Verbal  [] Demo  [x] Written  Comprehension of Education:  [x] Verbalizes understanding. [] Demonstrates understanding. [x] Needs review. [] Demonstrates/verbalizes HEP/Ed previously given. Plan: [x] Continue per plan of care. -- 2x/wk for total of 16 visits    [] Other:  Treatment Plan:  [x]? Therapeutic Exercise   46328             []? Iontophoresis: 4 mg/mL Dexamethasone Sodium Phosphate  mAmin  98414   [x]? Therapeutic Activity  45566 [x]? Vasopneumatic cold with compression  [id number]               []? Gait Training    95006 []? Ultrasound        [id number]   [x]? Neuromuscular Re-education  [id number] []? Electrical Stimulation Unattended  [id number]   [x]? Manual Therapy  89749 []? Electrical Stimulation Attended  [id number]   [x]? Instruction in HEP       []? Lumbar/Cervical Traction  [id number]   []? Aquatic Therapy           [id number] [x]? Cold/hotpack     []? Massage   37960      []? Dry Needling, 1 or 2 muscles  96989   []? Biofeedback, first 15 minutes   07172  []?  Biofeedback, additional 15 minutes   26252 []?  Dry Needling, 3 or more muscles  56121             Treatment Charges: Mins Units   []  Modalities     [x]  Ther Exercise 43 3   [x]  Manual Therapy     []  Ther Activities     []  Aquatics     []  Neuromuscular     [x] Vasocompression 15 1   [] Gait Training     [] Dry needling        [] 1 or 2 muscles        [] 3 or more muscles     []  Other     Total Treatment time 58 3     Time In:0850         Time Out: 0538    Electronically signed by:  Greg Watson PT

## 2022-07-14 ENCOUNTER — OFFICE VISIT (OUTPATIENT)
Dept: PRIMARY CARE CLINIC | Age: 67
End: 2022-07-14
Payer: MEDICARE

## 2022-07-14 VITALS
OXYGEN SATURATION: 99 % | SYSTOLIC BLOOD PRESSURE: 132 MMHG | WEIGHT: 135 LBS | HEIGHT: 64 IN | BODY MASS INDEX: 23.05 KG/M2 | HEART RATE: 73 BPM | DIASTOLIC BLOOD PRESSURE: 78 MMHG

## 2022-07-14 DIAGNOSIS — E03.9 HYPOTHYROIDISM, UNSPECIFIED TYPE: ICD-10-CM

## 2022-07-14 DIAGNOSIS — B37.9 YEAST INFECTION: ICD-10-CM

## 2022-07-14 PROCEDURE — 1090F PRES/ABSN URINE INCON ASSESS: CPT | Performed by: FAMILY MEDICINE

## 2022-07-14 PROCEDURE — 99213 OFFICE O/P EST LOW 20 MIN: CPT | Performed by: FAMILY MEDICINE

## 2022-07-14 PROCEDURE — 1123F ACP DISCUSS/DSCN MKR DOCD: CPT | Performed by: FAMILY MEDICINE

## 2022-07-14 PROCEDURE — 1036F TOBACCO NON-USER: CPT | Performed by: FAMILY MEDICINE

## 2022-07-14 PROCEDURE — G8427 DOCREV CUR MEDS BY ELIG CLIN: HCPCS | Performed by: FAMILY MEDICINE

## 2022-07-14 PROCEDURE — 3017F COLORECTAL CA SCREEN DOC REV: CPT | Performed by: FAMILY MEDICINE

## 2022-07-14 PROCEDURE — G8400 PT W/DXA NO RESULTS DOC: HCPCS | Performed by: FAMILY MEDICINE

## 2022-07-14 PROCEDURE — G8420 CALC BMI NORM PARAMETERS: HCPCS | Performed by: FAMILY MEDICINE

## 2022-07-14 RX ORDER — NYSTATIN 100000 U/G
CREAM TOPICAL 2 TIMES DAILY PRN
Qty: 30 G | Refills: 1 | Status: SHIPPED | OUTPATIENT
Start: 2022-07-14

## 2022-07-14 RX ORDER — LEVOTHYROXINE SODIUM 0.05 MG/1
TABLET ORAL
Qty: 270 TABLET | Refills: 1 | Status: SHIPPED | OUTPATIENT
Start: 2022-07-14 | End: 2022-09-23 | Stop reason: SDUPTHER

## 2022-07-14 RX ORDER — ALBUTEROL SULFATE 90 UG/1
2 AEROSOL, METERED RESPIRATORY (INHALATION) 4 TIMES DAILY PRN
Qty: 18 G | Refills: 2 | Status: SHIPPED | OUTPATIENT
Start: 2022-07-14

## 2022-07-14 NOTE — PROGRESS NOTES
[facility] PRIMARY CARE  [address] [address] [place] [id number]  Dept: [phone]    Benjamin Mccauley is a 74 y.o. female Established patient, who presents today for her medical conditions/complaintsas noted below. Chief Complaint   Patient presents with    Hypothyroidism     Pt here today for 5 week f/u    Vaginitis     Pt states she has a yeast infection due to sweating and wants to know if nystatin cream will help her/       HPI:     HPI  Patient states she has not been able to take 3 pills a day of her thyroid medication she takes 1 in the morning 630 she takes a second 1 about 9:00 she is able to tolerate that. When she eats about an hour after that. She is not able to tolerate taking the third dose at bedtime it kept her awake all night. She has been taking the chewable Tums. Has noticed a redness from sweating in the outer vulvar area. She would like to try a cream for this.     She states her insurance will not cover the ProAir anymore but only the Ventolin  Reviewed prior notes None  Reviewed previous Labs    LDL Cholesterol (mg/dL)   Date Value   12/22/2020 72   04/28/2020 106       (goal LDL is <100)   AST (U/L)   Date Value   09/04/2020 20     ALT (U/L)   Date Value   09/04/2020 16     BUN (mg/dL)   Date Value   06/07/2022 15     TSH (mIU/L)   Date Value   07/21/2021 3.41     BP Readings from Last 3 Encounters:   07/14/22 132/78   06/16/22 (!) 134/97   06/07/22 126/78          (goal 120/80)    Past Medical History:   Diagnosis Date    Abdominal pain     RLQ    Abnormal computed tomography of abdomen and pelvis 04/16/2021    Distal appendix is mildly thickened without significant periappendiceal inflammatory change    Acquired von Willebrand's disease ([facility] [address] 10/30/2018    Allergy to food dye     yellow and red dyes    Anemia     Asthma     Basal cell carcinoma of upper lip 06/22/2017    Benign paroxysmal positional vertigo 12/09/2013    Cancer ([facility] [address]     Thyroid and skin    Cardiac murmur     STATES SINCE CHILD BILLY- STATES SHE WAS BORN WITH A \"90 DEGREE ANGLE VALVE\"    Chest pain 09/04/2020    Cobalamin deficiency 12/26/2012    Diverticulitis     Diverticulosis     Gastroesophageal reflux disease 12/26/2012    Gastroparesis     Hiatal hernia     History of fractured kneecap     RIGHT    History of malignant neoplasm of thyroid 04/17/2012    History of squamous cell carcinoma in situ of skin 06/09/2014    Chart states left wrist, but patient denies    Hypoparathyroidism ([address] 07/03/2012    IBS (irritable bowel syndrome)     diarrhea-prominent type    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  Neoplasm of unspecified nature of bone, soft tissue, and skin     New lesions of right dorsum of hand (healed after Efudex) and lesion of right anterior thigh.  Osteoarthritis     Osteoporosis     beginning     PONV (postoperative nausea and vomiting)     Postprocedural hypothyroidism 07/16/2018    Prolonged emergence from general anesthesia     Pt states this was \"a long time ago\"     Raynaud's disease     Seasonal allergies     Sensorineural hearing loss, bilateral 12/26/2012    Squamous cell carcinoma of upper extremity 04/08/2013    Scc in situ rt elbow 1/11    Thrombocytopenia ([address]     Thyroid disease     Thyroid Ca Hx. - thyroid was removed.      Vulvar dystrophy     Wears hearing aid     Wears partial dentures     LOWER      Past Surgical History:   Procedure Laterality Date    APPENDECTOMY  05/11/2021    BREAST SURGERY      Cyst removed rt. nipple     CATARACT REMOVAL Right 07/17/2019    Right eye    CHOLECYSTECTOMY  2011    COLONOSCOPY      COLONOSCOPY N/A 05/10/2021    COLONOSCOPY DIAGNOSTIC performed by Wilhemina Olszewski, MD at [address] Bilateral     Tendonitis X2    GASTRIC FUNDOPLICATION      \"Chronic appearing postsurgical change to the stomach suggestive of prior Nissen fundoplication\"- noted per CT ABD/pelvis 4-16-21    HERNIA REPAIR  2005    hiatal hernia repair    HYSTERECTOMY, TOTAL ABDOMINAL (CERVIX REMOVED)  92-97    BAIRON 3 Surgeries    KNEE SURGERY Right 1996    W/PINS    LAPAROSCOPY N/A 5/11/2021    ATTEMPTED APPENDECTOMY LAPAROSCOPIC ROBOTIC CONVERTED TO , DIAGNOSTIC LAPARASCOPY performed [facility] 5/11/2021    LAPAROTOMY LYSIS OF ADHESIONS -OPEN APPENDECTOMY, LYSIS OF SMALL BOWEL ADHESIONS FOR 75 MINUTES performed by Wilhemina Olszewski, MD at [facility]  2011    rt elbow-scc, rt shoulder-keratosis, lft leg skin with excoriation    NASAL POLYP SURGERY  2012    X2 in [facility] Right     PINCHED [facility] SKIN BIOPSY  2012    lft leg keratosis    SKIN BIOPSY  2009    rt forearm, under brst, lft leg-keratosis    SKIN BIOPSY  2008    back and rt forearm-keratosis, abdomen-hemangioma    SKIN BIOPSY  2002    rt breast, lft arm, lft brst-keratosis    SKIN CANCER EXCISION      THYROIDECTOMY  1985    Thyroid Cancer    TONSILLECTOMY AND ADENOIDECTOMY      UPPER GASTROINTESTINAL ENDOSCOPY      Noted per Care Everywhere       Family History   Problem Relation Age of Onset    Other Mother         pneumonia    Coronary Art Dis Mother     Liver Cancer Father         age 80    Coronary Art Dis Father     Liver Cancer Brother 59    Colon Cancer Brother         \"Bowel and liver CA\"    Diabetes Other         Father's side    Thyroid Disease Other         Mom's side       Social History     Tobacco Use    Smoking status: Never Smoker    Smokeless tobacco: Never Used Substance Use Topics    Alcohol use: No      Current Outpatient Medications   Medication Sig Dispense Refill    nystatin (MYCOSTATIN) 343105 UNIT/GM cream Apply topically 2 times daily as needed for Dry Skin Apply topically 2 times daily. 30 g 1    levothyroxine (SYNTHROID) 50 MCG tablet 3 tabs a day, split up during the day 270 tablet 1    albuterol sulfate HFA (VENTOLIN HFA) 108 (90 Base) MCG/ACT inhaler Inhale 2 puffs into the lungs 4 times daily as needed for Wheezing 18 g 2    albuterol sulfate HFA (PROVENTIL;VENTOLIN;PROAIR) 108 (90 Base) MCG/ACT inhaler Inhale 2 puffs into the lungs every 6 hours as needed for Wheezing or Shortness of Breath 2 each 1    hyoscyamine (ANASPAZ;LEVSIN) 125 MCG tablet TAKE 1 TABLET BY MOUTH 4 TIMES DAILY AS NEEDED FOR CRAMPING OR DIARRHEA 360 tablet 1    Lidocaine HCl (LIDOCAINE PLUS) 4 % CREA Apply topically      calcium carbonate 648 MG TABS Take 2 tablets by mouth 3 times daily 180 tablet 2     No current facility-administered medications for this visit.      Allergies   Allergen Reactions    Aspirin Anaphylaxis and Shortness Of Breath    Cefadroxil Shortness Of Breath    Oifglcwn-Sblfzwa-Dtclpw [Fluocinolone] Shortness Of Breath    Ciprofloxacin Hcl Shortness Of Breath    Codeine Shortness Of Breath    Demerol Hcl [Meperidine] Shortness Of Breath    Doxycycline Nausea Only and Other (See Comments)    Glucosamine Shortness Of Breath, Diarrhea and Swelling     Other reaction(s): Respiratory Difficulty  \"lips swell up and get severe diarrhea\" - INCLUDES IODINE    Iodine Hives, Shortness Of Breath and Itching    Pcn [Penicillins] Anaphylaxis and Shortness Of Breath    Red Dye Shortness Of Breath    Shellfish-Derived Products Shortness Of Breath, Diarrhea and Swelling     \"lips swell up and get severe diarrhea\" - INCLUDES IODINE    Sulfa Antibiotics Hives, Shortness Of Breath, Itching and Rash     OK in small amounts, but larger amounts cause \"shortness of breath. \"    Yellow Dye Shortness Of Breath     Other reaction(s): Respiratory Difficulty    Yellow Dyes (Non-Tartrazine) Shortness Of Breath    Gadolinium Rash     Moderate allergic-like reaction consisting of diffuse urticaria to ProHance gadolinium-containing contrast material    Gadolinium Derivatives Rash     Moderate allergic-like reaction consisting of diffuse urticaria to ProHance gadolinium-containing contrast material    Lac Bovis     Midazolam Nausea Only     Versed caused bad nausea.  Iodides Hives    Adhesive Tape Rash       Health Maintenance   Topic Date Due    Pneumococcal 65+ years Vaccine (1 - PCV) Never done    DTaP/Tdap/Td vaccine (1 - Tdap) Never done    DEXA (modify frequency per FRAX score)  Never done    Shingles vaccine (1 of 2) 04/28/2026 (Originally 10/3/2003)    Flu vaccine (1) 09/01/2022    Depression Screen  03/24/2023    Annual Wellness Visit (AWV)  03/25/2023    Breast cancer screen  11/17/2023    Lipids  12/22/2025    Colorectal Cancer Screen  05/10/2031    COVID-19 Vaccine  Completed    Hepatitis C screen  Completed    Hepatitis A vaccine  Aged Out    Hepatitis B vaccine  Aged Out    Hib vaccine  Aged Out    Meningococcal (ACWY) vaccine  Aged Out       Subjective:      Review of Systems   Constitutional: Negative. Objective:     /78   Pulse 73   Ht 5' 4\" (1.626 m)   Wt 135 lb (61.2 kg)   SpO2 99%   BMI 23.17 kg/m²   Physical Exam  Vitals and nursing note reviewed. Constitutional:       General: She is not in acute distress. Appearance: She is well-developed. She is not ill-appearing. HENT:      Head: Normocephalic and atraumatic. Right Ear: External ear normal.      Left Ear: External ear normal.   Eyes:      General: No scleral icterus. Right eye: No discharge. Left eye: No discharge. Conjunctiva/sclera: Conjunctivae normal.   Neck:      Thyroid: No thyromegaly. Trachea: No tracheal deviation. Comments: Thyroid gland is not palpable  Cardiovascular:      Rate and Rhythm: Normal rate and regular rhythm. Heart sounds: Normal heart sounds. Pulmonary:      Effort: Pulmonary effort is normal. No respiratory distress. Breath sounds: Normal breath sounds. No wheezing. Musculoskeletal:      Cervical back: No tenderness. Lymphadenopathy:      Cervical: No cervical adenopathy. Skin:     General: Skin is warm. Findings: No rash. Neurological:      Mental Status: She is alert and oriented to person, place, and time. Psychiatric:         Mood and Affect: Mood normal.         Behavior: Behavior normal.         Thought Content: Thought content normal.         Assessment:       Diagnosis Orders   1. Hypothyroidism, unspecified type  levothyroxine (SYNTHROID) 50 MCG tablet    T4, Free    TSH    T3, Free   2. Yeast infection  nystatin (MYCOSTATIN) 589211 UNIT/GM cream        Plan:      Return in about 3 months (around 10/14/2022) for thyroid and low calcium . Try taking a third thyroid pill in mid afternoon. Try the nystatin cream on the outer vulvar area only. Call as needed. She is concerned about her 's health issues, she would like a urine specimen checked. This is ordered under his chart  Orders Placed This Encounter   Procedures    T4, Free     Standing Status:   Future     Standing Expiration Date:   7/14/2023    TSH     Standing Status:   Future     Standing Expiration Date:   7/14/2023    T3, Free     Standing Status:   Future     Standing Expiration Date:   7/14/2023     Orders Placed This Encounter   Medications    nystatin (MYCOSTATIN) 349180 UNIT/GM cream     Sig: Apply topically 2 times daily as needed for Dry Skin Apply topically 2 times daily.      Dispense:  30 g     Refill:  1    levothyroxine (SYNTHROID) 50 MCG tablet     Sig: 3 tabs a day, split up during the day     Dispense:  270 tablet     Refill:  1    albuterol sulfate HFA (VENTOLIN HFA) 108 (90 Base) MCG/ACT inhaler     Sig: Inhale 2 puffs into the lungs 4 times daily as needed for Wheezing     Dispense:  18 g     Refill:  2       Patient given educationalmaterials - see patient instructions. Discussed use, benefit, and side effectsof prescribed medications. All patient questions answered. Pt voiced understanding. Reviewed health maintenance. Instructed to continue current medications, diet andexercise. Patient agreed with treatment plan. Follow up as directed.      Electronicallysigned by Darylene Daisy, MD on 7/14/2022 at 3:08 PM

## 2022-07-15 ENCOUNTER — HOSPITAL ENCOUNTER (OUTPATIENT)
Dept: PHYSICAL THERAPY | Age: 67
Setting detail: THERAPIES SERIES
Discharge: HOME | End: 2022-07-15
Payer: MEDICARE

## 2022-07-15 PROCEDURE — 97110 THERAPEUTIC EXERCISES: CPT

## 2022-07-15 PROCEDURE — 97016 VASOPNEUMATIC DEVICE THERAPY: CPT

## 2022-07-15 NOTE — FLOWSHEET NOTE
[facility] Outpatient Physical Therapy   [address]   Phone: [phone]   Fax: [phone]    Physical Therapy Daily Treatment Note/Progress Note       Date:  7/15/2022  Patient Name:  Joseph Gama    :  1955  MRN: [id number]  Physician: Anabell Davis MD                               Insurance: Medicare/[employer] [place] -- based on [place]   Medical Diagnosis: S42.295D (ICD-10-CM) - Other closed nondisplaced fracture of proximal end of left humerus with routine healing, subsequent encounter     Rehab Codes: R25 pain , M25.60 stiffness, R53.1 weakness, R60.9 Edema  Onset Date: 22               Next 's appt: 22- Dr. Raina Smith   Visit# / total visits:   Cancels/No Shows: 0/0    Precautions: No strengthening at this time-- hold until 8 weeks (~), [place]     Subjective:  Patient notes she is feeling good today. Pain is improving to 3/10 but still gets occasional higher pain levels with getting up in the morning. Notices swelling in L hand/wrist is reducing. She feels that she is moving it more and using it more with ADLs. Notes good compliance with home exercise program. Notes her main ROM deficit is moving her arm out to the side.      Pain:  [x] Yes  [] No Location:L shoulder (about mid humeral region) Pain Rating: (0-10 scale) 3/10  Pain altered Tx:  [] No  [] Yes  Action:  Comments:    Objective:    Precautions: No strengthening at this time-- hold until 8 weeks (~), [place]   BORA DOES NOT TOLERATE LAYING FLAT     Exercises:  MODALITIES  Time  Weight/ Level Comments  Completed    vasocompression - L shoulder 15' Low, 36 deg  Seated with pillow case X          INTERVENTION Reps/ Time Weight/ Level Comments Completed   UBE: F/B  3'/3' Level 1   X   Pulleys: flexion/scaption 2' ea   X   IR towel stretch x10  Hold 10             PROM -- L shoulder flexion, abuduction, ER at 45*, scaption  8'   semi reclined X          MAT       AAROM: Flexion, ABD, ER 10x 3\" hold change. [] Other:    [x] Patient would continue to benefit from skilled physical therapy services in order to: improve L shoulder strength, ROM, and better control swelling to allow her to return to using her LUE with ADLs. STG: (to be met in 8 treatments)  Pt will self report worst pain no greater than 3/10 in order to better tolerate ADLs/work activities with minimal dysfunction   - 7/13: Met -- 3/10 noted today    Pt will improve PROM in L shoulder to >160 deg flexion and abduction to improve her ability to reach overhead.   -7/13: progressing -- improves by 38 deg to 118 deg    Pt will improve AROM in L shoulder flexion and abduction to >/= 90 deg in order to demonstrate ability in progressing reach in all planes to complete ADLs.   - 7/13: progressing -- reaches 90 deg actively flexion, 73 deg actively abd   Pt will report no feelings of stiffness in the L hand with  indicated controlled swelling.    -7/13: MET -- no stiff reported   LTG: (to be met in 16 treatments) -- will assess closer to end of POC   Pt will improve Apley reach bilaterally to posterior shoulder, ER to T3 and IR to L4 for efficient dressing at prior level  Pt will demonstrate improved L UE strength to 4+/5 or greater in order to demonstrate improved stability/strength necessary for unrestricted ADLs. Pt will increase score on UEFS to >/= 65/80 in order to demonstrate improved functional tolerances with ADLs with minimal restriction/dysfunction  Pt will demonstrate independence with a long term HEP for continued progress/maintenance after completion of PT  ** further goals may be established based on progress**     Pt. Education:  [x] Yes  [] No  [] Reviewed Prior HEP/Ed  Method of Education: [x] Verbal  [] Demo  [x] Written  Comprehension of Education:  [x] Verbalizes understanding. [] Demonstrates understanding. [x] Needs review. [] Demonstrates/verbalizes HEP/Ed previously given. Plan: [x] Continue per plan of care. -- 2x/wk for total of 16 visits    [] Other:          Treatment Charges: Mins Units   []  Modalities     [x]  Ther Exercise 45 3   []  Manual Therapy     []  Ther Activities     []  Aquatics     []  Neuromuscular     [x] Vasocompression 15 1   [] Gait Training     [] Dry needling        [] 1 or 2 muscles        [] 3 or more muscles     []  Other     Total Treatment time 60 4     Time WX:[id number]      Time Out: 1848    Electronically signed by:  Margarita Hampton PTA

## 2022-07-18 ENCOUNTER — HOSPITAL ENCOUNTER (OUTPATIENT)
Dept: PHYSICAL THERAPY | Age: 67
Setting detail: THERAPIES SERIES
Discharge: HOME | End: 2022-07-18
Payer: MEDICARE

## 2022-07-18 PROCEDURE — 97016 VASOPNEUMATIC DEVICE THERAPY: CPT

## 2022-07-18 PROCEDURE — 97110 THERAPEUTIC EXERCISES: CPT

## 2022-07-18 NOTE — FLOWSHEET NOTE
[facility]   [address]   Phone: [phone]   Fax: [phone]    Physical Therapy Daily Treatment Note/Progress Note       Date:  2022  Patient Name:  Cherise Gar    :  1955  MRN: [id number]  Physician: Rhea Siddiqui MD                               Insurance: Medicare/Medical [place]   Medical Diagnosis: S42.295D (ICD-10-CM) - Other closed nondisplaced fracture of proximal end of left humerus with routine healing, subsequent encounter     Rehab Codes: R25 pain , M25.60 stiffness, R53.1 weakness, R60.9 Edema  Onset Date: 22               Next 's appt: 22- Dr. Osmany Guerrero   Visit# / total visits: 10/16  Cancels/No Shows: 0/0    Precautions: No strengthening at this time-- hold until 8 weeks (~), JAYLON DELVALLE DOES NOT TOLERATE LAYING FLAT     Subjective:  Patient reports having increased hand pain and swelling and notes it wakes her up in the middle of the night. Patient states her shoulder is feeling better with on and off heat, but her hand is most painful. Pain:  [x] Yes  [] No Location:L shoulder (about mid humeral region) Pain Rating: (0-10 scale) 2/10 shoulder, 3/10 arm, 5-6/10 L hand  Pain altered Tx:  [] No  [] Yes  Action:  Comments:    Objective:    Exercises:  MODALITIES  Time  Weight/ Level Comments  Completed    vasocompression - L shoulder 15' Low, 36 deg  Seated with pillow case x   Cold pack to L hand 8'  Due to pain and swelling;  With vaso to L shoulder x          INTERVENTION       UBE: F/B  3'/3' Level 1   x   Pulleys: flexion/scaption 2' ea   x   IR towel stretch x10  Hold 10   x          PROM -- L shoulder flexion, abuduction, ER at 45*, scaption  8'   semi reclined    Posterior capsule stretch 2x20''   x          MAT       AAROM: Flexion, ABD, ER 10x 3\" hold  Semi-reclined  x   Table slides flexion x20 Hold 3s  x   Table slides scaption x10   x   Abduction rolls on bolster x20 Posterior shoulder roll  x15 Hold 5s  Seated  x   Retro shoulder rolls 10x 3\" holds      Seated rows  2x15  Reach and pull motion no resistance; tactile cues  x   Seated horizontal abduction x15       Hula hoop #5  1x ea    x   Finger ladder flexion  x10  Gets to 15     Finger ladder scaption  x10  Gets to 12     W ball rolls wall 2x5 ea       Seated with flexed over yellow ball, IYT  x10 ea   Tactile cues for scap activation     Bicep curls pronated, neutral, supinated 15x  Seated 7/8 inc reps x   Education     Proper sitting posture throughout exercises     Manual: gentle STM to L shoulder and biceps 5'                  Other:   7/5: provided with yellow putty for gripping work. HEP Access Code: [email or web address]  Date: 06/27/2022  Prepared by: Prashant Sanderson  Exercises  Seated Shoulder External Rotation AAROM with Dowel - 1 x daily - 7 x weekly - 3 sets - 10 reps - 3-5 second hold  Seated Shoulder Flexion Self PROM - 1 x daily - 7 x weekly - 3 sets - 10 reps - 3-5 second hold  Seated Shoulder Abduction AAROM with Dowel - 1 x daily - 7 x weekly - 3 sets - 10 reps - 3-5 second hold  Seated Biceps Curl - 1 x daily - 7 x weekly - 3 sets - 10 reps       Specific Instructions for next treatment: continue with passive ROM, work on  and wrist strengthening, UBE and finger ladder, pulley; add in scapular strengthening as able. Assessment: [x] Progressing toward goals. Continued focus on improving ROM and decreasing compensation and with activities. Verbal and visual cues given in order to decrease R lateral lean and UT compensation with pt able to self correct as session continued. Patient noted increased pain through L arm with hula hoop activity this date but was able to tolerate it enough to complete activity. Patient had increased swelling throughout session, so ice was applied to L forearm and hand while elevated in order to decrease swelling.  Patient noted improvement with pain and sensitivity at completion of 10' of ice. Discussed use of ice vs. Heat with edema and pain management. [] No change. [] Other:    [x] Patient would continue to benefit from skilled physical therapy services in order to: improve L shoulder strength, ROM, and better control swelling to allow her to return to using her LUE with ADLs. STG: (to be met in 8 treatments)  Pt will self report worst pain no greater than 3/10 in order to better tolerate ADLs/work activities with minimal dysfunction   - 7/13: Met -- 3/10 noted today    Pt will improve PROM in L shoulder to >160 deg flexion and abduction to improve her ability to reach overhead.   -7/13: progressing -- improves by 38 deg to 118 deg    Pt will improve AROM in L shoulder flexion and abduction to >/= 90 deg in order to demonstrate ability in progressing reach in all planes to complete ADLs.   - 7/13: progressing -- reaches 90 deg actively flexion, 73 deg actively abd   Pt will report no feelings of stiffness in the L hand with  indicated controlled swelling.    -7/13: MET -- no stiff reported   LTG: (to be met in 16 treatments) -- will assess closer to end of POC   Pt will improve Apley reach bilaterally to posterior shoulder, ER to T3 and IR to L4 for efficient dressing at prior level  Pt will demonstrate improved L UE strength to 4+/5 or greater in order to demonstrate improved stability/strength necessary for unrestricted ADLs. Pt will increase score on UEFS to >/= 65/80 in order to demonstrate improved functional tolerances with ADLs with minimal restriction/dysfunction  Pt will demonstrate independence with a long term HEP for continued progress/maintenance after completion of PT  ** further goals may be established based on progress**     Pt. Education:  [x] Yes  [] No  [] Reviewed Prior HEP/Ed  Method of Education: [x] Verbal  [] Demo  [x] Written  Comprehension of Education:  [x] Verbalizes understanding. [] Demonstrates understanding. [x] Needs review.   [] Demonstrates/verbalizes HEP/Ed previously given. Plan: [x] Continue per plan of care.  -- 2x/wk for total of 16 visits    [] Other:          Treatment Charges: Mins Units   []  Modalities     [x]  Ther Exercise 45 3   []  Manual Therapy     []  Ther Activities     []  Aquatics     []  Neuromuscular     [x] Vasocompression 15 1   [] Gait Training     [] Dry needling        [] 1 or 2 muscles        [] 3 or more muscles     []  Other     Total Treatment time 60 4     Time IP:7804     Time Out: 5235    Electronically signed by:  Sahil Tobar PTA

## 2022-07-20 ENCOUNTER — HOSPITAL ENCOUNTER (OUTPATIENT)
Dept: PHYSICAL THERAPY | Age: 67
Setting detail: THERAPIES SERIES
Discharge: HOME | End: 2022-07-20
Payer: MEDICARE

## 2022-07-20 PROCEDURE — 97110 THERAPEUTIC EXERCISES: CPT

## 2022-07-20 PROCEDURE — 97016 VASOPNEUMATIC DEVICE THERAPY: CPT

## 2022-07-20 NOTE — FLOWSHEET NOTE
509 [facility]   [address]   Phone: [phone]   Fax: [phone]    Physical Therapy Daily Treatment Note/Progress Note       Date:  2022  Patient Name:  Solitario Brown    :  1955  MRN: [id number]  Physician: Vinod Bell MD                               Insurance: Medicare/Medical [place]   Medical Diagnosis: S42.295D (ICD-10-CM) - Other closed nondisplaced fracture of proximal end of left humerus with routine healing, subsequent encounter     Rehab Codes: R25 pain , M25.60 stiffness, R53.1 weakness, R60.9 Edema  Onset Date: 22               Next 's appt: 22- Dr. Nathan Curiel   Visit# / total visits:   Cancels/No Shows: 0/0    Precautions: No strengthening at this time-- hold until 8 weeks (~), JAYLON DELVALLE DOES NOT TOLERATE LAYING FLAT     Subjective:  Patient reporting to therapy stating she is feeling the humidity in her arm as she is feeling very sore today. Patient states she was able to perform AAROM shoulder flexion in supine and was able to stretch and hold the position. Pain:  [x] Yes  [] No Location:L shoulder (about mid humeral region) Pain Rating: (0-10 scale) 3/10 shoulder, 3-4/10 arm, 5/10 L hand- with Tylenol   Pain altered Tx:  [] No  [] Yes  Action:  Comments:    Objective:    Exercises:  MODALITIES  Time  Weight/ Level Comments  Completed    vasocompression - L shoulder 15' Low, 36 deg  Seated with pillow case x   Cold pack to L hand 8'  Due to pain and swelling;  With vaso to L shoulder           INTERVENTION       UBE: F/B  2'/2' Level 1   x   Pulleys: flexion/scaption 2' ea   x   IR towel stretch x10  Hold 10   completed ~ 7 to what pt could tolerate x   Shoulder extension with cane x10 Hold 10  x   PROM -- L shoulder flexion, abuduction, ER at 45*, scaption  8'   semi reclined    Posterior capsule stretch 2x20''   x          MAT       AAROM: Flexion, ABD, ER 10x 3\" hold  supine x AROM shoulder flexion 10x2 3\"   x   Table slides flexion x20 Hold 3s     Table slides scaption x10      Abduction rolls on bolster x20      Posterior shoulder roll  x15 Hold 5s  Seated  x   Retro shoulder rolls 10x 3\" holds      Seated rows  2x15  Reach and pull motion no resistance; tactile cues  x   Seated horizontal abduction x15       Hula hoop #5  1x ea       Finger ladder flexion  x10  Gets to 15     Finger ladder scaption  x10  Gets to 12     W ball rolls wall 2x5 ea       Seated with flexed over yellow ball, IYT  x10 ea   Tactile cues for scap activation     Bicep curls pronated, neutral, supinated 15x  Seated 7/8 inc reps    Education     Proper sitting posture throughout exercises     Manual: gentle STM to L shoulder and biceps 5'                  Other:   7/5: provided with yellow putty for gripping work. HEP  Access Code: [email or web address]  URL: [email or web address]  Date: 07/20/2022  Prepared by: Zainab Bhandari    Exercises  Standing Shoulder Extension AAROM with Dowel - 1 x daily - 7 x weekly - 3 sets - 10 reps - 10 second hold  Supine Shoulder Flexion Extension Full Range AROM - 1 x daily - 7 x weekly - 3 sets - 10 reps - 3-5 second hold  Seated Shoulder Flexion Towel Slide at Table Top - 1 x daily - 7 x weekly - 3 sets - 10 reps - 3-5 second hold  Seated Shoulder Scaption Slide at Table Top with Forearm in Neutral - 1 x daily - 7 x weekly - 3 sets - 10 reps - 3-5 second hold  Seated Shoulder Abduction Towel Slide at Table Top - 1 x daily - 7 x weekly - 3 sets - 10 reps - 3-5 second hold       Specific Instructions for next treatment: continue with passive ROM, work on  and wrist strengthening, UBE and finger ladder, pulley; add in scapular strengthening as able. Assessment: [x] Progressing toward goals. Continued with shoulder ROM this session. Patient was able to perform shoulder flexion in supine actively demonstrating improved ROM.   Added AAROM shoulder extension stretch to improve shoulder mobility and stretch the ant portion of capsule with no pain reported. Patient continues to require cues for proper shoulder AAROM ABD and ER with cane and to correct posture to prevent from compensating during exercises. Completed session with lázaro for pain management. [] No change. [] Other:    [x] Patient would continue to benefit from skilled physical therapy services in order to: improve L shoulder strength, ROM, and better control swelling to allow her to return to using her LUE with ADLs. STG: (to be met in 8 treatments)  Pt will self report worst pain no greater than 3/10 in order to better tolerate ADLs/work activities with minimal dysfunction   - 7/13: Met -- 3/10 noted today    Pt will improve PROM in L shoulder to >160 deg flexion and abduction to improve her ability to reach overhead.   -7/13: progressing -- improves by 38 deg to 118 deg    Pt will improve AROM in L shoulder flexion and abduction to >/= 90 deg in order to demonstrate ability in progressing reach in all planes to complete ADLs.   - 7/13: progressing -- reaches 90 deg actively flexion, 73 deg actively abd   Pt will report no feelings of stiffness in the L hand with  indicated controlled swelling.    -7/13: MET -- no stiff reported   LTG: (to be met in 16 treatments) -- will assess closer to end of POC   Pt will improve Apley reach bilaterally to posterior shoulder, ER to T3 and IR to L4 for efficient dressing at prior level  Pt will demonstrate improved L UE strength to 4+/5 or greater in order to demonstrate improved stability/strength necessary for unrestricted ADLs.   Pt will increase score on UEFS to >/= 65/80 in order to demonstrate improved functional tolerances with ADLs with minimal restriction/dysfunction  Pt will demonstrate independence with a long term HEP for continued progress/maintenance after completion of PT  ** further goals may be established based on progress**     Pt. Education: [x] Yes  [] No  [] Reviewed Prior HEP/Ed  Method of Education: [x] Verbal  [] Demo  [x] Written  Comprehension of Education:  [x] Verbalizes understanding. [] Demonstrates understanding. [x] Needs review. [] Demonstrates/verbalizes HEP/Ed previously given. Plan: [x] Continue per plan of care.  -- 2x/wk for total of 16 visits    [] Other:          Treatment Charges: Mins Units   []  Modalities     [x]  Ther Exercise 45 3   []  Manual Therapy     []  Ther Activities     []  Aquatics     []  Neuromuscular     [x] Vasocompression 15 1   [] Gait Training     [] Dry needling        [] 1 or 2 muscles        [] 3 or more muscles     []  Other     Total Treatment time 60 4     Time In:0900     Time Out: 1000    Electronically signed by:  Gallo Omer PTA

## 2022-07-22 ENCOUNTER — NURSE ONLY (OUTPATIENT)
Dept: PRIMARY CARE CLINIC | Age: 67
End: 2022-07-22
Payer: MEDICARE

## 2022-07-22 ENCOUNTER — HOSPITAL ENCOUNTER (OUTPATIENT)
Dept: PHYSICAL THERAPY | Age: 67
Setting detail: THERAPIES SERIES
Discharge: HOME | End: 2022-07-22
Payer: MEDICARE

## 2022-07-22 VITALS — HEART RATE: 78 BPM | OXYGEN SATURATION: 98 % | SYSTOLIC BLOOD PRESSURE: 132 MMHG | DIASTOLIC BLOOD PRESSURE: 70 MMHG

## 2022-07-22 DIAGNOSIS — E03.9 HYPOTHYROIDISM, UNSPECIFIED TYPE: ICD-10-CM

## 2022-07-22 DIAGNOSIS — E83.51 HYPOCALCEMIA: ICD-10-CM

## 2022-07-22 DIAGNOSIS — E53.8 B12 DEFICIENCY: Primary | ICD-10-CM

## 2022-07-22 LAB
CALCIUM IONIZED: 1 MMOL/L (ref 1.13–1.33)
CALCIUM SERPL-MCNC: 7.5 MG/DL (ref 8.6–10.4)
T3 FREE: 3.39 PG/ML (ref 2.02–4.43)
THYROXINE, FREE: 1.39 NG/DL (ref 0.93–1.7)
TSH SERPL DL<=0.05 MIU/L-ACNC: 3.35 UIU/ML (ref 0.3–5)

## 2022-07-22 PROCEDURE — 97016 VASOPNEUMATIC DEVICE THERAPY: CPT

## 2022-07-22 PROCEDURE — 96372 THER/PROPH/DIAG INJ SC/IM: CPT | Performed by: FAMILY MEDICINE

## 2022-07-22 PROCEDURE — 97110 THERAPEUTIC EXERCISES: CPT

## 2022-07-22 RX ORDER — CYANOCOBALAMIN 1000 UG/ML
1000 INJECTION INTRAMUSCULAR; SUBCUTANEOUS ONCE
Status: COMPLETED | OUTPATIENT
Start: 2022-07-22 | End: 2022-07-22

## 2022-07-22 RX ADMIN — CYANOCOBALAMIN 1000 MCG: 1000 INJECTION INTRAMUSCULAR; SUBCUTANEOUS at 14:11

## 2022-07-22 NOTE — FLOWSHEET NOTE
509 [facility]   [address]   Phone: [phone]   Fax: [phone]    Physical Therapy Daily Treatment Note      Date:  2022  Patient Name:  Kaleigh Flor YOB: 1955  MRN: [id number]  Physician: Michaela Berrios MD                               Insurance: Medicare/Medical [place]   Medical Diagnosis: S42.295D (ICD-10-CM) - Other closed nondisplaced fracture of proximal end of left humerus with routine healing, subsequent encounter     Rehab Codes: R25 pain , M25.60 stiffness, R53.1 weakness, R60.9 Edema  Onset Date: 22               Next 's appt: 22- Dr. Bina Murrieta   Visit# / total visits:   Cancels/No Shows: 0/0    Precautions: No strengthening at this time-- hold until 8 weeks (~), JAYLON DELVALLE DOES NOT TOLERATE LAYING FLAT     Subjective:  Patient reporting to therapy stating she is feeling the humidity in her arm as she is feeling very sore today. Patient states she was able to perform AAROM shoulder flexion in supine and was able to stretch and hold the position. Pain:  [x] Yes  [] No- 1 Tylenol before session   L hand 3/10  L arm 3/10  Pain altered Tx:  [] No  [] Yes  Action:  Comments:    Objective:    Exercises:  MODALITIES  Time  Weight/ Level Comments  Completed    vasocompression - L shoulder 10' Low, 36 deg  Seated with pillow case x   Cold pack to L hand 8'  Due to pain and swelling;  With vaso to L shoulder           INTERVENTION       UBE: F/B  2'/2' Level 1.5  : inc resistance x   Pulleys: flexion/scaption 2' ea   x   IR towel stretch x10  Hold 10   completed ~ 7 to what pt could tolerate    Shoulder extension with cane x10 Hold 10     PROM -- L shoulder flexion, abuduction, ER at 45*, scaption  8'   semi reclined    Posterior capsule stretch 2x20''             Theraplast- RED       Gross  10x3\"   x   opposition 3x ea finger   x   Finger extension loop 10x   x   Wrist extension 5x x   cone 3x   x   Finger adduction 3x ea   x   6 Pack exercises 5x ea  On sheet x          MAT       AAROM: Flexion, ABD, ER 10x 3\" hold  supine x   AROM shoulder flexion 10x2 3\"      Table slides flexion x20 Hold 3s     Table slides scaption x10      Abduction rolls on bolster x20      Posterior shoulder roll  x15 Hold 5s  Seated     Retro shoulder rolls 10x 3\" holds      Seated rows  2x15 yellow 7/22 added resistance x   Seated horizontal abduction x15       Hula hoop #5  1x ea       Finger ladder flexion  x10  Gets to 15     Finger ladder scaption  x10  Gets to 12     W ball rolls wall 2x5 ea       Seated with flexed over yellow ball, IYT  x10 ea   Tactile cues for scap activation     Bicep curls pronated, neutral, supinated 15x  Seated 7/8 inc reps    Education     Proper sitting posture throughout exercises     Manual: gentle STM to L shoulder and biceps 5'                  Other:   7/22: provided with red putty for gripping work. Provided patient with 6 pack and theraplast exercises. HEP  Access Code: [email or web address]  URL: [employer]/  Date: 07/22/2022  Prepared by: Toñito Aldridge    Exercises  Standing Bilateral Low Shoulder Row with Anchored Resistance - 1 x daily - 7 x weekly - 3 sets - 10 reps - 3-5 second hold    Access Code: [email or web address]  URL: [employer]/  Date: 07/20/2022  Prepared by: Toñito Aldridge    Exercises  Standing Shoulder Extension AAROM with Dowel - 1 x daily - 7 x weekly - 3 sets - 10 reps - 10 second hold  Supine Shoulder Flexion Extension Full Range AROM - 1 x daily - 7 x weekly - 3 sets - 10 reps - 3-5 second hold  Seated Shoulder Flexion Towel Slide at Table Top - 1 x daily - 7 x weekly - 3 sets - 10 reps - 3-5 second hold  Seated Shoulder Scaption Slide at Table Top with Forearm in Neutral - 1 x daily - 7 x weekly - 3 sets - 10 reps - 3-5 second hold  Seated Shoulder Abduction Towel Slide at Table Top - 1 x daily - 7 x weekly - 3 sets - 10 reps - 3-5 second hold       Specific Instructions for next treatment: continue with passive ROM, work on  and wrist strengthening, UBE and finger ladder, pulley; add in scapular strengthening as able. Assessment: [x] Progressing toward goals. Added hand and wrist strengthening this session as patient reports continued swelling in L hand and states her yellow putty has gotten easier. Also added scapular retraction with yellow TB resistance to inc scapular activation and strength. Cues needed to keep patient on track. Patient demonstrating and reporting improved tolerance to PROM this session in supine position. Continued education on proper sitting posture during exercises. Verbally reviewed HEP to continue with improving ROM. Completed session with vaso for pain and edema management. [] No change. [] Other:    [x] Patient would continue to benefit from skilled physical therapy services in order to: improve L shoulder strength, ROM, and better control swelling to allow her to return to using her LUE with ADLs.          STG: (to be met in 8 treatments)  Pt will self report worst pain no greater than 3/10 in order to better tolerate ADLs/work activities with minimal dysfunction   - 7/13: Met -- 3/10 noted today    Pt will improve PROM in L shoulder to >160 deg flexion and abduction to improve her ability to reach overhead.   -7/13: progressing -- improves by 38 deg to 118 deg    Pt will improve AROM in L shoulder flexion and abduction to >/= 90 deg in order to demonstrate ability in progressing reach in all planes to complete ADLs.   - 7/13: progressing -- reaches 90 deg actively flexion, 73 deg actively abd   Pt will report no feelings of stiffness in the L hand with  indicated controlled swelling.    -7/13: MET -- no stiff reported   LTG: (to be met in 16 treatments) -- will assess closer to end of POC   Pt will improve Apley reach bilaterally to posterior shoulder, ER to T3 and IR to L4 for efficient dressing at prior level  Pt will demonstrate improved L UE strength to 4+/5 or greater in order to demonstrate improved stability/strength necessary for unrestricted ADLs. Pt will increase score on UEFS to >/= 65/80 in order to demonstrate improved functional tolerances with ADLs with minimal restriction/dysfunction  Pt will demonstrate independence with a long term HEP for continued progress/maintenance after completion of PT  ** further goals may be established based on progress**     Pt. Education:  [x] Yes  [] No  [] Reviewed Prior HEP/Ed  Method of Education: [x] Verbal  [] Demo  [x] Written  Comprehension of Education:  [x] Verbalizes understanding. [] Demonstrates understanding. [x] Needs review. [] Demonstrates/verbalizes HEP/Ed previously given. Plan: [x] Continue per plan of care.  -- 2x/wk for total of 16 visits    [] Other:          Treatment Charges: Mins Units   []  Modalities     [x]  Ther Exercise 50 3   []  Manual Therapy     []  Ther Activities     []  Aquatics     []  Neuromuscular     [x] Vasocompression 10 1   [] Gait Training     [] Dry needling        [] 1 or 2 muscles        [] 3 or more muscles     []  Other     Total Treatment time 60 4     Time RE:5642     Time Out: 6900    Electronically signed by:  Julio Ayoub PTA

## 2022-07-22 NOTE — PROGRESS NOTES
After obtaining consent, and per orders of Dr. Alfrieda Klinefelter, injection of Vitamin B12 given in Right deltoid by Miri Gonzalez MA. Patient tolerated well.

## 2022-07-25 ENCOUNTER — OFFICE VISIT (OUTPATIENT)
Dept: ORTHOPEDIC SURGERY | Age: 67
End: 2022-07-25

## 2022-07-25 VITALS — WEIGHT: 135 LBS | RESPIRATION RATE: 14 BRPM | HEIGHT: 64 IN | BODY MASS INDEX: 23.05 KG/M2

## 2022-07-25 DIAGNOSIS — S42.295D OTHER CLOSED NONDISPLACED FRACTURE OF PROXIMAL END OF LEFT HUMERUS WITH ROUTINE HEALING, SUBSEQUENT ENCOUNTER: Primary | ICD-10-CM

## 2022-07-25 DIAGNOSIS — E83.51 HYPOCALCEMIA: Chronic | ICD-10-CM

## 2022-07-25 PROCEDURE — 99024 POSTOP FOLLOW-UP VISIT: CPT | Performed by: ORTHOPAEDIC SURGERY

## 2022-07-25 RX ORDER — ANTACID TABLETS 648 MG/1
2 TABLET, CHEWABLE ORAL 4 TIMES DAILY
Qty: 240 TABLET | Refills: 11 | COMMUNITY
Start: 2022-07-25 | End: 2022-09-23

## 2022-07-25 NOTE — PROGRESS NOTES
HPI: Tisha Roberts is a 74 y.o. old female who is approximately 8 weeks out from a  closed left proximal humerus  fracture. She indicates that she is slowly feeling better. She rates her pain when present as a 4/10. Physical therapy is going well for her and she is pleased with the progress that she is making there. Physical Exam:  General Appearance: alert, well appearing, and in no distress  Mental Status: alert, oriented to person, place, and time  Evaluation of the patient's left shoulder and upper extremity demonstrates intact skin without warmth, erythema or ecchymosis. She does have some mild swelling still present in the left hand. She can actively elevate the shoulder to about 90 degrees. Imaging Studies: 4 x-ray views of the left shoulder were obtained and reviewed independently on 7/25/2022 demonstrating maintained alignment of the proximal in varus without any interval displacement. There is however interval consolidation across the fracture site. Impression and plan: Tisha Roberts is a 74 y.o. old female who is approximately 8 weeks out from a closed left proximal humerus fracture. As noted above she remains in acceptable alignment and appears to be healing appropriately both clinically and radiographically. Consequently we'll continue with conservative management. To this end she is to continue physical therapy working on range of motion and begin some gradual progressive strengthening of the shoulder. she may continue use the arm for light activities of daily living without heavy lifting, pushing, or pulling. I'll have her follow up in 4 weeks for re-evaluation but she was instructed to return or call earlier with any questions or concerns.

## 2022-07-27 ENCOUNTER — HOSPITAL ENCOUNTER (OUTPATIENT)
Dept: PHYSICAL THERAPY | Age: 67
Setting detail: THERAPIES SERIES
Discharge: HOME | End: 2022-07-27
Payer: MEDICARE

## 2022-07-27 ENCOUNTER — HOSPITAL ENCOUNTER (OUTPATIENT)
Dept: PHYSICAL THERAPY | Age: 67
Setting detail: THERAPIES SERIES
End: 2022-07-27
Payer: MEDICARE

## 2022-07-27 PROCEDURE — 97016 VASOPNEUMATIC DEVICE THERAPY: CPT

## 2022-07-27 PROCEDURE — 97110 THERAPEUTIC EXERCISES: CPT

## 2022-07-27 NOTE — FLOWSHEET NOTE
[facility]   [address]   Phone: [phone]   Fax: [phone]    Physical Therapy Daily Treatment Note      Date:  2022  Patient Name:  Zhanna Lane    :  1955  MRN: [id number]  Physician: Stan Denson MD                               Insurance: Medicare/Medical [place]   Medical Diagnosis: S42.295D (ICD-10-CM) - Other closed nondisplaced fracture of proximal end of left humerus with routine healing, subsequent encounter     Rehab Codes: R25 pain , M25.60 stiffness, R53.1 weakness, R60.9 Edema  Onset Date: 22               Next 's appt: 22- Dr. Nini Frey   Visit# / total visits:   Cancels/No Shows: 0/0    Precautions: No strengthening at this time-- hold until 8 weeks (~), JAYLON DELVALLE DOES NOT TOLERATE LAYING FLAT    range of motion and begin some gradual progressive strengthening of the shoulder. she may continue use the arm for light activities of daily living without heavy lifting, pushing, or pulling. Subjective:  Patient reports pain high today due to the humidity. States hand is no different and still swollen at this time. States decreased pain after vasocompression last visit. Pain:  [x] Yes  [] No- 1 Tylenol before session   L hand 3/10  L arm 7/10  Pain altered Tx:  [] No  [] Yes  Action:  Comments:    Objective:    Exercises:  MODALITIES  Time  Weight/ Level Comments  Completed    vasocompression - L shoulder 15' Low, 40 deg  Seated with pillow case x   Cold pack to L hand 8'  Due to pain and swelling;  With vaso to L shoulder           INTERVENTION       UBE: F/B  3'/3' Level 2  : inc resistance x   Pulleys: flexion/scaption 2' ea   x   IR towel stretch x10  Hold 10   completed ~ 7 to what pt could tolerate    Shoulder extension with cane x10 Hold 10     PROM -- L shoulder flexion, abuduction, ER at 45*, scaption  8'   semi reclined x   Posterior capsule stretch 2x20'' Theraplast- RED       Gross  10x3\"      opposition 3x ea finger      Finger extension loop 10x      Wrist extension 5x      cone 3x      Finger adduction 3x ea      6 Pack exercises 5x ea  On sheet           MAT       AAROM: Flexion, ABD, ER 10x 3\" hold  supine x   AROM shoulder flexion 10x2 3\"      Table slides flexion x20 Hold 3s     Table slides scaption x10      Abduction rolls on bolster x20      Posterior shoulder roll  x15 Hold 5s  Seated  x   Retro shoulder rolls 10x 3\" holds      Seated rows  2x15 yellow 7/22 added resistance x   Seated horizontal abduction x15       Supine scap punches 10x   x   Supine arm circles 10x ea  Cw/ccw AAROM with RUE x   Hula hoop #5  1x ea       Seated Flexion/scaption  10x ea dir   x   Finger ladder flexion  x10  Gets to 15     Finger ladder scaption  x10  Gets to 12     W ball rolls wall 2x5 ea       Seated with flexed over yellow ball, IYT  x10 ea   Tactile cues for scap activation     Bicep curls pronated, neutral, supinated 15x  Seated 7/8 inc reps    Education     Proper sitting posture throughout exercises     Manual: gentle STM to L shoulder and biceps 5'                  Other:   7/22: provided with red putty for gripping work. Provided patient with 6 pack and theraplast exercises. HEP  Access Code: [email or web address]  URL: Vi[employer]/  Date: 07/22/2022  Prepared by: Halley Smith    Exercises  Standing Bilateral Low Shoulder Row with Anchored Resistance - 1 x daily - 7 x weekly - 3 sets - 10 reps - 3-5 second hold    Access Code: [email or web address]  URL: Vi[employer]/  Date: 07/20/2022  Prepared by: Halley Smith    Exercises  Standing Shoulder Extension AAROM with Dowel - 1 x daily - 7 x weekly - 3 sets - 10 reps - 10 second hold  Supine Shoulder Flexion Extension Full Range AROM - 1 x daily - 7 x weekly - 3 sets - 10 reps - 3-5 second hold  Seated Shoulder Flexion Towel Slide at Table Top - 1 x daily - 7 x weekly - 3 sets - 10 reps - 3-5 second hold  Seated Shoulder Scaption Slide at Table Top with Forearm in Neutral - 1 x daily - 7 x weekly - 3 sets - 10 reps - 3-5 second hold  Seated Shoulder Abduction Towel Slide at Table Top - 1 x daily - 7 x weekly - 3 sets - 10 reps - 3-5 second hold       Specific Instructions for next treatment: continue with passive ROM, work on  and wrist strengthening, UBE and finger ladder, pulley; add in scapular strengthening as able. Assessment: [x] Progressing toward goals. Continued with emphasis on left shoulder ROM and strengthening. Good tolerance to PROM today with improved PROM at end of treatment. Some soreness with manual and added strengthening exercises but tolerable. Cueing needed to avoid UT activation with AROM flexion and scaption. Ended with vasocompression to left shoulder to decreases edema and pain. [] No change. [] Other:    [x] Patient would continue to benefit from skilled physical therapy services in order to: improve L shoulder strength, ROM, and better control swelling to allow her to return to using her LUE with ADLs.          STG: (to be met in 8 treatments)  Pt will self report worst pain no greater than 3/10 in order to better tolerate ADLs/work activities with minimal dysfunction   - 7/13: Met -- 3/10 noted today    Pt will improve PROM in L shoulder to >160 deg flexion and abduction to improve her ability to reach overhead.   -7/13: progressing -- improves by 38 deg to 118 deg    Pt will improve AROM in L shoulder flexion and abduction to >/= 90 deg in order to demonstrate ability in progressing reach in all planes to complete ADLs.   - 7/13: progressing -- reaches 90 deg actively flexion, 73 deg actively abd   Pt will report no feelings of stiffness in the L hand with  indicated controlled swelling.    -7/13: MET -- no stiff reported   LTG: (to be met in 16 treatments) -- will assess closer to end of POC   Pt will improve Apley reach bilaterally to posterior shoulder, ER to T3 and IR to L4 for efficient dressing at prior level  Pt will demonstrate improved L UE strength to 4+/5 or greater in order to demonstrate improved stability/strength necessary for unrestricted ADLs. Pt will increase score on UEFS to >/= 65/80 in order to demonstrate improved functional tolerances with ADLs with minimal restriction/dysfunction  Pt will demonstrate independence with a long term HEP for continued progress/maintenance after completion of PT  ** further goals may be established based on progress**     Pt. Education:  [x] Yes  [] No  [] Reviewed Prior HEP/Ed  Method of Education: [x] Verbal  [] Demo  [x] Written  Comprehension of Education:  [x] Verbalizes understanding. [] Demonstrates understanding. [x] Needs review. [] Demonstrates/verbalizes HEP/Ed previously given. Plan: [x] Continue per plan of care.  -- 2x/wk for total of 16 visits    [] Other:          Treatment Charges: Mins Units   []  Modalities     [x]  Ther Exercise 45 3   []  Manual Therapy     []  Ther Activities     []  Aquatics     []  Neuromuscular     [x] Vasocompression 15 1   [] Gait Training     [] Dry needling        [] 1 or 2 muscles        [] 3 or more muscles     []  Other     Total Treatment time 60 4     Time In:  5620    Time Out:  1602    Electronically signed by:  Leigh Ann Stubbs PTA

## 2022-07-28 ENCOUNTER — HOSPITAL ENCOUNTER (OUTPATIENT)
Dept: PHYSICAL THERAPY | Age: 67
Setting detail: THERAPIES SERIES
Discharge: HOME | End: 2022-07-28
Payer: MEDICARE

## 2022-07-28 PROCEDURE — 97016 VASOPNEUMATIC DEVICE THERAPY: CPT

## 2022-07-28 PROCEDURE — 97110 THERAPEUTIC EXERCISES: CPT

## 2022-07-28 NOTE — FLOWSHEET NOTE
[facility]   [address]   Phone: [phone]   Fax: [phone]    Physical Therapy Daily Treatment Note      Date:  2022  Patient Name:  Laura Rivera    :  1955  MRN: [id number]  Physician: Josefina Ray MD                               Insurance: Medicare/Medical [place]   Medical Diagnosis: S42.295D (ICD-10-CM) - Other closed nondisplaced fracture of proximal end of left humerus with routine healing, subsequent encounter     Rehab Codes: R25 pain , M25.60 stiffness, R53.1 weakness, R60.9 Edema  Onset Date: 22               Next 's appt: 22- Dr. Anne Boalnos   Visit# / total visits:   Cancels/No Shows: 0/0    Precautions:  JAYLON DELVALLE DOES NOT TOLERATE LAYING FLAT    range of motion and begin some gradual progressive strengthening of the shoulder. she may continue use the arm for light activities of daily living without heavy lifting, pushing, or pulling. Subjective:  Patient reports pain high today due to the humidity. She saw Dr. Anne Bolanos this week and he wants her to begin working on some strengthening. She notes she did some driving yesterday and had most difficulty with turning her wheel and reaching to buckle her seatbelt    Pain:  [x] Yes  [] No- 1 Tylenol before session   L shoulder 5/10  L hand 4/10     Pain altered Tx:  [] No  [] Yes  Action:  Comments:    Objective:    Exercises:  MODALITIES  Time  Weight/ Level Comments  Completed    vasocompression - L shoulder 15' Low, 40 deg  Seated with pillow case x   Cold pack to L hand 8'  Due to pain and swelling;  With vaso to L shoulder           INTERVENTION       UBE: F/B  2/2' Level 2   x   Pulleys: flexion/scaption 2' ea   x   IR towel stretch x10  Hold 10   completed ~ 7 to what pt could tolerate    Shoulder extension with cane x10 Hold 10     PROM -- L shoulder flexion, abuduction, ER at 45*, scaption  8'   semi reclined  Hold 5-10s at end range   - pain with abd in pec  x   Posterior capsule stretch 2x20''             Theraplast- RED       Gross  10x3\"      opposition 3x ea finger      Finger extension loop 10x      Wrist extension 5x      cone 3x      Finger adduction 3x ea      6 Pack exercises 5x ea  On sheet           MAT       AAROM: Flexion ER 10x 5\" hold  supine x   Sidelying abductions  2x10  Therapist assisting at end range; gains more motion by end of reps  x   Sidelying ER  2x10  Towel under the arm  x   AROM shoulder flexion 10x2 3\"      Posterior shoulder roll  x15 Hold 5s  Seated     Seated rows  2x15 yellow 7/22 added resistance    Seated horizontal abduction x15       Supine scap punches 15x2 1#   x   Supine arm circles 10x ea  1#  CW/CCW x   Hula hoop #5  1x ea       Seated Flexion/scaption  10x ea dir 1#  x   Finger ladder flexion  x10  Gets to 15     Finger ladder scaption  x10  Gets to 12     W ball rolls wall 2x5 ea       Seated with flexed over yellow ball, IYT  x10 ea   Tactile cues for scap activation     Bicep curls pronated, neutral, supinated 15x  Seated 7/8 inc reps    Education     Proper sitting posture throughout exercises     Manual:          gentle STM to L shoulder and biceps          PA mobs        Other:   7/22: provided with red putty for gripping work. Provided patient with 6 pack and theraplast exercises. HEP  Access Code: [email or web address]  URL: [email or web address]  Date: 07/22/2022  Prepared by: Sarthak Asp    Exercises  Standing Bilateral Low Shoulder Row with Anchored Resistance - 1 x daily - 7 x weekly - 3 sets - 10 reps - 3-5 second hold    Access Code: [email or web address]  URL: [email or web address]  Date: 07/20/2022  Prepared by: Sarthak Asp    Exercises  Standing Shoulder Extension AAROM with Dowel - 1 x daily - 7 x weekly - 3 sets - 10 reps - 10 second hold  Supine Shoulder Flexion Extension Full Range AROM - 1 x daily - 7 x weekly - 3 sets - 10 reps - 3-5 second hold  Seated Shoulder Flexion Towel Slide at Table Top - 1 x daily - 7 x weekly - 3 sets - 10 reps - 3-5 second hold  Seated Shoulder Scaption Slide at Table Top with Forearm in Neutral - 1 x daily - 7 x weekly - 3 sets - 10 reps - 3-5 second hold  Seated Shoulder Abduction Towel Slide at Table Top - 1 x daily - 7 x weekly - 3 sets - 10 reps - 3-5 second hold       Specific Instructions for next treatment: continue with passive ROM, work on  and wrist strengthening, UBE and finger ladder, pulley; add in scapular strengthening as able. Assessment: [x] Progressing toward goals. Continued with emphasis on left shoulder ROM and strengthening. Still having pain at end ranges of motion. Still very limited with abduction due to pain. Progressed some strengthening with use of weight to improve withing available ranges. Added in sidelying abduction and ER to work in different planes to maximize motion. Best when therapist cueing at [address] for motion. Ended with vasocompression to left shoulder to decreases edema and pain. [] No change. [] Other:    [x] Patient would continue to benefit from skilled physical therapy services in order to: improve L shoulder strength, ROM, and better control swelling to allow her to return to using her LUE with ADLs.          STG: (to be met in 8 treatments)  Pt will self report worst pain no greater than 3/10 in order to better tolerate ADLs/work activities with minimal dysfunction   - 7/13: Met -- 3/10 noted today    Pt will improve PROM in L shoulder to >160 deg flexion and abduction to improve her ability to reach overhead.   -7/13: progressing -- improves by 38 deg to 118 deg    Pt will improve AROM in L shoulder flexion and abduction to >/= 90 deg in order to demonstrate ability in progressing reach in all planes to complete ADLs.   - 7/13: progressing -- reaches 90 deg actively flexion, 73 deg actively abd   Pt will report no feelings of stiffness in the L hand with  indicated controlled swelling.    -7/13: MET -- no stiff reported   LTG: (to be met in 16 treatments) -- will assess closer to end of POC   Pt will improve Apley reach bilaterally to posterior shoulder, ER to T3 and IR to L4 for efficient dressing at prior level  Pt will demonstrate improved L UE strength to 4+/5 or greater in order to demonstrate improved stability/strength necessary for unrestricted ADLs. Pt will increase score on UEFS to >/= 65/80 in order to demonstrate improved functional tolerances with ADLs with minimal restriction/dysfunction  Pt will demonstrate independence with a long term HEP for continued progress/maintenance after completion of PT  ** further goals may be established based on progress**     Pt. Education:  [x] Yes  [] No  [] Reviewed Prior HEP/Ed  Method of Education: [x] Verbal  [] Demo  [x] Written  Comprehension of Education:  [x] Verbalizes understanding. [] Demonstrates understanding. [x] Needs review. [] Demonstrates/verbalizes HEP/Ed previously given. Plan: [x] Continue per plan of care.  -- 2x/wk for total of 16 visits    [] Other:          Treatment Charges: Mins Units   []  Modalities     [x]  Ther Exercise 36 2   []  Manual Therapy     []  Ther Activities     []  Aquatics     []  Neuromuscular     [x] Vasocompression 15 1   [] Gait Training     [] Dry needling        [] 1 or 2 muscles        [] 3 or more muscles     []  Other     Total Treatment time 51 3     Time In:  4603   Time Out:  9924    Electronically signed by:  Meghan Blackman, PT

## 2022-07-29 ENCOUNTER — APPOINTMENT (OUTPATIENT)
Dept: PHYSICAL THERAPY | Age: 67
End: 2022-07-29
Payer: MEDICARE

## 2022-08-02 ENCOUNTER — HOSPITAL ENCOUNTER (OUTPATIENT)
Dept: PHYSICAL THERAPY | Age: 67
Setting detail: THERAPIES SERIES
Discharge: HOME | End: 2022-08-02
Payer: MEDICARE

## 2022-08-02 PROCEDURE — 97016 VASOPNEUMATIC DEVICE THERAPY: CPT

## 2022-08-02 PROCEDURE — 97110 THERAPEUTIC EXERCISES: CPT

## 2022-08-02 NOTE — FLOWSHEET NOTE
[facility]   [address]   Phone: [phone]   Fax: [phone]    Physical Therapy Daily Treatment Note      Date:  2022  Patient Name:  Fidel Vaz    :  1955  MRN: [id number]  Physician: Mark Colunga MD                               Insurance: Medicare/Medical [place]   Medical Diagnosis: S42.295D (ICD-10-CM) - Other closed nondisplaced fracture of proximal end of left humerus with routine healing, subsequent encounter     Rehab Codes: R25 pain , M25.60 stiffness, R53.1 weakness, R60.9 Edema  Onset Date: 22               Next 's appt: 22- Dr. Juan Ramon Serrano   Visit# / total visits: 15/16  Cancels/No Shows: 0/0    Precautions:  JAYLON DELVALLE DOES NOT TOLERATE LAYING FLAT    range of motion and begin some gradual progressive strengthening of the shoulder. she may continue use the arm for light activities of daily living without heavy lifting, pushing, or pulling. Subjective:  Patient reporting to therapy stating while she was on her trip over the weekend she was able to achieve more shoulder ABD in supine. Patient also reporting she had to reschedule her appt from Friday to a later date as she is having a a procedure done on Thursday and will be too uncomfortable to complete therapy on Friday. Patient is getting moles removed from under her bra line. Pain:  [x] Yes  [] No- 1 Tylenol before session   L shoulder 1/10  L hand denies/10     Pain altered Tx:  [] No  [] Yes  Action:  Comments:    Objective:    Exercises:  MODALITIES  Time  Weight/ Level Comments  Completed    vasocompression - L shoulder 10' Low, 36 deg  Seated with pillow case x   Cold pack to L hand 8'  Due to pain and swelling;  With vaso to L shoulder           INTERVENTION       UBE: F/B  2/2' Level 2   x   Pulleys: flexion/scaption 2' ea   x   IR towel stretch x10  Hold 10   completed ~ 7 to what pt could tolerate    Shoulder extension with Shoulder Extension AAROM with Dowel - 1 x daily - 7 x weekly - 3 sets - 10 reps - 10 second hold  Supine Shoulder Flexion Extension Full Range AROM - 1 x daily - 7 x weekly - 3 sets - 10 reps - 3-5 second hold  Seated Shoulder Flexion Towel Slide at Table Top - 1 x daily - 7 x weekly - 3 sets - 10 reps - 3-5 second hold  Seated Shoulder Scaption Slide at Table Top with Forearm in Neutral - 1 x daily - 7 x weekly - 3 sets - 10 reps - 3-5 second hold  Seated Shoulder Abduction Towel Slide at Table Top - 1 x daily - 7 x weekly - 3 sets - 10 reps - 3-5 second hold       Specific Instructions for next treatment: continue with passive ROM, work on  and wrist strengthening, UBE and finger ladder, pulley; add in scapular strengthening as able. Assessment: [x] Progressing toward goals. Patient continues to report inc in discomfort and soreness after completing pulleys and UBE making it difficult to perform PROM. Due to patient's decreased tolerance for activity post exercises due to pain, frequent breaks are needed for repositioning to decrease pain. Cues as needed to correct sitting posture when performing exercises. Patient needed encouragement to complete serratus punches in circles with weight to improve scapular strength and stability. Completed session with vaso for pain management. [] No change. [] Other:    [x] Patient would continue to benefit from skilled physical therapy services in order to: improve L shoulder strength, ROM, and better control swelling to allow her to return to using her LUE with ADLs.          STG: (to be met in 8 treatments)  Pt will self report worst pain no greater than 3/10 in order to better tolerate ADLs/work activities with minimal dysfunction   - 7/13: Met -- 3/10 noted today    Pt will improve PROM in L shoulder to >160 deg flexion and abduction to improve her ability to reach overhead.   -7/13: progressing -- improves by 38 deg to 118 deg    Pt will improve AROM in L shoulder flexion and abduction to >/= 90 deg in order to demonstrate ability in progressing reach in all planes to complete ADLs.   - 7/13: progressing -- reaches 90 deg actively flexion, 73 deg actively abd   Pt will report no feelings of stiffness in the L hand with  indicated controlled swelling.    -7/13: MET -- no stiff reported   LTG: (to be met in 16 treatments) -- will assess closer to end of POC   Pt will improve Apley reach bilaterally to posterior shoulder, ER to T3 and IR to L4 for efficient dressing at prior level  Pt will demonstrate improved L UE strength to 4+/5 or greater in order to demonstrate improved stability/strength necessary for unrestricted ADLs. Pt will increase score on UEFS to >/= 65/80 in order to demonstrate improved functional tolerances with ADLs with minimal restriction/dysfunction  Pt will demonstrate independence with a long term HEP for continued progress/maintenance after completion of PT  ** further goals may be established based on progress**     Pt. Education:  [x] Yes  [] No  [] Reviewed Prior HEP/Ed  Method of Education: [x] Verbal  [] Demo  [x] Written  Comprehension of Education:  [x] Verbalizes understanding. [] Demonstrates understanding. [x] Needs review. [] Demonstrates/verbalizes HEP/Ed previously given. Plan: [x] Continue per plan of care.  -- 2x/wk for total of 16 visits    [] Other:          Treatment Charges: Mins Units   []  Modalities     [x]  Ther Exercise 50 3   []  Manual Therapy     []  Ther Activities     []  Aquatics     []  Neuromuscular     [x] Vasocompression 10 1   [] Gait Training     [] Dry needling        [] 1 or 2 muscles        [] 3 or more muscles     []  Other     Total Treatment time 60 4     Time In:  0900  Time Out: 1000    Electronically signed by:  Kaleb Mitchell PTA

## 2022-08-05 ENCOUNTER — APPOINTMENT (OUTPATIENT)
Dept: PHYSICAL THERAPY | Age: 67
End: 2022-08-05
Payer: MEDICARE

## 2022-08-08 ENCOUNTER — HOSPITAL ENCOUNTER (OUTPATIENT)
Dept: PHYSICAL THERAPY | Age: 67
Setting detail: THERAPIES SERIES
Discharge: HOME | End: 2022-08-08
Payer: MEDICARE

## 2022-08-08 PROCEDURE — 97110 THERAPEUTIC EXERCISES: CPT

## 2022-08-08 PROCEDURE — 97016 VASOPNEUMATIC DEVICE THERAPY: CPT

## 2022-08-08 NOTE — FLOWSHEET NOTE
509 [facility] Outpatient Physical Therapy   [address]   Phone: [phone]   Fax: [phone]    Physical Therapy Daily Treatment Note/Progress Note       Date:  2022  Patient Name:  Gabe Henderson    :  1955  MRN: [id number]  Physician: Shin Coffey MD                               Insurance: Medicare/Medical [place] -- [place]   Medical Diagnosis: S42.295D (ICD-10-CM) - Other closed nondisplaced fracture of proximal end of left humerus with routine healing, subsequent encounter     Rehab Codes: R25 pain , M25.60 stiffness, R53.1 weakness, R60.9 Edema  Onset Date: 22               Next 's appt: 22- Dr. Daryl Andre   Visit# / total visits:   Cancels/No Shows: 0/0  Date of initial visit: 22        Date of PN: 22 (visit 8);22 (visit 16)   Formal progress note reporting period: 22 -22    Precautions:  JAYLON DELVALLE DOES NOT TOLERATE LAYING FLAT    range of motion and begin some gradual progressive strengthening of the shoulder. she may continue use the arm for light activities of daily living without heavy lifting, pushing, or pulling. Subjective:  Pt arrives noting her pain is under control. She reports she is getting better ROM with her AAROM exercises. No longer having any arm shaking at max flexion. She also notes she has been able to manipulate silverware to cut meat on her own. She notes she is doing well with dressing with minimal difficulties.     Pain:  [] Yes  [x] No-    L shoulder denies/10  Pain altered Tx:  [] No  [] Yes  Action:  Comments:    Objective:  Tests & measures:      Range of Motion  Left 22 Range of Motion  Left 22 Range of motion   Left 22 Strength   Left 22   Shoulder Flexion P: 80 deg  A: 95  P: 118 A: 110  P: 135   (Sitting) 3+   Shoulder Abduction 40 deg A: 73  P: 90  A: 110  P:130   (Sitting) 3   Shoulder Extension 10 deg  A: 40  P: 50 A: 55 4+   Shoulder ER  Arm at side 15 (+p)  A: weekly - 2 sets - 5 reps - 10-20 seconds hold    Access Code: [email or web address]  URL: [email or web address]  Date: 07/22/2022  Prepared by: Jose Guadalupe Smith    Exercises  Standing Bilateral Low Shoulder Row with Anchored Resistance - 1 x daily - 7 x weekly - 3 sets - 10 reps - 3-5 second hold    Access Code: [email or web address]  URL: [email or web address]  Date: 07/20/2022  Prepared by: Orvil Elms    Exercises  Standing Shoulder Extension AAROM with Dowel - 1 x daily - 7 x weekly - 3 sets - 10 reps - 10 second hold  Supine Shoulder Flexion Extension Full Range AROM - 1 x daily - 7 x weekly - 3 sets - 10 reps - 3-5 second hold  Seated Shoulder Flexion Towel Slide at Table Top - 1 x daily - 7 x weekly - 3 sets - 10 reps - 3-5 second hold  Seated Shoulder Scaption Slide at Table Top with Forearm in Neutral - 1 x daily - 7 x weekly - 3 sets - 10 reps - 3-5 second hold  Seated Shoulder Abduction Towel Slide at Table Top - 1 x daily - 7 x weekly - 3 sets - 10 reps - 3-5 second hold       Specific Instructions for next treatment: ROM against gravity, work on IR towel stretch, work on strengthening as much as tolerated , manual work     Assessment: [x] Progressing toward goals. Pt was initially evaluated on 6/23/22 post L humeral fracture that is being treated conservatively. She is now about 11 weeks post injury. She has completed x16 total visits and is progressing well toward all goals. She continues to improve in ROM making gains in all shoulder motions. She is still most limited in L shoulder abduction and IR that impacts reaching behind her with dressing and reaching laterally. Strength wise she is improving but still has areas for improvement that will allow her to complete ADLs more sufficiently. Since last progress note pain is much better controlled and she has been able to do more from a functional standpoint. She has improved her score by 9 pts on the UEFS which is a positive and meaningful change.  She could still benefit from working on increasing her range of motion and L shoulder strength. Will plan to add more manual to further improve range. Will also focus on posture as her significant forward rounding and tendency to compensate if limiting her shoulder motion. Provided her this date with a new HEP of exercises really focusing on improve her ROM. Feel that she would benefit from extending her POC by x10 additional visits to allow for more time to make progress toward goals and reach her highest functional level. [] No change. [] Other:    [x] Patient would continue to benefit from skilled physical therapy services in order to: improve L shoulder strength, ROM, and better control swelling to allow her to return to using her LUE with ADLs.          STG: (to be met in 8 treatments)  Pt will self report worst pain no greater than 3/10 in order to better tolerate ADLs/work activities with minimal dysfunction   - 7/13: Met -- 3/10 noted today    Pt will improve PROM in L shoulder to >160 deg flexion and abduction to improve her ability to reach overhead.   -7/13: progressing -- improves by 38 deg to 118 deg     - 8/8: progressing -- improves to 135 deg flexion & 130 deg abduction (~17 deg flexion improvement & 40 deg abd improvement)   Pt will improve AROM in L shoulder flexion and abduction to >/= 90 deg in order to demonstrate ability in progressing reach in all planes to complete ADLs.   - 7/13: progressing -- reaches 90 deg actively flexion, 73 deg actively abd   - 8/8: MET -- reaches 110 deg for each actively  Pt will report no feelings of stiffness in the L hand with  indicated controlled swelling.    -7/13: MET -- no stiff reported   LTG: (to be met by end of POC) --   Pt will improve Apley reach bilaterally to posterior shoulder, ER to T3 and IR to L4 for efficient dressing at prior level   -8/8: progressing-- cross body reach to top of shoulder, ER to C6, IR to sacrum   Pt will demonstrate improved L UE strength to 4+/5 or greater in order to demonstrate improved stability/strength necessary for unrestricted ADLs.   -8/8: progressing -- still limited in strength of the L shoulder and elbow   Pt will increase score on UEFS to >/= 65/80 in order to demonstrate improved functional tolerances with ADLs with minimal restriction/dysfunction   - 8/8: progressing 61/80 this date   Pt will demonstrate independence with a long term HEP for continued progress/maintenance after completion of PT   - 8/8: meeting -- noting good compliance with HEP   5. NEW GOAL 8/8: Pt will actively be able to elevate her L arm to >130 deg flexion and abduction to improve ability to lift & reach overhead. ** further goals may be established based on progress**     Pt. Education:  [x] Yes  [] No  [x] Reviewed Prior HEP/Ed  Method of Education: [x] Verbal  [] Demo  [x] Written  Comprehension of Education:  [x] Verbalizes understanding. [] Demonstrates understanding. [x] Needs review. [x] Demonstrates/verbalizes HEP/Ed previously given. Treatment Plan:  [x] Therapeutic Exercise   25812  [] Iontophoresis: 4 mg/mL Dexamethasone Sodium Phosphate  mAmin  33582   [x] Therapeutic Activity  00422 [x] Vasopneumatic cold with compression  85075    [] Gait Training   16762 [] Ultrasound   60162   [] Neuromuscular Re-education  41570 [] Electrical Stimulation Unattended  34376   [x] Manual Therapy  21444 [] Electrical Stimulation Attended  90606   [x] Instruction in HEP  [] Lumbar/Cervical Traction  19578   [] Aquatic Therapy   08997 [x] Cold/hotpack    [] Massage   30827      [] Dry Needling, 1 or 2 muscles  78856   [] Biofeedback, first 15 minutes   82458  [] Biofeedback, additional 15 minutes   66561 [] Dry Needling, 3 or more muscles  38465      Patient Status:     [] Continue per initial plan of care.     [x] Additional visits necessary.- making progress and could benefit from x10 more visits     [] Other:     Requested Frequency/Duration: 2 times per week for total of 26 treatments.        Treatment Charges: Mins Units   []  Modalities     [x]  Ther Exercise 44 3   []  Manual Therapy     []  Ther Activities     []  Aquatics     []  Neuromuscular     [x] Vasocompression 10 1   [] Gait Training     [] Dry needling        [] 1 or 2 muscles        [] 3 or more muscles     []  Other     Total Treatment time 54 4     Time In:  1300  Time Out: 4590     Electronically signed by:  Hari Browning PT

## 2022-08-09 ENCOUNTER — HOSPITAL ENCOUNTER (OUTPATIENT)
Dept: PHYSICAL THERAPY | Age: 67
Setting detail: THERAPIES SERIES
Discharge: HOME | End: 2022-08-09
Payer: MEDICARE

## 2022-08-09 PROCEDURE — 97110 THERAPEUTIC EXERCISES: CPT

## 2022-08-09 PROCEDURE — 97140 MANUAL THERAPY 1/> REGIONS: CPT

## 2022-08-09 PROCEDURE — 97016 VASOPNEUMATIC DEVICE THERAPY: CPT

## 2022-08-09 NOTE — FLOWSHEET NOTE
509 [facility] Outpatient Physical Therapy   [address] [facility] [address]   Phone: [phone]   Fax: [phone]    Physical Therapy Daily Treatment Note/Progress Note       Date:  2022  Patient Name:  Benjamin Mccauley    :  1955  MRN: [id number]  Physician: Corazon Lu MD                               Insurance: Medicare/Medical [place]   Medical Diagnosis: S42.295D (ICD-10-CM) - Other closed nondisplaced fracture of proximal end of left humerus with routine healing, subsequent encounter     Rehab Codes: R25 pain , M25.60 stiffness, R53.1 weakness, R60.9 Edema  Onset Date: 22               Next 's appt: 22- Dr. Angelica Merchant   Visit# / total visits:   Cancels/No Shows: 0/0    Precautions:  JAYLON DELVALLE DOES NOT TOLERATE LAYING FLAT    range of motion and begin some gradual progressive strengthening of the shoulder. she may continue use the arm for light activities of daily living without heavy lifting, pushing, or pulling. Subjective:  Pt arrives to therapy with portable UBE machine and had questions on proper use. Pain:  [x] Yes  [] No   L shoulder 1-2/10  Pain altered Tx:  [] No  [] Yes  Action:  Comments:    Objective:      Exercises:  MODALITIES  Time/reps Weight/ Level Comments  Completed    vasocompression - L shoulder 15' Low, 34 deg  Seated with pillow case x   Cold pack to L hand 8'  Due to pain and swelling;  With vaso to L shoulder           INTERVENTION       UBE: F/B  2/2' Level 2      Pulleys: flexion/scaption/ abd 2' ea   x   IR towel stretch x10  Hold 5  x   Wall slides flexion  x10 Hold 5  x   PROM -- L shoulder flexion, abuduction, ER at 45*, scaption  8'   semi reclined  Hold 5-10s at end range   - pain with abd in pec     Posterior capsule stretch x5 Hold 10s   x   IR/ER 2x10 ea  Yellow  With towel under arm for all            MAT       AAROM: Flexion ER 10x 5\" hold  Seated     Sidelying abductions  2x10  Therapist assisting at end range; gains more motion by end of reps     Sidelying ER  2x10  Towel under the arm     Seated rows  2x15 yellow 7/22 added resistance    Seated horizontal abduction x15       Hula hoop #5  1x ea       Seated Flexion/scaption  10x ea dir 1#     W ball rolls wall 2x5 ea       Bicep curls pronated, neutral, supinated 15x  Seated 7/8 inc reps HEP   Education     Proper sitting posture throughout exercises            Manual:          gentle STM to L shoulder and biceps          PA & inferior mobs  8'  Also educated patient on SITs mm and the importance of inferior and PA mobs x   Other:  8/9:  TE 10'  instructed patient on proper use of portable UBE before beginning our session. TA- 8'  Patient had asked questions about her POC and how her going on vacation would affect her therapy sessions and had asked about what to bring on her vacation as far     7/22: provided with red putty for gripping work. Provided patient with 6 pack and theraplast exercises. HEP  New HEP provided 8/8/22  Exercises  Standing Shoulder Internal Rotation Stretch with Towel - 2 x daily - 7 x weekly - 2 sets - 10 reps - 5 seconds hold  Shoulder Flexion Wall Slide with Towel - 2 x daily - 7 x weekly - 2 sets - 10 reps - 5 sec hold  Modified Posterior Capsule Stretch - 2 x daily - 7 x weekly - 2 sets - 5 reps - 10-20 seconds hold       Specific Instructions for next treatment: ROM against gravity, work on IR towel stretch, work on strengthening as much as tolerated , manual work     Assessment: [x] Progressing toward goals. Began session with portable UBE instruction and followed with Pulleys and UBE as warm up in preparation for ROM and strengthening exercises. Patient continues to need TC while on pulley to ensure proper sitting posture and for proper holding time as patient can get distracted easily and get off task. Cues as needed to correct posture during wall slides and IR stretch with towel.   Added PA and inferior mob glides to her shoulder continued progress/maintenance after completion of PT   - 8/8: meeting -- noting good compliance with HEP   5. NEW GOAL 8/8: Pt will actively be able to elevate her L arm to >130 deg flexion and abduction to improve ability to lift & reach overhead. ** further goals may be established based on progress**     Pt. Education:  [x] Yes  [] No  [x] Reviewed Prior HEP/Ed  Method of Education: [x] Verbal  [] Demo  [x] Written  Comprehension of Education:  [x] Verbalizes understanding. [] Demonstrates understanding. [x] Needs review. [x] Demonstrates/verbalizes HEP/Ed previously given. Patient Status:     [] Continue per initial plan of care.     [x] Additional visits necessary.- making progress and could benefit from x10 more visits     [] Other:     Treatment Charges: Mins Units   []  Modalities     [x]  Ther Exercise 37 2   [x]  Manual Therapy 8 1   []  Ther Activities     []  Aquatics     []  Neuromuscular     [x] Vasocompression 15 1   [] Gait Training     [] Dry needling        [] 1 or 2 muscles        [] 3 or more muscles     []  Other     Total Treatment time 60 4     Time In:  1056  Time Out: 9228    Electronically signed by:  Zainab Bhandari PTA

## 2022-08-15 ENCOUNTER — HOSPITAL ENCOUNTER (OUTPATIENT)
Dept: PHYSICAL THERAPY | Age: 67
Setting detail: THERAPIES SERIES
Discharge: HOME | End: 2022-08-15
Payer: MEDICARE

## 2022-08-15 ENCOUNTER — HOSPITAL ENCOUNTER (OUTPATIENT)
Dept: PHYSICAL THERAPY | Age: 67
Setting detail: THERAPIES SERIES
End: 2022-08-15
Payer: MEDICARE

## 2022-08-15 PROCEDURE — 97016 VASOPNEUMATIC DEVICE THERAPY: CPT

## 2022-08-15 PROCEDURE — 97140 MANUAL THERAPY 1/> REGIONS: CPT

## 2022-08-15 PROCEDURE — 97110 THERAPEUTIC EXERCISES: CPT

## 2022-08-15 NOTE — FLOWSHEET NOTE
509 [facility] Outpatient Physical Therapy   [address]   Phone: [phone]   Fax: [phone]    Physical Therapy Daily Treatment Note      Date:  8/15/2022  Patient Name:  Emma Freeman    :  1955  MRN: [id number]  Physician: Stephen Saravia MD                               Insurance: Medicare/Medical [place]   Medical Diagnosis: S42.295D (ICD-10-CM) - Other closed nondisplaced fracture of proximal end of left humerus with routine healing, subsequent encounter     Rehab Codes: R25 pain , M25.60 stiffness, R53.1 weakness, R60.9 Edema  Onset Date: 22               Next 's appt: 22- Dr. Orantes Bath   Visit# / total visits:   Cancels/No Shows: 0/0    Precautions:  JAYLON DELVALLE DOES NOT TOLERATE LAYING FLAT    range of motion and begin some gradual progressive strengthening of the shoulder. she may continue use the arm for light activities of daily living without heavy lifting, pushing, or pulling. Subjective:    Patient repots today feeling well overall. Reported that she was able to cut steak eating dinner recently for the first time since fracture. Reported overall function is slowly improving. Pain:  [] Yes  [x] No   L shoulder 1-2/10  Pain altered Tx:  [] No  [] Yes  Action:  Comments:    Objective:      Exercises:  MODALITIES  Time/reps Weight/ Level Comments  Completed    vasocompression - L shoulder 15' Low, 34 deg  Seated with pillow case x   Cold pack to L hand 8'  Due to pain and swelling;  With vaso to L shoulder           INTERVENTION       UBE: F/B  2/2' Level 2      Pulleys: flexion/scaption/ abd 2' ea   x   IR towel stretch x10  Hold 5 Used Pulleys today x   Wall slides flexion/scaption  X10 ea Hold 5  x   PROM -- L shoulder flexion, abuduction, ER at 45*, scaption  8'   semi reclined  Hold 5-10s at end range   - pain with abd in pec     Posterior capsule stretch x5 Hold 10s   x   IR/ER 2x10 ea  Yellow  With towel under arm for all            MAT       AAROM: Flexion ER 10x 5\" hold  Seated     Sidelying abductions  2x10  Therapist assisting at end range; gains more motion by end of reps     Sidelying ER  2x10  Towel under the arm     Seated rows  2x15 yellow 7/22 added resistance    Seated horizontal abduction x15       Hula hoop #5  1x ea       Seated Flexion/scaption  10x ea dir 1# Performed Semi-reclined today, Occasional assistance from contralateral UE  x   W ball rolls wall 2x5 ea       Bicep curls pronated, neutral, supinated 15x  Seated 7/8 inc reps HEP   Education     Proper sitting posture throughout exercises            Manual:         STM L pec major, distal lat, subscap 15 min     x   PA & inferior mobs  5 min   x   Other:      7/22: provided with red putty for gripping work. Provided patient with 6 pack and theraplast exercises. HEP  New HEP provided 8/8/22  Exercises  Standing Shoulder Internal Rotation Stretch with Towel - 2 x daily - 7 x weekly - 2 sets - 10 reps - 5 seconds hold  Shoulder Flexion Wall Slide with Towel - 2 x daily - 7 x weekly - 2 sets - 10 reps - 5 sec hold  Modified Posterior Capsule Stretch - 2 x daily - 7 x weekly - 2 sets - 5 reps - 10-20 seconds hold       Specific Instructions for next treatment: ROM against gravity, work on IR towel stretch, work on strengthening as much as tolerated , manual work     Assessment: [x] Progressing toward goals. Good response to manual this date, especially incorporation of STM of distal lat/subscap in improving ROM. Added scaption wall slides to help improve ROM within respective plane. Vaso for pain management following exercises for strengthening and ROM. [] No change. [] Other:    [x] Patient would continue to benefit from skilled physical therapy services in order to: improve L shoulder strength, ROM, and better control swelling to allow her to return to using her LUE with ADLs.          STG: (to be met in 8 treatments)  Pt will self report worst pain no greater than 3/10 in order to better tolerate ADLs/work activities with minimal dysfunction   - 7/13: Met -- 3/10 noted today    Pt will improve PROM in L shoulder to >160 deg flexion and abduction to improve her ability to reach overhead.   -7/13: progressing -- improves by 38 deg to 118 deg     - 8/8: progressing -- improves to 135 deg flexion & 130 deg abduction (~17 deg flexion improvement & 40 deg abd improvement)   Pt will improve AROM in L shoulder flexion and abduction to >/= 90 deg in order to demonstrate ability in progressing reach in all planes to complete ADLs.   - 7/13: progressing -- reaches 90 deg actively flexion, 73 deg actively abd   - 8/8: MET -- reaches 110 deg for each actively  Pt will report no feelings of stiffness in the L hand with  indicated controlled swelling.    -7/13: MET -- no stiff reported   LTG: (to be met by end of POC) --   Pt will improve Apley reach bilaterally to posterior shoulder, ER to T3 and IR to L4 for efficient dressing at prior level   -8/8: progressing-- cross body reach to top of shoulder, ER to C6, IR to sacrum   Pt will demonstrate improved L UE strength to 4+/5 or greater in order to demonstrate improved stability/strength necessary for unrestricted ADLs.   -8/8: progressing -- still limited in strength of the L shoulder and elbow   Pt will increase score on UEFS to >/= 65/80 in order to demonstrate improved functional tolerances with ADLs with minimal restriction/dysfunction   - 8/8: progressing 61/80 this date   Pt will demonstrate independence with a long term HEP for continued progress/maintenance after completion of PT   - 8/8: meeting -- noting good compliance with HEP   5. NEW GOAL 8/8: Pt will actively be able to elevate her L arm to >130 deg flexion and abduction to improve ability to lift & reach overhead.    ** further goals may be established based on progress**     Pt. Education:  [x] Yes  [] No  [x] Reviewed Prior HEP/Ed  Method of Education: [x] Verbal  [] Demo  [x] Written  Comprehension of Education:  [x] Verbalizes understanding. [] Demonstrates understanding. [x] Needs review. [x] Demonstrates/verbalizes HEP/Ed previously given. Patient Status:       [x] Continue per initial plan of care.     [] Other:     Treatment Charges: Mins Units   []  Modalities     [x]  Ther Exercise 34 2   [x]  Manual Therapy 15 1   []  Ther Activities     []  Aquatics     []  Neuromuscular     [x] Vasocompression 15 1   [] Gait Training     [] Dry needling        [] 1 or 2 muscles        [] 3 or more muscles     []  Other     Total Treatment time 64 4     Time In:  2:04 pm  Time Out: 3:08 pm    Electronically signed by:  Gavin Mercado PTA

## 2022-08-18 ENCOUNTER — HOSPITAL ENCOUNTER (OUTPATIENT)
Dept: PHYSICAL THERAPY | Age: 67
Setting detail: THERAPIES SERIES
Discharge: HOME | End: 2022-08-18
Payer: MEDICARE

## 2022-08-18 PROCEDURE — 97110 THERAPEUTIC EXERCISES: CPT

## 2022-08-18 PROCEDURE — 97140 MANUAL THERAPY 1/> REGIONS: CPT

## 2022-08-18 NOTE — FLOWSHEET NOTE
Yellow  With towel under arm for all            MAT       AAROM: Flexion ER 10x 5\" hold  Seated     Sidelying abductions  2x10  Therapist assisting at end range; gains more motion by end of reps     Sidelying ER  2x10  Towel under the arm  x   Seated rows  2x15 yellow 7/22 added resistance    Seated horizontal abduction x15    x   Hula hoop #5  1x ea       Seated Flexion/scaption  10x ea dir 1# Performed Semi-reclined today, Occasional assistance from contralateral UE  x   W ball rolls wall 2x5 ea       Bicep curls pronated, neutral, supinated 15x  Seated 7/8 inc reps HEP   Education     Proper sitting posture throughout exercises            Manual:         STM L pec major, distal lat, subscap 10 min     x   PA & inferior mobs  5 min   x   Other:      7/22: provided with red putty for gripping work. Provided patient with 6 pack and theraplast exercises. HEP  New HEP provided 8/8/22  Exercises  Standing Shoulder Internal Rotation Stretch with Towel - 2 x daily - 7 x weekly - 2 sets - 10 reps - 5 seconds hold  Shoulder Flexion Wall Slide with Towel - 2 x daily - 7 x weekly - 2 sets - 10 reps - 5 sec hold  Modified Posterior Capsule Stretch - 2 x daily - 7 x weekly - 2 sets - 5 reps - 10-20 seconds hold       Specific Instructions for next treatment: ROM against gravity, work on IR towel stretch, work on strengthening as much as tolerated , manual work     Assessment: [x] Progressing toward goals. Continued with ROM and strengthening exercises without resistance today due to increased symptoms. Patient had good response to manual interventions, specifically inferior mobs. Occasional cues throughout session for upright posture and decreasing compensation with flexion and abduction. Vaso unavailable at end of session but able to apply cold pack to L shoulder x10 min with pt noting pain relief. [] No change.      [] Other:    [x] Patient would continue to benefit from skilled physical therapy services in order to: improve L shoulder strength, ROM, and better control swelling to allow her to return to using her LUE with ADLs. STG: (to be met in 8 treatments)  Pt will self report worst pain no greater than 3/10 in order to better tolerate ADLs/work activities with minimal dysfunction   - 7/13: Met -- 3/10 noted today    Pt will improve PROM in L shoulder to >160 deg flexion and abduction to improve her ability to reach overhead.   -7/13: progressing -- improves by 38 deg to 118 deg     - 8/8: progressing -- improves to 135 deg flexion & 130 deg abduction (~17 deg flexion improvement & 40 deg abd improvement)   Pt will improve AROM in L shoulder flexion and abduction to >/= 90 deg in order to demonstrate ability in progressing reach in all planes to complete ADLs.   - 7/13: progressing -- reaches 90 deg actively flexion, 73 deg actively abd   - 8/8: MET -- reaches 110 deg for each actively  Pt will report no feelings of stiffness in the L hand with  indicated controlled swelling.    -7/13: MET -- no stiff reported   LTG: (to be met by end of POC) --   Pt will improve Apley reach bilaterally to posterior shoulder, ER to T3 and IR to L4 for efficient dressing at prior level   -8/8: progressing-- cross body reach to top of shoulder, ER to C6, IR to sacrum   Pt will demonstrate improved L UE strength to 4+/5 or greater in order to demonstrate improved stability/strength necessary for unrestricted ADLs.   -8/8: progressing -- still limited in strength of the L shoulder and elbow   Pt will increase score on UEFS to >/= 65/80 in order to demonstrate improved functional tolerances with ADLs with minimal restriction/dysfunction   - 8/8: progressing 61/80 this date   Pt will demonstrate independence with a long term HEP for continued progress/maintenance after completion of PT   - 8/8: meeting -- noting good compliance with HEP   5.  NEW GOAL 8/8: Pt will actively be able to elevate her L arm to >130 deg flexion and abduction to improve ability to lift & reach overhead. ** further goals may be established based on progress**     Pt. Education:  [x] Yes  [] No  [x] Reviewed Prior HEP/Ed  Method of Education: [x] Verbal  [] Demo  [x] Written  Comprehension of Education:  [x] Verbalizes understanding. [] Demonstrates understanding. [x] Needs review. [x] Demonstrates/verbalizes HEP/Ed previously given. Patient Status:       [x] Continue per initial plan of care.     [] Other:     Treatment Charges: Mins Units   [x]  Modalities- cold pack 10 -   [x]  Ther Exercise 39 3   [x]  Manual Therapy 15 1   []  Ther Activities     []  Aquatics     []  Neuromuscular     [] Vasocompression     [] Gait Training     [] Dry needling        [] 1 or 2 muscles        [] 3 or more muscles     []  Other     Total Treatment time 64 4     Time In:  10:00am  Time Out: 11:04am    Electronically signed by:  Arnold Martinez PTA

## 2022-08-22 ENCOUNTER — HOSPITAL ENCOUNTER (OUTPATIENT)
Dept: PHYSICAL THERAPY | Age: 67
Setting detail: THERAPIES SERIES
Discharge: HOME | End: 2022-08-22
Payer: MEDICARE

## 2022-08-22 PROCEDURE — 97016 VASOPNEUMATIC DEVICE THERAPY: CPT

## 2022-08-22 PROCEDURE — 97140 MANUAL THERAPY 1/> REGIONS: CPT

## 2022-08-22 PROCEDURE — 97110 THERAPEUTIC EXERCISES: CPT

## 2022-08-22 NOTE — FLOWSHEET NOTE
[facility]   [address]   Phone: [phone]   Fax: [phone]    Physical Therapy Daily Treatment Note      Date:  2022  Patient Name:  Cyndee Alarcon    :  1955  MRN: [id number]  Physician: Driss Messina MD                               Insurance: Medicare/Medical [place]   Medical Diagnosis: S42.295D (ICD-10-CM) - Other closed nondisplaced fracture of proximal end of left humerus with routine healing, subsequent encounter     Rehab Codes: R25 pain , M25.60 stiffness, R53.1 weakness, R60.9 Edema  Onset Date: 22               Next 's appt: 22- Dr. Carrington Buchanan   Visit# / total visits:   Cancels/No Shows: 0/0    Precautions:  JAYLON DELVALLE DOES NOT TOLERATE LAYING FLAT    range of motion and begin some gradual progressive strengthening of the shoulder. she may continue use the arm for light activities of daily living without heavy lifting, pushing, or pulling. Subjective:    Patient reporting she has been doing very well and realized she was able to reach in her micorwave over head without increase pain. Patient continues to demonstrate limited AROM this session. Pain:  [] Yes  [x] No   L shoulder 3-4/10, L hand 7-8/10  Pain altered Tx:  [x] No  [] Yes  Action:  Comments:    Objective:      Exercises:  MODALITIES  Time/reps Weight/ Level Comments  Completed    vasocompression - L shoulder 15' Low, 34 deg  Seated with pillow case    Cold pack to L hand 8'  Due to pain and swelling;  With vaso to L shoulder           INTERVENTION       UBE: F/B  2/2' Level 2      Pulleys: flexion/scaption/ abd 2' ea   limited in ABD x   IR towel stretch x15  Hold 5  x   Wall slides flexion/scaption  X10 ea Hold 5  table slides into shoulder ABD reviewed and added to HEP x   PROM -- L shoulder flexion, abuduction, ER at 45*, scaption  8'   semi reclined  Hold 5-10s at end range   - pain with abd in pec     Posterior capsule stretch x5 Hold 10s      IR/ER 2x10 ea  Yellow  With towel under arm for all     Finger ladder 10x 5\" hold   x          Gross grasp with foam block 10x  blue HEP- pt concerned with poor   x                        MAT       AAROM: Flexion ER 10x 5\" hold  Seated     Sidelying abductions  2x10  8/22 pt demos improved tolerance and ROM with sub scap release x   Sidelying ER  2x10  Towel under the arm  x   Seated rows  2x15 yellow 7/22 added resistance    Seated horizontal abduction x15       Hula hoop #5  1x ea       Seated Flexion/scaption  10x ea dir 1# Performed Semi-reclined today, Occasional assistance from contralateral UE     W ball rolls wall 2x5 ea       Bicep curls pronated, neutral, supinated 15x  Seated 7/8 inc reps HEP   Education     Proper sitting posture throughout exercises            Manual:  15 min       STM L pec major, distal lat, subscap 10 min        traction 5x 10 sec   x   PA & inferior mobs  5 min   x   Sub scap release 5 min   x   Other:  Access Code: [email or web address]  URL: [email or web address]  Date: 08/22/2022  Prepared by: Rubi Kumar    Exercises  Seated Shoulder Abduction Towel Slide at Table Top - 1 x daily - 7 x weekly - 3 sets - 10 reps - 3-5 sex hold  Sidelying Shoulder Abduction Palm Forward - 1 x daily - 7 x weekly - 3 sets - 10 reps      7/22: provided with red putty for gripping work. Provided patient with 6 pack and theraplast exercises.      HEP  New HEP provided 8/8/22  Exercises  Standing Shoulder Internal Rotation Stretch with Towel - 2 x daily - 7 x weekly - 2 sets - 10 reps - 5 seconds hold  Shoulder Flexion Wall Slide with Towel - 2 x daily - 7 x weekly - 2 sets - 10 reps - 5 sec hold  Modified Posterior Capsule Stretch - 2 x daily - 7 x weekly - 2 sets - 5 reps - 10-20 seconds hold       Specific Instructions for next treatment: ROM against gravity, work on IR towel stretch, work on strengthening as much as tolerated , manual work     Assessment: [x] stability/strength necessary for unrestricted ADLs.   -8/8: progressing -- still limited in strength of the L shoulder and elbow   Pt will increase score on UEFS to >/= 65/80 in order to demonstrate improved functional tolerances with ADLs with minimal restriction/dysfunction   - 8/8: progressing 61/80 this date   Pt will demonstrate independence with a long term HEP for continued progress/maintenance after completion of PT   - 8/8: meeting -- noting good compliance with HEP   5. NEW GOAL 8/8: Pt will actively be able to elevate her L arm to >130 deg flexion and abduction to improve ability to lift & reach overhead. ** further goals may be established based on progress**     Pt. Education:  [x] Yes  [] No  [x] Reviewed Prior HEP/Ed  Method of Education: [x] Verbal  [] Demo  [x] Written  Comprehension of Education:  [x] Verbalizes understanding. [] Demonstrates understanding. [x] Needs review. [x] Demonstrates/verbalizes HEP/Ed previously given. Patient Status:       [x] Continue per initial plan of care.     [] Other:     Treatment Charges: Mins Units   []  Modalities- cold pack     [x]  Ther Exercise 35 2   [x]  Manual Therapy 15 1   []  Ther Activities     []  Aquatics     []  Neuromuscular     [x] Vasocompression 10 1   [] Gait Training     [] Dry needling        [] 1 or 2 muscles        [] 3 or more muscles     []  Other     Total Treatment time  60 4     Time In:  1300  Time Out: 1400    Electronically signed by:  Rock Barnett PTA

## 2022-08-25 ENCOUNTER — HOSPITAL ENCOUNTER (OUTPATIENT)
Dept: PHYSICAL THERAPY | Age: 67
Setting detail: THERAPIES SERIES
Discharge: HOME | End: 2022-08-25
Payer: MEDICARE

## 2022-08-25 PROCEDURE — 97016 VASOPNEUMATIC DEVICE THERAPY: CPT

## 2022-08-25 PROCEDURE — 97110 THERAPEUTIC EXERCISES: CPT

## 2022-08-25 PROCEDURE — 97140 MANUAL THERAPY 1/> REGIONS: CPT

## 2022-08-25 NOTE — FLOWSHEET NOTE
[address] [facility]   [address]   Phone: [phone]   Fax: [phone]    Physical Therapy Daily Treatment Note      Date:  2022  Patient Name:  Toya Jolley    :  1955  MRN: [id number]  Physician: Jamar Adams MD                               Insurance: Medicare/Medical [place]   Medical Diagnosis: S42.295D (ICD-10-CM) - Other closed nondisplaced fracture of proximal end of left humerus with routine healing, subsequent encounter     Rehab Codes: R25 pain , M25.60 stiffness, R53.1 weakness, R60.9 Edema  Onset Date: 22               Next 's appt: 22- Dr. Kye Garcia   Visit# / total visits:   Cancels/No Shows: 0/0    Precautions:  [place]   range of motion and begin some gradual progressive strengthening of the shoulder. she may continue use the arm for light activities of daily living without heavy lifting, pushing, or pulling. Subjective:    Patient reporting that she had increased pain this morning when getting up but is doing better now. Pain:  [] Yes  [x] No   L shoulder 2-3/10, L hand 5/10  Pain altered Tx:  [x] No  [] Yes  Action:  Comments:    Objective:  Exercises:  MODALITIES  Time/reps Weight/ Level Comments  Completed    vasocompression - L shoulder 15' Low, 34 deg  Seated with pillow case    Cold pack to L hand 8'  Due to pain and swelling;  With vaso to L shoulder           INTERVENTION       UBE: F/B  2/2' Level 2      Pulleys: flexion/scaption/ abd 2' ea   limited in ABD x   IR towel stretch x15  Hold 5  x   Wall slides flexion/scaption  X10 ea Hold 5  table slides into shoulder ABD reviewed and added to HEP    PROM -- L shoulder flexion, abuduction, ER at 45*, scaption  : continues to have pain in end range and demos limited ROM with ER and ABD x   Posterior capsule stretch x5 Hold 10s      IR/ER 2x10 ea  Yellow  With towel under arm for all     Finger ladder 10x 5\" hold      [employer] on wall 3 direction 5x  8/24 limited with standing due to back pain  x   Gross grasp with foam block 10x  blue HEP- pt concerned with poor      Shoulder Isometrics 10x 5\" holds  Provided HEP but needs reviewed x                 MAT       AAROM: Flexion ER 10x 5\" hold  Seated     Sidelying abductions  2x10  8/22 pt demos improved tolerance and ROM with sub scap release x   Sidelying ER  2x10  Towel under the arm     Seated rows  2x15 yellow 7/22 added resistance    Seated horizontal abduction x15       Hula hoop #5  1x ea       Seated Flexion/scaption  10x ea dir 1# Performed Semi-reclined today, Occasional assistance from contralateral UE     W ball rolls wall 2x5 ea       Bicep curls pronated, neutral, supinated 15x  Seated 7/8 inc reps HEP   Education     Proper sitting posture throughout exercises            Manual:  20 min       STM L pec major, distal lat, subscap 10 min        traction 5x 10 sec   x   PA & inferior mobs  5 min   x   Sub scap release 5 min   x   Other:  8/25 shoulder Iso added to HEP in all directions. Specific Instructions for next treatment: ROM against gravity, work on IR towel stretch, work on strengthening as much as tolerated, manual work     Assessment: [x] Progressing toward goals. Continued with focus on ROM this session with patient demonstrating limitation due to inc pain this session. Began session with pulley and followed with ball on wall as patient states she got a ball for home and would like to review HEP for exercise. Pt reported pain with ball on wall in low back and reps were decrease to 5x this date. Followed with supine PROM and manual to continue with decreasing tightness to improve ROM and tolerance to mobility. Limited progress noted this session due to high level of pain but Completed session with vaso for pain management at the end of session. [] No change.      [] Other:    [x] Patient would continue to benefit from skilled physical therapy services in order to: improve L shoulder strength, ROM, and better control swelling to allow her to return to using her LUE with ADLs. STG: (to be met in 8 treatments)  Pt will self report worst pain no greater than 3/10 in order to better tolerate ADLs/work activities with minimal dysfunction   - 7/13: Met -- 3/10 noted today    Pt will improve PROM in L shoulder to >160 deg flexion and abduction to improve her ability to reach overhead.   -7/13: progressing -- improves by 38 deg to 118 deg     - 8/8: progressing -- improves to 135 deg flexion & 130 deg abduction (~17 deg flexion improvement & 40 deg abd improvement)   Pt will improve AROM in L shoulder flexion and abduction to >/= 90 deg in order to demonstrate ability in progressing reach in all planes to complete ADLs.   - 7/13: progressing -- reaches 90 deg actively flexion, 73 deg actively abd   - 8/8: MET -- reaches 110 deg for each actively  Pt will report no feelings of stiffness in the L hand with  indicated controlled swelling.    -7/13: MET -- no stiff reported   LTG: (to be met by end of POC) --   Pt will improve Apley reach bilaterally to posterior shoulder, ER to T3 and IR to L4 for efficient dressing at prior level   -8/8: progressing-- cross body reach to top of shoulder, ER to C6, IR to sacrum   Pt will demonstrate improved L UE strength to 4+/5 or greater in order to demonstrate improved stability/strength necessary for unrestricted ADLs.   -8/8: progressing -- still limited in strength of the L shoulder and elbow   Pt will increase score on UEFS to >/= 65/80 in order to demonstrate improved functional tolerances with ADLs with minimal restriction/dysfunction   - 8/8: progressing 61/80 this date   Pt will demonstrate independence with a long term HEP for continued progress/maintenance after completion of PT   - 8/8: meeting -- noting good compliance with HEP   5.  NEW GOAL 8/8: Pt will actively be able to elevate her L arm to >130 deg flexion and abduction to improve ability to lift & reach overhead. ** further goals may be established based on progress**     Pt. Education:  [x] Yes  [] No  [x] Reviewed Prior HEP/Ed  Method of Education: [x] Verbal  [] Demo  [x] Written  Comprehension of Education:  [x] Verbalizes understanding. [] Demonstrates understanding. [x] Needs review. [x] Demonstrates/verbalizes HEP/Ed previously given. Patient Status:       [x] Continue per initial plan of care.     [] Other:     Treatment Charges: Mins Units   []  Modalities- cold pack     [x]  Ther Exercise 40 3   [x]  Manual Therapy 20 1   []  Ther Activities     []  Aquatics     []  Neuromuscular     [x] Vasocompression 10 1   [] Gait Training     [] Dry needling        [] 1 or 2 muscles        [] 3 or more muscles     []  Other     Total Treatment time  70 5     Time In:  4670   Time Out: 6769    Electronically signed by:  José Navarrete PTA

## 2022-08-26 ENCOUNTER — OFFICE VISIT (OUTPATIENT)
Dept: ORTHOPEDIC SURGERY | Age: 67
End: 2022-08-26

## 2022-08-26 VITALS — RESPIRATION RATE: 14 BRPM | HEIGHT: 64 IN | BODY MASS INDEX: 23.05 KG/M2 | WEIGHT: 135 LBS

## 2022-08-26 DIAGNOSIS — M79.642 LEFT HAND PAIN: ICD-10-CM

## 2022-08-26 DIAGNOSIS — S42.295D OTHER CLOSED NONDISPLACED FRACTURE OF PROXIMAL END OF LEFT HUMERUS WITH ROUTINE HEALING, SUBSEQUENT ENCOUNTER: Primary | ICD-10-CM

## 2022-08-26 PROCEDURE — 99024 POSTOP FOLLOW-UP VISIT: CPT | Performed by: ORTHOPAEDIC SURGERY

## 2022-08-29 ENCOUNTER — HOSPITAL ENCOUNTER (OUTPATIENT)
Dept: PHYSICAL THERAPY | Age: 67
Setting detail: THERAPIES SERIES
Discharge: HOME | End: 2022-08-29
Payer: MEDICARE

## 2022-08-29 PROCEDURE — 97110 THERAPEUTIC EXERCISES: CPT

## 2022-08-29 PROCEDURE — 97016 VASOPNEUMATIC DEVICE THERAPY: CPT

## 2022-08-29 NOTE — FLOWSHEET NOTE
[address] [facility]   [address]   Phone: [phone]   Fax: [phone]    Physical Therapy Daily Treatment Note      Date:  2022  Patient Name:  Janette Padilla    :  1955  MRN: [id number]  Physician: Aylin Green MD                               Insurance: Medicare/Medical [place]   Medical Diagnosis: S42.295D (ICD-10-CM) - Other closed nondisplaced fracture of proximal end of left humerus with routine healing, subsequent encounter     Rehab Codes: R25 pain , M25.60 stiffness, R53.1 weakness, R60.9 Edema  Onset Date: 22               Next 's appt: 22- Dr. Bert Wall   Visit# / total visits:   Cancels/No Shows: 0/0    Precautions:  [place]   range of motion and begin some gradual progressive strengthening of the shoulder. she may continue use the arm for light activities of daily living without heavy lifting, pushing, or pulling. Subjective:    Patient notes today that her L shoulder is hurting more due to the weather. She saw Dr. Bert Wall last week who noted that her L shoulder may not get full range back because of the shifted position it healed in. He also explored her L hand swelling with no abnormalities noted. Feel that swelling will go away with time. Over the weekend she went to take a pie out of the oven and her L arm gave out. [place] it was due to strength issue. Pain:  [x] Yes  [] No   L shoulder 5-6/10  Pain altered Tx:  [x] No  [] Yes  Action:  Comments:    Objective:  Exercises:  MODALITIES  Time/reps Weight/ Level Comments  Completed    vasocompression - L shoulder 15' Low, 36 deg   x   Cold pack to L hand 8'  Due to pain and swelling;  With vaso to L shoulder           INTERVENTION       UBE: F/B  2/2' Level 2      Pulleys: flexion/scaption/ abd 2' ea  Scap: 140 deg   Abd: 130 deg  x   IR towel stretch x15  Hold 5 Did with pulley in standing  x   Wall slides flexion/scaption  X10 ea Hold 5  table slides into shoulder ABD reviewed and added to HEP    PROM -- L shoulder flexion, abuduction, ER at 45*, scaption  8'  8/25: continues to have pain in end range and demos limited ROM with ER and ABD    Posterior capsule stretch x5 Hold 10s      IR/ER 2x10 ea  Yellow  With towel under arm for all     Finger ladder 10x 5\" hold  Flexion: 120deg  x   Ball on wall 3 direction 5x  8/24 limited with standing due to back pain     Gross grasp with foam block 10x  blue HEP- pt concerned with poor      Shoulder Isometrics 10x 5\" holds  Provided HEP but needs reviewed                  MAT       AAROM: Flexion ER 10x 5\" hold  Seated     Sidelying abductions  2x10 0 -1#  Light over pressure at end range   Added weight second set  x   Sidelying ER  2x10 1# Towel under the arm   2# was too heavy  x   Seated servers  x10 1# In supination, reach forward -> horz abd -> back  x   Seated rows & extensions 2x10 ea red  x   Seated horizontal abduction x15  red  x   Half wall push ups  X10    x   Standing front raise  2x10 4# DB ( both hands)  Felt as a challenge x   Seated Flexion/scaption  10x ea dir 1# Performed Semi-reclined today, Occasional assistance from contralateral UE     W ball rolls wall 2x5 ea       Bicep curls & hammer curls  10x 4# HEP (2#)   Therapist assist to keep wrist supported  x   Education     Proper sitting posture throughout exercises            Manual:  20 min       STM L pec major, distal lat, subscap 10 min        traction 5x 10 sec      PA & inferior mobs  5 min      Sub scap release 5 min      Other:  8/25 shoulder Iso added to HEP in all directions. Specific Instructions for next treatment: split sessions to work half on range and half on strength; encourage pt to push weight -- each exercises should be a challenge vs what is comfortable     Assessment: [x] Progressing toward goals. Continued to work on ROM and strengthening of the LUE to improve functional use.  Range measures taken during exercises showing similar measures compared to last PN (8/8). She is still very painful and tends to stop early in her range due to discomfort. Educated on trying to breathe and progress range. Added in strengthening of servers and progressed all weight. Had discussion during that pt needs to work on progressing the weight vs just doing the weight that is easy which will no build strength. With biceps curls needed to support the wrist to prevent discomfort that is likley related to some weakness. Added in weight bearing to build this strength. Could also add in more wrist strengthening to improve the ability to lift th heavier weight. Mod cues to ensure postural alignment to prevent compensation still. Ended with vaso due to moderate pain 4-5/10 post session with improvement post vaso. [] No change. [] Other:    [x] Patient would continue to benefit from skilled physical therapy services in order to: improve L shoulder strength, ROM, and better control swelling to allow her to return to using her LUE with ADLs.          STG: (to be met in 8 treatments)  Pt will self report worst pain no greater than 3/10 in order to better tolerate ADLs/work activities with minimal dysfunction   - 7/13: Met -- 3/10 noted today    Pt will improve PROM in L shoulder to >160 deg flexion and abduction to improve her ability to reach overhead.   -7/13: progressing -- improves by 38 deg to 118 deg     - 8/8: progressing -- improves to 135 deg flexion & 130 deg abduction (~17 deg flexion improvement & 40 deg abd improvement)   Pt will improve AROM in L shoulder flexion and abduction to >/= 90 deg in order to demonstrate ability in progressing reach in all planes to complete ADLs.   - 7/13: progressing -- reaches 90 deg actively flexion, 73 deg actively abd   - 8/8: MET -- reaches 110 deg for each actively  Pt will report no feelings of stiffness in the L hand with  indicated controlled swelling.    -7/13: MET -- no stiff

## 2022-08-30 NOTE — PROGRESS NOTES
HPI: Toya Jolley is a 74 y.o. old female who is approximately 3 months out from a  closed left proximal humerus  fracture. She continues to make improvement indicating that physical therapy has been very helpful. She rates her pain as a 4/10 when present. She still reports having some swelling in her hand. Physical Exam:  General Appearance: alert, well appearing, and in no distress  Mental Status: alert, oriented to person, place, and time  Evaluation of the patient's left shoulder and upper extremity demonstrates intact skin without warmth, erythema or ecchymosis. She does have some mild swelling still present in the left hand. She has approximately 95 degrees of active shoulder elevation, 80 degrees of abduction, 45 degrees of external rotation and internal rotation to her buttock. Passively she has 105 degrees of shoulder elevation, 95 degrees of abduction and 50 degrees of external rotation. Imaging Studies: 4 x-ray views of the left shoulder were obtained and reviewed independently on 8/26/2022 demonstrating maintained alignment of a proximal humerus fracture varus without interval displacement. Progressive consolidation across the fracture site noted. 3 x-ray views of the left hand completed on 8/26/2022 were reviewed independently demonstrating mild degeneration at the first [facility] joint by way of some subchondral cystic changes involving the bones on either side of the joint. Otherwise joint spaces appear to be well preserved. No obvious fracture, dislocation or subluxation. Impression and plan: Toya Jolley is a 74 y.o. old female who is approximately 3 months out from a closed left proximal humerus fracture. She appears to be healing appropriately both clinically and radiographically. She was encouraged to keep up with physical therapy working on her motion and now progressive strengthening of her rotator cuff and scapular stabilizers.   She can continue to use this arm for activities as she feels comfortable. I will see her back for reevaluation in 3 months but she may return or call earlier with any questions or concerns.

## 2022-08-31 ENCOUNTER — APPOINTMENT (OUTPATIENT)
Dept: PHYSICAL THERAPY | Age: 67
End: 2022-08-31
Payer: MEDICARE

## 2022-08-31 ENCOUNTER — HOSPITAL ENCOUNTER (OUTPATIENT)
Dept: PHYSICAL THERAPY | Age: 67
Setting detail: THERAPIES SERIES
Discharge: HOME | End: 2022-08-31
Payer: MEDICARE

## 2022-08-31 PROCEDURE — 97016 VASOPNEUMATIC DEVICE THERAPY: CPT

## 2022-08-31 PROCEDURE — 97110 THERAPEUTIC EXERCISES: CPT

## 2022-08-31 NOTE — FLOWSHEET NOTE
[facility] Physical Therapy   [address]   Phone: [phone]   Fax: [phone]    Physical Therapy Daily Treatment Note      Date:  2022  Patient Name:  Janette Padilla    :  1955  MRN: [id number]  Physician: Aylin Green MD                               Insurance: Medicare/Medical [place]   Medical Diagnosis: S42.295D (ICD-10-CM) - Other closed nondisplaced fracture of proximal end of left humerus with routine healing, subsequent encounter     Rehab Codes: R25 pain , M25.60 stiffness, R53.1 weakness, R60.9 Edema  Onset Date: 22               Next 's appt: 22- Dr. Bert Wall   Visit# / total visits:   Cancels/No Shows: 0/0    Precautions:  [place]  Range of motion and begin some gradual progressive strengthening of the shoulder. she may continue use the arm for light activities of daily living without heavy lifting, pushing, or pulling. Subjective:  Patient reports she was able to do two things that she hasnt done since prior to her shoulder injury. She was able to bake cookies and lift the cookie sheets without struggling. The second thing, she was able to type an e-mail with both hands, which she has only used her R UE to type since injury. Patient also requesting a new print out of shoulder isometrics to complete at home. Pain:  [x] Yes  [] No     Location: L shoulder 4/10  Pain altered Tx:  [x] No  [] Yes  Action:  Comments:    Objective:  Exercises:  MODALITIES  Time/reps Weight/ Level Comments  Completed    vasocompression - L shoulder 15' Low, 36 deg   x   Cold pack to L hand 8'  Due to pain and swelling;  With vaso to L shoulder           INTERVENTION       UBE: F/B  2/2' Level 2      Pulleys: flexion/ abd 2' ea  Scap: 140 deg   Abd: 130 deg  x   IR towel stretch x15  Hold 5 Did with pulley in standing, required assistance to complete correctly x   Wall slides flexion/scaption  X10 ea Hold 5     PROM -- L shoulder flexion, abuduction, ER at 45*, scaption  8'  8/25: continues to have pain in end range and demos limited ROM with ER and ABD    Posterior capsule stretch x5 Hold 10s      IR/ER 2x10 ea  Yellow  With towel under arm for all     Finger ladder 10x 5\" hold  Flexion: 120deg  x   Ball on wall 3 direction 5x  8/24 limited with standing due to back pain     Gross grasp with foam block 10x  blue HEP- pt concerned with poor      Shoulder Isometrics 10x 5\" holds  Provided HEP but needs reviewed                  MAT       AAROM: Flexion ER 10x 5\" hold  Seated     Sidelying abductions  2x10 0 -1#  Completed standing 8/31 x   Sidelying ER  2x10 1# Completed standing 8/31 x   Seated servers  x10 1# In supination, reach forward -> horz abd -> back  x   Seated rows & extensions 2x10 ea red  x   Seated horizontal abduction x15  red Attempted 8/31 but pt stated it was too painful    Half wall push ups  X10       Standing front raise  2x10 4# DB ( both hands)  Felt as a challenge    Seated Flexion/scaption  10x ea dir 1# Performed Semi-reclined today, Occasional assistance from contralateral UE  x   W ball rolls wall 2x5 ea    x   Bicep curls & hammer curls  10x 4# HEP (2#)   Therapist assist to keep wrist supported  x   Education     Proper sitting posture throughout exercises            Manual:  20 min       STM L pec major, distal lat, subscap 10 min        traction 5x 10 sec      PA & inferior mobs  5 min      Sub scap release 5 min      Other:  8/25 shoulder Iso added to HEP in all directions. Specific Instructions for next treatment: split sessions to work half on range and half on strength; encourage pt to push weight -- each exercises should be a challenge vs what is comfortable     Assessment: [x] Progressing toward goals. 8/31: Focused first half of session on improving ROM and joint mobility prior to strengthening exercises for the second half of the session.  Patient required many cues to stay on task and allow her L UE to do more movement with AAROM at pulleys, rather than letting her R UE do all the work. Patient would get distracted with talking and require tactile and verbal cues to slow down and complete exercises to full capability. With bicep curls, patient stated \"I can't do this, its too much\" but was able to complete 10 reps with encouragement. All exercises completed in seated and standing position today to work on posture as well. Tactile and verbal cues to prevent compensation were frequently given throughout session. Red theraband given to take home to complete with HEP. Also re-printed shoulder isometrics as pt reported not remembering how to do them. Ended session with vaso for pain management. [] No change. [] Other:    [x] Patient would continue to benefit from skilled physical therapy services in order to: improve L shoulder strength, ROM, and better control swelling to allow her to return to using her LUE with ADLs.          STG: (to be met in 8 treatments)  Pt will self report worst pain no greater than 3/10 in order to better tolerate ADLs/work activities with minimal dysfunction   - 7/13: Met -- 3/10 noted today    Pt will improve PROM in L shoulder to >160 deg flexion and abduction to improve her ability to reach overhead.   -7/13: progressing -- improves by 38 deg to 118 deg     - 8/8: progressing -- improves to 135 deg flexion & 130 deg abduction (~17 deg flexion improvement & 40 deg abd improvement)   Pt will improve AROM in L shoulder flexion and abduction to >/= 90 deg in order to demonstrate ability in progressing reach in all planes to complete ADLs.   - 7/13: progressing -- reaches 90 deg actively flexion, 73 deg actively abd   - 8/8: MET -- reaches 110 deg for each actively  Pt will report no feelings of stiffness in the L hand with  indicated controlled swelling.    -7/13: MET -- no stiff reported   LTG: (to be met by end of POC) --   Pt will improve Apley reach bilaterally to posterior shoulder, ER to T3 and IR to L4 for efficient dressing at prior level   -8/8: progressing-- cross body reach to top of shoulder, ER to C6, IR to sacrum   Pt will demonstrate improved L UE strength to 4+/5 or greater in order to demonstrate improved stability/strength necessary for unrestricted ADLs.   -8/8: progressing -- still limited in strength of the L shoulder and elbow   Pt will increase score on UEFS to >/= 65/80 in order to demonstrate improved functional tolerances with ADLs with minimal restriction/dysfunction   - 8/8: progressing 61/80 this date   Pt will demonstrate independence with a long term HEP for continued progress/maintenance after completion of PT   - 8/8: meeting -- noting good compliance with HEP   5. NEW GOAL 8/8: Pt will actively be able to elevate her L arm to >130 deg flexion and abduction to improve ability to lift & reach overhead. ** further goals may be established based on progress**     Pt. Education:  [x] Yes  [] No  [x] Reviewed Prior HEP/Ed  Method of Education: [x] Verbal  [] Demo  [x] Written  Comprehension of Education:  [x] Verbalizes understanding. [] Demonstrates understanding. [x] Needs review. [x] Demonstrates/verbalizes HEP/Ed previously given. Patient Status:       [x] Continue per initial plan of care.     [] Other:     Treatment Charges: Mins Units   []  Modalities- cold pack     [x]  Ther Exercise 44 3   []  Manual Therapy     []  Ther Activities     []  Aquatics     []  Neuromuscular     [x] Vasocompression 15 1   [] Gait Training     [] Dry needling        [] 1 or 2 muscles        [] 3 or more muscles     []  Other     Total Treatment time 59 4     Time In: 4:00   Time Out: 4:59    Electronically signed by:  Marcelino Parra PTA

## 2022-09-06 ENCOUNTER — HOSPITAL ENCOUNTER (OUTPATIENT)
Dept: PHYSICAL THERAPY | Age: 67
Setting detail: THERAPIES SERIES
Discharge: HOME | End: 2022-09-06
Payer: MEDICARE

## 2022-09-06 PROCEDURE — 97016 VASOPNEUMATIC DEVICE THERAPY: CPT

## 2022-09-06 PROCEDURE — 97110 THERAPEUTIC EXERCISES: CPT

## 2022-09-06 NOTE — FLOWSHEET NOTE
With towel under arm for all     Finger ladder 10x 5\" hold  Flexion: 120deg  x   Ball on wall 3 direction 5x  8/24 limited with standing due to back pain     Gross grasp with foam block 10x  blue HEP- pt concerned with poor      Shoulder Isometrics 10x 5\" holds  Provided HEP but needs reviewed                  MAT       AAROM: Flexion ER 10x 5\" hold  Seated     Sidelying abductions  2x10 0 -1#  Completed standing 8/31 x   Sidelying ER  2x10 1# Completed standing 8/31 x   Seated servers  x10 1# In supination, reach forward -> horz abd -> back  x   Seated rows & extensions 2x10 ea red 9/6 cue for slow controlled movement x   Seated horizontal abduction x15  red Attempted 8/31 but pt stated it was too painful    Half wall push ups  X10       Standing front raise  2x10 4# DB ( both hands)  Felt as a challenge    Seated Flexion/scaption  10x ea dir 1# Performed semi- reclines today, Cues for slow controlled movement and for inc scap activation x   W ball rolls wall 2x5 ea    x   Bicep curls & hammer curls  10x 2# HEP (2#)   9/6 Pt reporting she supports forearm due to inc in pain in wrist.  Decreasing weight improved patient's tolerance.  x   Supination stretch 10x 5\" hold  HEP; educated patient on stretch to improve wrist mobility x   Education     Proper sitting posture throughout exercises            Manual:  20 min       STM L pec major, distal lat, subscap 10 min        traction 5x 10 sec      PA & inferior mobs  5 min      Sub scap release 5 min      Other:  Access Code: [email or web address]  URL: [email or web address]  Date: 09/06/2022  Prepared by: Anh Go    Exercises  Seated Wrist Supination Pronation with Can - 3-5 x daily - 7 x weekly - 3 sets - 10 reps - 3-5 sec hold  Seated Wrist Supination Stretch - 3-5 x daily - 7 x weekly - 3 sets - 10 reps - 10-15 sec hold    8/25 shoulder Iso added to HEP in all directions.      Specific Instructions for next treatment: split sessions to work half on range and half on strength; encourage pt to push weight -- each exercises should be a challenge vs what is comfortable     Assessment: [x] Progressing toward goals. Began session with pulleys and continued with ROM stretching in preparation for strengthening this session. Frequent cues provided to keep patient on task, to correct patient's technique when performing exercises and to correct patient's posture when sitting or standing as patient tends to compensate with her posture. Placed patient in front of a mirror for visual feedback when practicing seated servers to increase awareness to posture. Patient demonstrated better technique without resistance and with visual cueing. Added supination stretching to improve tolerance of wrist mobility with strengthening exercises. Decreased weight for bicep/hammer curls due to inc pain in wrist.  Overall, patient continues to demonstrate improved tolerance to exercises with improved AROM in the supine position. Educated patient on increasing awareness to stretches and technique when performing exercises as she tends to move too fast.  Completed session with vaso for pain management. [] No change. [] Other:    [x] Patient would continue to benefit from skilled physical therapy services in order to: improve L shoulder strength, ROM, and better control swelling to allow her to return to using her LUE with ADLs.          STG: (to be met in 8 treatments)  Pt will self report worst pain no greater than 3/10 in order to better tolerate ADLs/work activities with minimal dysfunction   - 7/13: Met -- 3/10 noted today    Pt will improve PROM in L shoulder to >160 deg flexion and abduction to improve her ability to reach overhead.   -7/13: progressing -- improves by 38 deg to 118 deg     - 8/8: progressing -- improves to 135 deg flexion & 130 deg abduction (~17 deg flexion improvement & 40 deg abd improvement)   Pt will improve AROM in L shoulder flexion and abduction to >/= 90 deg in order to demonstrate ability in progressing reach in all planes to complete ADLs.   - 7/13: progressing -- reaches 90 deg actively flexion, 73 deg actively abd   - 8/8: MET -- reaches 110 deg for each actively  Pt will report no feelings of stiffness in the L hand with  indicated controlled swelling.    -7/13: MET -- no stiff reported   LTG: (to be met by end of POC) --   Pt will improve Apley reach bilaterally to posterior shoulder, ER to T3 and IR to L4 for efficient dressing at prior level   -8/8: progressing-- cross body reach to top of shoulder, ER to C6, IR to sacrum   Pt will demonstrate improved L UE strength to 4+/5 or greater in order to demonstrate improved stability/strength necessary for unrestricted ADLs.   -8/8: progressing -- still limited in strength of the L shoulder and elbow   Pt will increase score on UEFS to >/= 65/80 in order to demonstrate improved functional tolerances with ADLs with minimal restriction/dysfunction   - 8/8: progressing 61/80 this date   Pt will demonstrate independence with a long term HEP for continued progress/maintenance after completion of PT   - 8/8: meeting -- noting good compliance with HEP   5. NEW GOAL 8/8: Pt will actively be able to elevate her L arm to >130 deg flexion and abduction to improve ability to lift & reach overhead. ** further goals may be established based on progress**     Pt. Education:  [x] Yes  [] No  [x] Reviewed Prior HEP/Ed  Method of Education: [x] Verbal  [] Demo  [x] Written  Comprehension of Education:  [x] Verbalizes understanding. [] Demonstrates understanding. [x] Needs review. [x] Demonstrates/verbalizes HEP/Ed previously given. Patient Status:       [x] Continue per initial plan of care.     [] Other:     Treatment Charges: Mins Units   []  Modalities- cold pack     [x]  Ther Exercise 60 4   []  Manual Therapy     []  Ther Activities     []  Aquatics     []  Neuromuscular     [x] Vasocompression 10 1   [] Gait Training     [] Dry needling        [] 1 or 2 muscles        [] 3 or more muscles     []  Other     Total Treatment time 70 4     Time In: 1000  Time Out: 1110    Electronically signed by:  Abhijit Tuttle PTA

## 2022-09-07 ENCOUNTER — HOSPITAL ENCOUNTER (OUTPATIENT)
Dept: PHYSICAL THERAPY | Age: 67
Setting detail: THERAPIES SERIES
Discharge: HOME | End: 2022-09-07
Payer: MEDICARE

## 2022-09-07 PROCEDURE — 97016 VASOPNEUMATIC DEVICE THERAPY: CPT

## 2022-09-07 PROCEDURE — 97110 THERAPEUTIC EXERCISES: CPT

## 2022-09-07 NOTE — FLOWSHEET NOTE
[address] [facility] Out[facility] Physical Therapy   [address]   Phone: [phone]   Fax: [phone]    Physical Therapy Daily Treatment Note      Date:  2022  Patient Name:  Tati Cooper    :  1955  MRN: [id number]  Physician: Christina Jimenez MD                               Insurance: Medicare/Medical [place] -- [place] on [place]   Medical Diagnosis: S42.295D (ICD-10-CM) - Other closed nondisplaced fracture of proximal end of left humerus with routine healing, subsequent encounter     Rehab Codes: R25 pain , M25.60 stiffness, R53.1 weakness, R60.9 Edema  Onset Date: 22               Next 's appt: 22- Dr. Jared Casas   Visit# / total visits:   Cancels/No Shows: 0/0    Precautions:  "[employer]  Range of motion and begin some gradual progressive strengthening of the shoulder. she may continue use the arm for light activities of daily living without heavy lifting, pushing, or pulling. Subjective:  Patient reports today with no new issues. Reported some soreness in L shoulder with no pain. Pain:  [] Yes  [x] No     Location: L shoulder denies/10  Pain altered Tx:  [x] No  [] Yes  Action:  Comments:    Objective:  Exercises:  MODALITIES  Time/reps Weight/ Level Comments  Completed    vasocompression - L shoulder 10' Low, 34 deg   x   Cold pack to L hand 8'  Due to pain and swelling;  With vaso to L shoulder           INTERVENTION       UBE: F/B  2/2' Level 2      Pulleys: flexion/ abd 2' ea   x   IR towel stretch x15  Hold 5 With towel x   Wall slides flexion/scaption  X10 ea Hold 5     PROM -- L shoulder flexion, abuduction, ER at 45*, scaption  8'  : continues to have pain in end range and demos limited ROM with ER and ABD    Posterior capsule stretch x5 Hold 10s      IR/ER 2x10 ea  Yellow  With towel under arm for all     Finger ladder 10x 5\" hold  Flexion: 120deg  x   Ball on wall 3 direction 5x  8/24 limited with standing due to back pain     Gross grasp with foam block 10x  blue HEP- pt concerned with poor      Shoulder Isometrics 10x 5\" holds  Provided HEP but needs reviewed    Overhead liftoffs from max flexion 10x2  Manual facilitation to maximize motion x          MAT       AAROM: Flexion ER 10x 5\" hold  Seated     Sidelying abductions  2x10 1#   x   Sidelying ER  2x10 1# Completed standing 8/31 x   Seated servers  x10 1# In supination, reach forward -> horz abd -> back  x   Seated rows & extensions 2x10 ea red 9/6 cue for slow controlled movement    Seated horizontal abduction x15  red Attempted 8/31 but pt stated it was too painful    Half wall push ups  X10       Standing front raise  2x10 4# DB ( both hands)  Felt as a challenge    Seated Flexion/scaption  10x ea dir 1#  x   W ball rolls wall 2x5 ea       Bicep curls & hammer curls  10x ea 2#  x   Supination stretch 10x 5\" hold  HEP; educated patient on stretch to improve wrist mobility    Education     Proper sitting posture throughout exercises            Manual:  20 min       STM L pec major, distal lat, subscap 10 min        traction 5x 10 sec      PA & inferior mobs  5 min      Sub scap release 5 min      Other:  Access Code: [email or web address]  URL: [email or web address]  Date: 09/06/2022  Prepared by: Rock Barnett    Exercises  Seated Wrist Supination Pronation with Can - 3-5 x daily - 7 x weekly - 3 sets - 10 reps - 3-5 sec hold  Seated Wrist Supination Stretch - 3-5 x daily - 7 x weekly - 3 sets - 10 reps - 10-15 sec hold    8/25 shoulder Iso added to HEP in all directions. Specific Instructions for next treatment: split sessions to work half on range and half on strength; encourage pt to push weight -- each exercises should be a challenge vs what is comfortable     Assessment: [x] Progressing toward goals. Reviewed HEP exercises added last visit secondary to pt questions on technique. Patient verbalized better understanding of stretches following.  Continued to work on improving L shoulder strength and ROM with charted exercises. Added overhead liftoffs to further promote shoulder flexion. Vaso for pain management following exercises today. [] No change. [] Other:    [x] Patient would continue to benefit from skilled physical therapy services in order to: improve L shoulder strength, ROM, and better control swelling to allow her to return to using her LUE with ADLs.          STG: (to be met in 8 treatments)  Pt will self report worst pain no greater than 3/10 in order to better tolerate ADLs/work activities with minimal dysfunction   - 7/13: Met -- 3/10 noted today    Pt will improve PROM in L shoulder to >160 deg flexion and abduction to improve her ability to reach overhead.   -7/13: progressing -- improves by 38 deg to 118 deg     - 8/8: progressing -- improves to 135 deg flexion & 130 deg abduction (~17 deg flexion improvement & 40 deg abd improvement)   Pt will improve AROM in L shoulder flexion and abduction to >/= 90 deg in order to demonstrate ability in progressing reach in all planes to complete ADLs.   - 7/13: progressing -- reaches 90 deg actively flexion, 73 deg actively abd   - 8/8: MET -- reaches 110 deg for each actively  Pt will report no feelings of stiffness in the L hand with  indicated controlled swelling.    -7/13: MET -- no stiff reported   LTG: (to be met by end of POC) --   Pt will improve Apley reach bilaterally to posterior shoulder, ER to T3 and IR to L4 for efficient dressing at prior level   -8/8: progressing-- cross body reach to top of shoulder, ER to C6, IR to sacrum   Pt will demonstrate improved L UE strength to 4+/5 or greater in order to demonstrate improved stability/strength necessary for unrestricted ADLs.   -8/8: progressing -- still limited in strength of the L shoulder and elbow   Pt will increase score on UEFS to >/= 65/80 in order to demonstrate improved functional tolerances with ADLs with minimal restriction/dysfunction   - 8/8: progressing 61/80 this date   Pt will demonstrate independence with a long term HEP for continued progress/maintenance after completion of PT   - 8/8: meeting -- noting good compliance with HEP   5. NEW GOAL 8/8: Pt will actively be able to elevate her L arm to >130 deg flexion and abduction to improve ability to lift & reach overhead. ** further goals may be established based on progress**     Pt. Education:  [x] Yes  [] No  [x] Reviewed Prior HEP/Ed  Method of Education: [x] Verbal  [] Demo  [x] Written  Comprehension of Education:  [x] Verbalizes understanding. [] Demonstrates understanding. [x] Needs review. [x] Demonstrates/verbalizes HEP/Ed previously given. Patient Status:       [x] Continue per initial plan of care.     [] Other:     Treatment Charges: Mins Units   []  Modalities- cold pack     [x]  Ther Exercise 45 3   []  Manual Therapy     []  Ther Activities     []  Aquatics     []  Neuromuscular     [x] Vasocompression 15 1   [] Gait Training     [] Dry needling        [] 1 or 2 muscles        [] 3 or more muscles     []  Other     Total Treatment time 60 4     Time In: 8:47 am  Time Out: 9:47 am     Electronically signed by:  Amy Rodriguez PTA

## 2022-09-08 ENCOUNTER — APPOINTMENT (OUTPATIENT)
Dept: PHYSICAL THERAPY | Age: 67
End: 2022-09-08
Payer: MEDICARE

## 2022-09-13 ENCOUNTER — APPOINTMENT (OUTPATIENT)
Dept: PHYSICAL THERAPY | Age: 67
End: 2022-09-13
Payer: MEDICARE

## 2022-09-13 ENCOUNTER — HOSPITAL ENCOUNTER (OUTPATIENT)
Dept: PHYSICAL THERAPY | Age: 67
Setting detail: THERAPIES SERIES
Discharge: HOME | End: 2022-09-13
Payer: MEDICARE

## 2022-09-13 PROCEDURE — 97110 THERAPEUTIC EXERCISES: CPT

## 2022-09-13 NOTE — FLOWSHEET NOTE
[facility]   [address]   Phone: [phone]   Fax: [phone]    Physical Therapy Daily Treatment Note/Progress Note       Date:  2022  Patient Name:  Carla Castellanos    :  1955  MRN: [id number]  Physician: Kiley Banks MD                               Insurance: Medicare/Medical [place]   Medical Diagnosis: S42.295D (ICD-10-CM) - Other closed nondisplaced fracture of proximal end of left humerus with routine healing, subsequent encounter     Rehab Codes: R25 pain , M25.60 stiffness, R53.1 weakness, R60.9 Edema  Onset Date: 22               Next 's appt: 22- Dr. Scarlet Alatorre   Visit# / total visits:   Cancels/No Shows: 0/0  Date of initial visit: 22        Date of PN: 22 (visit 8);22 (visit 16); 22 (visit 26)  Formal progress note reporting period:  22 - 22     Precautions:  [place]      Subjective:  Patient reporting she was able to do her hair this morning with a little more ease, patient demos improve IR with less pain and was able to improve shoulder flexion in supine. Pt reports she would like to cont with therapy to improve driving, doing her hair and IR ROM behind her back and over her head. Patient states she did not perform her exercises over the weekend as they were out of town. Subjective given to Primary PT Sloan Harrell PT ): Pt notes she is getting better when she does her HEP. Still feeling amounts of weakness in the arm affecting her ability to lift, reach, and carry things.  She still is wanting to come to therapy to improve upon her strength       Pain:  [] Yes  [x] No     Location: L shoulder denies/10  Pain altered Tx:  [x] No  [] Yes  Action:  Comments:    Objective:  Tests & measures:      Range of Motion  Left 22 Range of Motion  Left 22 Range of motion   Left 22 Range of Motion  Left 2022 Strength   Left 22 Strength  Left 22 Shoulder Flexion P: 80 deg  A: 95  P: 118 A: 110  P: 135   (Sitting) A:113  P 137 3+ 4   Shoulder Abduction 40 deg A: 73  P: 90  A: 110  P:130   (Sitting) A: 97  P: 118 3 3   Shoulder Extension 10 deg  A: 40  P: 50 A: 55 A 55 4+ 4+   Shoulder ER  Arm at side 15 (+p)  A: 44   P: 50 A; 60   Apley: (C5)   3+ 3+   Shoulder IR  Arm at side 40 (+p)  45  (apley: lateral glute)  Apley: Midline sacrum  4- 4+    cross body reachin     Top of R shoulder       Elbow Flexion 140 145    4 4+   Elbow Extension 0 0    4+ 4   9/13: UEFI SCORE: 54/80  (previously 61/80)    Exercises:  MODALITIES  Time/reps Weight/ Level Comments  Completed    INTERVENTION       UBE: F/B  2/2' Level 2      Pulleys: flexion/ abd 2' ea   x   IR towel stretch x15  Hold 5 With towel    Wall slides flexion/scaption  X10 ea Hold 5     PROM -- L shoulder flexion, abuduction, ER at 45*, scaption  8'  9/14- performed prior to measurments x   Posterior capsule stretch x5 Hold 10s      IR/ER 2x10 ea  Yellow  With towel under arm for all     Finger ladder 10x 5\" hold  Flexion: 120deg     Ball on wall 3 direction 5x  8/24 limited with standing due to back pain     Gross grasp with foam block 10x  blue HEP- pt concerned with poor      Shoulder Isometrics 10x 5\" holds  Provided HEP but needs reviewed    Overhead liftoffs from max flexion 10x2  Manual facilitation to maximize motion    Tricep press 10x2 3\" red  x   Rows/extension 10x2 red  x                 MAT       AAROM: Flexion ER 10x 5\" hold  Seated     Sidelying abductions  2x10 1#   x   Sidelying ER  2x10 1# Completed standing 8/31    Seated servers  x10 1# In supination, reach forward -> horz abd -> back     Seated horizontal abduction x15  red Attempted 8/31 but pt stated it was too painful    Half wall push ups  X10       Standing front raise  2x10 4# DB ( both hands)  Felt as a challenge    Seated Flexion/scaption  10x ea dir 1#     W ball rolls wall 2x5 ea       Bicep curls & hammer curls  10x ea 2#     Supination stretch 10x 5\" hold  HEP; educated patient on stretch to improve wrist mobility    Education     Proper sitting posture throughout exercises            Manual:  20 min       STM L pec major, distal lat, subscap 10 min        traction 5x 10 sec      PA & inferior mobs  5 min      Sub scap release 5 min      Other:      Access Code: [email or web address]  URL: [email or web address]  Date: 09/06/2022  Prepared by: Halley Smith    Exercises  Seated Wrist Supination Pronation with Can - 3-5 x daily - 7 x weekly - 3 sets - 10 reps - 3-5 sec hold  Seated Wrist Supination Stretch - 3-5 x daily - 7 x weekly - 3 sets - 10 reps - 10-15 sec hold    8/25 shoulder Iso added to HEP in all directions. Specific Instructions for next treatment: STRENGTHENING ONLY, no vaso    Assessment: [x] Progressing toward goals. Began session with pulleys and followed with PROM before recording shoulder ROM measurements. Discussed with primary PT to update pt's POC. See below for primary PT's comments. Added standing TB rows, extensions and tricep press to continue with strengthening. Re-assessment by Primary PT Burciaga MARILYN Ravi): Pt was last assessed 8/8/22. Since that assessment have been adding in more strength to the POC. Pt still having occasional pain but expect that to relieve with greater healing time. Her ROM measures this date are similar to last assessment but that was to be expected as also noted by ortho. ROM could also be affected as pt just returned from traveling and was not as compliant with her HEP. She does show improving strength in MMT grades with main weakness in L shoulder flexion, L shoulder abduction, L shoulder ER, and L triceps. These weaknesses are making it difficult for pt to lift, reach overhead, and carry things around her home. Pt feels she is now doing more in terms of ADLs but still having limited endurance with overhead work.  Due to more awareness of deficits, this is likely why decrease in UEFS scores. Feel at this time it is appropriate to extend POC to improve upon strength to maximize functional use of the LUE with ADLs. Will extend for x10 additional visits focusing mostly on strength. [] No change. [] Other:    [x] Patient would continue to benefit from skilled physical therapy services in order to: improve L shoulder strength, ROM, and better control swelling to allow her to return to using her LUE with ADLs.        GOALS -   STG: (to be met in 8 treatments)  Pt will self report worst pain no greater than 3/10 in order to better tolerate ADLs/work activities with minimal dysfunction   - 7/13: Met -- 3/10 noted today     -9/13: MET no pain noted   Pt will improve PROM in L shoulder to >160 deg flexion and abduction to improve her ability to reach overhead.   -7/13: progressing -- improves by 38 deg to 118 deg     - 8/8: progressing -- improves to 135 deg flexion & 130 deg abduction (~17 deg flexion improvement & 40 deg abd improvement)    -9/13: plateau in progress; similar to last measures ,slight regression in ABD likely related to dec compliance with HEP   Pt will improve AROM in L shoulder flexion and abduction to >/= 90 deg in order to demonstrate ability in progressing reach in all planes to complete ADLs.   - 7/13: progressing -- reaches 90 deg actively flexion, 73 deg actively abd   - 8/8: MET -- reaches 110 deg for each actively  -9/13: continues to meet   Pt will report no feelings of stiffness in the L hand with  indicated controlled swelling.    -7/13: MET -- no stiff reported   LTG: (to be met by end of POC) --   Pt will improve Apley reach bilaterally to posterior shoulder, ER to T3 and IR to L4 for efficient dressing at prior level   -8/8: progressing-- cross body reach to top of shoulder, ER to C6, IR to sacrum    -9/13: limited in progression likely due to how fracture is healing   Pt will demonstrate improved L UE strength to 4+/5 or greater in order to demonstrate improved stability/strength necessary for unrestricted ADLs.   -8/8: progressing -- still limited in strength of the L shoulder and elbow    -9/13: progressing -- still weak in L shoulder flexion, L shoulder abduction, L shoulder ER, and L triceps  Pt will increase score on UEFS to >/= 65/80 in order to demonstrate improved functional tolerances with ADLs with minimal restriction/dysfunction   - 8/8: progressing 61/80 this date    -9/13: regression in score to 54/80 likely due to more awareness of deficits   Pt will demonstrate independence with a long term HEP for continued progress/maintenance after completion of PT   - 8/8: meeting -- noting good compliance with HEP    - 9/13: decreased compliance as of late but pt is verbalizing plans to be more consistent. 5. NEW GOAL 8/8: Pt will actively be able to elevate her L arm to >130 deg flexion and abduction to improve ability to lift & reach overhead.     -9/13: progressing -- active flexion 113, active abd 97       Pt. Education:  [x] Yes  [] No  [x] Reviewed Prior HEP/Ed  Method of Education: [x] Verbal  [] Demo  [x] Written  Comprehension of Education:  [x] Verbalizes understanding. [] Demonstrates understanding. [x] Needs review. [x] Demonstrates/verbalizes HEP/Ed previously given.        PLAN:   - adjustment of POC       Treatment Plan: -- no longer vaso as pt can ice at home   [x] Therapeutic Exercise   98493  [] Iontophoresis: 4 mg/mL Dexamethasone Sodium Phosphate  mAmin  41781   [x] Therapeutic Activity  69152 [] Vasopneumatic cold with compression  82775    [] Gait Training   11322 [] Ultrasound   30528   [x] Neuromuscular Re-education  30075 [] Electrical Stimulation Unattended  58389   [x] Manual Therapy  92723 [] Electrical Stimulation Attended  29793   [x] Instruction in HEP  [] Lumbar/Cervical Traction  73930   [] Aquatic Therapy   26119 [] Cold/hotpack    [] Massage   86649      [] Dry Needling, 1 or 2 muscles  20144   [] Biofeedback, first 15 minutes   33238  [] Biofeedback, additional 15 minutes   09713 [] Dry Needling, 3 or more muscles  04899      Patient Status:     [] Continue per initial plan of care. [x] Additional visits necessary. --x10 visits to reach total of 36 focusing mainly on strength     [] Other:     Requested Frequency/Duration: 2 times per week for total  of 36 treatments.                 Treatment Charges: Mins Units   []  Modalities- cold pack     [x]  Ther Exercise 50 3   []  Manual Therapy     []  Ther Activities     []  Aquatics     []  Neuromuscular     [] Vasocompression     [] Gait Training     [] Dry needling        [] 1 or 2 muscles        [] 3 or more muscles     []  Other     Total Treatment time 50 3   **5 minutes spent with primary PT in discussion about POC**     Time In: 1600  Time Out: 2800     Electronically signed by:  Rachel Burr PTA      Addended & cosigned: Meli Mccray, PT, DPT   #[id number]

## 2022-09-21 ENCOUNTER — HOSPITAL ENCOUNTER (OUTPATIENT)
Dept: PHYSICAL THERAPY | Age: 67
Setting detail: THERAPIES SERIES
Discharge: HOME | End: 2022-09-21
Payer: MEDICARE

## 2022-09-21 PROCEDURE — 97110 THERAPEUTIC EXERCISES: CPT

## 2022-09-21 NOTE — FLOWSHEET NOTE
[facility]   [address]   Phone: [phone]   Fax: [phone]    Physical Therapy Daily Treatment Note      Date:  2022  Patient Name:  Kenya Florian    :  1955  MRN: [id number]  Physician: Angel Camacho MD                               Insurance: Medicare/Medical [place] -- [place]   Medical Diagnosis: S42.295D (ICD-10-CM) - Other closed nondisplaced fracture of proximal end of left humerus with routine healing, subsequent encounter     Rehab Codes: R25 pain , M25.60 stiffness, R53.1 weakness, R60.9 Edema  Onset Date: 22               Next 's appt: 22- Dr. Yary Castaneda   Visit# / total visits:   Cancels/No Shows: 0/0    Precautions:  [place]    Subjective:  Patient demonstrating improved tolerance to mobility.       Pain:  [] Yes  [x] No     Location: L shoulder denies/10  Pain altered Tx:  [x] No  [] Yes  Action:  Comments:    Objective:  Exercises:  MODALITIES  Time/reps Weight/ Level Comments  Completed    INTERVENTION       UBE: F/B  2/2' Level 2   x   Pulleys: flexion/ abd 2' ea      IR towel stretch x15  Hold 5 With towel    Wall slides flexion/scaption  X10 ea Hold 5     PROM -- L shoulder flexion, abuduction, ER at 45*, scaption  8'  14- performed prior to measurments    Posterior capsule stretch x5 Hold 10s      IR/ER 2x10 ea  Yellow  With towel under arm for all     Finger ladder 10x 5\" hold  Flexion: 120deg     Ball on wall 3 direction 5x  8/24 limited with standing due to back pain     Gross grasp with foam block 10x  blue HEP- pt concerned with poor      Shoulder Isometrics 10x 5\" holds  Provided HEP but needs reviewed    Overhead liftoffs from max flexion 10x2  Manual facilitation to maximize motion x   Tricep press 10x2 3\" red  x   Rows/extension 10x2 red  x   Shoulder flexion, scaption, ABD 10x2 2# AW  patient fatiguing quickly and weight was removed x   Wall push ups 10x2   x Manual:  20 min       STM L pec major, distal lat, subscap 10 min        traction 5x 10 sec      PA & inferior mobs  5 min      Sub scap release 5 min      Other:      Access Code: [email or web address]  URL: [email or web address]  Date: 09/06/2022  Prepared by: Radha Olivarez    Exercises  Seated Wrist Supination Pronation with Can - 3-5 x daily - 7 x weekly - 3 sets - 10 reps - 3-5 sec hold  Seated Wrist Supination Stretch - 3-5 x daily - 7 x weekly - 3 sets - 10 reps - 10-15 sec hold    8/25 shoulder Iso added to HEP in all directions. Specific Instructions for next treatment: STRENGTHENING ONLY, no vaso    Assessment: [x] Progressing toward goals. Patient is able to demonstrate improved tolerance to ROM  with exercises but fatigues quickly with added resistance. Frequent cueing needed to correct posture when sitting and standing. Patient continues to demonstrate trunk rotation with UT compensation when elevating L UE. Tactile cues needed to maintain neutral position and keep shoulders in lowered position. No increase in pain noted at the end of session. Patient needed frequent cueing to stay on task as she had just returned from vacation. [] No change. [] Other:    [x] Patient would continue to benefit from skilled physical therapy services in order to: improve L shoulder strength, ROM, and better control swelling to allow her to return to using her LUE with ADLs.        GOALS -   STG: (to be met in 8 treatments)  Pt will self report worst pain no greater than 3/10 in order to better tolerate ADLs/work activities with minimal dysfunction   - 7/13: Met -- 3/10 noted today     -9/13: MET no pain noted   Pt will improve PROM in L shoulder to >160 deg flexion and abduction to improve her ability to reach overhead.   -7/13: progressing -- improves by 38 deg to 118 deg     - 8/8: progressing -- improves to 135 deg flexion & 130 deg abduction (~17 deg flexion improvement & 40 deg abd improvement) -9/13: plateau in progress; similar to last measures ,slight regression in ABD likely related to dec compliance with HEP   Pt will improve AROM in L shoulder flexion and abduction to >/= 90 deg in order to demonstrate ability in progressing reach in all planes to complete ADLs.   - 7/13: progressing -- reaches 90 deg actively flexion, 73 deg actively abd   - 8/8: MET -- reaches 110 deg for each actively  -9/13: continues to meet   Pt will report no feelings of stiffness in the L hand with  indicated controlled swelling.    -7/13: MET -- no stiff reported   LTG: (to be met by end of POC) --   Pt will improve Apley reach bilaterally to posterior shoulder, ER to T3 and IR to L4 for efficient dressing at prior level   -8/8: progressing-- cross body reach to top of shoulder, ER to C6, IR to sacrum    -9/13: limited in progression likely due to how fracture is healing   Pt will demonstrate improved L UE strength to 4+/5 or greater in order to demonstrate improved stability/strength necessary for unrestricted ADLs.   -8/8: progressing -- still limited in strength of the L shoulder and elbow    -9/13: progressing -- still weak in L shoulder flexion, L shoulder abduction, L shoulder ER, and L triceps  Pt will increase score on UEFS to >/= 65/80 in order to demonstrate improved functional tolerances with ADLs with minimal restriction/dysfunction   - 8/8: progressing 61/80 this date    -9/13: regression in score to 54/80 likely due to more awareness of deficits   Pt will demonstrate independence with a long term HEP for continued progress/maintenance after completion of PT   - 8/8: meeting -- noting good compliance with HEP    - 9/13: decreased compliance as of late but pt is verbalizing plans to be more consistent.    5. NEW GOAL 8/8: Pt will actively be able to elevate her L arm to >130 deg flexion and abduction to improve ability to lift & reach overhead.     -9/13: progressing -- active flexion 113, active abd 97 Pt. Education:  [x] Yes  [] No  [x] Reviewed Prior HEP/Ed  Method of Education: [x] Verbal  [] Demo  [x] Written  Comprehension of Education:  [x] Verbalizes understanding. [] Demonstrates understanding. [x] Needs review. [x] Demonstrates/verbalizes HEP/Ed previously given. PLAN:   - 2 times per week for total  of 36 treatments.            Treatment Charges: Mins Units   []  Modalities- cold pack     [x]  Ther Exercise 45 3   []  Manual Therapy     []  Ther Activities     []  Aquatics     []  Neuromuscular     [] Vasocompression     [] Gait Training     [] Dry needling        [] 1 or 2 muscles        [] 3 or more muscles     []  Other     Total Treatment time 45 3       Time In: 0900  Time Out: 8410     Electronically signed by:  Mehran Ritchie PTA

## 2022-09-23 ENCOUNTER — OFFICE VISIT (OUTPATIENT)
Dept: PRIMARY CARE CLINIC | Age: 67
End: 2022-09-23
Payer: MEDICARE

## 2022-09-23 VITALS
HEART RATE: 88 BPM | OXYGEN SATURATION: 96 % | WEIGHT: 136.6 LBS | DIASTOLIC BLOOD PRESSURE: 82 MMHG | BODY MASS INDEX: 23.32 KG/M2 | SYSTOLIC BLOOD PRESSURE: 136 MMHG | HEIGHT: 64 IN

## 2022-09-23 DIAGNOSIS — E83.51 HYPOCALCEMIA: ICD-10-CM

## 2022-09-23 DIAGNOSIS — E53.8 B12 DEFICIENCY: ICD-10-CM

## 2022-09-23 DIAGNOSIS — E03.9 HYPOTHYROIDISM, UNSPECIFIED TYPE: ICD-10-CM

## 2022-09-23 DIAGNOSIS — E87.8 ELECTROLYTE DISORDER: Primary | ICD-10-CM

## 2022-09-23 DIAGNOSIS — N76.3 SUBACUTE VULVITIS: ICD-10-CM

## 2022-09-23 PROCEDURE — 3017F COLORECTAL CA SCREEN DOC REV: CPT | Performed by: FAMILY MEDICINE

## 2022-09-23 PROCEDURE — G8427 DOCREV CUR MEDS BY ELIG CLIN: HCPCS | Performed by: FAMILY MEDICINE

## 2022-09-23 PROCEDURE — 99214 OFFICE O/P EST MOD 30 MIN: CPT | Performed by: FAMILY MEDICINE

## 2022-09-23 PROCEDURE — 96372 THER/PROPH/DIAG INJ SC/IM: CPT | Performed by: FAMILY MEDICINE

## 2022-09-23 PROCEDURE — 1123F ACP DISCUSS/DSCN MKR DOCD: CPT | Performed by: FAMILY MEDICINE

## 2022-09-23 PROCEDURE — G8400 PT W/DXA NO RESULTS DOC: HCPCS | Performed by: FAMILY MEDICINE

## 2022-09-23 PROCEDURE — G8420 CALC BMI NORM PARAMETERS: HCPCS | Performed by: FAMILY MEDICINE

## 2022-09-23 PROCEDURE — 1036F TOBACCO NON-USER: CPT | Performed by: FAMILY MEDICINE

## 2022-09-23 PROCEDURE — 1090F PRES/ABSN URINE INCON ASSESS: CPT | Performed by: FAMILY MEDICINE

## 2022-09-23 RX ORDER — CYANOCOBALAMIN 1000 UG/ML
1000 INJECTION INTRAMUSCULAR; SUBCUTANEOUS ONCE
Status: COMPLETED | OUTPATIENT
Start: 2022-09-23 | End: 2022-09-23

## 2022-09-23 RX ORDER — LEVOTHYROXINE SODIUM 0.05 MG/1
100 TABLET ORAL DAILY
Qty: 180 TABLET | Refills: 1
Start: 2022-09-23

## 2022-09-23 RX ORDER — CLOTRIMAZOLE AND BETAMETHASONE DIPROPIONATE 10; .64 MG/G; MG/G
CREAM TOPICAL
Qty: 45 G | Refills: 0 | Status: SHIPPED | OUTPATIENT
Start: 2022-09-23

## 2022-09-23 RX ADMIN — CYANOCOBALAMIN 1000 MCG: 1000 INJECTION INTRAMUSCULAR; SUBCUTANEOUS at 13:37

## 2022-09-23 SDOH — ECONOMIC STABILITY: FOOD INSECURITY: WITHIN THE PAST 12 MONTHS, THE FOOD YOU BOUGHT JUST DIDN'T LAST AND YOU DIDN'T HAVE MONEY TO GET MORE.: NEVER TRUE

## 2022-09-23 SDOH — ECONOMIC STABILITY: FOOD INSECURITY: WITHIN THE PAST 12 MONTHS, YOU WORRIED THAT YOUR FOOD WOULD RUN OUT BEFORE YOU GOT MONEY TO BUY MORE.: NEVER TRUE

## 2022-09-23 ASSESSMENT — PATIENT HEALTH QUESTIONNAIRE - PHQ9
2. FEELING DOWN, DEPRESSED OR HOPELESS: 0
SUM OF ALL RESPONSES TO PHQ QUESTIONS 1-9: 0
SUM OF ALL RESPONSES TO PHQ9 QUESTIONS 1 & 2: 0
1. LITTLE INTEREST OR PLEASURE IN DOING THINGS: 0
SUM OF ALL RESPONSES TO PHQ QUESTIONS 1-9: 0

## 2022-09-23 ASSESSMENT — SOCIAL DETERMINANTS OF HEALTH (SDOH): HOW HARD IS IT FOR YOU TO PAY FOR THE VERY BASICS LIKE FOOD, HOUSING, MEDICAL CARE, AND HEATING?: NOT HARD AT ALL

## 2022-09-23 NOTE — PROGRESS NOTES
[facility] PRIMARY CARE  [address]  [facility] [address]  Dept: [phone]    Cooper Archuleta is a 74 y.o. female Established patient, who presents today for her medical conditions/complaintsas noted below. Chief Complaint   Patient presents with    Hypothyroidism     6 month f/u    Yeast Infection     Pt c/o yeast infection    Pain     Right sided pain on buttocks       HPI:     HPI    Using cream BID for yeast infection: vulva area, still has a rash there. Patient states her rash in the vulvar area got worse over a week ago when she was in [place]. She has been trying the cream for a week and is not helping. She cannot take the Diflucan tablet since it has red dye in it and she is allergic to it.     Right buttock area pain, started after she fell/passed out    Reviewed prior notes Orthopedics  Reviewed previous Labs  Has been eating ice cream instead of taking tums    LDL Cholesterol (mg/dL)   Date Value   12/22/2020 72   04/28/2020 106       (goal LDL is <100)   AST (U/L)   Date Value   09/04/2020 20     ALT (U/L)   Date Value   09/04/2020 16     BUN (mg/dL)   Date Value   06/07/2022 15     TSH (uIU/mL)   Date Value   07/22/2022 3.35     BP Readings from Last 3 Encounters:   09/23/22 136/82   08/04/22 (!) 141/87   07/22/22 132/70          (goal 120/80)    Past Medical History:   Diagnosis Date    Abdominal pain     RLQ    Abnormal computed tomography of abdomen and pelvis 04/16/2021    Distal appendix is mildly thickened without significant periappendiceal inflammatory change    Acquired von Willebrand's disease ([facility] 10/30/2018    Allergy to food dye     yellow and red dyes    Anemia     Asthma     Basal cell carcinoma of upper lip 06/22/2017    Benign paroxysmal positional vertigo 12/09/2013    Cancer (HCC)     Thyroid and skin    Cardiac murmur     STATES SINCE CHILD BILLY- STATES SHE WAS BORN WITH A \"90 DEGREE ANGLE [place]"    Chest pain 09/04/2020 Cobalamin deficiency 12/26/2012    Diverticulitis     Diverticulosis     Gastroesophageal reflux disease 12/26/2012    Gastroparesis     Hiatal hernia     History of fractured kneecap     RIGHT    History of malignant neoplasm of thyroid 04/17/2012    History of squamous cell carcinoma in situ of skin 06/09/2014    Chart states left wrist, but patient denies    Hypoparathyroidism ([address] 07/03/2012    IBS (irritable bowel syndrome)     diarrhea-prominent type    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 Neoplasm of unspecified nature of bone, soft tissue, and skin     New lesions of right dorsum of hand (healed after Efudex) and lesion of right anterior thigh. 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 - thyroid was removed.      Vulvar dystrophy     Wears hearing aid     Wears partial dentures     LOWER      Past Surgical History:   Procedure Laterality Date    APPENDECTOMY  05/11/2021    BREAST SURGERY      Cyst removed rt. nipple     CATARACT REMOVAL Right 07/17/2019    Right eye    CHOLECYSTECTOMY  2011    COLONOSCOPY      COLONOSCOPY N/A 05/10/2021    COLONOSCOPY DIAGNOSTIC performed by Chadd Mondragon MD at [address] [place] [address] Avenue Bilateral     Tendonitis X2    GASTRIC FUNDOPLICATION      \"Chronic appearing postsurgical change to the stomach suggestive of prior Nissen fundoplication\"- noted per CT ABD/pelvis 4-16-21    HERNIA REPAIR  2005    hiatal hernia repair    HYSTERECTOMY, TOTAL ABDOMINAL (CERVIX REMOVED)  92-97    BAIRON 3 Surgeries    KNEE SURGERY Right 1996    W/PINS    LAPAROSCOPY N/A 5/11/2021    ATTEMPTED APPENDECTOMY LAPAROSCOPIC ROBOTIC CONVERTED TO , DIAGNOSTIC LAPARASCOPY performed by Leisa Guerra MD at [place] [address] 5/11/2021    LAPAROTOMY LYSIS OF ADHESIONS -OPEN APPENDECTOMY, LYSIS OF SMALL BOWEL ADHESIONS FOR 75 MINUTES performed by Leisa Guerra MD at [facility] 2011    rt elbow-scc, rt shoulder-keratosis, lft leg skin with excoriation    NASAL POLYP SURGERY  2012    X2 in [address] Right     PINCHED [address] [facility]    SKIN BIOPSY  2012    lft leg keratosis    SKIN BIOPSY  2009    rt forearm, under brst, lft leg-keratosis    SKIN BIOPSY  2008    back and rt forearm-keratosis, abdomen-hemangioma    SKIN BIOPSY  2002    rt breast, lft arm, lft brst-keratosis    SKIN CANCER EXCISION      THYROIDECTOMY  1985    Thyroid Cancer    TONSILLECTOMY AND ADENOIDECTOMY      UPPER GASTROINTESTINAL ENDOSCOPY      Noted per Care Everywhere       Family History   Problem Relation Age of Onset    Other Mother         pneumonia    Coronary Art Dis Mother     Liver Cancer Father         age 80    Coronary Art Dis Father     Liver Cancer Brother 59    Colon Cancer Brother         \"Bowel and liver CA\"    Diabetes Other         Father's side    Thyroid Disease Other         Mom's side       Social History     Tobacco Use    Smoking status: Never    Smokeless tobacco: Never   Substance Use Topics    Alcohol use: No      Current Outpatient Medications   Medication Sig Dispense Refill    levothyroxine (SYNTHROID) 50 MCG tablet Take 2 tablets by mouth Daily split up during the day 180 tablet 1 clotrimazole-betamethasone (LOTRISONE) 1-0.05 % cream Apply topically 2 times daily. 45 g 0    nystatin (MYCOSTATIN) 849712 UNIT/GM cream Apply topically 2 times daily as needed for Dry Skin Apply topically 2 times daily. 30 g 1    albuterol sulfate HFA (VENTOLIN HFA) 108 (90 Base) MCG/ACT inhaler Inhale 2 puffs into the lungs 4 times daily as needed for Wheezing 18 g 2    hyoscyamine (ANASPAZ;LEVSIN) 125 MCG tablet TAKE 1 TABLET BY MOUTH 4 TIMES DAILY AS NEEDED FOR CRAMPING OR DIARRHEA 360 tablet 1    Lidocaine HCl 4 % CREA Apply topically       No current facility-administered medications for this visit. Allergies   Allergen Reactions    Aspirin Anaphylaxis and Shortness Of Breath    Cefadroxil Shortness Of Breath    Ypwalwsh-Fmjocfn-Tntbye [Fluocinolone] Shortness Of Breath    Ciprofloxacin Hcl Shortness Of Breath    Codeine Shortness Of Breath    Demerol Hcl [Meperidine] Shortness Of Breath    Doxycycline Nausea Only and Other (See Comments)    Glucosamine Shortness Of Breath, Diarrhea and Swelling     Other reaction(s): Respiratory Difficulty  \"lips swell up and get severe diarrhea\" - INCLUDES IODINE    Iodine Hives, Shortness Of Breath and Itching    Pcn [Penicillins] Anaphylaxis and Shortness Of Breath    Red Dye Shortness Of Breath    Shellfish-Derived Products Shortness Of Breath, Diarrhea and Swelling     \"lips swell up and get severe diarrhea\" - INCLUDES IODINE    Sulfa Antibiotics Hives, Shortness Of Breath, Itching and Rash     OK in small amounts, but larger amounts cause \"shortness of breath. \"    Yellow Dye Shortness Of Breath     Other reaction(s): Respiratory Difficulty    Yellow Dyes (Non-Tartrazine) Shortness Of Breath    Gadolinium Rash     Moderate allergic-like reaction consisting of diffuse urticaria to ProHance gadolinium-containing contrast material    Gadolinium Derivatives Rash     Moderate allergic-like reaction consisting of diffuse urticaria to ProHance gadolinium-containing contrast material    [place]     Midazolam Nausea Only     Versed caused bad nausea. Iodides Hives    Adhesive Tape Rash       Health Maintenance   Topic Date Due    Pneumococcal 65+ years Vaccine (1 - PCV) Never done    DTaP/Tdap/Td vaccine (1 - Tdap) Never done    DEXA (modify frequency per FRAX score)  Never done    COVID-19 Vaccine (4 - Booster for Moderna series) 04/30/2022    Flu vaccine (1) 09/01/2022    Shingles vaccine (1 of 2) 04/28/2026 (Originally 10/3/2003)    Depression Screen  03/24/2023    Annual Wellness Visit (AWV)  03/25/2023    Breast cancer screen  11/17/2023    Lipids  12/22/2025    Colorectal Cancer Screen  05/10/2031    Hepatitis C screen  Completed    Hepatitis A vaccine  Aged Out    Hepatitis B vaccine  Aged Out    Hib vaccine  Aged Out    Meningococcal (ACWY) vaccine  Aged Out       Subjective:      Review of Systems    Objective:     /82   Pulse 88   Ht 5' 4\" (1.626 m)   Wt 136 lb 9.6 oz (62 kg)   SpO2 96%   BMI 23.45 kg/m²   Physical Exam  Vitals and nursing note reviewed. Exam conducted with a chaperone present. Constitutional:       Appearance: Normal appearance. HENT:      Head: Normocephalic and atraumatic. Genitourinary:     Labia:         Right: Rash present. Left: Rash present. Comments: Red dry vulvar rash, no satellite lesions. The skin appears thin, not swollen  Musculoskeletal:      Right lower leg: No edema. Left lower leg: No edema. Comments: Tender right ischium bone  Pain with hip rotation/figure 4 stretch   Skin:     General: Skin is warm. Neurological:      Mental Status: She is alert and oriented to person, place, and time. Psychiatric:         Mood and Affect: Mood normal.         Behavior: Behavior normal.       Assessment:       Diagnosis Orders   1. Electrolyte disorder        2. Hypocalcemia  Calcium, Ionized      3. Subacute vulvitis  clotrimazole-betamethasone (LOTRISONE) 1-0.05 % cream      4.  Hypothyroidism, unspecified type  levothyroxine (SYNTHROID) 50 MCG tablet    TSH With Reflex Ft4      5. B12 deficiency  cyanocobalamin injection 1,000 mcg             Plan:      Return in about 3 months (around 12/23/2022) for thyroid. Orders Placed This Encounter   Procedures    Calcium, Ionized     Standing Status:   Future     Standing Expiration Date:   9/23/2023    TSH With Reflex Ft4     Standing Status:   Future     Standing Expiration Date:   9/23/2023     Orders Placed This Encounter   Medications    levothyroxine (SYNTHROID) 50 MCG tablet     Sig: Take 2 tablets by mouth Daily split up during the day     Dispense:  180 tablet     Refill:  1    clotrimazole-betamethasone (LOTRISONE) 1-0.05 % cream     Sig: Apply topically 2 times daily. Dispense:  45 g     Refill:  0    cyanocobalamin injection 1,000 mcg       Patient given educationalmaterials - see patient instructions. Discussed use, benefit, and side effectsof prescribed medications. All patient questions answered. Pt voiced understanding. Reviewed health maintenance. Instructed to continue current medications, diet andexercise. Patient agreed with treatment plan. Follow up as directed.      Electronicallysigned by Mathew Daugherty MD on 9/23/2022 at 1:52 PM

## 2022-09-27 ENCOUNTER — HOSPITAL ENCOUNTER (OUTPATIENT)
Dept: PHYSICAL THERAPY | Age: 67
Setting detail: THERAPIES SERIES
Discharge: HOME | End: 2022-09-27
Payer: MEDICARE

## 2022-09-27 PROCEDURE — 97110 THERAPEUTIC EXERCISES: CPT

## 2022-09-27 NOTE — FLOWSHEET NOTE
[facility] Outpatient Physical Therapy   [address]   Phone: [phone]   Fax: [phone]    Physical Therapy Daily Treatment Note      Date:  2022  Patient Name:  Reena Gamez    :  1955  MRN: [id number]  Physician: Fredis Walton MD                               Insurance: Medicare/[employer] [place] -- based on [place]   Medical Diagnosis: S42.295D (ICD-10-CM) - Other closed nondisplaced fracture of proximal end of left humerus with routine healing, subsequent encounter     Rehab Codes: R25 pain , M25.60 stiffness, R53.1 weakness, R60.9 Edema  Onset Date: 22               Next 's appt: 22-  [facility]   Visit# / total visits:   Cancels/No Shows: 0/0    Precautions:  [place]    Subjective:  Pt notes she is doing well in terms of pain and function. She feels there is some difficulty with still reaching overhead to put dishes away. She is able to reach overhead better.  Notes she was complaint with her HEP while on vacation       Pain:  [] Yes  [x] No     Location: L shoulder denies/10  Pain altered Tx:  [x] No  [] Yes  Action:  Comments:    Objective:  Exercises:  MODALITIES  Time/reps Weight/ Level Comments  Completed    INTERVENTION       UBE: F/B  3/3' Level 3  x   Pulleys: flexion/ abd 2' ea      IR towel stretch x15  Hold 5 With towel    Wall slides flexion/scaption  X10 ea Hold 5     PROM -- L shoulder flexion, abuduction, ER at 45*, scaption  8'  14- performed prior to measurments    Posterior capsule stretch x5 Hold 10s      IR/ER with TB 2x10 ea  Red  Tactile cues for position  x   Finger ladder 10x 5\" hold  Flexion: 120deg     Ball on wall 3 direction 5x  8/24 limited with standing due to back pain     Gross grasp with foam block 10x  blue HEP- pt concerned with poor      Shoulder Isometrics 10x 5\" holds  Provided HEP but needs reviewed    Overhead liftoffs from max flexion 10x2  Manual facilitation to maximize motion x   Tricep press 15x2 3\" red  x   Rows/extension 15x2 red  x   Shoulder flexion, scaption, ABD 10x2 2# AW 9/21 patient fatiguing quickly and weight was removed    Modified push ups 10x2  Mat table at waist level  x   Horizontal ABD  2x10 Red  Limited motion  x          Wrist program - L        Wrist flex 2x10 2#   x   Wrist ext 2x10 2#  x   Pronation/supination 2x10 2#  x           Manual:  20 min       STM L pec major, distal lat, subscap 10 min        traction 5x 10 sec      PA & inferior mobs  5 min      Sub scap release 5 min      Other:      Access Code: [email or web address]  URL: [email or web address]  Date: 09/06/2022  Prepared by: Sarthak Asp    Exercises  Seated Wrist Supination Pronation with Can - 3-5 x daily - 7 x weekly - 3 sets - 10 reps - 3-5 sec hold  Seated Wrist Supination Stretch - 3-5 x daily - 7 x weekly - 3 sets - 10 reps - 10-15 sec hold    8/25 shoulder Iso added to HEP in all directions. Specific Instructions for next treatment: STRENGTHENING ONLY, no vaso    Assessment: [x] Progressing toward goals. Began with UBE at increased resistance to build strength. Continued with gross LUE strengthening to maximize strength in available ranges. Showing good activation of scapular musculature with less compensations during session. Added in work for L wrist strengthening as pt notes she was having difficulty carrying her bags due to wrist strength. Pain with extension in the extensors likely due to increased demand. Provided with HEP of wrist exercises to continue to build strength. Overall pt tolerates session well with some fatigue/soreness of LUE post session. [] No change. [] Other:    [x] Patient would continue to benefit from skilled physical therapy services in order to: improve L shoulder strength, ROM, and better control swelling to allow her to return to using her LUE with ADLs.        GOALS -   STG: (to be met in 8 treatments)  Pt will self report worst pain no greater than 3/10 in order to better tolerate ADLs/work activities with minimal dysfunction   - 7/13: Met -- 3/10 noted today     -9/13: MET no pain noted   Pt will improve PROM in L shoulder to >160 deg flexion and abduction to improve her ability to reach overhead.   -7/13: progressing -- improves by 38 deg to 118 deg     - 8/8: progressing -- improves to 135 deg flexion & 130 deg abduction (~17 deg flexion improvement & 40 deg abd improvement)    -9/13: plateau in progress; similar to last measures ,slight regression in ABD likely related to dec compliance with HEP   Pt will improve AROM in L shoulder flexion and abduction to >/= 90 deg in order to demonstrate ability in progressing reach in all planes to complete ADLs.   - 7/13: progressing -- reaches 90 deg actively flexion, 73 deg actively abd   - 8/8: MET -- reaches 110 deg for each actively  -9/13: continues to meet   Pt will report no feelings of stiffness in the L hand with  indicated controlled swelling.    -7/13: MET -- no stiff reported   LTG: (to be met by end of POC) --   Pt will improve Apley reach bilaterally to posterior shoulder, ER to T3 and IR to L4 for efficient dressing at prior level   -8/8: progressing-- cross body reach to top of shoulder, ER to C6, IR to sacrum    -9/13: limited in progression likely due to how fracture is healing   Pt will demonstrate improved L UE strength to 4+/5 or greater in order to demonstrate improved stability/strength necessary for unrestricted ADLs.   -8/8: progressing -- still limited in strength of the L shoulder and elbow    -9/13: progressing -- still weak in L shoulder flexion, L shoulder abduction, L shoulder ER, and L triceps  Pt will increase score on UEFS to >/= 65/80 in order to demonstrate improved functional tolerances with ADLs with minimal restriction/dysfunction   - 8/8: progressing 61/80 this date    -9/13: regression in score to 54/80 likely due to more awareness of deficits   Pt will demonstrate independence with a long term HEP for continued progress/maintenance after completion of PT   - 8/8: meeting -- noting good compliance with HEP    - 9/13: decreased compliance as of late but pt is verbalizing plans to be more consistent. 5. NEW GOAL 8/8: Pt will actively be able to elevate her L arm to >130 deg flexion and abduction to improve ability to lift & reach overhead.     -9/13: progressing -- active flexion 113, active abd 97       Pt. Education:  [x] Yes  [] No  [x] Reviewed Prior HEP/Ed  Method of Education: [x] Verbal  [] Demo  [x] Written  Comprehension of Education:  [x] Verbalizes understanding. [] Demonstrates understanding. [x] Needs review. [x] Demonstrates/verbalizes HEP/Ed previously given. PLAN:   - 2 times per week for total  of 36 treatments.            Treatment Charges: Mins Units   []  Modalities- cold pack     [x]  Ther Exercise 45 3   []  Manual Therapy     []  Ther Activities     []  Aquatics     []  Neuromuscular     [] Vasocompression     [] Gait Training     [] Dry needling        [] 1 or 2 muscles        [] 3 or more muscles     []  Other     Total Treatment time 45 3   ** KX modifier needed**     Time In: 0825     Time Out: 3917     Electronically signed by:  Sameer Browning, PT

## 2022-09-30 ENCOUNTER — HOSPITAL ENCOUNTER (OUTPATIENT)
Dept: PHYSICAL THERAPY | Age: 67
Setting detail: THERAPIES SERIES
Discharge: HOME | End: 2022-09-30
Payer: MEDICARE

## 2022-09-30 PROCEDURE — 97110 THERAPEUTIC EXERCISES: CPT

## 2022-09-30 NOTE — FLOWSHEET NOTE
[address] [facility]   [address]   Phone: [phone]   Fax: [phone]    Physical Therapy Daily Treatment Note      Date:  2022  Patient Name:  Katlin Cooper    :  1955  MRN: [id number]  Physician: Alba Pearson MD                               Insurance: Medicare/Medical [place]   Medical Diagnosis: S42.295D (ICD-10-CM) - Other closed nondisplaced fracture of proximal end of left humerus with routine healing, subsequent encounter     Rehab Codes: R25 pain , M25.60 stiffness, R53.1 weakness, R60.9 Edema  Onset Date: 22               Next 's appt: 22- Dr. Aletha Lanes   Visit# / total visits:   Cancels/No Shows: 0/0    Precautions:  [place]    Subjective:  Pt notes she is doing well in terms of pain and function. Denies any changes since last. Pt inquires about POC -- discussed that going 1-2x / wk is feasible with consistent HEP.  Pt still noting some discomfort of the L ulnar wrist.      Pain:  [] Yes  [x] No     Location: L shoulder denies/10  Pain altered Tx:  [x] No  [] Yes  Action:  Comments:    Objective:  Exercises:  MODALITIES  Time/reps Weight/ Level Comments  Completed    INTERVENTION       UBE: F/B  3/3' Level 3  x   Pulleys: flexion/ abd 2' ea      IR towel stretch x15  Hold 5 With towel    Wall slides flexion/scaption  X10 ea Hold 5     PROM -- L shoulder flexion, abuduction, ER at 45*, scaption  8'  14- performed prior to measurments    Posterior capsule stretch x5 Hold 10s      IR/ER with TB 2x15 ea  Red  Tactile cues for position  x   Finger ladder 10x 5\" hold  Flexion: 120deg     Ball on wall 3 direction 5x  8/24 limited with standing due to back pain     Gross grasp with foam block 10x  blue HEP- pt concerned with poor      Shoulder Isometrics 10x 5\" holds  Provided HEP but needs reviewed    Pull outs laterally 2x10 ea Yellow loop  Hands on wall reaching laterally  x   Overhead liftoffs from max flexion 10x2  Manual facilitation to maximize motion x   Tricep press 15x2 3\" red  x   Rows/extension 15x2 red  x   Bicep curls  10x2 3#  Tried 5# but pt could not tolerate x   Shoulder flexion, scaption, ABD 10x2 2# AW 9/21 patient fatiguing quickly and weight was removed    Modified push ups 10x2  Mat table at waist level  x   Horizontal ABD  2x15 Red  Limited motion  x          Wrist program - L        Wrist flex 2x10 2#   x   Wrist ext 2x10 2#  x   Pronation/supination 2x10 2# Stabilize arm to isolate pronation & supination  x   I strip to ulnar side   For pain control x          Other:      Access Code: [email or web address]  URL: [email or web address]  Date: 09/06/2022  Prepared by: Aisha Cochran    Exercises  Seated Wrist Supination Pronation with Can - 3-5 x daily - 7 x weekly - 3 sets - 10 reps - 3-5 sec hold  Seated Wrist Supination Stretch - 3-5 x daily - 7 x weekly - 3 sets - 10 reps - 10-15 sec hold    8/25 shoulder Iso added to HEP in all directions. Specific Instructions for next treatment: STRENGTHENING ONLY, no vaso    Assessment: [x] Progressing toward goals. Began with UBE at increased resistance to build strength. Continued with gross LUE strengthening to maximize strength in available ranges. Showing good activation of scapular musculature with less compensations during session. Continued with work for L wrist strengthening as pt notes she was having difficulty carrying her bags due to wrist strength. Still getting discomfort in the L ulnar wrist region. Feel it could have been related to carrying things and more. Provided with I strip to L ulnar wrist to support the wrist to reduce over ulnar deviation. During session pt inquires about doing other machines but explained that since she can not tolerate 5# DB she would likely have discomfort with machines. Need to build strength before progressing to theses. Overall pt tolerates session well with some fatigue/soreness of LUE post session. [] No change. [] Other:    [x] Patient would continue to benefit from skilled physical therapy services in order to: improve L shoulder strength, ROM, and better control swelling to allow her to return to using her LUE with ADLs.        GOALS -   STG: (to be met in 8 treatments)  Pt will self report worst pain no greater than 3/10 in order to better tolerate ADLs/work activities with minimal dysfunction   - 7/13: Met -- 3/10 noted today     -9/13: MET no pain noted   Pt will improve PROM in L shoulder to >160 deg flexion and abduction to improve her ability to reach overhead.   -7/13: progressing -- improves by 38 deg to 118 deg     - 8/8: progressing -- improves to 135 deg flexion & 130 deg abduction (~17 deg flexion improvement & 40 deg abd improvement)    -9/13: plateau in progress; similar to last measures ,slight regression in ABD likely related to dec compliance with HEP   Pt will improve AROM in L shoulder flexion and abduction to >/= 90 deg in order to demonstrate ability in progressing reach in all planes to complete ADLs.   - 7/13: progressing -- reaches 90 deg actively flexion, 73 deg actively abd   - 8/8: MET -- reaches 110 deg for each actively  -9/13: continues to meet   Pt will report no feelings of stiffness in the L hand with  indicated controlled swelling.    -7/13: MET -- no stiff reported   LTG: (to be met by end of POC) --   Pt will improve Apley reach bilaterally to posterior shoulder, ER to T3 and IR to L4 for efficient dressing at prior level   -8/8: progressing-- cross body reach to top of shoulder, ER to C6, IR to sacrum    -9/13: limited in progression likely due to how fracture is healing   Pt will demonstrate improved L UE strength to 4+/5 or greater in order to demonstrate improved stability/strength necessary for unrestricted ADLs.   -8/8: progressing -- still limited in strength of the L shoulder and elbow    -9/13: progressing -- still weak in L shoulder flexion, L shoulder abduction, L shoulder ER, and L triceps  Pt will increase score on UEFS to >/= 65/80 in order to demonstrate improved functional tolerances with ADLs with minimal restriction/dysfunction   - 8/8: progressing 61/80 this date    -9/13: regression in score to 54/80 likely due to more awareness of deficits   Pt will demonstrate independence with a long term HEP for continued progress/maintenance after completion of PT   - 8/8: meeting -- noting good compliance with HEP    - 9/13: decreased compliance as of late but pt is verbalizing plans to be more consistent. 5. NEW GOAL 8/8: Pt will actively be able to elevate her L arm to >130 deg flexion and abduction to improve ability to lift & reach overhead.     -9/13: progressing -- active flexion 113, active abd 97       Pt. Education:  [x] Yes  [] No  [x] Reviewed Prior HEP/Ed  Method of Education: [x] Verbal  [] Demo  [x] Written  Comprehension of Education:  [x] Verbalizes understanding. [] Demonstrates understanding. [x] Needs review. [x] Demonstrates/verbalizes HEP/Ed previously given. PLAN:   - 2 times per week for total  of 36 treatments.            Treatment Charges: Mins Units   []  Modalities- cold pack     [x]  Ther Exercise 43 3   []  Manual Therapy     []  Ther Activities     []  Aquatics     []  Neuromuscular     [] Vasocompression     [] Gait Training     [] Dry needling        [] 1 or 2 muscles        [] 3 or more muscles     []  Other     Total Treatment time 43 3   ** KX modifier needed**     Time In: 0904    Time Out: 4378      Electronically signed by:  Nino Shaffer, PT

## 2022-10-04 ENCOUNTER — HOSPITAL ENCOUNTER (OUTPATIENT)
Dept: PHYSICAL THERAPY | Age: 67
Setting detail: THERAPIES SERIES
Discharge: HOME | End: 2022-10-04
Payer: MEDICARE

## 2022-10-04 PROCEDURE — 97110 THERAPEUTIC EXERCISES: CPT

## 2022-10-04 NOTE — FLOWSHEET NOTE
[facility]   [address]   Phone: [phone]   Fax: [phone]    Physical Therapy Daily Treatment Note      Date:  10/4/2022  Patient Name:  Malvin Galeano    :  1955  MRN: [id number]  Physician: Sandor Graham MD                               Insurance: Medicare/Medical [place]   Medical Diagnosis: S42.295D (ICD-10-CM) - Other closed nondisplaced fracture of proximal end of left humerus with routine healing, subsequent encounter     Rehab Codes: R25 pain , M25.60 stiffness, R53.1 weakness, R60.9 Edema  Onset Date: 22               Next 's appt: 22- Dr. Vickie Rodriguez   Visit# / total visits:   Cancels/No Shows: 0/0    Precautions:  [place]    Subjective:  Pt notes her shoulder is feeling better. She notes that she is doing better with doing her hair and cooking. Feels that her most limitation is in external rotation.      Pain:  [] Yes  [x] No     Location: L shoulder   denies/10  Pain altered Tx:  [x] No  [] Yes  Action:  Comments:    Objective:  Exercises:  MODALITIES  Time/reps Weight/ Level Comments  Completed    INTERVENTION       UBE: F/B  3/3' Level 3  x   Pulleys: flexion/ abd 2' ea      IR towel stretch x15  Hold 5 With towel    Wall slides flexion/scaption  X10 ea Hold 5     PROM -- L shoulder flexion, abuduction, ER at 45*, scaption  8'  9/14- performed prior to measurments    Posterior capsule stretch x5 Hold 10s      IR with TB 2x15  green Tactile cues for position  x   ER with TB  2x15 red Tried green but unable  x   ER with cane x20  In front of mirrior for feedback; towel under arm for more pure ER  x   Ball on wall 3 direction 5x  8/24 limited with standing due to back pain     Gross grasp with foam block 10x  blue HEP- pt concerned with poor      Shoulder Isometrics 10x 5\" holds  Provided HEP but needs reviewed    Pull outs laterally 2x10 ea Yellow loop  Hands on wall reaching laterally; pt sore post x   Overhead liftoffs from max flexion 10x2  Manual facilitation to maximize motion x   Tricep press 15x2 3\" green  x   Rows/extension 15x2 green  x   Bicep curls  10x2 4#  Standing  x   Shoulder flexion, scaption, ABD 10x2 2# AW Therapist assisting to maximize reps.  x   Modified push ups 10x2  Mat table at waist level; cues to shoulders over hands  x   Horizontal ABD  2x15 Red  Motion improves; tried green but too difficult x          Wrist program - L        Wrist flex 2x10 2#      Wrist ext 2x10 2#     Pronation/supination 2x10 2# Stabilize arm to isolate pronation & supination     I strip to ulnar side   For pain control           Other:      Access Code: [email or web address]  URL: [email or web address]  Date: 09/06/2022  Prepared by: Rin Sellers    Exercises  Seated Wrist Supination Pronation with Can - 3-5 x daily - 7 x weekly - 3 sets - 10 reps - 3-5 sec hold  Seated Wrist Supination Stretch - 3-5 x daily - 7 x weekly - 3 sets - 10 reps - 10-15 sec hold    8/25 shoulder Iso added to HEP in all directions. Specific Instructions for next treatment: STRENGTHENING ONLY, no vaso// give HEP created     Assessment: [x] Progressing toward goals. Began with UBE at increased resistance to build strength and ROM prior to strengthening. Increased resistance band to green as progression. Still very weak with ER so unable to progress to green resistance. Continued to need cues for posture and reducing compensatory movements. Pt fatigues quickly so assist needed for 2nd set of flexion, scaption, and abduction. Post session pt has some discomfort in the L proximal humeral region but it subsides with rest. Pt requests new HEP for exercises so will put together and provide next session. [] No change. [] Other:    [x] Patient would continue to benefit from skilled physical therapy services in order to: improve L shoulder strength, ROM, and better control swelling to allow her to return to using her LUE with ADLs. GOALS -   STG: (to be met in 8 treatments)  Pt will self report worst pain no greater than 3/10 in order to better tolerate ADLs/work activities with minimal dysfunction   - 7/13: Met -- 3/10 noted today     -9/13: MET no pain noted   Pt will improve PROM in L shoulder to >160 deg flexion and abduction to improve her ability to reach overhead.   -7/13: progressing -- improves by 38 deg to 118 deg     - 8/8: progressing -- improves to 135 deg flexion & 130 deg abduction (~17 deg flexion improvement & 40 deg abd improvement)    -9/13: plateau in progress; similar to last measures ,slight regression in ABD likely related to dec compliance with HEP   Pt will improve AROM in L shoulder flexion and abduction to >/= 90 deg in order to demonstrate ability in progressing reach in all planes to complete ADLs.   - 7/13: progressing -- reaches 90 deg actively flexion, 73 deg actively abd   - 8/8: MET -- reaches 110 deg for each actively  -9/13: continues to meet   Pt will report no feelings of stiffness in the L hand with  indicated controlled swelling.    -7/13: MET -- no stiff reported   LTG: (to be met by end of POC) --   Pt will improve Apley reach bilaterally to posterior shoulder, ER to T3 and IR to L4 for efficient dressing at prior level   -8/8: progressing-- cross body reach to top of shoulder, ER to C6, IR to sacrum    -9/13: limited in progression likely due to how fracture is healing   Pt will demonstrate improved L UE strength to 4+/5 or greater in order to demonstrate improved stability/strength necessary for unrestricted ADLs.   -8/8: progressing -- still limited in strength of the L shoulder and elbow    -9/13: progressing -- still weak in L shoulder flexion, L shoulder abduction, L shoulder ER, and L triceps  Pt will increase score on UEFS to >/= 65/80 in order to demonstrate improved functional tolerances with ADLs with minimal restriction/dysfunction   - 8/8: progressing 61/80 this date    -9/13: regression in score to 54/80 likely due to more awareness of deficits   Pt will demonstrate independence with a long term HEP for continued progress/maintenance after completion of PT   - 8/8: meeting -- noting good compliance with HEP    - 9/13: decreased compliance as of late but pt is verbalizing plans to be more consistent. 5. NEW GOAL 8/8: Pt will actively be able to elevate her L arm to >130 deg flexion and abduction to improve ability to lift & reach overhead.     -9/13: progressing -- active flexion 113, active abd 97       Pt. Education:  [x] Yes  [] No  [x] Reviewed Prior HEP/Ed  Method of Education: [x] Verbal  [] Demo  [x] Written  Comprehension of Education:  [x] Verbalizes understanding. [] Demonstrates understanding. [x] Needs review. [x] Demonstrates/verbalizes HEP/Ed previously given. PLAN:   - 2 times per week for total  of 36 treatments.            Treatment Charges: Mins Units   []  Modalities- cold pack     [x]  Ther Exercise 44 3   []  Manual Therapy     []  Ther Activities     []  Aquatics     []  Neuromuscular     [] Vasocompression     [] Gait Training     [] Dry needling        [] 1 or 2 muscles        [] 3 or more muscles     []  Other     Total Treatment time 44 3   ** KX modifier needed**     Time In: 8318    Time Out: 4500      Electronically signed by:  Hamida Hinton PT

## 2022-10-04 NOTE — PROGRESS NOTES
Patient arrives to floor from PACU. Patient resting comfortably in bed. Head to Toe assessment completed, Admission database completed. Vitals obtained and charted. Instructed on [employer]. Abdominal Binder applied. Will continue to monitor. Negative/Unknown

## 2022-10-06 ENCOUNTER — HOSPITAL ENCOUNTER (OUTPATIENT)
Dept: PHYSICAL THERAPY | Age: 67
Setting detail: THERAPIES SERIES
Discharge: HOME | End: 2022-10-06
Payer: MEDICARE

## 2022-10-06 PROCEDURE — 97110 THERAPEUTIC EXERCISES: CPT

## 2022-10-06 NOTE — FLOWSHEET NOTE
[facility]   [address]   Phone: [phone]   Fax: [phone]    Physical Therapy Daily Treatment Note      Date:  10/6/2022  Patient Name:  Renetta Padilla    :  1955  MRN: [id number]  Physician: Isidro Fu MD                               Insurance: Medicare/Medical [place]   Medical Diagnosis: S42.295D (ICD-10-CM) - Other closed nondisplaced fracture of proximal end of left humerus with routine healing, subsequent encounter     Rehab Codes: R25 pain , M25.60 stiffness, R53.1 weakness, R60.9 Edema  Onset Date: 22               Next 's appt: 22- Dr. Natalie Pretty   Visit# / total visits:   Cancels/No Shows: 0/0    Precautions:  [place]    Subjective:  Pt notes she is feeling sore in the wrists and low back due to the cold weather coming in. Denies issues with the L shoulder.       Pain:  [] Yes  [x] No     Location: L shoulder   denies/10  Pain altered Tx:  [x] No  [] Yes  Action:  Comments:    Objective:  Exercises:  MODALITIES  Time/reps Weight/ Level Comments  Completed    INTERVENTION       UBE: F/B  3/3' Level 3.5 Inc resistance  x   Pulleys: flexion/ abd 2' ea      IR towel stretch x15  Hold 5 With towel    Ball on wall: U/D & S/S x20 2# ball  Limited tolerance of only 16 reps with s/s due to shoulder pain x   PROM -- L shoulder flexion, abuduction, ER at 45*, scaption  8'  914- performed prior to measurments    Posterior capsule stretch x5 Hold 10s      IR with TB 2x15  green Ball under arm to maintain position  x   ER with TB  2x15 red  Ball under arm to maintain position  x   ER with cane x20  In front of mirrior for feedback; towel under arm for more pure ER     Ball on wall 3 direction 5x  8/24 limited with standing due to back pain     Pull outs laterally 2x10 ea Yellow loop  Hands on wall reaching laterally x   Wall surrenders 10x  Limited by feeling claustrophobic x   Overhead liftoffs from max flexion 10x2   x   Tricep press 15x2 3\" green Cue for form to target triceps  x   Rows/extension 15x2 green  x   Bicep curls  10x2 4#/RED Standing -- TB as it is easier on the wrist  x   Shoulder flexion, scaption, ABD 10x 2# AW Therapist assisting to maximize reps.  x   Modified push ups 10x2  Done on wall due to low back pain  x   Horizontal ABD  2x15 Red  Motion improves; tried green but too difficult x          Wrist program - L        Wrist flex 2x10 2#      Wrist ext 2x10 2#     Pronation/supination 2x10 2# Stabilize arm to isolate pronation & supination     I strip to ulnar side   For pain control           Other:  10/6: Spent time reviewing a comprehensive home program with old and new exercises. Made notes and reviewed in depth to ensure understanding. --see home program       8/25 shoulder Iso added to HEP in all directions. Specific Instructions for next treatment: STRENGTHENING ONLY, no vaso// give HEP created     Assessment: [x] Progressing toward goals. More limited this date due to pain in wrist and low back. Did not progress resistance due to pain. Had to modify  or to use of ankle weights due to wrist being sore due to weather changes. At times pt tends to doubt her abilities so had to highly encourage to keep with the same weights. Ended with updated home program to ensure carryover       [] No change. [] Other:    [x] Patient would continue to benefit from skilled physical therapy services in order to: improve L shoulder strength, ROM, and better control swelling to allow her to return to using her LUE with ADLs.        GOALS -   STG: (to be met in 8 treatments)  Pt will self report worst pain no greater than 3/10 in order to better tolerate ADLs/work activities with minimal dysfunction   - 7/13: Met -- 3/10 noted today     -9/13: MET no pain noted   Pt will improve PROM in L shoulder to >160 deg flexion and abduction to improve her ability to reach overhead.   -7/13: progressing -- improves by 38 deg to 118 deg     - 8/8: progressing -- improves to 135 deg flexion & 130 deg abduction (~17 deg flexion improvement & 40 deg abd improvement)    -9/13: plateau in progress; similar to last measures ,slight regression in ABD likely related to dec compliance with HEP   Pt will improve AROM in L shoulder flexion and abduction to >/= 90 deg in order to demonstrate ability in progressing reach in all planes to complete ADLs.   - 7/13: progressing -- reaches 90 deg actively flexion, 73 deg actively abd   - 8/8: MET -- reaches 110 deg for each actively  -9/13: continues to meet   Pt will report no feelings of stiffness in the L hand with  indicated controlled swelling.    -7/13: MET -- no stiff reported   LTG: (to be met by end of POC) --   Pt will improve Apley reach bilaterally to posterior shoulder, ER to T3 and IR to L4 for efficient dressing at prior level   -8/8: progressing-- cross body reach to top of shoulder, ER to C6, IR to sacrum    -9/13: limited in progression likely due to how fracture is healing   Pt will demonstrate improved L UE strength to 4+/5 or greater in order to demonstrate improved stability/strength necessary for unrestricted ADLs.   -8/8: progressing -- still limited in strength of the L shoulder and elbow    -9/13: progressing -- still weak in L shoulder flexion, L shoulder abduction, L shoulder ER, and L triceps  Pt will increase score on UEFS to >/= 65/80 in order to demonstrate improved functional tolerances with ADLs with minimal restriction/dysfunction   - 8/8: progressing 61/80 this date    -9/13: regression in score to 54/80 likely due to more awareness of deficits   Pt will demonstrate independence with a long term HEP for continued progress/maintenance after completion of PT   - 8/8: meeting -- noting good compliance with HEP    - 9/13: decreased compliance as of late but pt is verbalizing plans to be more consistent.    5. NEW GOAL 8/8: Pt will actively be able to elevate her L arm to >130 deg flexion and abduction to improve ability to lift & reach overhead.     -9/13: progressing -- active flexion 113, active abd 97       Pt. Education:  [x] Yes  [] No  [x] Reviewed Prior HEP/Ed  Method of Education: [x] Verbal  [] Demo  [x] Written  Comprehension of Education:  [x] Verbalizes understanding. [] Demonstrates understanding. [x] Needs review. [x] Demonstrates/verbalizes HEP/Ed previously given. PLAN:   - 2 times per week for total  of 36 treatments.            Treatment Charges: Mins Units   []  Modalities- cold pack     [x]  Ther Exercise 41 3   []  Manual Therapy     []  Ther Activities     []  Aquatics     []  Neuromuscular     [] Vasocompression     [] Gait Training     [] Dry needling        [] 1 or 2 muscles        [] 3 or more muscles     []  Other     Total Treatment time 41 3   ** KX modifier needed**     Time In: 9471    Time Out: 0204     Electronically signed by:  René Lama PT

## 2022-10-10 ENCOUNTER — HOSPITAL ENCOUNTER (OUTPATIENT)
Dept: PHYSICAL THERAPY | Age: 67
Setting detail: THERAPIES SERIES
Discharge: HOME | End: 2022-10-10
Payer: MEDICARE

## 2022-10-10 PROCEDURE — 97110 THERAPEUTIC EXERCISES: CPT

## 2022-10-10 NOTE — FLOWSHEET NOTE
[facility]   [address]   Phone: [phone]   Fax: [phone]    Physical Therapy Daily Treatment Note      Date:  10/10/2022  Patient Name:  Flory Da Silva    :  1955  MRN: [id number]  Physician: Dav Butts MD                               Insurance: Medicare/[employer] -- based on [place]   Medical Diagnosis: S42.295D (ICD-10-CM) - Other closed nondisplaced fracture of proximal end of left humerus with routine healing, subsequent encounter     Rehab Codes: R25 pain , M25.60 stiffness, R53.1 weakness, R60.9 Edema  Onset Date: 22               Next 's appt: 22- Dr. Chris Langley   Visit# / total visits: 32/36  Cancels/No Shows: 0/0    Precautions:  [place]    Subjective:  Pt notes she is feeling much better this date. She feels she is progressing with her HEP. Denies any major concerns at home with any functional activities.        Pain:  [] Yes  [x] No     Location: L shoulder   denies/10  Pain altered Tx:  [x] No  [] Yes  Action:  Comments:    Objective:  Exercises:  MODALITIES  Time/reps Weight/ Level Comments  Completed    INTERVENTION       UBE: F/B  3/3' Level 4 Inc resistance  x   Pulleys: flexion/ abd 2' ea      IR towel stretch x15  Hold 5 With towel    Ball on wall: U/D & S/S 2x10 2# ball  Limited tolerance of only 16 reps with s/s due to shoulder pain x   Ball on wall circles 10x CW CCW  2# ball   x   PROM -- L shoulder flexion, abuduction, ER at 45*, scaption  8'  14- performed prior to measurments    Posterior capsule stretch x5 Hold 10s      IR with TB 2x15  green towel under arm to maintain position  x   ER with TB  2x15 red  towel under arm to maintain position  x   ER with cane x20  In front of mirrior for feedback; towel under arm for more pure ER     Pull outs laterally 2x15 ea Yellow loop  Hands on wall reaching laterally x   Wall surrenders 10x  Limited by feeling claustrophobic    Overhead liftoffs from max flexion 10x2  Tried with yellow band but not tolerated x   Tricep press 15x2 3\" green Cue for form to target triceps  x   Rows/extension 15x2 green  x   Bicep curls  15x2 green Standing -- TB as it is easier on the wrist  x   Shoulder flexion, scaption, ABD 10x 2# AW Therapist assisting to maximize reps. Prone IYTA  10x 1# DB bilat  x   Modified push ups 10x2  Done on wall due to low back pain  x   Horizontal ABD  2x15 Red  Motion improves x          Wrist program - L        Wrist flex 2x10 2#      Wrist ext 2x10 2#     Pronation/supination 2x10 2# Stabilize arm to isolate pronation & supination     I strip to ulnar side   For pain control           Other:  10/6: Spent time reviewing a comprehensive home program with old and new exercises. Made notes and reviewed in depth to ensure understanding. --see home program       8/25 shoulder Iso added to HEP in all directions. Specific Instructions for next treatment: STRENGTHENING ONLY, no vaso// give HEP created     Assessment: [x] Progressing toward goals. Still working to progress strength of LUE. Pain more controlled so could return to most recent exercise progressions. Increased resistance with BTE. Added in semi prone IYTA for postural strengthening. Pt needing much encouragement to trial new exercises as she is apprehensive of soreness. Still very challenged with ball on wall stability work due to serratus weakness and decreased control. [] No change. [] Other:    [x] Patient would continue to benefit from skilled physical therapy services in order to: improve L shoulder strength, ROM, and better control swelling to allow her to return to using her LUE with ADLs.        GOALS -   STG: (to be met in 8 treatments)  Pt will self report worst pain no greater than 3/10 in order to better tolerate ADLs/work activities with minimal dysfunction   - 7/13: Met -- 3/10 noted today     -9/13: MET no pain noted   Pt will improve PROM in L shoulder to >160 deg flexion and abduction to improve her ability to reach overhead.   -7/13: progressing -- improves by 38 deg to 118 deg     - 8/8: progressing -- improves to 135 deg flexion & 130 deg abduction (~17 deg flexion improvement & 40 deg abd improvement)    -9/13: plateau in progress; similar to last measures ,slight regression in ABD likely related to dec compliance with HEP   Pt will improve AROM in L shoulder flexion and abduction to >/= 90 deg in order to demonstrate ability in progressing reach in all planes to complete ADLs.   - 7/13: progressing -- reaches 90 deg actively flexion, 73 deg actively abd   - 8/8: MET -- reaches 110 deg for each actively  -9/13: continues to meet   Pt will report no feelings of stiffness in the L hand with  indicated controlled swelling.    -7/13: MET -- no stiff reported   LTG: (to be met by end of POC) --   Pt will improve Apley reach bilaterally to posterior shoulder, ER to T3 and IR to L4 for efficient dressing at prior level   -8/8: progressing-- cross body reach to top of shoulder, ER to C6, IR to sacrum    -9/13: limited in progression likely due to how fracture is healing   Pt will demonstrate improved L UE strength to 4+/5 or greater in order to demonstrate improved stability/strength necessary for unrestricted ADLs.   -8/8: progressing -- still limited in strength of the L shoulder and elbow    -9/13: progressing -- still weak in L shoulder flexion, L shoulder abduction, L shoulder ER, and L triceps  Pt will increase score on UEFS to >/= 65/80 in order to demonstrate improved functional tolerances with ADLs with minimal restriction/dysfunction   - 8/8: progressing 61/80 this date    -9/13: regression in score to 54/80 likely due to more awareness of deficits   Pt will demonstrate independence with a long term HEP for continued progress/maintenance after completion of PT   - 8/8: meeting -- noting good compliance with HEP    - 9/13: decreased compliance as of late but pt is verbalizing plans to be more consistent. 5. NEW GOAL 8/8: Pt will actively be able to elevate her L arm to >130 deg flexion and abduction to improve ability to lift & reach overhead.     -9/13: progressing -- active flexion 113, active abd 97       Pt. Education:  [x] Yes  [] No  [x] Reviewed Prior HEP/Ed  Method of Education: [x] Verbal  [] Demo  [x] Written  Comprehension of Education:  [x] Verbalizes understanding. [] Demonstrates understanding. [x] Needs review. [x] Demonstrates/verbalizes HEP/Ed previously given. PLAN:   - 2 times per week for total  of 36 treatments.            Treatment Charges: Mins Units   []  Modalities- cold pack     [x]  Ther Exercise 41 3   []  Manual Therapy     []  Ther Activities     []  Aquatics     []  Neuromuscular     [] Vasocompression     [] Gait Training     [] Dry needling        [] 1 or 2 muscles        [] 3 or more muscles     []  Other     Total Treatment time 41 3   ** KX modifier needed**     Time In: 4350    Time Out: 6346     Electronically signed by:  Piyush Valles, PT

## 2022-10-12 ENCOUNTER — OFFICE VISIT (OUTPATIENT)
Dept: PRIMARY CARE CLINIC | Age: 67
End: 2022-10-12
Payer: MEDICARE

## 2022-10-12 VITALS
HEIGHT: 64 IN | HEART RATE: 78 BPM | DIASTOLIC BLOOD PRESSURE: 78 MMHG | OXYGEN SATURATION: 98 % | TEMPERATURE: 97 F | WEIGHT: 137.6 LBS | BODY MASS INDEX: 23.49 KG/M2 | SYSTOLIC BLOOD PRESSURE: 128 MMHG

## 2022-10-12 DIAGNOSIS — M19.042 ARTHRITIS OF LEFT HAND: Primary | ICD-10-CM

## 2022-10-12 DIAGNOSIS — M19.041 ARTHRITIS OF RIGHT HAND: ICD-10-CM

## 2022-10-12 PROCEDURE — G8484 FLU IMMUNIZE NO ADMIN: HCPCS | Performed by: FAMILY MEDICINE

## 2022-10-12 PROCEDURE — 99212 OFFICE O/P EST SF 10 MIN: CPT | Performed by: FAMILY MEDICINE

## 2022-10-12 PROCEDURE — 1090F PRES/ABSN URINE INCON ASSESS: CPT | Performed by: FAMILY MEDICINE

## 2022-10-12 PROCEDURE — 1123F ACP DISCUSS/DSCN MKR DOCD: CPT | Performed by: FAMILY MEDICINE

## 2022-10-12 PROCEDURE — 3017F COLORECTAL CA SCREEN DOC REV: CPT | Performed by: FAMILY MEDICINE

## 2022-10-12 PROCEDURE — G8400 PT W/DXA NO RESULTS DOC: HCPCS | Performed by: FAMILY MEDICINE

## 2022-10-12 PROCEDURE — G8420 CALC BMI NORM PARAMETERS: HCPCS | Performed by: FAMILY MEDICINE

## 2022-10-12 PROCEDURE — G8427 DOCREV CUR MEDS BY ELIG CLIN: HCPCS | Performed by: FAMILY MEDICINE

## 2022-10-12 PROCEDURE — 1036F TOBACCO NON-USER: CPT | Performed by: FAMILY MEDICINE

## 2022-10-12 ASSESSMENT — PATIENT HEALTH QUESTIONNAIRE - PHQ9
SUM OF ALL RESPONSES TO PHQ QUESTIONS 1-9: 0
SUM OF ALL RESPONSES TO PHQ9 QUESTIONS 1 & 2: 0
SUM OF ALL RESPONSES TO PHQ QUESTIONS 1-9: 0
SUM OF ALL RESPONSES TO PHQ QUESTIONS 1-9: 0
1. LITTLE INTEREST OR PLEASURE IN DOING THINGS: 0
SUM OF ALL RESPONSES TO PHQ QUESTIONS 1-9: 0
2. FEELING DOWN, DEPRESSED OR HOPELESS: 0

## 2022-10-12 NOTE — PROGRESS NOTES
Arnulfo Friedman is a 69 y.o. femalewho presents today for her medical conditions/complaints as noted below. Chief Complaint   Patient presents with    Swelling     Pt here today for LT hand swelling and pain since her shoulder injury. Injections     Pt declined b12 shot today. HPI:     HPI    Sees GI and can't afford the vybreezia. Levsin didn't help  Imodium not heping enough  Left hand swelling, since her shoulder fracture. More hand pain also  Tries to elevated hand at HS  Doing hand exercise also. Takng tyelnol for pain. Current Outpatient Medications   Medication Sig Dispense Refill    levothyroxine (SYNTHROID) 50 MCG tablet Take 2 tablets by mouth Daily split up during the day 180 tablet 1    clotrimazole-betamethasone (LOTRISONE) 1-0.05 % cream Apply topically 2 times daily. 45 g 0    nystatin (MYCOSTATIN) 895925 UNIT/GM cream Apply topically 2 times daily as needed for Dry Skin Apply topically 2 times daily. 30 g 1    albuterol sulfate HFA (VENTOLIN HFA) 108 (90 Base) MCG/ACT inhaler Inhale 2 puffs into the lungs 4 times daily as needed for Wheezing 18 g 2    Lidocaine HCl 4 % CREA Apply topically       No current facility-administered medications for this visit.      Allergies   Allergen Reactions    Aspirin Anaphylaxis and Shortness Of Breath    Cefadroxil Shortness Of Breath    Cqguuwkl-Gojoprj-Hswoby [Fluocinolone] Shortness Of Breath    Ciprofloxacin Hcl Shortness Of Breath    Codeine Shortness Of Breath    Demerol Hcl [Meperidine] Shortness Of Breath    Doxycycline Nausea Only and Other (See Comments)    Glucosamine Shortness Of Breath, Diarrhea and Swelling     Other reaction(s): Respiratory Difficulty  \"lips swell up and get severe diarrhea\" - INCLUDES IODINE    Iodine Hives, Shortness Of Breath and Itching    Pcn [Penicillins] Anaphylaxis and Shortness Of Breath    Red Dye Shortness Of Breath    Shellfish-Derived Products Shortness Of Breath, Diarrhea and Swelling     \"lips swell up and get severe diarrhea\" - INCLUDES IODINE    Sulfa Antibiotics Hives, Shortness Of Breath, Itching and Rash     OK in small amounts, but larger amounts cause \"shortness of breath. \"    Yellow Dye Shortness Of Breath     Other reaction(s): Respiratory Difficulty    Yellow Dyes (Non-Tartrazine) Shortness Of Breath    Gadolinium Rash     Moderate allergic-like reaction consisting of diffuse urticaria to ProHance gadolinium-containing contrast material    Gadolinium Derivatives Rash     Moderate allergic-like reaction consisting of diffuse urticaria to ProHance gadolinium-containing contrast material    Lac Bovis     Midazolam Nausea Only     Versed caused bad nausea. Iodides Hives    Adhesive Tape Rash       Subjective:     Review of Systems   Constitutional: Negative. Objective:     /78   Pulse 78   Temp 97 °F (36.1 °C) (Infrared)   Ht 5' 4\" (1.626 m)   Wt 137 lb 9.6 oz (62.4 kg)   SpO2 98%   BMI 23.62 kg/m²   Physical Exam  Vitals and nursing note reviewed. Constitutional:       Appearance: Normal appearance. HENT:      Head: Normocephalic and atraumatic. Musculoskeletal:      Comments: Left hand slightly swollen, joints swollen, decreased ROM fingers and wrs  Slight increased warmth fingers, wrists on left. No redness   Neurological:      Mental Status: She is oriented to person, place, and time. Psychiatric:         Mood and Affect: Mood normal.         Behavior: Behavior normal.         Thought Content: Thought content normal.       Assessment:       Diagnosis Orders   1. Arthritis of left hand  RUBÉN Screen with Reflex    Cyclic Citrul Peptide Antibody, IgG           Plan:      No follow-ups on file. She needs to call the GI doctor about further suggestions for her irritable bowel syndrome. Continues hand exercises and elevating the hand at least twice a day for several minutes.   It does appear she has an arthritis flareup in her hand but check for RUBÉN and RA  Orders Placed This Encounter   Procedures    RUBÉN Screen with Reflex     Standing Status:   Future     Number of Occurrences:   1     Standing Expiration Date:   19/29/6317    Cyclic Citrul Peptide Antibody, IgG     No orders of the defined types were placed in this encounter. Reviewed medications and possibleside effects.        Electronically signed by Trini St MD on 10/12/2022 at 11:07 AM

## 2022-10-13 ENCOUNTER — HOSPITAL ENCOUNTER (OUTPATIENT)
Dept: PHYSICAL THERAPY | Age: 67
Setting detail: THERAPIES SERIES
Discharge: HOME | End: 2022-10-13
Payer: MEDICARE

## 2022-10-13 LAB
ANTI DNA DOUBLE STRANDED: <0.5 IU/ML
ANTI-NUCLEAR ANTIBODY (ANA): NEGATIVE
CCP IGG ANTIBODIES: <0.4 U/ML (ref 0–7)
ENA ANTIBODIES SCREEN: 0.2 U/ML

## 2022-10-13 PROCEDURE — 97110 THERAPEUTIC EXERCISES: CPT

## 2022-10-13 NOTE — FLOWSHEET NOTE
[facility]   [address]   Phone: [phone]   Fax: [phone]    Physical Therapy Daily Treatment Note/Discharge Note       Date:  10/13/2022  Patient Name:  Tabatha Gregorio    :  1955  MRN: [id number]  Physician: Suresh Finley MD                               Insurance: Medicare/[employer] [place]   Medical Diagnosis: S42.295D (ICD-10-CM) - Other closed nondisplaced fracture of proximal end of left humerus with routine healing, subsequent encounter     Rehab Codes: R25 pain , M25.60 stiffness, R53.1 weakness, R60.9 Edema  Onset Date: 22               Next 's appt: 22- Dr. Chace Brizuela   Visit# / total visits: 33/36  Cancels/No Shows: 0/0    Precautions:  [place]    Subjective:  Patient reporting to therapy stating today will be her last session.     Pain:  [] Yes  [x] No     Location: L shoulder   denies/10  Pain altered Tx:  [x] No  [] Yes  Action:  Comments:    Objective:     Range of Motion  Left 22 Range of Motion  Left 22 Range of motion   Left 22 Range of Motion  Left 2022 Strength   Left 22 Strength  Left 22 Strength  L 10/13/22   Shoulder Flexion P: 80 deg  A: 95  P: 118 A: 110  P: 135   (Sitting) A:113  P 137 3+ 4 5-   Shoulder Abduction 40 deg A: 73  P: 90  A: 110  P:130   (Sitting) A: 97  P: 118 3 3 4+   Shoulder Extension 10 deg  A: 40  P: 50 A: 55 A 55 4+ 4+ 5   Shoulder ER  Arm at side 15 (+p)  A: 44   P: 50 A; 60   Apley: (C5)    3+ 3+ 5-   Shoulder IR  Arm at side 40 (+p)  45  (apley: lateral glute)  Apley: Midline sacrum   4- 4+ 4+    cross body reachin     Top of R shoulder          Elbow Flexion 140 145     4 4+ 5   Elbow Extension 0 0     4+ 4 5     Exercises:  MODALITIES  Time/reps Weight/ Level Comments  Completed    INTERVENTION       UBE: F/B  3/3' Level 4 Inc resistance  x   Pulleys: flexion/ abd 2' ea      IR towel stretch x15  Hold 5 With towel    Ball on wall: U/D & S/S x15 2# ball  Limited tolerance of only 16 reps with s/s due to shoulder pain x   Ball on wall circles 10x CW CCW  2# ball   x   PROM -- L shoulder flexion, abuduction, ER at 45*, scaption  8'  9/14- performed prior to measurments    Posterior capsule stretch x5 Hold 10s      IR with TB 2x15 green towel under arm to maintain position;  x   ER with TB  2x15 green  towel under arm to maintain position 10/13 20x due to pain x   ER with cane x20  In front of mirrior for feedback; towel under arm for more pure ER     Pull outs laterally 2x15 ea Yellow loop  Hands on wall reaching laterally. Most pain/discomfort in her wrist x   Wall surrenders 10x  Limited by feeling claustrophobic    Overhead liftoffs from max flexion 10x2   x   Tricep press 15x2 3\" green Cue for form to target triceps  x   Rows/extension 15x2 green 10/13 20x x   Bicep curls  15x2 green Standing -- TB as it is easier on the wrist     Shoulder flexion, scaption, ABD 10x 2# AW Therapist assisting to maximize reps. Prone IYTA  10x 1# DB bilat     Modified push ups 10x2  Done on wall due to low back pain     Horizontal ABD  2x15 Red  Motion improves           Wrist program - L        Wrist flex 2x10 2#      Wrist ext 2x10 2#     Pronation/supination 2x10 2# Stabilize arm to isolate pronation & supination     I strip to ulnar side   For pain control           Other:  10/6: Spent time reviewing a comprehensive home program with old and new exercises. Made notes and reviewed in depth to ensure understanding. --see home program     8/25 shoulder Iso added to HEP in all directions. Assessment: [x] Progressing toward goals. Focused this session primarily on reviewing patient's posture and technique when performing HEP at home as today is pt's last session. Patient  requesting throughout session to decrease reps with exercises due to increase in pain in shoulder and due to fatigue. Completed strength measurements as recorded above.   Patient reported feeling sore at the end of session. Assessment from Primary PT -- Leyla Jean Baptiste, PT: Pt requesting to self-discharge at this time to home program. Feel this is appropriate based on progress and need to just build strength. She has completed 33 total visits of PT and make good progress. Notes minimal decreased function. Dealing more with pain and soreness which is to be expected with healing. She should continue to progress with home program. At this time will close this episode of care.         GOALS -   STG: (to be met in 8 treatments)  Pt will self report worst pain no greater than 3/10 in order to better tolerate ADLs/work activities with minimal dysfunction   - 7/13: Met -- 3/10 noted today     -9/13: MET no pain noted   Pt will improve PROM in L shoulder to >160 deg flexion and abduction to improve her ability to reach overhead.   -7/13: progressing -- improves by 38 deg to 118 deg     - 8/8: progressing -- improves to 135 deg flexion & 130 deg abduction (~17 deg flexion improvement & 40 deg abd improvement)    -9/13: plateau in progress; similar to last measures ,slight regression in ABD likely related to dec compliance with HEP   10/13: plateau in progress but is functional   Pt will improve AROM in L shoulder flexion and abduction to >/= 90 deg in order to demonstrate ability in progressing reach in all planes to complete ADLs.   - 7/13: progressing -- reaches 90 deg actively flexion, 73 deg actively abd   - 8/8: MET -- reaches 110 deg for each actively  -9/13: continues to meet   10/13: MET   Pt will report no feelings of stiffness in the L hand with  indicated controlled swelling.    -7/13: MET -- no stiff reported   LTG: (to be met by end of POC) --   Pt will improve Apley reach bilaterally to posterior shoulder, ER to T3 and IR to L4 for efficient dressing at prior level   -8/8: progressing-- cross body reach to top of shoulder, ER to C6, IR to sacrum    -9/13: limited in progression likely due to how fracture is healing    10/13: partially MET   Pt will demonstrate improved L UE strength to 4+/5 or greater in order to demonstrate improved stability/strength necessary for unrestricted ADLs.   -8/8: progressing -- still limited in strength of the L shoulder and elbow    -9/13: progressing -- still weak in L shoulder flexion, L shoulder abduction, L shoulder ER, and L triceps   10/13: MET   Pt will increase score on UEFS to >/= 65/80 in order to demonstrate improved functional tolerances with ADLs with minimal restriction/dysfunction   - 8/8: progressing 61/80 this date    -9/13: regression in score to 54/80 likely due to more awareness of deficits    10/13; unable to assess due to early DC   Pt will demonstrate independence with a long term HEP for continued progress/maintenance after completion of PT   - 8/8: meeting -- noting good compliance with HEP    - 9/13: decreased compliance as of late but pt is verbalizing plans to be more consistent  -10/13: MET . 5. NEW GOAL 8/8: Pt will actively be able to elevate her L arm to >130 deg flexion and abduction to improve ability to lift & reach overhead.     -9/13: progressing -- active flexion 113, active abd 97    10/13: NOT MET       Pt. Education:  [x] Yes  [] No  [x] Reviewed Prior HEP/Ed  Method of Education: [x] Verbal  [] Demo  [x] Written  Comprehension of Education:  [x] Verbalizes understanding. [] Demonstrates understanding. [x] Needs review. [x] Demonstrates/verbalizes HEP/Ed previously given. PLAN:   - 10/13/22- pt requesting to discharge after today's session.  --Discharge            Treatment Charges: Mins Units   []  Modalities- cold pack     [x]  Ther Exercise 43 3   []  Manual Therapy     []  Ther Activities     []  Aquatics     []  Neuromuscular     [] Vasocompression     [] Gait Training     [] Dry needling        [] 1 or 2 muscles        [] 3 or more muscles     []  Other     Total Treatment time 43 3   ** KX modifier needed** Time In: 1891   Time Out: 1118     Electronically signed by:  Rin Sellers PTA      Edited by Esther Vaughn, PT, DPT 10/17/22 for discharge.

## 2022-10-17 ENCOUNTER — APPOINTMENT (OUTPATIENT)
Dept: PHYSICAL THERAPY | Age: 67
End: 2022-10-17
Payer: MEDICARE

## 2022-10-21 ENCOUNTER — OFFICE VISIT (OUTPATIENT)
Dept: PRIMARY CARE CLINIC | Age: 67
End: 2022-10-21
Payer: MEDICARE

## 2022-10-21 VITALS
HEIGHT: 64 IN | HEART RATE: 56 BPM | BODY MASS INDEX: 23.7 KG/M2 | OXYGEN SATURATION: 99 % | WEIGHT: 138.8 LBS | DIASTOLIC BLOOD PRESSURE: 74 MMHG | SYSTOLIC BLOOD PRESSURE: 126 MMHG

## 2022-10-21 DIAGNOSIS — L29.9 ITCHING: Primary | ICD-10-CM

## 2022-10-21 DIAGNOSIS — N76.3 SUBACUTE VULVITIS: ICD-10-CM

## 2022-10-21 DIAGNOSIS — E53.8 B12 DEFICIENCY: ICD-10-CM

## 2022-10-21 PROCEDURE — G8400 PT W/DXA NO RESULTS DOC: HCPCS | Performed by: FAMILY MEDICINE

## 2022-10-21 PROCEDURE — 1090F PRES/ABSN URINE INCON ASSESS: CPT | Performed by: FAMILY MEDICINE

## 2022-10-21 PROCEDURE — 99213 OFFICE O/P EST LOW 20 MIN: CPT | Performed by: FAMILY MEDICINE

## 2022-10-21 PROCEDURE — 1123F ACP DISCUSS/DSCN MKR DOCD: CPT | Performed by: FAMILY MEDICINE

## 2022-10-21 PROCEDURE — G8420 CALC BMI NORM PARAMETERS: HCPCS | Performed by: FAMILY MEDICINE

## 2022-10-21 PROCEDURE — G8427 DOCREV CUR MEDS BY ELIG CLIN: HCPCS | Performed by: FAMILY MEDICINE

## 2022-10-21 PROCEDURE — 1036F TOBACCO NON-USER: CPT | Performed by: FAMILY MEDICINE

## 2022-10-21 PROCEDURE — G8484 FLU IMMUNIZE NO ADMIN: HCPCS | Performed by: FAMILY MEDICINE

## 2022-10-21 PROCEDURE — 3017F COLORECTAL CA SCREEN DOC REV: CPT | Performed by: FAMILY MEDICINE

## 2022-10-21 RX ORDER — CYANOCOBALAMIN 1000 UG/ML
1000 INJECTION INTRAMUSCULAR; SUBCUTANEOUS ONCE
Status: CANCELLED | OUTPATIENT
Start: 2022-10-21 | End: 2022-10-21

## 2022-10-21 NOTE — PROGRESS NOTES
Kenya Barrientos is a 69 y.o. femalewho presents today for her medical conditions/complaints as noted below. Chief Complaint   Patient presents with    Yeast Infection     Pt c/o yeast infection for x1 - 1.5 weeks. Pruritis     Pt c/o all over body itchiness. X5 months         HPI:     HPI    Uses dove soap and shampoo    Itches a lot since May  All day and night off and on  Scalp itch  Worse during anxiety or stress. Uses eczema cream and ( has ginger root extract in it ) she is allergic to ginger    Current Outpatient Medications   Medication Sig Dispense Refill    Cyanocobalamin 1000 MCG SUBL Place 1,000 mcg under the tongue once a week 4 tablet 3    levothyroxine (SYNTHROID) 50 MCG tablet Take 2 tablets by mouth Daily split up during the day 180 tablet 1    clotrimazole-betamethasone (LOTRISONE) 1-0.05 % cream Apply topically 2 times daily. 45 g 0    nystatin (MYCOSTATIN) 443300 UNIT/GM cream Apply topically 2 times daily as needed for Dry Skin Apply topically 2 times daily. 30 g 1    albuterol sulfate HFA (VENTOLIN HFA) 108 (90 Base) MCG/ACT inhaler Inhale 2 puffs into the lungs 4 times daily as needed for Wheezing 18 g 2    Lidocaine HCl 4 % CREA Apply topically       No current facility-administered medications for this visit.      Allergies   Allergen Reactions    Aspirin Anaphylaxis and Shortness Of Breath    Cefadroxil Shortness Of Breath    Kypmdidr-Sflhcmc-Jzbafs [Fluocinolone] Shortness Of Breath    Ciprofloxacin Hcl Shortness Of Breath    Codeine Shortness Of Breath    Demerol Hcl [Meperidine] Shortness Of Breath    Doxycycline Nausea Only and Other (See Comments)    Glucosamine Shortness Of Breath, Diarrhea and Swelling     Other reaction(s): Respiratory Difficulty  \"lips swell up and get severe diarrhea\" - INCLUDES IODINE    Iodine Hives, Shortness Of Breath and Itching    Pcn [Penicillins] Anaphylaxis and Shortness Of Breath    Red Dye Shortness Of Breath    Shellfish-Derived Products Shortness Of Breath, Diarrhea and Swelling     \"lips swell up and get severe diarrhea\" - INCLUDES IODINE    Sulfa Antibiotics Hives, Shortness Of Breath, Itching and Rash     OK in small amounts, but larger amounts cause \"shortness of breath. \"    Yellow Dye Shortness Of Breath     Other reaction(s): Respiratory Difficulty    Yellow Dyes (Non-Tartrazine) Shortness Of Breath    Gadolinium Rash     Moderate allergic-like reaction consisting of diffuse urticaria to ProHance gadolinium-containing contrast material    Gadolinium Derivatives Rash     Moderate allergic-like reaction consisting of diffuse urticaria to ProHance gadolinium-containing contrast material    Lac Bovis     Midazolam Nausea Only     Versed caused bad nausea. Iodides Hives    Adhesive Tape Rash       Subjective:     Review of Systems  Itching snice may   Genital rash getting better. Using lotrisone. Allergic to corn. Has corn field near her house  Objective:     /74   Pulse 56   Ht 5' 4\" (1.626 m)   Wt 138 lb 12.8 oz (63 kg)   SpO2 99%   BMI 23.82 kg/m²   Physical Exam  Vitals and nursing note reviewed. Constitutional:       Appearance: Normal appearance. HENT:      Head: Normocephalic and atraumatic. Skin:     General: Skin is warm. Comments: Trunk and extremities examined for any rash, no rashes seen. She does have dermatographism  Scalp does not have any rash. Neurological:      Mental Status: She is alert and oriented to person, place, and time. Psychiatric:         Mood and Affect: Mood normal.         Behavior: Behavior normal.         Thought Content: Thought content normal.       Assessment:       Diagnosis Orders   1. Itching        2. B12 deficiency  Cyanocobalamin 1000 MCG SUBL      3. Subacute vulvitis             Plan:      No follow-ups on file. No hot baths or showers and make them short and lukewarm  Aveeno soap. Stop B12 shots since it has red dye. Take allergy pill daily.    She should stop the current eczema cream since it has a gingerroot extract in it. Use Lotrisone for groin rash for up to 2 weeks and then off for a week   No orders of the defined types were placed in this encounter. Orders Placed This Encounter   Medications    Cyanocobalamin 1000 MCG SUBL     Sig: Place 1,000 mcg under the tongue once a week     Dispense:  4 tablet     Refill:  3      Reviewed medications and possibleside effects.        Electronically signed by Jayden Nieto MD on 10/21/2022 at 11:31 AM

## 2022-10-27 ENCOUNTER — APPOINTMENT (OUTPATIENT)
Dept: PHYSICAL THERAPY | Age: 67
End: 2022-10-27
Payer: MEDICARE

## 2022-11-14 DIAGNOSIS — E03.9 HYPOTHYROIDISM, UNSPECIFIED TYPE: ICD-10-CM

## 2022-11-14 RX ORDER — LEVOTHYROXINE SODIUM 0.05 MG/1
100 TABLET ORAL DAILY
Qty: 180 TABLET | Refills: 1 | Status: SHIPPED | OUTPATIENT
Start: 2022-11-14

## 2022-11-14 RX ORDER — ALBUTEROL SULFATE 90 UG/1
2 AEROSOL, METERED RESPIRATORY (INHALATION) 4 TIMES DAILY PRN
Qty: 18 G | Refills: 2 | Status: SHIPPED | OUTPATIENT
Start: 2022-11-14

## 2022-11-14 NOTE — TELEPHONE ENCOUNTER
----- Message from Bonny Lomeli sent at 11/14/2022  8:57 AM EST -----  Subject: Refill Request    QUESTIONS  Name of Medication? levothyroxine (SYNTHROID) 50 MCG tablet  Patient-reported dosage and instructions? 50 mcg 2 tablets daily  How many days do you have left? 5  Preferred Pharmacy? [facility] #[id number]  Pharmacy phone number (if available)? [phone]  ---------------------------------------------------------------------------  --------------,  Name of Medication? albuterol sulfate HFA (VENTOLIN HFA) 108 (90 Base)   MCG/ACT inhaler  Patient-reported dosage and instructions? as needed,  How many days do you have left? 0  Preferred Pharmacy? [facility] [id number]  Pharmacy phone number (if available)? [phone]  ---------------------------------------------------------------------------  --------------  CALL BACK INFO  What is the best way for the office to contact you? OK to leave message on   voicemail  Preferred Call Back Phone Number? [phone]  ---------------------------------------------------------------------------  --------------  SCRIPT ANSWERS  Relationship to Patient?  Self

## 2022-11-22 ENCOUNTER — OFFICE VISIT (OUTPATIENT)
Dept: PRIMARY CARE CLINIC | Age: 67
End: 2022-11-22
Payer: MEDICARE

## 2022-11-22 ENCOUNTER — HOSPITAL ENCOUNTER (OUTPATIENT)
Dept: GENERAL RADIOLOGY | Age: 67
Discharge: HOME | End: 2022-11-24
Payer: MEDICARE

## 2022-11-22 ENCOUNTER — HOSPITAL ENCOUNTER (OUTPATIENT)
Age: 67
Discharge: HOME | End: 2022-11-24
Payer: MEDICARE

## 2022-11-22 VITALS
BODY MASS INDEX: 23.73 KG/M2 | DIASTOLIC BLOOD PRESSURE: 78 MMHG | OXYGEN SATURATION: 98 % | WEIGHT: 139 LBS | HEIGHT: 64 IN | SYSTOLIC BLOOD PRESSURE: 124 MMHG | HEART RATE: 88 BPM

## 2022-11-22 DIAGNOSIS — L29.9 ITCHING: ICD-10-CM

## 2022-11-22 DIAGNOSIS — N76.3 SUBACUTE VULVITIS: ICD-10-CM

## 2022-11-22 DIAGNOSIS — R05.1 ACUTE COUGH: ICD-10-CM

## 2022-11-22 DIAGNOSIS — E53.8 B12 DEFICIENCY: ICD-10-CM

## 2022-11-22 DIAGNOSIS — L20.9 ATOPIC DERMATITIS, UNSPECIFIED TYPE: Primary | ICD-10-CM

## 2022-11-22 PROCEDURE — G8400 PT W/DXA NO RESULTS DOC: HCPCS | Performed by: FAMILY MEDICINE

## 2022-11-22 PROCEDURE — G8420 CALC BMI NORM PARAMETERS: HCPCS | Performed by: FAMILY MEDICINE

## 2022-11-22 PROCEDURE — G8484 FLU IMMUNIZE NO ADMIN: HCPCS | Performed by: FAMILY MEDICINE

## 2022-11-22 PROCEDURE — 1090F PRES/ABSN URINE INCON ASSESS: CPT | Performed by: FAMILY MEDICINE

## 2022-11-22 PROCEDURE — G8427 DOCREV CUR MEDS BY ELIG CLIN: HCPCS | Performed by: FAMILY MEDICINE

## 2022-11-22 PROCEDURE — 1123F ACP DISCUSS/DSCN MKR DOCD: CPT | Performed by: FAMILY MEDICINE

## 2022-11-22 PROCEDURE — 99213 OFFICE O/P EST LOW 20 MIN: CPT | Performed by: FAMILY MEDICINE

## 2022-11-22 PROCEDURE — 3017F COLORECTAL CA SCREEN DOC REV: CPT | Performed by: FAMILY MEDICINE

## 2022-11-22 PROCEDURE — 71046 X-RAY EXAM CHEST 2 VIEWS: CPT

## 2022-11-22 PROCEDURE — 1036F TOBACCO NON-USER: CPT | Performed by: FAMILY MEDICINE

## 2022-11-22 RX ORDER — LEVOCETIRIZINE DIHYDROCHLORIDE 5 MG/1
5 TABLET, FILM COATED ORAL NIGHTLY
COMMUNITY

## 2022-11-22 RX ORDER — CLOTRIMAZOLE AND BETAMETHASONE DIPROPIONATE 10; .64 MG/G; MG/G
CREAM TOPICAL
Qty: 45 G | Refills: 0 | Status: SHIPPED | OUTPATIENT
Start: 2022-11-22

## 2022-11-22 NOTE — PROGRESS NOTES
[facility]  [address] [address]  Dept: [phone]    Jed Leigh is a 69 y.o. female Established patient, who presents today for her medical conditions/complaintsas noted below. Chief Complaint   Patient presents with    Rash     Pt has red [place] itchy rashes on her LT leg, RT ankle, and back. Pt states its on her vaginal area as well. Cough     Pt states when at home she can't stop coughing. She is trying a humidifier and its not working       HPI:     HPI  Left thigh lesion  Has had dry cough at night for 1 month.    Reviewed prior notes None  Reviewed previous Labs    LDL Cholesterol (mg/dL)   Date Value   12/22/2020 72   04/28/2020 106       (goal LDL is <100)   AST (U/L)   Date Value   09/04/2020 20     ALT (U/L)   Date Value   09/04/2020 16     BUN (mg/dL)   Date Value   06/07/2022 15     TSH (uIU/mL)   Date Value   07/22/2022 3.35     BP Readings from Last 3 Encounters:   11/22/22 124/78   10/21/22 126/74   10/12/22 128/78          (goal 120/80)    Past Medical History:   Diagnosis Date    Abdominal pain     RLQ    Abnormal computed tomography of abdomen and pelvis 04/16/2021    Distal appendix is mildly thickened without significant periappendiceal inflammatory change    Acquired von Willebrand's disease 10/30/2018    Allergy to food dye     yellow and red dyes    Anemia     Asthma     Basal cell carcinoma of upper lip 06/22/2017    Benign paroxysmal positional vertigo 12/09/2013    Cancer ([address]     Thyroid and skin    Cardiac murmur     STATES SINCE CHILD BILLY- STATES SHE WAS BORN WITH A \"90 DEGREE ANGLE Shruthi Liming"    Chest pain 09/04/2020    Cobalamin deficiency 12/26/2012    Diverticulitis     Diverticulosis     Gastroesophageal reflux disease 12/26/2012    Gastroparesis     Hiatal hernia     History of fractured kneecap     RIGHT    History of malignant neoplasm of thyroid 04/17/2012    History of squamous cell carcinoma in situ of skin 06/09/2014    Chart states left wrist, but patient denies    Hypoparathyroidism ([facility] [address] 07/03/2012    IBS (irritable bowel syndrome)     diarrhea-prominent type    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 Neoplasm of unspecified nature of bone, soft tissue, and skin     New lesions of right dorsum of hand (healed after Efudex) and lesion of right anterior thigh. 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 - thyroid was removed.      Vulvar dystrophy     Wears hearing aid     Wears partial dentures     LOWER      Past Surgical History:   Procedure Laterality Date    APPENDECTOMY  05/11/2021    BREAST SURGERY      Cyst removed rt. nipple     CATARACT REMOVAL Right 07/17/2019    Right eye    CHOLECYSTECTOMY  2011    COLONOSCOPY      COLONOSCOPY N/A 05/10/2021    COLONOSCOPY DIAGNOSTIC performed by Laendra Hernandez MD at [address] Bilateral     Tendonitis X2    GASTRIC FUNDOPLICATION      \"Chronic appearing postsurgical change to the stomach suggestive of prior Nissen fundoplication\"- noted per CT ABD/pelvis 4-16-21    HERNIA REPAIR  2005    hiatal hernia repair    HYSTERECTOMY, TOTAL ABDOMINAL (CERVIX REMOVED)  92-97    BAIRON 3 Surgeries    KNEE SURGERY Right 1996    W/PINS    LAPAROSCOPY N/A 5/11/2021    ATTEMPTED APPENDECTOMY LAPAROSCOPIC ROBOTIC CONVERTED TO , DIAGNOSTIC LAPARASCOPY performed by Justina James MD at [facility] N/A 5/11/2021    LAPAROTOMY LYSIS OF ADHESIONS -OPEN APPENDECTOMY, LYSIS OF SMALL BOWEL ADHESIONS FOR 75 MINUTES performed by Justina James MD at [facility] 2011    rt elbow-scc, rt shoulder-keratosis, lft leg skin with excoriation    NASAL POLYP SURGERY  2012    X2 in [address] Right     PINCHED [facility]    SKIN BIOPSY  2012    lft leg keratosis    SKIN BIOPSY  2009    rt forearm, under brst, lft leg-keratosis    SKIN BIOPSY  2008    back and rt forearm-keratosis, abdomen-hemangioma    SKIN BIOPSY  2002    rt breast, lft arm, lft brst-keratosis    SKIN CANCER EXCISION      THYROIDECTOMY  1985    Thyroid Cancer    TONSILLECTOMY AND ADENOIDECTOMY      UPPER GASTROINTESTINAL ENDOSCOPY      Noted per Care Everywhere       Family History   Problem Relation Age of Onset    Other Mother         pneumonia    Coronary Art Dis Mother     Liver Cancer Father         age 80    Coronary Art Dis Father     Liver Cancer Brother 59    Colon Cancer Brother         \"Bowel and liver CA\"    Diabetes Other         Father's side    Thyroid Disease Other         Mom's side       Social History     Tobacco Use    Smoking status: Never    Smokeless tobacco: Never   Substance Use Topics    Alcohol use: No      Current Outpatient Medications   Medication Sig Dispense Refill    levocetirizine (ALLERGY RELIEF) 5 MG tablet Take 5 mg by mouth nightly      Cyanocobalamin 1000 MCG SUBL Place 500 mcg under the tongue once a week 4 tablet 0    clotrimazole-betamethasone (LOTRISONE) 1-0.05 % cream Apply topically 2 times daily.  45 g 0    albuterol sulfate HFA (VENTOLIN HFA) 108 (90 Base) MCG/ACT inhaler Inhale 2 puffs into the lungs 4 times daily as needed for Wheezing 18 g 2    levothyroxine (SYNTHROID) 50 MCG tablet Take 2 tablets by mouth Daily split up during the day 180 tablet 1    nystatin (MYCOSTATIN) 805043 UNIT/GM cream Apply topically 2 times daily as needed for Dry Skin Apply topically 2 times daily. 30 g 1    Lidocaine HCl 4 % CREA Apply topically       No current facility-administered medications for this visit. Allergies   Allergen Reactions    Aspirin Anaphylaxis and Shortness Of Breath    Cefadroxil Shortness Of Breath    Jebqbjql-Hoyfjnv-Huxocv [Fluocinolone] Shortness Of Breath    Ciprofloxacin Hcl Shortness Of Breath    Codeine Shortness Of Breath    Demerol Hcl [Meperidine] Shortness Of Breath    Doxycycline Nausea Only and Other (See Comments)    Glucosamine Shortness Of Breath, Diarrhea and Swelling     Other reaction(s): Respiratory Difficulty  \"lips swell up and get severe diarrhea\" - INCLUDES IODINE    Iodine Hives, Shortness Of Breath and Itching    Pcn [Penicillins] Anaphylaxis and Shortness Of Breath    Red Dye Shortness Of Breath    Shellfish-Derived Products Shortness Of Breath, Diarrhea and Swelling     \"lips swell up and get severe diarrhea\" - INCLUDES IODINE    Sulfa Antibiotics Hives, Shortness Of Breath, Itching and Rash     OK in small amounts, but larger amounts cause \"shortness of breath. \"    Yellow Dye Shortness Of Breath     Other reaction(s): Respiratory Difficulty    Yellow Dyes (Non-Tartrazine) Shortness Of Breath    Gadolinium Rash     Moderate allergic-like reaction consisting of diffuse urticaria to ProHance gadolinium-containing contrast material    Gadolinium Derivatives Rash     Moderate allergic-like reaction consisting of diffuse urticaria to ProHance gadolinium-containing contrast material    Lac Bovis     Midazolam Nausea Only     Versed caused bad nausea.     Ginger Root     Iodides Hives    Adhesive Tape Rash       Health Maintenance   Topic Date Due    Pneumococcal 65+ years Vaccine (1 - PCV) Never done    DTaP/Tdap/Td vaccine (1 - Tdap) Never done    DEXA (modify frequency per FRAX score)  Never done    Flu vaccine (1) 10/12/2023 (Originally 8/1/2022)    Shingles vaccine (1 of 2) 04/28/2026 (Originally 10/3/2003)    Annual Wellness Visit (AWV)  03/25/2023    Depression Screen  10/12/2023    Breast cancer screen  11/17/2023    Lipids  12/22/2025    Colorectal Cancer Screen  05/10/2031    COVID-19 Vaccine  Completed    Hepatitis C screen  Completed    Hepatitis A vaccine  Aged Out    Hib vaccine  Aged Out    Meningococcal (ACWY) vaccine  Aged Out       Subjective:      Review of Systems  Coughs at night since dry winter air x 1 month. Cough syrup bothers her. Has a humidifier. Has dry itchy skin  kathleen     Objective:     /78   Pulse 88   Ht 5' 4\" (1.626 m)   Wt 139 lb (63 kg)   SpO2 98%   BMI 23.86 kg/m²   Physical Exam  Vitals and nursing note reviewed. Constitutional:       General: She is not in acute distress. Appearance: Normal appearance. She is well-developed. She is not ill-appearing. HENT:      Head: Normocephalic and atraumatic. Right Ear: Tympanic membrane, ear canal and external ear normal.      Left Ear: External ear normal. There is impacted cerumen. Mouth/Throat:      Mouth: Mucous membranes are moist.      Pharynx: No oropharyngeal exudate or posterior oropharyngeal erythema. Eyes:      General: No scleral icterus. Right eye: No discharge. Left eye: No discharge. Conjunctiva/sclera: Conjunctivae normal.   Neck:      Thyroid: No thyromegaly. Trachea: No tracheal deviation. Cardiovascular:      Rate and Rhythm: Normal rate and regular rhythm. Heart sounds: Normal heart sounds. Pulmonary:      Effort: Pulmonary effort is normal. No respiratory distress. Breath sounds: Normal breath sounds. No wheezing. Genitourinary:     Comments: Vulva : slightly pink and groin creases  Lymphadenopathy:      Cervical: No cervical adenopathy. Skin:     General: Skin is warm. Findings: Lesion and rash present. Comments: Left medial upper thigh: dry skin lesion red, dry, circular about 2 cm. Some mild clearing in center. Lower back tan dry mole, slightly raised. Dry skin   Neurological:      Mental Status: She is alert and oriented to person, place, and time. Psychiatric:         Mood and Affect: Mood normal.         Behavior: Behavior normal.         Thought Content: Thought content normal.       Assessment:       Diagnosis Orders   1. Atopic dermatitis, unspecified type  clotrimazole-betamethasone (LOTRISONE) 1-0.05 % cream      2. Itching        3. Acute cough  XR CHEST (2 VW)      4. B12 deficiency  Cyanocobalamin 1000 MCG SUBL      5. Subacute vulvitis  clotrimazole-betamethasone (LOTRISONE) 1-0.05 % cream           Plan:      Return if symptoms worsen or fail to improve. Left thigh area: lotrisone, cortisone, and good lotion. Ear wax drops on left and then come in for ear wash. If cough continues even with using a humidifier and chest x-ray is normal consider a PPI or H2 blocker  If left thigh lesion is not improved in 2-4 weeks, stop using all steroid-containing creams for 2 weeks and have [facility] do a biopsy  Orders Placed This Encounter   Procedures    XR CHEST (2 VW)     Standing Status:   Future     Standing Expiration Date:   11/22/2023       Orders Placed This Encounter   Medications    Cyanocobalamin 1000 MCG SUBL     Sig: Place 500 mcg under the tongue once a week     Dispense:  4 tablet     Refill:  0    clotrimazole-betamethasone (LOTRISONE) 1-0.05 % cream     Sig: Apply topically 2 times daily. Dispense:  45 g     Refill:  0       Patient given educationalmaterials - see patient instructions. Discussed use, benefit, and side effectsof prescribed medications. All patient questions answered. Pt voiced understanding. Reviewed health maintenance. Instructed to continue current medications, diet andexercise. Patient agreed with treatment plan.  Follow up as directed.      Electronicallysigned by Nikia Tuttle MD on 11/22/2022 at 11:50 AM

## 2022-11-28 ENCOUNTER — OFFICE VISIT (OUTPATIENT)
Dept: ORTHOPEDIC SURGERY | Age: 67
End: 2022-11-28

## 2022-11-28 VITALS — RESPIRATION RATE: 16 BRPM | BODY MASS INDEX: 23.73 KG/M2 | HEIGHT: 64 IN | WEIGHT: 139 LBS

## 2022-11-28 DIAGNOSIS — M79.642 LEFT HAND PAIN: ICD-10-CM

## 2022-11-28 DIAGNOSIS — S42.295D OTHER CLOSED NONDISPLACED FRACTURE OF PROXIMAL END OF LEFT HUMERUS WITH ROUTINE HEALING, SUBSEQUENT ENCOUNTER: Primary | ICD-10-CM

## 2022-11-28 PROCEDURE — 99024 POSTOP FOLLOW-UP VISIT: CPT | Performed by: ORTHOPAEDIC SURGERY

## 2022-11-28 NOTE — RESULT ENCOUNTER NOTE
Chest x-ray is okay except possibly showing  COPD.   Has she ever had a PFT, lung function test?  If not, she needs to have 1 to check on the possibility of COPD

## 2022-11-28 NOTE — PROGRESS NOTES
HPI: Ms. Coty Wilson is a 49-year-old approximately 6 months out from a left proximal humerus fracture that has been managed conservatively thus far. She states that she is feeling much better. She has no pain in her shoulder and her motion has improved. She has completed physical therapy but kept up with her exercises. She continues to have some swelling in her hand and states that it does not feel normal and she is not able to use it as she would like to. She denies having any numbness or tingling. Physical examination:  Evaluation the patient's left shoulder and upper extremity demonstrates intact skin without warmth, erythema, or notable swelling. She has approximately 105 degrees of active shoulder elevation and abduction with 75 degrees of external rotation and internal rotation to L4. Passively she has 115 degrees of shoulder elevation, 110 degrees of abduction and 80 degrees of external rotation. On the contralateral side she has 135 degrees of active shoulder elevation 145 degrees of abduction 85 degrees of external rotation internal rotation to T10. In the left shoulder she has 5/5 muscle strength with resisted deltoid, supraspinatus and internal rotation testing and 4/5 muscle strength with resisted external rotation testing. Imaging studies:  4 x-ray views of the left shoulder completed on 11/20/2022 were reviewed independently demonstrated complete consolidation across what appears to be a fracture through the surgical neck of the proximal humerus with varus angulation of the humeral head. No obvious dislocation or subluxation. Impression and plan: Ms. Coty Wilson is a 49-year-old 6 months out from a closed left proximal humerus fracture that appears to be healed both clinically and radiographically. At this time she was encouraged to keep up with her home exercise program and can continue to use this arm as she feels comfortable.   With regards to her hand which is still giving her some issues I did recommend some occupational therapy. A prescription was provided. She states that she will try to start this in the new year for insurance issues. Ultimately should she fail treatment with occupational therapy I would recommend further evaluation by hand specialist.  I will have her follow-up my clinic as needed but she may return or call at anytime with questions or concerns.

## 2022-11-29 ENCOUNTER — TELEPHONE (OUTPATIENT)
Dept: PRIMARY CARE CLINIC | Age: 67
End: 2022-11-29

## 2022-11-29 DIAGNOSIS — R93.89 ABNORMAL CXR: ICD-10-CM

## 2022-11-29 DIAGNOSIS — R05.3 CHRONIC COUGH: Primary | ICD-10-CM

## 2022-11-29 NOTE — TELEPHONE ENCOUNTER
Patient would like to schedule NV to have ears flushed. Patient states she has been using ear drops.      Ok to schedule for NV?

## 2022-11-30 NOTE — TELEPHONE ENCOUNTER
Called patient - got vm -     Looks like patient is scheduled already 12/13 for this - I have added note per Izzy Callahan

## 2022-12-06 ENCOUNTER — HOSPITAL ENCOUNTER (OUTPATIENT)
Dept: PULMONOLOGY | Age: 67
Discharge: HOME | End: 2022-12-06
Payer: MEDICARE

## 2022-12-06 DIAGNOSIS — R93.89 ABNORMAL CXR: ICD-10-CM

## 2022-12-06 DIAGNOSIS — R05.3 CHRONIC COUGH: ICD-10-CM

## 2022-12-06 LAB
DLCO %PRED: NORMAL
DLCO PRED: NORMAL
DLCO/VA %PRED: NORMAL
DLCO/VA PRED: NORMAL
DLCO/VA: NORMAL
DLCO: NORMAL
EXPIRATORY TIME: NORMAL
FEF 25-75% %PRED-PRE: NORMAL
FEF 25-75% PRED: NORMAL
FEF 25-75%-PRE: NORMAL
FEV1 %PRED-PRE: NORMAL
FEV1 PRED: NORMAL
FEV1/FVC %PRED-PRE: NORMAL
FEV1/FVC PRED: NORMAL
FEV1/FVC: NORMAL
FEV1: NORMAL
FVC %PRED-PRE: NORMAL
FVC PRED: NORMAL
FVC: NORMAL
GAW %PRED: NORMAL
GAW PRED: NORMAL
GAW: NORMAL
IC %PRED: NORMAL
IC PRED: NORMAL
IC: NORMAL
MVV %PRED-PRE: NORMAL
MVV PRED: NORMAL
MVV-PRE: NORMAL
PEF %PRED-PRE: NORMAL
PEF PRED: NORMAL
PEF-PRE: NORMAL
RAW %PRED: NORMAL
RAW PRED: NORMAL
RAW: NORMAL
RV %PRED: NORMAL
RV PRED: NORMAL
RV: NORMAL
SVC %PRED: NORMAL
SVC PRED: NORMAL
SVC: NORMAL
TLC %PRED: NORMAL
TLC PRED: NORMAL
TLC: NORMAL
VA %PRED: NORMAL
VA PRED: NORMAL
VA: NORMAL
VTG %PRED: NORMAL
VTG PRED: NORMAL
VTG: NORMAL

## 2022-12-06 PROCEDURE — 6370000000 HC RX 637 (ALT 250 FOR IP): Performed by: ORTHOPAEDIC SURGERY

## 2022-12-06 PROCEDURE — 94664 DEMO&/EVAL PT USE INHALER: CPT

## 2022-12-06 PROCEDURE — 94375 RESPIRATORY FLOW VOLUME LOOP: CPT

## 2022-12-06 PROCEDURE — 94060 EVALUATION OF WHEEZING: CPT

## 2022-12-06 PROCEDURE — 94729 DIFFUSING CAPACITY: CPT

## 2022-12-06 RX ORDER — ALBUTEROL SULFATE 90 UG/1
2 AEROSOL, METERED RESPIRATORY (INHALATION)
Status: COMPLETED | OUTPATIENT
Start: 2022-12-06 | End: 2022-12-06

## 2022-12-06 RX ADMIN — ALBUTEROL SULFATE 2 PUFF: 90 AEROSOL, METERED RESPIRATORY (INHALATION) at 10:43

## 2022-12-06 NOTE — PROGRESS NOTES
Addie Briggs was ordered for a full pft test today. Addie Briggs became very upset and stated that she was very claustrophobic and could not even sit in the body box with it open due to distress. We were able to complete most of the testing but we could not do anything with the body box. Attempted times 2. Patient was unsure that she would be able to complete any testing due to nerves but successfully completed DLCO and spirometry with good effort.

## 2022-12-06 NOTE — PROCEDURES
Pulmonary function report    . Showed suboptimal effort and perhaps inaccurate tracings. Having said this, spirometry is notable for a moderate restrictive defect. There is improvement with Bronchodilator therapy. Static lung volumes are notable for a mild restrictive defect. The DLCO normalizes with respect to reveal about. Summary,    Although spirometry may be invalid, there is evidence of a mild restrictive defect with reversibility. The DLCO is normal. Clinical correlation recommended.     Jacob Orellana M.D.

## 2022-12-13 ENCOUNTER — NURSE ONLY (OUTPATIENT)
Dept: PRIMARY CARE CLINIC | Age: 67
End: 2022-12-13
Payer: MEDICARE

## 2022-12-13 DIAGNOSIS — E05.00 GRAVES' DISEASE WITH EXOPHTHALMOS: ICD-10-CM

## 2022-12-13 DIAGNOSIS — H61.22 HEARING LOSS OF LEFT EAR DUE TO CERUMEN IMPACTION: Primary | ICD-10-CM

## 2022-12-13 PROCEDURE — 1123F ACP DISCUSS/DSCN MKR DOCD: CPT | Performed by: FAMILY MEDICINE

## 2022-12-13 PROCEDURE — 99212 OFFICE O/P EST SF 10 MIN: CPT | Performed by: FAMILY MEDICINE

## 2022-12-13 NOTE — PROGRESS NOTES
Left ear cerumen impaction  Has bilateral hearing aids  Right ear has minimal wax near the eardrum. Patient requests referral to Dr. Krystin Ingram  for her eye disease due to [facility]' ds  Patient states she is getting sinus pressure especially with sneezing. She wants to know what she should do should. She can try using plain Mucinex    Left ear syringing done by MA and again by Dr Merlin Searles. The cerumen was removed by the curette. Canal was clear. Patient tolerated procedure well except for coughing a lot. Use ear wax drops monthly in both ears.

## 2023-01-13 ENCOUNTER — TELEPHONE (OUTPATIENT)
Dept: PRIMARY CARE CLINIC | Age: 68
End: 2023-01-13

## 2023-01-13 ENCOUNTER — HOSPITAL ENCOUNTER (OUTPATIENT)
Age: 68
Discharge: HOME | End: 2023-01-13
Payer: MEDICARE

## 2023-01-13 ENCOUNTER — OFFICE VISIT (OUTPATIENT)
Dept: PRIMARY CARE CLINIC | Age: 68
End: 2023-01-13
Payer: MEDICARE

## 2023-01-13 VITALS
HEART RATE: 71 BPM | DIASTOLIC BLOOD PRESSURE: 68 MMHG | HEIGHT: 64 IN | WEIGHT: 138.6 LBS | BODY MASS INDEX: 23.66 KG/M2 | OXYGEN SATURATION: 97 % | SYSTOLIC BLOOD PRESSURE: 130 MMHG

## 2023-01-13 DIAGNOSIS — E83.51 HYPOCALCEMIA: ICD-10-CM

## 2023-01-13 DIAGNOSIS — J45.30 MILD PERSISTENT ASTHMA WITHOUT COMPLICATION: Chronic | ICD-10-CM

## 2023-01-13 DIAGNOSIS — M19.042 ARTHRITIS OF LEFT HAND: ICD-10-CM

## 2023-01-13 DIAGNOSIS — K31.84 GASTROPARESIS: ICD-10-CM

## 2023-01-13 DIAGNOSIS — K59.1 FUNCTIONAL DIARRHEA: Primary | ICD-10-CM

## 2023-01-13 DIAGNOSIS — E03.9 HYPOTHYROIDISM, UNSPECIFIED TYPE: ICD-10-CM

## 2023-01-13 DIAGNOSIS — E20.9 HYPOPARATHYROIDISM, UNSPECIFIED HYPOPARATHYROIDISM TYPE (HCC): ICD-10-CM

## 2023-01-13 DIAGNOSIS — D69.6 THROMBOCYTOPENIA (HCC): ICD-10-CM

## 2023-01-13 PROBLEM — F40.240 CLAUSTROPHOBIA: Chronic | Status: ACTIVE | Noted: 2023-01-13

## 2023-01-13 LAB
CALCIUM IONIZED: 0.92 MMOL/L (ref 1.1–1.33)
THYROXINE, FREE: 0.8 NG/DL (ref 0.93–1.7)
TSH SERPL DL<=0.05 MIU/L-ACNC: 4.23 UIU/ML (ref 0.3–5)

## 2023-01-13 PROCEDURE — 86038 ANTINUCLEAR ANTIBODIES: CPT

## 2023-01-13 PROCEDURE — 1036F TOBACCO NON-USER: CPT | Performed by: FAMILY MEDICINE

## 2023-01-13 PROCEDURE — 86225 DNA ANTIBODY NATIVE: CPT

## 2023-01-13 PROCEDURE — 3017F COLORECTAL CA SCREEN DOC REV: CPT | Performed by: FAMILY MEDICINE

## 2023-01-13 PROCEDURE — G8484 FLU IMMUNIZE NO ADMIN: HCPCS | Performed by: FAMILY MEDICINE

## 2023-01-13 PROCEDURE — 36415 COLL VENOUS BLD VENIPUNCTURE: CPT

## 2023-01-13 PROCEDURE — 86200 CCP ANTIBODY: CPT

## 2023-01-13 PROCEDURE — 82330 ASSAY OF CALCIUM: CPT

## 2023-01-13 PROCEDURE — 84439 ASSAY OF FREE THYROXINE: CPT

## 2023-01-13 PROCEDURE — G8400 PT W/DXA NO RESULTS DOC: HCPCS | Performed by: FAMILY MEDICINE

## 2023-01-13 PROCEDURE — 1090F PRES/ABSN URINE INCON ASSESS: CPT | Performed by: FAMILY MEDICINE

## 2023-01-13 PROCEDURE — 99214 OFFICE O/P EST MOD 30 MIN: CPT | Performed by: FAMILY MEDICINE

## 2023-01-13 PROCEDURE — G8420 CALC BMI NORM PARAMETERS: HCPCS | Performed by: FAMILY MEDICINE

## 2023-01-13 PROCEDURE — 1123F ACP DISCUSS/DSCN MKR DOCD: CPT | Performed by: FAMILY MEDICINE

## 2023-01-13 PROCEDURE — 84443 ASSAY THYROID STIM HORMONE: CPT

## 2023-01-13 PROCEDURE — G8427 DOCREV CUR MEDS BY ELIG CLIN: HCPCS | Performed by: FAMILY MEDICINE

## 2023-01-13 RX ORDER — DIPHENOXYLATE HYDROCHLORIDE AND ATROPINE SULFATE 2.5; .025 MG/1; MG/1
1 TABLET ORAL 4 TIMES DAILY PRN
Qty: 120 TABLET | Refills: 0 | Status: SHIPPED | OUTPATIENT
Start: 2023-01-13 | End: 2023-02-12

## 2023-01-13 RX ORDER — CLOBETASOL PROPIONATE 0.5 MG/G
CREAM TOPICAL
Qty: 30 G | Refills: 0 | Status: SHIPPED | OUTPATIENT
Start: 2023-01-13

## 2023-01-13 ASSESSMENT — PATIENT HEALTH QUESTIONNAIRE - PHQ9
2. FEELING DOWN, DEPRESSED OR HOPELESS: 0
DEPRESSION UNABLE TO ASSESS: PT REFUSES
1. LITTLE INTEREST OR PLEASURE IN DOING THINGS: 0
SUM OF ALL RESPONSES TO PHQ9 QUESTIONS 1 & 2: 0
SUM OF ALL RESPONSES TO PHQ QUESTIONS 1-9: 0

## 2023-01-13 NOTE — PROGRESS NOTES
[facility] PRIMARY CARE  [address] [address]  Dept: [phone]    Titus Chaney is a 69 y.o. female Established patient, who presents today for her medical conditions/complaintsas noted below. Chief Complaint   Patient presents with    Diarrhea     Possibly from thyroid medication        HPI:     HPI  Patient states since the new batch of thyroid medicine she has diarrhea within 30 min of taking pills  She skipped thyroid pills the last 2 days. She refuses to go back on ALFREDO synthroid she states she gained weight when on it    She tried imodium AD and took 8 at one time and then got constipation  She tried 2 imodium AD at a time severe times a day and didn't work    Yeast medicine not helping the vulvar area  Nystatin cream not helping. Lotrisone cream tried in NOvember. Aveeno cream is helping.      She was on vybrezi and that helped her IBS but it was too expensive    Reviewed prior notes  GI  Reviewed previous Labs    LDL Cholesterol (mg/dL)   Date Value   12/22/2020 72   04/28/2020 106       (goal LDL is <100)   AST (U/L)   Date Value   09/04/2020 20     ALT (U/L)   Date Value   09/04/2020 16     BUN (mg/dL)   Date Value   06/07/2022 15     TSH (uIU/mL)   Date Value   07/22/2022 3.35     BP Readings from Last 3 Encounters:   01/13/23 130/68   11/22/22 124/78   10/21/22 126/74          (goal 120/80)    Past Medical History:   Diagnosis Date    Abdominal pain     RLQ    Abnormal computed tomography of abdomen and pelvis 04/16/2021    Distal appendix is mildly thickened without significant periappendiceal inflammatory change    Acquired von Willebrand's disease 10/30/2018    Allergy to food dye     yellow and red dyes    Anemia     Asthma     Basal cell carcinoma of upper lip 06/22/2017    Benign paroxysmal positional vertigo 12/09/2013    Cancer (HCC)     Thyroid and skin    Cardiac murmur     STATES SINCE CHILD BILLY- STATES SHE WAS BORN WITH A \"90 DEGREE ANGLE VALVE\"    Chest pain 09/04/2020    Cobalamin deficiency 12/26/2012    Diverticulitis     Diverticulosis     Gastroesophageal reflux disease 12/26/2012    Gastroparesis     Hiatal hernia     History of fractured kneecap     RIGHT    History of malignant neoplasm of thyroid 04/17/2012    History of squamous cell carcinoma in situ of skin 06/09/2014    Chart states left wrist, but patient denies    Hypoparathyroidism ([address] 07/03/2012    IBS (irritable bowel syndrome)     diarrhea-prominent type    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 Neoplasm of unspecified nature of bone, soft tissue, and skin     New lesions of right dorsum of hand (healed after Efudex) and lesion of right anterior thigh. 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 - thyroid was removed.      Vulvar dystrophy     Wears hearing aid     Wears partial dentures     LOWER      Past Surgical History:   Procedure Laterality Date    APPENDECTOMY  05/11/2021    BREAST SURGERY      Cyst removed rt. nipple     CATARACT REMOVAL Right 07/17/2019    Right eye    CHOLECYSTECTOMY  2011    COLONOSCOPY      COLONOSCOPY N/A 05/10/2021    COLONOSCOPY DIAGNOSTIC performed by Red Bourne MD at [address] Bilateral     Tendonitis X2 GASTRIC FUNDOPLICATION      \"Chronic appearing postsurgical change to the stomach suggestive of prior Nissen fundoplication\"- noted per CT ABD/pelvis 4-16-21    HERNIA REPAIR  2005    hiatal hernia repair    HYSTERECTOMY, TOTAL ABDOMINAL (CERVIX REMOVED)  92-97    BAIRON 3 Surgeries    KNEE SURGERY Right 1996    W/PINS    LAPAROSCOPY N/A 5/11/2021    ATTEMPTED APPENDECTOMY LAPAROSCOPIC ROBOTIC CONVERTED TO , DIAGNOSTIC LAPARASCOPY performed by Alexei Nur MD at [place] [address] 5/11/2021    LAPAROTOMY LYSIS OF ADHESIONS -OPEN APPENDECTOMY, LYSIS OF SMALL BOWEL ADHESIONS FOR 75 MINUTES performed by Alexei Nur MD at [place] [address]  2011    rt elbow-scc, rt shoulder-keratosis, lft leg skin with excoriation    NASAL POLYP SURGERY  2012    X2 in [address] Right     PINCHED [facility]    SKIN BIOPSY  2012    lft leg keratosis    SKIN BIOPSY  2009    rt forearm, under brst, lft leg-keratosis    SKIN BIOPSY  2008    back and rt forearm-keratosis, abdomen-hemangioma    SKIN BIOPSY  2002    rt breast, lft arm, lft brst-keratosis    SKIN CANCER EXCISION      THYROIDECTOMY  1985    Thyroid Cancer    TONSILLECTOMY AND ADENOIDECTOMY      UPPER GASTROINTESTINAL ENDOSCOPY      Noted per Care Everywhere       Family History   Problem Relation Age of Onset    Other Mother         pneumonia    Coronary Art Dis Mother     Liver Cancer Father         age 80    Coronary Art Dis Father     Liver Cancer Brother 59    Colon Cancer Brother         \"Bowel and liver CA\"    Diabetes Other         Father's side    Thyroid Disease Other         Mom's side       Social History     Tobacco Use    Smoking status: Never    Smokeless tobacco: Never   Substance Use Topics    Alcohol use: No      Current Outpatient Medications   Medication Sig Dispense Refill    levocetirizine (XYZAL) 5 MG tablet Take 5 mg by mouth nightly Cyanocobalamin 1000 MCG SUBL Place 500 mcg under the tongue once a week 4 tablet 0    clotrimazole-betamethasone (LOTRISONE) 1-0.05 % cream Apply topically 2 times daily. 45 g 0    albuterol sulfate HFA (VENTOLIN HFA) 108 (90 Base) MCG/ACT inhaler Inhale 2 puffs into the lungs 4 times daily as needed for Wheezing 18 g 2    levothyroxine (SYNTHROID) 50 MCG tablet Take 2 tablets by mouth Daily split up during the day 180 tablet 1    nystatin (MYCOSTATIN) 915197 UNIT/GM cream Apply topically 2 times daily as needed for Dry Skin Apply topically 2 times daily. 30 g 1    Lidocaine HCl 4 % CREA Apply topically       No current facility-administered medications for this visit. Allergies   Allergen Reactions    Aspirin Anaphylaxis and Shortness Of Breath    Cefadroxil Shortness Of Breath    Hkeuhmfe-Sikbaqq-Oeezuh [Fluocinolone] Shortness Of Breath    Ciprofloxacin Hcl Shortness Of Breath    Codeine Shortness Of Breath    Demerol Hcl [Meperidine] Shortness Of Breath    Doxycycline Nausea Only and Other (See Comments)    Glucosamine Shortness Of Breath, Diarrhea and Swelling     Other reaction(s): Respiratory Difficulty  \"lips swell up and get severe diarrhea\" - INCLUDES IODINE    Iodine Hives, Shortness Of Breath and Itching    Pcn [Penicillins] Anaphylaxis and Shortness Of Breath    Red Dye Shortness Of Breath    Shellfish-Derived Products Shortness Of Breath, Diarrhea and Swelling     \"lips swell up and get severe diarrhea\" - INCLUDES IODINE    Sulfa Antibiotics Hives, Shortness Of Breath, Itching and Rash     OK in small amounts, but larger amounts cause \"shortness of breath. \"    Yellow Dye Shortness Of Breath     Other reaction(s): Respiratory Difficulty    Yellow Dyes (Non-Tartrazine) Shortness Of Breath    Gadolinium Rash     Moderate allergic-like reaction consisting of diffuse urticaria to ProHance gadolinium-containing contrast material    Gadolinium Derivatives Rash     Moderate allergic-like reaction consisting of diffuse urticaria to ProHance gadolinium-containing contrast material    Lac Bovis     Midazolam Nausea Only     Versed caused bad nausea. Jerri Root     Iodides Hives    Adhesive Tape Rash       Health Maintenance   Topic Date Due    Pneumococcal 65+ years Vaccine (1 - PCV) Never done    DTaP/Tdap/Td vaccine (1 - Tdap) Never done    DEXA (modify frequency per FRAX score)  Never done    Flu vaccine (1) 10/12/2023 (Originally 8/1/2022)    Shingles vaccine (1 of 2) 04/28/2026 (Originally 10/3/2003)    Annual Wellness Visit (AWV)  03/25/2023    Depression Screen  10/12/2023    Breast cancer screen  11/17/2023    Lipids  12/22/2025    Colorectal Cancer Screen  05/10/2031    COVID-19 Vaccine  Completed    Hepatitis C screen  Completed    Hepatitis A vaccine  Aged Out    Hib vaccine  Aged Out    Meningococcal (ACWY) vaccine  Aged Out       Subjective:      Review of Systems    Objective:     /68   Pulse 71   Ht 5' 4\" (1.626 m)   Wt 138 lb 9.6 oz (62.9 kg)   SpO2 97%   BMI 23.79 kg/m²   Physical Exam  Vitals and nursing note reviewed. Constitutional:       Appearance: Normal appearance. HENT:      Head: Normocephalic and atraumatic. Pulmonary:      Effort: No respiratory distress. Skin:     Findings: Rash present. Comments: Labia majora has a pink dry rash. Slight pink rash in the groin creases and medial upper thighs near the groin crease   Neurological:      Mental Status: She is alert and oriented to person, place, and time. Psychiatric:         Mood and Affect: Mood normal.         Behavior: Behavior normal.         Thought Content: Thought content normal.       Assessment:       Diagnosis Orders   1. Functional diarrhea        2. Mild persistent asthma without complication      + PFT 7933      3. Hypoparathyroidism, unspecified hypoparathyroidism type ([address]        4. Gastroparesis        5. Thrombocytopenia ([address]               Plan:      No follow-ups on file.   She needs to check on manufacture of thyroid medicine and see if it's different. No orders of the defined types were placed in this encounter. No orders of the defined types were placed in this encounter. Patient given educationalmaterials - see patient instructions. Discussed use, benefit, and side effectsof prescribed medications. All patient questions answered. Pt voiced understanding. Reviewed health maintenance. Instructed to continue current medications, diet andexercise. Patient agreed with treatment plan. Follow up as directed.      Electronicallysigned by Radha Flor MD on 1/13/2023 at 8:57 AM

## 2023-01-13 NOTE — TELEPHONE ENCOUNTER
----- Message from Melissa Fang sent at 1/13/2023 10:25 AM EST -----  Subject: Message to Provider    QUESTIONS  Information for Provider? Yuli just spoke to the head pharmacist at [facility]. They have not changed  of her thyroid medication.   Pharmacist recommended that the dosage may be too high and causing the   diarrhea. Also recommended that she get her thyroid levels checked. Yuli   would like those orders sent to [facility] in [facility]. Yuli   also said that the prescription for the cream for the rash has not been   sent to [facility]. Please call Yuli with any questions and concerns.   [phone] or [phone]  ---------------------------------------------------------------------------  --------------  CALL BACK INFO  [phone]; OK to leave message on voicemail  ---------------------------------------------------------------------------  --------------  SCRIPT ANSWERS  Relationship to Patient? Self   Yes - the patient is able to be screened

## 2023-01-13 NOTE — RESULT ENCOUNTER NOTE
Calcium level is a little low. Try to increase Tums or calcium foods. Her thyroid is a little low however I had told her not to check it today because she had skipped her dose for 2 days.   TSH is normal.

## 2023-01-17 ENCOUNTER — OFFICE VISIT (OUTPATIENT)
Dept: PRIMARY CARE CLINIC | Age: 68
End: 2023-01-17
Payer: MEDICARE

## 2023-01-17 VITALS
WEIGHT: 139.6 LBS | BODY MASS INDEX: 23.96 KG/M2 | HEART RATE: 68 BPM | DIASTOLIC BLOOD PRESSURE: 82 MMHG | SYSTOLIC BLOOD PRESSURE: 128 MMHG | OXYGEN SATURATION: 97 %

## 2023-01-17 DIAGNOSIS — E87.6 HYPOKALEMIA: ICD-10-CM

## 2023-01-17 DIAGNOSIS — K59.1 FUNCTIONAL DIARRHEA: Primary | ICD-10-CM

## 2023-01-17 DIAGNOSIS — E03.9 HYPOTHYROIDISM, UNSPECIFIED TYPE: ICD-10-CM

## 2023-01-17 PROCEDURE — 1036F TOBACCO NON-USER: CPT | Performed by: FAMILY MEDICINE

## 2023-01-17 PROCEDURE — G8400 PT W/DXA NO RESULTS DOC: HCPCS | Performed by: FAMILY MEDICINE

## 2023-01-17 PROCEDURE — G8427 DOCREV CUR MEDS BY ELIG CLIN: HCPCS | Performed by: FAMILY MEDICINE

## 2023-01-17 PROCEDURE — 1090F PRES/ABSN URINE INCON ASSESS: CPT | Performed by: FAMILY MEDICINE

## 2023-01-17 PROCEDURE — 99213 OFFICE O/P EST LOW 20 MIN: CPT | Performed by: FAMILY MEDICINE

## 2023-01-17 PROCEDURE — G8420 CALC BMI NORM PARAMETERS: HCPCS | Performed by: FAMILY MEDICINE

## 2023-01-17 PROCEDURE — 1123F ACP DISCUSS/DSCN MKR DOCD: CPT | Performed by: FAMILY MEDICINE

## 2023-01-17 PROCEDURE — 3017F COLORECTAL CA SCREEN DOC REV: CPT | Performed by: FAMILY MEDICINE

## 2023-01-17 PROCEDURE — G8484 FLU IMMUNIZE NO ADMIN: HCPCS | Performed by: FAMILY MEDICINE

## 2023-01-17 RX ORDER — LEVOTHYROXINE AND LIOTHYRONINE 38; 9 UG/1; UG/1
60 TABLET ORAL DAILY
Qty: 30 TABLET | Refills: 3 | Status: SHIPPED | OUTPATIENT
Start: 2023-01-17

## 2023-01-17 RX ORDER — ALBUTEROL SULFATE 90 UG/1
2 AEROSOL, METERED RESPIRATORY (INHALATION) 4 TIMES DAILY PRN
Qty: 18 G | Refills: 2 | Status: SHIPPED | OUTPATIENT
Start: 2023-01-17

## 2023-01-17 NOTE — PROGRESS NOTES
Tonny Castrejon is a 69 y.o. femalewho presents today for her medical conditions/complaints as noted below. Chief Complaint   Patient presents with    Diarrhea     States after she takes her thyroid medication it gives her diarrhea         HPI:     HPI  Explosive diarrhea at times can't make it to the bathroom. Every time she takes the thyroid medicine she gets severe diarrhea since mid September, worse in December  Skipped thyroid med for 2 weeks in May when on vacation and still had occasional diarrhea. Over the summer diarrhea off and on. Hx of IBS, saw GI doctor at [facility]: note reviewed. No change in diet  Drinks tea and water  Breakfast: pancakes, vasquez, syrup ( sugar free). And OJ  Lunch: stew or hamburger. Potatoes  Dinner: spagetti or pork chops or steak. Veggies cooked ( carrots, or cauliflower)   Snack: potato stick, pretzles  Ice cream. Small amount and take lactaid tablets since she is lactose intolerant. Current Outpatient Medications   Medication Sig Dispense Refill    thyroid (ARMOUR THYROID) 60 MG tablet Take 1 tablet by mouth daily 30 tablet 3    albuterol sulfate HFA (VENTOLIN HFA) 108 (90 Base) MCG/ACT inhaler Inhale 2 puffs into the lungs 4 times daily as needed for Wheezing 18 g 2    diphenoxylate-atropine (LOMOTIL) 2.5-0.025 MG per tablet Take 1 tablet by mouth 4 times daily as needed for Diarrhea for up to 30 days. Max Daily Amount: 4 tablets 120 tablet 0    clobetasol (TEMOVATE) 0.05 % cream Apply topically 2 times daily for 2 weeks then off for a week, then can repeat 30 g 0    levocetirizine (XYZAL) 5 MG tablet Take 5 mg by mouth nightly      Cyanocobalamin 1000 MCG SUBL Place 500 mcg under the tongue once a week 4 tablet 0    clotrimazole-betamethasone (LOTRISONE) 1-0.05 % cream Apply topically 2 times daily. 45 g 0    nystatin (MYCOSTATIN) 061999 UNIT/GM cream Apply topically 2 times daily as needed for Dry Skin Apply topically 2 times daily.  30 g 1    Lidocaine HCl 4 % CREA Apply topically       No current facility-administered medications for this visit.     Allergies   Allergen Reactions    Aspirin Anaphylaxis and Shortness Of Breath    Cefadroxil Shortness Of Breath    Tjuzbbbt-Edkyoyq-Ivnvqo [Fluocinolone] Shortness Of Breath    Ciprofloxacin Hcl Shortness Of Breath    Codeine Shortness Of Breath    Demerol Hcl [Meperidine] Shortness Of Breath    Doxycycline Nausea Only and Other (See Comments)    Glucosamine Shortness Of Breath, Diarrhea and Swelling     Other reaction(s): Respiratory Difficulty  \"lips swell up and get severe diarrhea\" - INCLUDES IODINE    Iodine Hives, Shortness Of Breath and Itching    Pcn [Penicillins] Anaphylaxis and Shortness Of Breath    Red Dye Shortness Of Breath    Shellfish-Derived Products Shortness Of Breath, Diarrhea and Swelling     \"lips swell up and get severe diarrhea\" - INCLUDES IODINE    Sulfa Antibiotics Hives, Shortness Of Breath, Itching and Rash     OK in small amounts, but larger amounts cause \"shortness of breath.\"    Yellow Dye Shortness Of Breath     Other reaction(s): Respiratory Difficulty    Yellow Dyes (Non-Tartrazine) Shortness Of Breath    Gadolinium Rash     Moderate allergic-like reaction consisting of diffuse urticaria to ProHance gadolinium-containing contrast material    Gadolinium Derivatives Rash     Moderate allergic-like reaction consisting of diffuse urticaria to ProHance gadolinium-containing contrast material    Lac Bovis     Midazolam Nausea Only     Versed caused bad nausea.    Jerri Root     Iodides Hives    Adhesive Tape Rash       Subjective:     Review of Systems  Needs inhaler refill has pulmonary doctor appt  Steroid Cream is helping the vulvar rash  Objective:     /82   Pulse 68   Wt 139 lb 9.6 oz (63.3 kg)   SpO2 97%   BMI 23.96 kg/m²   Physical Exam  Vitals and nursing note reviewed.   Constitutional:       Appearance: Normal appearance.   HENT:      Head: Normocephalic and atraumatic.  Neurological:      Mental Status: She is alert and oriented to person, place, and time. Psychiatric:         Mood and Affect: Mood normal.         Behavior: Behavior normal.       Assessment:       Diagnosis Orders   1. Functional diarrhea  thyroid (ARMOUR THYROID) 60 MG tablet    Basic Metabolic Panel      2. Hypothyroidism, unspecified type  thyroid (ARMOUR THYROID) 60 MG tablet      3. Hypokalemia  Basic Metabolic Panel           Plan:      No follow-ups on file. Stop the sugar free products. Stop the ice cream. Stay on synthroid. Call back in 4-5 days with how she was feeling and if it's better then it's NOT the synthroid. If still issues then try the armour thryoid. [facility] sent in in case she needs to switch. Potassium foods in daily. Saline nose spray. Do not use Sinex    Orders Placed This Encounter   Procedures    Basic Metabolic Panel     Standing Status:   Future     Standing Expiration Date:   1/17/2024     Orders Placed This Encounter   Medications    thyroid (ARMOUR THYROID) 60 MG tablet     Sig: Take 1 tablet by mouth daily     Dispense:  30 tablet     Refill:  3    albuterol sulfate HFA (VENTOLIN HFA) 108 (90 Base) MCG/ACT inhaler     Sig: Inhale 2 puffs into the lungs 4 times daily as needed for Wheezing     Dispense:  18 g     Refill:  2      Reviewed medications and possibleside effects.        Electronically signed by Asia Manley MD on 1/17/2023 at 8:49 AM

## 2023-02-14 DIAGNOSIS — K59.1 FUNCTIONAL DIARRHEA: ICD-10-CM

## 2023-02-14 DIAGNOSIS — E03.9 HYPOTHYROIDISM, UNSPECIFIED TYPE: ICD-10-CM

## 2023-02-14 RX ORDER — LEVOTHYROXINE AND LIOTHYRONINE 38; 9 UG/1; UG/1
60 TABLET ORAL DAILY
Qty: 30 TABLET | Refills: 3 | Status: SHIPPED | OUTPATIENT
Start: 2023-02-14

## 2023-02-14 NOTE — TELEPHONE ENCOUNTER
----- Message from [facility] sent at 2/14/2023 10:20 AM EST -----  Subject: Refill Request    QUESTIONS  Name of Medication? thyroid (ARMOUR THYROID) 60 MG tablet  Patient-reported dosage and instructions? take one tablet daily  How many days do you have left? 3  Preferred Pharmacy? [id number]  Pharmacy phone number (if available)? [phone]  Additional Information for Provider? Patient wants Dr. Sveta Martinez to know this   medication is helping and she needs a refill.   ---------------------------------------------------------------------------  --------------  CALL BACK INFO  What is the best way for the office to contact you? OK to leave message on   voicemail  Preferred Call Back Phone Number? [phone]  ---------------------------------------------------------------------------  --------------  SCRIPT ANSWERS  Relationship to Patient?  Self

## 2023-02-24 ENCOUNTER — TELEPHONE (OUTPATIENT)
Dept: PRIMARY CARE CLINIC | Age: 68
End: 2023-02-24

## 2023-02-24 DIAGNOSIS — J06.9 UPPER RESPIRATORY TRACT INFECTION, UNSPECIFIED TYPE: Primary | ICD-10-CM

## 2023-02-24 RX ORDER — AZITHROMYCIN 250 MG/1
250 TABLET, FILM COATED ORAL SEE ADMIN INSTRUCTIONS
Qty: 6 TABLET | Refills: 0 | Status: SHIPPED | OUTPATIENT
Start: 2023-02-24 | End: 2023-03-01

## 2023-02-24 NOTE — TELEPHONE ENCOUNTER
Pt here today with . Pt has sore throat, congestion, body aches, and states its messing with her asthma x4-5 days. Please advise. [facility] [place].

## 2023-03-02 RX ORDER — ALBUTEROL SULFATE 90 UG/1
2 AEROSOL, METERED RESPIRATORY (INHALATION) 4 TIMES DAILY PRN
Qty: 18 G | Refills: 2 | Status: SHIPPED | OUTPATIENT
Start: 2023-03-02

## 2023-03-22 NOTE — LETTER
Dr. Makenna Skinner  [address] [address] [place] [address] [address] [address]  Dept: [phone]  Dept Fax: [phone]        12/1/21    Patient: Kenya Florian  YOB: 1955    Dear Darylene Daisy, MD,    I had the pleasure of seeing one of your patients, Eric Steinberg today in the office. Below are the relevant portions of my assessment and plan of care. IMPRESSION:  1. Right knee pain, unspecified chronicity    2. Left knee pain, unspecified chronicity      PLAN:  Reviewed x-ray imaging with the patient today in the office. Discussed etiology and natural history of very minimal arthritis to bilateral knees. The treatment options may include oral anti-inflammatories, bracing, injections, advanced imaging, activity modification, physical therapy and/or surgical intervention. The patient would like to proceed prednisone 5 mg by mouth 7 days. Oral medication was ordered this way due to patient expressing that she cannot take many pills a day. The patient will follow up in the office as needed. We discussed that the patient should call us with any concerns or questions. Thank you for allowing me to participate in the care of this patient. I will keep you updated on this patient's follow up and I look forward to serving you and your patients again in the future. Please don't hesitate to contact me at my mobile number [phone].         Frankie Baeza Cimetidine Counseling:  I discussed with the patient the risks of Cimetidine including but not limited to gynecomastia, headache, diarrhea, nausea, drowsiness, arrhythmias, pancreatitis, skin rashes, psychosis, bone marrow suppression and kidney toxicity.

## 2023-03-31 ENCOUNTER — HOSPITAL ENCOUNTER (OUTPATIENT)
Age: 68
Discharge: HOME | End: 2023-03-31
Payer: MEDICARE

## 2023-03-31 ENCOUNTER — OFFICE VISIT (OUTPATIENT)
Dept: PRIMARY CARE CLINIC | Age: 68
End: 2023-03-31
Payer: MEDICARE

## 2023-03-31 VITALS
OXYGEN SATURATION: 98 % | HEIGHT: 64 IN | WEIGHT: 140.8 LBS | BODY MASS INDEX: 24.04 KG/M2 | SYSTOLIC BLOOD PRESSURE: 138 MMHG | HEART RATE: 78 BPM | DIASTOLIC BLOOD PRESSURE: 86 MMHG

## 2023-03-31 DIAGNOSIS — M25.559 HIP PAIN: Primary | ICD-10-CM

## 2023-03-31 DIAGNOSIS — L29.9 PRURITIC CONDITION: ICD-10-CM

## 2023-03-31 DIAGNOSIS — E83.51 HYPOCALCEMIA: Primary | ICD-10-CM

## 2023-03-31 DIAGNOSIS — L20.9 ATOPIC DERMATITIS, UNSPECIFIED TYPE: ICD-10-CM

## 2023-03-31 DIAGNOSIS — E03.9 HYPOTHYROIDISM, UNSPECIFIED TYPE: ICD-10-CM

## 2023-03-31 LAB
ABSOLUTE EOS #: 0.3 K/UL (ref 0–0.4)
ABSOLUTE LYMPH #: 1.1 K/UL (ref 1–4.8)
ABSOLUTE MONO #: 0.3 K/UL (ref 0.1–1.3)
ALBUMIN SERPL-MCNC: 4.5 G/DL (ref 3.5–5.2)
ALP SERPL-CCNC: 111 U/L (ref 35–104)
ALT SERPL-CCNC: 25 U/L (ref 5–33)
ANION GAP SERPL CALCULATED.3IONS-SCNC: 12 MMOL/L (ref 9–17)
AST SERPL-CCNC: 25 U/L
BASOPHILS # BLD: 1 % (ref 0–2)
BASOPHILS ABSOLUTE: 0 K/UL (ref 0–0.2)
BILIRUB SERPL-MCNC: 0.4 MG/DL (ref 0.3–1.2)
BUN SERPL-MCNC: 13 MG/DL (ref 8–23)
CALCIUM SERPL-MCNC: 5.9 MG/DL (ref 8.6–10.4)
CHLORIDE SERPL-SCNC: 97 MMOL/L (ref 98–107)
CO2 SERPL-SCNC: 29 MMOL/L (ref 20–31)
CREAT SERPL-MCNC: 0.78 MG/DL (ref 0.5–0.9)
EOSINOPHILS RELATIVE PERCENT: 6 % (ref 0–4)
GFR SERPL CREATININE-BSD FRML MDRD: >60 ML/MIN/1.73M2
GLUCOSE SERPL-MCNC: 86 MG/DL (ref 70–99)
HCT VFR BLD AUTO: 34.2 % (ref 36–46)
HGB BLD-MCNC: 11.6 G/DL (ref 12–16)
LYMPHOCYTES # BLD: 21 % (ref 24–44)
MCH RBC QN AUTO: 30.2 PG (ref 26–34)
MCHC RBC AUTO-ENTMCNC: 33.8 G/DL (ref 31–37)
MCV RBC AUTO: 89.4 FL (ref 80–100)
MONOCYTES # BLD: 6 % (ref 1–7)
PDW BLD-RTO: 15.8 % (ref 11.5–14.9)
PLATELET # BLD AUTO: 89 K/UL (ref 150–450)
PMV BLD AUTO: 11.7 FL (ref 6–12)
POTASSIUM SERPL-SCNC: 3.9 MMOL/L (ref 3.7–5.3)
PROT SERPL-MCNC: 7.3 G/DL (ref 6.4–8.3)
RBC # BLD: 3.82 M/UL (ref 4–5.2)
SEG NEUTROPHILS: 66 % (ref 36–66)
SEGMENTED NEUTROPHILS ABSOLUTE COUNT: 3.5 K/UL (ref 1.3–9.1)
SODIUM SERPL-SCNC: 138 MMOL/L (ref 135–144)
WBC # BLD AUTO: 5.2 K/UL (ref 3.5–11)

## 2023-03-31 PROCEDURE — 85025 COMPLETE CBC W/AUTO DIFF WBC: CPT

## 2023-03-31 PROCEDURE — G8427 DOCREV CUR MEDS BY ELIG CLIN: HCPCS | Performed by: FAMILY MEDICINE

## 2023-03-31 PROCEDURE — 80053 COMPREHEN METABOLIC PANEL: CPT

## 2023-03-31 PROCEDURE — 1090F PRES/ABSN URINE INCON ASSESS: CPT | Performed by: FAMILY MEDICINE

## 2023-03-31 PROCEDURE — 1036F TOBACCO NON-USER: CPT | Performed by: FAMILY MEDICINE

## 2023-03-31 PROCEDURE — 36415 COLL VENOUS BLD VENIPUNCTURE: CPT

## 2023-03-31 PROCEDURE — 99213 OFFICE O/P EST LOW 20 MIN: CPT | Performed by: FAMILY MEDICINE

## 2023-03-31 PROCEDURE — 3017F COLORECTAL CA SCREEN DOC REV: CPT | Performed by: FAMILY MEDICINE

## 2023-03-31 PROCEDURE — G8484 FLU IMMUNIZE NO ADMIN: HCPCS | Performed by: FAMILY MEDICINE

## 2023-03-31 PROCEDURE — G8400 PT W/DXA NO RESULTS DOC: HCPCS | Performed by: FAMILY MEDICINE

## 2023-03-31 PROCEDURE — G8420 CALC BMI NORM PARAMETERS: HCPCS | Performed by: FAMILY MEDICINE

## 2023-03-31 PROCEDURE — 1123F ACP DISCUSS/DSCN MKR DOCD: CPT | Performed by: FAMILY MEDICINE

## 2023-03-31 RX ORDER — LEVOTHYROXINE AND LIOTHYRONINE 38; 9 UG/1; UG/1
60 TABLET ORAL DAILY
Qty: 30 TABLET | Refills: 3 | Status: SHIPPED | OUTPATIENT
Start: 2023-03-31

## 2023-03-31 RX ORDER — ALBUTEROL SULFATE 90 UG/1
2 AEROSOL, METERED RESPIRATORY (INHALATION) 4 TIMES DAILY PRN
Qty: 18 G | Refills: 2 | Status: SHIPPED | OUTPATIENT
Start: 2023-03-31

## 2023-03-31 SDOH — ECONOMIC STABILITY: FOOD INSECURITY: WITHIN THE PAST 12 MONTHS, YOU WORRIED THAT YOUR FOOD WOULD RUN OUT BEFORE YOU GOT MONEY TO BUY MORE.: NEVER TRUE

## 2023-03-31 SDOH — ECONOMIC STABILITY: HOUSING INSECURITY
IN THE LAST 12 MONTHS, WAS THERE A TIME WHEN YOU DID NOT HAVE A STEADY PLACE TO SLEEP OR SLEPT IN A SHELTER (INCLUDING NOW)?: NO

## 2023-03-31 SDOH — ECONOMIC STABILITY: FOOD INSECURITY: WITHIN THE PAST 12 MONTHS, THE FOOD YOU BOUGHT JUST DIDN'T LAST AND YOU DIDN'T HAVE MONEY TO GET MORE.: NEVER TRUE

## 2023-03-31 SDOH — ECONOMIC STABILITY: INCOME INSECURITY: HOW HARD IS IT FOR YOU TO PAY FOR THE VERY BASICS LIKE FOOD, HOUSING, MEDICAL CARE, AND HEATING?: NOT HARD AT ALL

## 2023-03-31 NOTE — RESULT ENCOUNTER NOTE
Calcium is way too low again. The rest of the blood work is doing okay. Anemia is improved. She needs to start taking Tums or calcium supplements again. After she does this recheck calcium level in 1 to 2 weeks.

## 2023-03-31 NOTE — PROGRESS NOTES
thyroid (ARMOUR THYROID) 60 MG tablet      3. Pruritic condition  Comprehensive Metabolic Panel    CBC with Auto Differential      4. Atopic dermatitis, unspecified type           Eczema type rash  Plan:      No follow-ups on file. Dry skin care. Use lotion on the body immediately after showering. She states her scalp itches also. Hip stretches. Not better consider physical therapy  Orders Placed This Encounter   Procedures    Comprehensive Metabolic Panel     Standing Status:   Future     Standing Expiration Date:   3/31/2024    CBC with Auto Differential     Standing Status:   Future     Standing Expiration Date:   3/31/2024     Orders Placed This Encounter   Medications    thyroid (ARMOUR THYROID) 60 MG tablet     Sig: Take 1 tablet by mouth daily     Dispense:  30 tablet     Refill:  3    albuterol sulfate HFA (VENTOLIN HFA) 108 (90 Base) MCG/ACT inhaler     Sig: Inhale 2 puffs into the lungs 4 times daily as needed for Wheezing     Dispense:  18 g     Refill:  2       Patient given educationalmaterials - see patient instructions. Discussed use, benefit, and side effectsof prescribed medications. All patient questions answered. Pt voiced understanding. Reviewed health maintenance. Instructed to continue current medications, diet andexercise. Patient agreed with treatment plan. Follow up as directed.      Electronicallysigned by Kady Benson MD on 3/31/2023 at 10:59 AM

## 2023-04-19 ENCOUNTER — OFFICE VISIT (OUTPATIENT)
Dept: PULMONOLOGY | Age: 68
End: 2023-04-19

## 2023-04-19 ENCOUNTER — HOSPITAL ENCOUNTER (OUTPATIENT)
Age: 68
Setting detail: SPECIMEN
Discharge: HOME | End: 2023-04-19

## 2023-04-19 VITALS
TEMPERATURE: 97.3 F | HEART RATE: 68 BPM | SYSTOLIC BLOOD PRESSURE: 129 MMHG | DIASTOLIC BLOOD PRESSURE: 78 MMHG | HEIGHT: 64 IN | RESPIRATION RATE: 15 BRPM | OXYGEN SATURATION: 98 % | WEIGHT: 140 LBS | BODY MASS INDEX: 23.9 KG/M2

## 2023-04-19 DIAGNOSIS — J45.40 MODERATE PERSISTENT ASTHMA, UNSPECIFIED WHETHER COMPLICATED: Primary | ICD-10-CM

## 2023-04-19 DIAGNOSIS — J45.40 MODERATE PERSISTENT ASTHMA, UNSPECIFIED WHETHER COMPLICATED: ICD-10-CM

## 2023-04-19 LAB
ABSOLUTE EOS #: 0.06 K/UL (ref 0–0.4)
ABSOLUTE IMMATURE GRANULOCYTE: 0.06 K/UL (ref 0–0.3)
ABSOLUTE LYMPH #: 1.24 K/UL (ref 1–4.8)
ABSOLUTE MONO #: 0.12 K/UL (ref 0.1–0.8)
BASOPHILS # BLD: 0 % (ref 0–2)
BASOPHILS ABSOLUTE: 0 K/UL (ref 0–0.2)
EOSINOPHILS RELATIVE PERCENT: 1 % (ref 1–4)
HCT VFR BLD AUTO: 36.6 % (ref 36.3–47.1)
HGB BLD-MCNC: 11.5 G/DL (ref 11.9–15.1)
IMMATURE GRANULOCYTES: 1 %
LYMPHOCYTES # BLD: 20 % (ref 24–44)
MCH RBC QN AUTO: 30.7 PG (ref 25.2–33.5)
MCHC RBC AUTO-ENTMCNC: 31.4 G/DL (ref 28.4–34.8)
MCV RBC AUTO: 97.9 FL (ref 82.6–102.9)
MONOCYTES # BLD: 2 % (ref 1–7)
MORPHOLOGY: ABNORMAL
NRBC AUTOMATED: 0 PER 100 WBC
PDW BLD-RTO: 15 % (ref 11.8–14.4)
PLATELET # BLD AUTO: ABNORMAL K/UL (ref 138–453)
PLATELET, FLUORESCENCE: 108 K/UL (ref 138–453)
PLATELET, IMMATURE FRACTION: 19.3 % (ref 1.1–10.3)
RBC # BLD: 3.74 M/UL (ref 3.95–5.11)
SEG NEUTROPHILS: 76 % (ref 36–66)
SEGMENTED NEUTROPHILS ABSOLUTE COUNT: 4.72 K/UL (ref 1.8–7.7)
WBC # BLD AUTO: 6.2 K/UL (ref 3.5–11.3)

## 2023-04-19 RX ORDER — BUDESONIDE AND FORMOTEROL FUMARATE DIHYDRATE 160; 4.5 UG/1; UG/1
2 AEROSOL RESPIRATORY (INHALATION) 2 TIMES DAILY
Qty: 10.2 G | Refills: 5 | Status: SHIPPED | OUTPATIENT
Start: 2023-04-19

## 2023-04-19 ASSESSMENT — SLEEP AND FATIGUE QUESTIONNAIRES
HOW LIKELY ARE YOU TO NOD OFF OR FALL ASLEEP WHEN YOU ARE A PASSENGER IN A CAR FOR AN HOUR WITHOUT A BREAK: 0
HOW LIKELY ARE YOU TO NOD OFF OR FALL ASLEEP WHILE WATCHING TV: 0
HOW LIKELY ARE YOU TO NOD OFF OR FALL ASLEEP WHILE LYING DOWN TO REST IN THE AFTERNOON WHEN CIRCUMSTANCES PERMIT: 0
HOW LIKELY ARE YOU TO NOD OFF OR FALL ASLEEP WHILE SITTING INACTIVE IN A PUBLIC PLACE: 0
HOW LIKELY ARE YOU TO NOD OFF OR FALL ASLEEP WHILE SITTING AND READING: 0
HOW LIKELY ARE YOU TO NOD OFF OR FALL ASLEEP WHILE SITTING QUIETLY AFTER LUNCH WITHOUT ALCOHOL: 0
HOW LIKELY ARE YOU TO NOD OFF OR FALL ASLEEP WHILE SITTING AND TALKING TO SOMEONE: 0
ESS TOTAL SCORE: 0
NECK CIRCUMFERENCE (INCHES): 0
HOW LIKELY ARE YOU TO NOD OFF OR FALL ASLEEP IN A CAR, WHILE STOPPED FOR A FEW MINUTES IN TRAFFIC: 0

## 2023-04-19 NOTE — PROGRESS NOTES
Sincerely,      Electronically signed by Sam Snider MD on   4/19/23 at 3:51 PM EDT       Please note that this chart was generated using voice recognition Dragon dictation software. Although every effort was made to ensure the accuracy of this automated transcription, some errors in transcription may have occurred.

## 2023-04-21 ENCOUNTER — TELEPHONE (OUTPATIENT)
Dept: PULMONOLOGY | Age: 68
End: 2023-04-21

## 2023-04-21 LAB — ASPERGILLUS FUMIGATUS: <0.1 KU/L (ref 0–0.34)

## 2023-04-27 ENCOUNTER — TELEPHONE (OUTPATIENT)
Dept: PULMONOLOGY | Age: 68
End: 2023-04-27

## 2023-05-16 ENCOUNTER — OFFICE VISIT (OUTPATIENT)
Dept: PRIMARY CARE CLINIC | Age: 68
End: 2023-05-16
Payer: MEDICARE

## 2023-05-16 VITALS
OXYGEN SATURATION: 98 % | HEIGHT: 64 IN | SYSTOLIC BLOOD PRESSURE: 152 MMHG | DIASTOLIC BLOOD PRESSURE: 90 MMHG | WEIGHT: 142.2 LBS | HEART RATE: 78 BPM | BODY MASS INDEX: 24.28 KG/M2

## 2023-05-16 DIAGNOSIS — M25.551 PAIN OF RIGHT HIP: Primary | ICD-10-CM

## 2023-05-16 DIAGNOSIS — Z91.81 AT HIGH RISK FOR FALLS: ICD-10-CM

## 2023-05-16 PROCEDURE — 1090F PRES/ABSN URINE INCON ASSESS: CPT | Performed by: FAMILY MEDICINE

## 2023-05-16 PROCEDURE — 1036F TOBACCO NON-USER: CPT | Performed by: FAMILY MEDICINE

## 2023-05-16 PROCEDURE — 3017F COLORECTAL CA SCREEN DOC REV: CPT | Performed by: FAMILY MEDICINE

## 2023-05-16 PROCEDURE — G8427 DOCREV CUR MEDS BY ELIG CLIN: HCPCS | Performed by: FAMILY MEDICINE

## 2023-05-16 PROCEDURE — G8400 PT W/DXA NO RESULTS DOC: HCPCS | Performed by: FAMILY MEDICINE

## 2023-05-16 PROCEDURE — 1123F ACP DISCUSS/DSCN MKR DOCD: CPT | Performed by: FAMILY MEDICINE

## 2023-05-16 PROCEDURE — G8420 CALC BMI NORM PARAMETERS: HCPCS | Performed by: FAMILY MEDICINE

## 2023-05-16 PROCEDURE — 99214 OFFICE O/P EST MOD 30 MIN: CPT | Performed by: FAMILY MEDICINE

## 2023-05-16 RX ORDER — DIPHENOXYLATE HYDROCHLORIDE AND ATROPINE SULFATE 2.5; .025 MG/1; MG/1
TABLET ORAL
COMMUNITY
Start: 2023-04-27

## 2023-05-16 RX ORDER — TOBRAMYCIN 3 MG/ML
SOLUTION/ DROPS OPHTHALMIC
COMMUNITY
Start: 2023-05-12

## 2023-05-16 ASSESSMENT — ENCOUNTER SYMPTOMS: SHORTNESS OF BREATH: 1

## 2023-05-16 NOTE — PATIENT INSTRUCTIONS
Patient Education        A Healthy Lifestyle: Care Instructions  A healthy lifestyle can help you feel good, have more energy, and stay at a weight that's healthy for you. You can share a healthy lifestyle with your friends and family. And you can do it on your own. Eat meals with your friends or family. You could try cooking together. Plan activities with other people. Go for a walk with a friend, try a free online fitness class, or join a sports league. Eat a variety of healthy foods. These include fruits, vegetables, whole grains, low-fat dairy, and lean protein. Choose healthy portions of food. You can use the Nutrition Facts label on food packages as a guide. Eat more fruits and vegetables. You could add vegetables to sandwiches or add fruit to cereal.   Drink water when you are thirsty. Limit soda, juice, and sports drinks. Try to exercise most days. Aim for at least 2½ hours of exercise each week. Keep moving. Work in the garden or take your dog on a walk. Use the stairs instead of the elevator. If you use tobacco or nicotine, try to quit. Ask your doctor about programs and medicines to help you quit. Limit alcohol. Men should have no more than 2 drinks a day. Women should have no more than 1. For some people, no alcohol is the best choice. Follow-up care is a key part of your treatment and safety. Be sure to make and go to all appointments, and call your doctor if you are having problems. It's also a good idea to know your test results and keep a list of the medicines you take. Where can you learn more? Go to http://www.marcelo.com/ and enter U807 to learn more about \"A Healthy Lifestyle: Care Instructions. \"  Current as of: November 14, 2022               Content Version: 13.6  © 2822-4126 [employer], [employer]. Care instructions adapted under license by [facility] ([facility]).  If you have questions about a medical condition or this instruction, always ask your

## 2023-05-16 NOTE — PROGRESS NOTES
[facility] PRIMARY CARE  [address] [facility]  [address] [address]  Dept: [phone]    Renetta Padilla is a 69 y.o. female Established patient, who presents today for her medical conditions/complaintsas noted below. Chief Complaint   Patient presents with    Hip Pain     Pt is here for a f/u. HPI:     HPI  Right post hip down into the post thigh  Going up stairs is bad and going down is ok  Has been doing home exercises. Patient did not want to do any of the medicare wellness questions. Getting eye surgery soon. Reviewed prior notes None  Reviewed previous Labs and Imaging  Saw pulmonary doctor  She doesn't know how to use the nebulizer machine. She doesn't like the new inhalers. Has multiple allergies.    LDL Cholesterol (mg/dL)   Date Value   12/22/2020 72   04/28/2020 106       (goal LDL is <100)   AST (U/L)   Date Value   03/31/2023 25     ALT (U/L)   Date Value   03/31/2023 25     BUN (mg/dL)   Date Value   03/31/2023 13     TSH (uIU/mL)   Date Value   01/13/2023 4.23     BP Readings from Last 3 Encounters:   05/16/23 (!) 152/90   04/19/23 129/78   03/31/23 138/86          (goal 120/80)    Past Medical History:   Diagnosis Date    Abdominal pain     RLQ    Abnormal computed tomography of abdomen and pelvis 04/16/2021    Distal appendix is mildly thickened without significant periappendiceal inflammatory change    Acquired von Willebrand's disease ([facility] [address] 10/30/2018    Allergy to food dye     yellow and red dyes    Anemia     Asthma     Basal cell carcinoma of upper lip 06/22/2017    Benign paroxysmal positional vertigo 12/09/2013    Cancer ([facility] [address]     Thyroid and skin    Cardiac murmur     STATES SINCE CHILD BILLY- STATES SHE WAS BORN WITH A \"90 DEGREE ANGLE Arden Moras"    Chest pain 09/04/2020    Cobalamin deficiency 12/26/2012    Diverticulitis     Diverticulosis     Gastroesophageal reflux disease 12/26/2012    Gastroparesis     Hiatal hernia

## 2023-05-18 ENCOUNTER — HOSPITAL ENCOUNTER (EMERGENCY)
Age: 68
Discharge: HOME | End: 2023-05-18
Attending: EMERGENCY MEDICINE
Payer: MEDICARE

## 2023-05-18 ENCOUNTER — HOSPITAL ENCOUNTER (OUTPATIENT)
Dept: GENERAL RADIOLOGY | Age: 68
Discharge: HOME | End: 2023-05-20
Payer: MEDICARE

## 2023-05-18 ENCOUNTER — HOSPITAL ENCOUNTER (OUTPATIENT)
Age: 68
Discharge: HOME | End: 2023-05-20
Payer: MEDICARE

## 2023-05-18 VITALS
WEIGHT: 142 LBS | TEMPERATURE: 98 F | SYSTOLIC BLOOD PRESSURE: 164 MMHG | HEART RATE: 58 BPM | OXYGEN SATURATION: 97 % | HEIGHT: 64 IN | RESPIRATION RATE: 18 BRPM | BODY MASS INDEX: 24.24 KG/M2 | DIASTOLIC BLOOD PRESSURE: 71 MMHG

## 2023-05-18 DIAGNOSIS — M25.551 PAIN OF RIGHT HIP: ICD-10-CM

## 2023-05-18 DIAGNOSIS — E83.51 HYPOCALCEMIA: Primary | ICD-10-CM

## 2023-05-18 LAB
ALBUMIN SERPL-MCNC: 4.4 G/DL (ref 3.5–5.2)
ALP SERPL-CCNC: 103 U/L (ref 35–104)
ALT SERPL-CCNC: 12 U/L (ref 5–33)
ANION GAP SERPL CALCULATED.3IONS-SCNC: 16 MMOL/L (ref 9–17)
AST SERPL-CCNC: 25 U/L
BASOPHILS # BLD: 0 K/UL (ref 0–0.2)
BASOPHILS NFR BLD: 1 % (ref 0–2)
BILIRUB SERPL-MCNC: 0.5 MG/DL (ref 0.3–1.2)
BUN SERPL-MCNC: 11 MG/DL (ref 8–23)
CA-I BLD-SCNC: 0.78 MMOL/L (ref 1.1–1.33)
CALCIUM SERPL-MCNC: 5.8 MG/DL (ref 8.6–10.4)
CHLORIDE SERPL-SCNC: 97 MMOL/L (ref 98–107)
CK SERPL-CCNC: 303 U/L (ref 26–192)
CO2 SERPL-SCNC: 27 MMOL/L (ref 20–31)
CREAT SERPL-MCNC: 0.95 MG/DL (ref 0.5–0.9)
EOSINOPHIL # BLD: 0.3 K/UL (ref 0–0.4)
EOSINOPHILS RELATIVE PERCENT: 4 % (ref 0–4)
ERYTHROCYTE [DISTWIDTH] IN BLOOD BY AUTOMATED COUNT: 15.2 % (ref 11.5–14.9)
GFR SERPL CREATININE-BSD FRML MDRD: >60 ML/MIN/1.73M2
GLUCOSE SERPL-MCNC: 100 MG/DL (ref 70–99)
HCT VFR BLD AUTO: 34 % (ref 36–46)
HGB BLD-MCNC: 11.4 G/DL (ref 12–16)
LYMPHOCYTES # BLD: 19 % (ref 24–44)
LYMPHOCYTES NFR BLD: 1.3 K/UL (ref 1–4.8)
MAGNESIUM SERPL-MCNC: 1.6 MG/DL (ref 1.6–2.6)
MCH RBC QN AUTO: 30.4 PG (ref 26–34)
MCHC RBC AUTO-ENTMCNC: 33.5 G/DL (ref 31–37)
MCV RBC AUTO: 90.8 FL (ref 80–100)
MONOCYTES NFR BLD: 0.4 K/UL (ref 0.1–1.3)
MONOCYTES NFR BLD: 6 % (ref 1–7)
NEUTROPHILS NFR BLD: 70 % (ref 36–66)
NEUTS SEG NFR BLD: 4.7 K/UL (ref 1.3–9.1)
PLATELET # BLD AUTO: 118 K/UL (ref 150–450)
PMV BLD AUTO: 11.1 FL (ref 6–12)
POTASSIUM SERPL-SCNC: 4.1 MMOL/L (ref 3.7–5.3)
PROT SERPL-MCNC: 7.5 G/DL (ref 6.4–8.3)
RBC # BLD AUTO: 3.74 M/UL (ref 4–5.2)
SODIUM SERPL-SCNC: 140 MMOL/L (ref 135–144)
WBC OTHER # BLD: 6.7 K/UL (ref 3.5–11)

## 2023-05-18 PROCEDURE — 73502 X-RAY EXAM HIP UNI 2-3 VIEWS: CPT

## 2023-05-18 PROCEDURE — 82330 ASSAY OF CALCIUM: CPT

## 2023-05-18 PROCEDURE — 85025 COMPLETE CBC W/AUTO DIFF WBC: CPT

## 2023-05-18 PROCEDURE — 6370000000 HC RX 637 (ALT 250 FOR IP): Performed by: STUDENT IN AN ORGANIZED HEALTH CARE EDUCATION/TRAINING PROGRAM

## 2023-05-18 PROCEDURE — 96366 THER/PROPH/DIAG IV INF ADDON: CPT

## 2023-05-18 PROCEDURE — 82550 ASSAY OF CK (CPK): CPT

## 2023-05-18 PROCEDURE — 2580000003 HC RX 258: Performed by: STUDENT IN AN ORGANIZED HEALTH CARE EDUCATION/TRAINING PROGRAM

## 2023-05-18 PROCEDURE — 93005 ELECTROCARDIOGRAM TRACING: CPT | Performed by: STUDENT IN AN ORGANIZED HEALTH CARE EDUCATION/TRAINING PROGRAM

## 2023-05-18 PROCEDURE — 83735 ASSAY OF MAGNESIUM: CPT

## 2023-05-18 PROCEDURE — 99284 EMERGENCY DEPT VISIT MOD MDM: CPT

## 2023-05-18 PROCEDURE — 80053 COMPREHEN METABOLIC PANEL: CPT

## 2023-05-18 PROCEDURE — 96365 THER/PROPH/DIAG IV INF INIT: CPT

## 2023-05-18 PROCEDURE — 6360000002 HC RX W HCPCS: Performed by: STUDENT IN AN ORGANIZED HEALTH CARE EDUCATION/TRAINING PROGRAM

## 2023-05-18 PROCEDURE — 36415 COLL VENOUS BLD VENIPUNCTURE: CPT

## 2023-05-18 RX ORDER — ACETAMINOPHEN 500 MG
1000 TABLET ORAL ONCE
Status: COMPLETED | OUTPATIENT
Start: 2023-05-18 | End: 2023-05-18

## 2023-05-18 RX ORDER — OXYCODONE HYDROCHLORIDE 5 MG/1
10 TABLET ORAL ONCE
Status: COMPLETED | OUTPATIENT
Start: 2023-05-18 | End: 2023-05-18

## 2023-05-18 RX ORDER — CALCIUM GLUCONATE 20 MG/ML
2000 INJECTION, SOLUTION INTRAVENOUS ONCE
Status: COMPLETED | OUTPATIENT
Start: 2023-05-18 | End: 2023-05-18

## 2023-05-18 RX ORDER — SODIUM CHLORIDE, SODIUM LACTATE, POTASSIUM CHLORIDE, AND CALCIUM CHLORIDE .6; .31; .03; .02 G/100ML; G/100ML; G/100ML; G/100ML
1000 INJECTION, SOLUTION INTRAVENOUS ONCE
Status: COMPLETED | OUTPATIENT
Start: 2023-05-18 | End: 2023-05-18

## 2023-05-18 RX ADMIN — OXYCODONE HYDROCHLORIDE 10 MG: 5 TABLET ORAL at 17:24

## 2023-05-18 RX ADMIN — SODIUM CHLORIDE, POTASSIUM CHLORIDE, SODIUM LACTATE AND CALCIUM CHLORIDE 1000 ML: 600; 310; 30; 20 INJECTION, SOLUTION INTRAVENOUS at 17:02

## 2023-05-18 RX ADMIN — CALCIUM GLUCONATE 2000 MG: 20 INJECTION, SOLUTION INTRAVENOUS at 17:56

## 2023-05-18 RX ADMIN — ACETAMINOPHEN 1000 MG: 500 TABLET ORAL at 17:24

## 2023-05-18 ASSESSMENT — PAIN SCALES - GENERAL
PAINLEVEL_OUTOF10: 5
PAINLEVEL_OUTOF10: 9

## 2023-05-18 ASSESSMENT — PAIN - FUNCTIONAL ASSESSMENT: PAIN_FUNCTIONAL_ASSESSMENT: 0-10

## 2023-05-18 NOTE — ED NOTES
Medicated for pain. Refusing BP assessment at this time.  remains at bedside.      Hazel Marin RN  05/18/23 2958

## 2023-05-18 NOTE — ED PROVIDER NOTES
[address]  Emergency Department Encounter  Emergency Medicine Resident     Pt Name:Yuli Clements  MRN: [id number]  [place] 1955  Date of evaluation: 5/18/23  PCP:  Renald Gosselin, MD  Note Started: 4:47 PM EDT      CHIEF COMPLAINT       Chief Complaint   Patient presents with    Arm Pain       HISTORY OF PRESENT ILLNESS  (Location/Symptom, Timing/Onset, Context/Setting, Quality, Duration, Modifying Factors, Severity.)      Blanca Bocanegra is a 69 y.o. female who presents with bilateral upper extremity arm pain. Patient states that pain began recently worse, she is unable to move her hands. Patient does not claim any chest pain. Patient does not have any shortness of breath dizziness redness nausea or vomiting. Patient is that her pain is moderate to severe in intensity. Patient is complaining that any distress open her hands, the pain is unbearable. Patient does have a cardiac history.       PAST MEDICAL / SURGICAL / SOCIAL / FAMILY HISTORY      has a past medical history of Abdominal pain, Abnormal computed tomography of abdomen and pelvis, Acquired von Willebrand's disease ([address] Allergy to food dye, Anemia, Asthma, Basal cell carcinoma of upper lip, Benign paroxysmal positional vertigo, Cancer (HCC), Cardiac murmur, Chest pain, Cobalamin deficiency, Diverticulitis, Diverticulosis, Gastroesophageal reflux disease, Gastroparesis, Hiatal hernia, History of fractured kneecap, History of malignant neoplasm of thyroid, History of squamous cell carcinoma in situ of skin, Hypoparathyroidism ([address] IBS (irritable bowel syndrome), Intestinal disaccharidase deficiency, Ischemic optic neuropathy, left, Lichen sclerosus, MVP (mitral valve prolapse), Neoplasm of unspecified nature of bone, soft tissue, and skin, Neoplasm of unspecified nature of bone, soft tissue, and skin, Osteoarthritis, Osteoporosis, PONV (postoperative nausea and vomiting), Postprocedural hypothyroidism, Prolonged emergence

## 2023-05-18 NOTE — ED TRIAGE NOTES
Mode of arrival (squad #, walk in, police, etc) : walk-in        Chief complaint(s): arm pain        Arrival Note (brief scenario, treatment PTA, etc). : Pt reports worsening arm pain and tingling today. C= \"Have you ever felt that you should Cut down on your drinking? \"  No  A= \"Have people Annoyed you by criticizing your drinking? \"  No  G= \"Have you ever felt bad or Guilty about your drinking? \"  No  E= \"Have you ever had a drink as an Eye-opener first thing in the morning to steady your nerves or to help a hangover? \"  No      Deferred []      Reason for deferring: N/A    *If yes to two or more: probable alcohol abuse. *

## 2023-05-19 ENCOUNTER — TELEPHONE (OUTPATIENT)
Dept: PRIMARY CARE CLINIC | Age: 68
End: 2023-05-19

## 2023-05-19 LAB
EKG ATRIAL RATE: 76 BPM
EKG P AXIS: 23 DEGREES
EKG P-R INTERVAL: 130 MS
EKG Q-T INTERVAL: 436 MS
EKG QRS DURATION: 68 MS
EKG QTC CALCULATION (BAZETT): 490 MS
EKG R AXIS: 0 DEGREES
EKG T AXIS: -63 DEGREES
EKG VENTRICULAR RATE: 76 BPM

## 2023-05-19 PROCEDURE — 93010 ELECTROCARDIOGRAM REPORT: CPT | Performed by: INTERNAL MEDICINE

## 2023-05-19 NOTE — TELEPHONE ENCOUNTER
I don't see anything in the chart approving pt to switch providers. I am not taking new pts. I see it was okay'd with Dr. Cami Celis in the appt notes, but I don't see where I okay'd it and I don't remember  I would suggest you ask Dr. Cami Celis since she has been following pt's low calcium for years and I have not seen her yet.

## 2023-05-19 NOTE — TELEPHONE ENCOUNTER
Pt started Tums today. Pt is not eating calcium foods and she is not taking calcium supplements. Pt would like a Rx sent to [facility]. Pt states she can only handle 250 mg. Pt states it has to be white tablets since she's allergic to yellow and red dye. Discussed with Dr. Ambrocio Dang, she should try kids calcium OTC. Pt was notified.

## 2023-05-19 NOTE — TELEPHONE ENCOUNTER
Patient called office states she recently switched providers. Previous patient of Dr Yareli Dillard. Patient states she was seen in ED yesterday and was told she has low calcium and calling to see what Dr Matteo Benjamin suggest she do.      Next appointment: 6/16/23     Please call patient back on mobile phone per her req

## 2023-05-19 NOTE — TELEPHONE ENCOUNTER
Patient called office back checking on message - aware pt this was sent over to dr Roopa Washington for review

## 2023-05-19 NOTE — ED NOTES
Discharge instructions reviewed with patient and spouse. Encouraged f/u with PCP. Declined wheelchair. Ambulatory out of dept with steady gait.       Armin Feldman RN  05/18/23 2004

## 2023-05-19 NOTE — TELEPHONE ENCOUNTER
Is she eating Tums like we have discussed in the past ? Is she eating calcium foods or taking a supplement ?   If she doing that then suggest she see nephrologist.

## 2023-05-31 ENCOUNTER — HOSPITAL ENCOUNTER (OUTPATIENT)
Dept: PHYSICAL THERAPY | Age: 68
Setting detail: THERAPIES SERIES
Discharge: HOME | End: 2023-05-31

## 2023-05-31 ENCOUNTER — OFFICE VISIT (OUTPATIENT)
Dept: PULMONOLOGY | Age: 68
End: 2023-05-31
Payer: MEDICARE

## 2023-05-31 VITALS
RESPIRATION RATE: 16 BRPM | WEIGHT: 142 LBS | HEIGHT: 64 IN | BODY MASS INDEX: 24.24 KG/M2 | OXYGEN SATURATION: 97 % | DIASTOLIC BLOOD PRESSURE: 88 MMHG | HEART RATE: 77 BPM | SYSTOLIC BLOOD PRESSURE: 128 MMHG

## 2023-05-31 DIAGNOSIS — J45.40 MODERATE PERSISTENT ASTHMA WITHOUT COMPLICATION: Primary | ICD-10-CM

## 2023-05-31 PROCEDURE — 3017F COLORECTAL CA SCREEN DOC REV: CPT | Performed by: INTERNAL MEDICINE

## 2023-05-31 PROCEDURE — 1036F TOBACCO NON-USER: CPT | Performed by: INTERNAL MEDICINE

## 2023-05-31 PROCEDURE — 99214 OFFICE O/P EST MOD 30 MIN: CPT | Performed by: INTERNAL MEDICINE

## 2023-05-31 PROCEDURE — 1090F PRES/ABSN URINE INCON ASSESS: CPT | Performed by: INTERNAL MEDICINE

## 2023-05-31 PROCEDURE — G8420 CALC BMI NORM PARAMETERS: HCPCS | Performed by: INTERNAL MEDICINE

## 2023-05-31 PROCEDURE — G8427 DOCREV CUR MEDS BY ELIG CLIN: HCPCS | Performed by: INTERNAL MEDICINE

## 2023-05-31 PROCEDURE — 1123F ACP DISCUSS/DSCN MKR DOCD: CPT | Performed by: INTERNAL MEDICINE

## 2023-05-31 PROCEDURE — G8400 PT W/DXA NO RESULTS DOC: HCPCS | Performed by: INTERNAL MEDICINE

## 2023-05-31 RX ORDER — MONTELUKAST SODIUM 10 MG/1
10 TABLET ORAL DAILY
Qty: 30 TABLET | Refills: 3 | Status: SHIPPED | OUTPATIENT
Start: 2023-05-31

## 2023-05-31 NOTE — THERAPY EVALUATION
[address]   Outpatient Physical Therapy  Physical Therapy Lower Extremity Evaluation    Date:  2023  Patient: Ariana Burciaga  : 1955  MRN: [id number]  Physician: Delia Dang MD Insurance: Medicare with [employer] [place] secondary. Visits BMN and in accordance with Medicare guidelines. Track cap. Medical Diagnosis:   M25.551 (ICD-10-CM) - Pain of right hip   Z91.81 (ICD-10-CM) - At high risk for falls   Rehab Codes: M25.551,   Onset Date: 2023   Next 's appt: 23    Subjective:   CC: Chronic R posterior hip pain  HPI: Pt reports ~4 month hx of R posterior hip pain which she states started after sustaining a ground level fall in her bathroom. States that she tripped over her rug and cord of her space heater and ended up landing onto her R hip. Denies any history or pattern of gait unsteadiness or balance issues, and that this was an isolated fall event- recently rearranging her home environment to minimize cords and rugs. Pt states that initially after her fall,she was experiencing fairly high pain levels in her R hip but this has gradually improved over time. Has been self-managing with Tylenol heat, and exercise. Overall feels that sx are much improved but still feels some residual pain in her R posterior hip and proximal thigh which continues to feel like a muscle strain. Recently discussed concerns of continued discomfort with her PCP who ordered x-rays and referred to PT for conservative mgmt. X-rays of R hip unremarkable. Pt presents to PT evaluation with 5/10 discomfort at rest and describes most difficulty with ascending stairs leading with her R LE.     PMHx: [] Unremarkable [] Diabetes [] HTN  [] Pacemaker   [] MI/Heart Problems [x] Cancer (thyroid s/p thyroidectomy, BCC, SCC) [] Arthritis [x] Other: GERD, R patellar fx s/p ORIF, thrombocytopenia, R cataract surgery, B elbow surgery, L proximal humerus fx              [x] Refer to full medical chart  In EPIC

## 2023-05-31 NOTE — PROGRESS NOTES
REASON FOR THE CONSULTATION:  Bronchial asthma  Hard of hearing  Possible sleep apnea syndrome  History of carcinoma of the thyroid which were resected. HISTORY OF PRESENT ILLNESS:    Georgia Enriquez has a moderate persistent bronchial asthma which is responding verbally well to the present bronchodilator therapy. She is taking albuterol as needed basis. She uses Symbicort twice a day. She uses Pulmicort as an aerosol. He does rinse her mouth and throat after use of Pulmicort and Symbicort. She did not have to go to the ER or urgent care center since her last visit. He does not have to wake up at night because of asthma wheezing. Overall her pulmonary status is stable there is no evidence of acute exacerbation of asthma. I did check her antibodies for Aspergillus fumigatus but they were within normal limits. She has no history of smoking. She has not noted any symptoms of chest infection. She has not noted any pedal edema pulmonary process. She very likely has sleep apnea syndrome however she refuses to get a sleep study done. Patient have had a surgery for carcinoma of the thyroid no complications. She is on replacement therapy.     LUNG CANCER SCREENING     CRITERIA MET    []     CT ORDERED  []      CRITERIA NOT MET   [x]      REFUSED                    []        REASON CRITERIA NOT MET     SMOKING LESS THAN 30 PY  []      AGE LESS THAN 55 or GREATER 77 YEARS  []      QUIT SMOKING 15 YEARS OR GREATER   []      RECENT CT WITH IN 11 MONTHS    []      LIFE EXPECTANCY < 5 YEARS   []      SIGNS  AND SYMPTOMS OF LUNG CANCER   []         Immunization   Immunization History   Administered Date(s) Administered    COVID-19, MODERNA BLUE border, Primary or Immunocompromised, (age 12y+), IM, 100 mcg/0.5mL 03/16/2021, 04/13/2021, 12/30/2021    COVID-19, PFIZER Bivalent, DO NOT Dilute, (age 12y+), IM, 27 mcg/0.3 mL 10/25/2022    Influenza Virus Vaccine 11/01/2011, 12/27/2012, 11/19/2013, 11/14/2014

## 2023-06-01 ENCOUNTER — HOSPITAL ENCOUNTER (OUTPATIENT)
Dept: PHYSICAL THERAPY | Age: 68
Setting detail: THERAPIES SERIES
Discharge: HOME | End: 2023-06-01
Payer: MEDICARE

## 2023-06-01 PROCEDURE — 97161 PT EVAL LOW COMPLEX 20 MIN: CPT

## 2023-06-01 PROCEDURE — 97110 THERAPEUTIC EXERCISES: CPT

## 2023-06-05 ENCOUNTER — HOSPITAL ENCOUNTER (OUTPATIENT)
Dept: PHYSICAL THERAPY | Age: 68
Setting detail: THERAPIES SERIES
Discharge: HOME | End: 2023-06-05
Payer: MEDICARE

## 2023-06-05 PROCEDURE — 97110 THERAPEUTIC EXERCISES: CPT

## 2023-06-05 PROCEDURE — 97140 MANUAL THERAPY 1/> REGIONS: CPT

## 2023-06-07 NOTE — FLOWSHEET NOTE
tolerances through community activities at her PLOF  Pt will improve LEFS to 80% function or greater in order to demonstrate improved functional tolerances at PLOF with minimal restriction/dysfunction  Pt will demonstrate independence with a long term HEP for continued progress/maintenance after completion of PT    Pt. Education:  [x] Yes  [] No  [x] Reviewed Prior HEP/Ed  Method of Education: [x] Verbal  [x] Demo  [x] Written ([employer] Access Code:   Comprehension of Education:  [x] Verbalizes understanding. [x] Demonstrates understanding. [] Needs review. [] Demonstrates/verbalizes HEP/Ed previously given. Access Code: [email or web address]  URL: [employer]/  Date: 06/01/2023  Prepared by: Magi Mcdermott     Exercises  - Supine Single Knee to Chest Stretch  - 1 x daily - 7 x weekly - 3 sets - 30 hold  - Supine Bridge  - 1 x daily - 7 x weekly - 3 sets - 10 reps  - Supine Piriformis Stretch Pulling Heel to Hip  - 1 x daily - 7 x weekly - 3 sets - 30 hold  - Supine Hamstring Stretch with Strap  - 1 x daily - 7 x weekly - 3 sets - 30 hold        Plan: [x] Continue per plan of care.    [] Other:      Treatment Charges: Mins Units   []  Modalities     [x]  Ther Exercise 35 2   [x]  Manual Therapy 8 1   []  Ther Activities     []  Aquatics     []  Neuromuscular     [] Vasocompression     [] Gait Training     [] Dry needling        [] 1 or 2 muscles        [] 3 or more muscles     []  Other     Total Treatment time 43 3     Time In: 3:25 pm            Time Out: 4:08 pm    All above treatment interventions and documentation were performed by a student physical therapist (SPT) under the direct supervision of licensed provider Magi Mcdermott PT, DPT    Electronically signed by:  Kiya Irwin, SPT

## 2023-06-08 ENCOUNTER — HOSPITAL ENCOUNTER (OUTPATIENT)
Dept: PHYSICAL THERAPY | Age: 68
Setting detail: THERAPIES SERIES
Discharge: HOME | End: 2023-06-08
Payer: MEDICARE

## 2023-06-08 PROCEDURE — 97140 MANUAL THERAPY 1/> REGIONS: CPT

## 2023-06-08 PROCEDURE — 97110 THERAPEUTIC EXERCISES: CPT

## 2023-06-13 ENCOUNTER — APPOINTMENT (OUTPATIENT)
Dept: PHYSICAL THERAPY | Age: 68
End: 2023-06-13
Payer: MEDICARE

## 2023-06-19 ENCOUNTER — OFFICE VISIT (OUTPATIENT)
Dept: PRIMARY CARE CLINIC | Age: 68
End: 2023-06-19
Payer: MEDICARE

## 2023-06-19 VITALS
BODY MASS INDEX: 24.07 KG/M2 | SYSTOLIC BLOOD PRESSURE: 134 MMHG | HEIGHT: 64 IN | RESPIRATION RATE: 16 BRPM | OXYGEN SATURATION: 100 % | DIASTOLIC BLOOD PRESSURE: 80 MMHG | HEART RATE: 67 BPM | WEIGHT: 141 LBS

## 2023-06-19 DIAGNOSIS — B07.0 PLANTAR WART: ICD-10-CM

## 2023-06-19 DIAGNOSIS — H61.22 IMPACTED CERUMEN OF LEFT EAR: ICD-10-CM

## 2023-06-19 DIAGNOSIS — E83.51 HYPOCALCEMIA: Primary | ICD-10-CM

## 2023-06-19 DIAGNOSIS — E53.8 B12 DEFICIENCY: Chronic | ICD-10-CM

## 2023-06-19 DIAGNOSIS — E03.9 HYPOTHYROIDISM, UNSPECIFIED TYPE: ICD-10-CM

## 2023-06-19 DIAGNOSIS — E83.51 HYPOCALCEMIA: ICD-10-CM

## 2023-06-19 DIAGNOSIS — E20.9 HYPOPARATHYROIDISM, UNSPECIFIED HYPOPARATHYROIDISM TYPE (HCC): Chronic | ICD-10-CM

## 2023-06-19 PROBLEM — R55 SYNCOPE AND COLLAPSE: Status: ACTIVE | Noted: 2022-05-26

## 2023-06-19 PROBLEM — H04.123 DRY EYE SYNDROME OF BOTH EYES: Status: ACTIVE | Noted: 2022-04-29

## 2023-06-19 PROBLEM — R42 DIZZINESS: Status: ACTIVE | Noted: 2022-05-25

## 2023-06-19 PROBLEM — H25.12 AGE-RELATED NUCLEAR CATARACT OF LEFT EYE: Status: ACTIVE | Noted: 2022-04-29

## 2023-06-19 PROBLEM — H52.4 PRESBYOPIA: Status: ACTIVE | Noted: 2022-04-29

## 2023-06-19 PROBLEM — R94.31 PROLONGED Q-T INTERVAL ON ECG: Status: ACTIVE | Noted: 2022-05-26

## 2023-06-19 PROBLEM — R79.89 ELEVATED TSH: Status: ACTIVE | Noted: 2022-05-26

## 2023-06-19 PROBLEM — H47.20 OPTIC ATROPHY OF LEFT EYE: Status: ACTIVE | Noted: 2022-04-29

## 2023-06-19 PROBLEM — S42.302A FRACTURE OF LEFT HUMERUS: Status: ACTIVE | Noted: 2022-05-25

## 2023-06-19 LAB
ALBUMIN SERPL-MCNC: 4.4 G/DL (ref 3.5–5.2)
ALBUMIN/GLOBULIN RATIO: 1.6 (ref 1–2.5)
ALP BLD-CCNC: 91 U/L (ref 35–104)
ALT SERPL-CCNC: 12 U/L (ref 5–33)
ANION GAP SERPL CALCULATED.3IONS-SCNC: 15 MMOL/L (ref 9–17)
AST SERPL-CCNC: 21 U/L
BILIRUB SERPL-MCNC: 0.4 MG/DL (ref 0.3–1.2)
BUN BLDV-MCNC: 13 MG/DL (ref 8–23)
CALCIUM IONIZED: 0.9 MMOL/L (ref 1.13–1.33)
CALCIUM SERPL-MCNC: 7 MG/DL (ref 8.6–10.4)
CHLORIDE BLD-SCNC: 99 MMOL/L (ref 98–107)
CO2: 26 MMOL/L (ref 20–31)
CREAT SERPL-MCNC: 0.89 MG/DL (ref 0.5–0.9)
GFR SERPL CREATININE-BSD FRML MDRD: >60 ML/MIN/1.73M2
GLUCOSE BLD-MCNC: 74 MG/DL (ref 70–99)
POTASSIUM SERPL-SCNC: 3.8 MMOL/L (ref 3.7–5.3)
PTH INTACT: 19.6 PG/ML (ref 14–72)
SODIUM BLD-SCNC: 140 MMOL/L (ref 135–144)
T3 FREE: 1.23 PG/ML (ref 2.02–4.43)
THYROXINE, FREE: 0.5 NG/DL (ref 0.9–1.7)
TOTAL PROTEIN: 7.2 G/DL (ref 6.4–8.3)
TSH SERPL DL<=0.05 MIU/L-ACNC: 27.77 UIU/ML (ref 0.3–5)
VITAMIN B-12: 769 PG/ML (ref 232–1245)

## 2023-06-19 PROCEDURE — G8420 CALC BMI NORM PARAMETERS: HCPCS | Performed by: FAMILY MEDICINE

## 2023-06-19 PROCEDURE — 1123F ACP DISCUSS/DSCN MKR DOCD: CPT | Performed by: FAMILY MEDICINE

## 2023-06-19 PROCEDURE — 1036F TOBACCO NON-USER: CPT | Performed by: FAMILY MEDICINE

## 2023-06-19 PROCEDURE — G8427 DOCREV CUR MEDS BY ELIG CLIN: HCPCS | Performed by: FAMILY MEDICINE

## 2023-06-19 PROCEDURE — G8400 PT W/DXA NO RESULTS DOC: HCPCS | Performed by: FAMILY MEDICINE

## 2023-06-19 PROCEDURE — 3017F COLORECTAL CA SCREEN DOC REV: CPT | Performed by: FAMILY MEDICINE

## 2023-06-19 PROCEDURE — 1090F PRES/ABSN URINE INCON ASSESS: CPT | Performed by: FAMILY MEDICINE

## 2023-06-19 PROCEDURE — 99214 OFFICE O/P EST MOD 30 MIN: CPT | Performed by: FAMILY MEDICINE

## 2023-06-19 RX ORDER — LEVOTHYROXINE AND LIOTHYRONINE 38; 9 UG/1; UG/1
60 TABLET ORAL DAILY
Qty: 30 TABLET | Refills: 3 | Status: SHIPPED | OUTPATIENT
Start: 2023-06-19 | End: 2023-06-20 | Stop reason: SDUPTHER

## 2023-06-19 RX ORDER — ALBUTEROL SULFATE 90 UG/1
2 AEROSOL, METERED RESPIRATORY (INHALATION) 4 TIMES DAILY PRN
Qty: 18 G | Refills: 2 | Status: SHIPPED | OUTPATIENT
Start: 2023-06-19

## 2023-06-19 ASSESSMENT — PATIENT HEALTH QUESTIONNAIRE - PHQ9
SUM OF ALL RESPONSES TO PHQ QUESTIONS 1-9: 0
SUM OF ALL RESPONSES TO PHQ QUESTIONS 1-9: 0
1. LITTLE INTEREST OR PLEASURE IN DOING THINGS: 0
SUM OF ALL RESPONSES TO PHQ QUESTIONS 1-9: 0
2. FEELING DOWN, DEPRESSED OR HOPELESS: 0
SUM OF ALL RESPONSES TO PHQ9 QUESTIONS 1 & 2: 0
SUM OF ALL RESPONSES TO PHQ QUESTIONS 1-9: 0

## 2023-06-19 NOTE — PROGRESS NOTES
[facility] PRIMARY CARE  [address] [facility] [address] [address]  Dept: [phone]    Kami Howell is a 69 y.o. female Established patient, who presents today for her medical conditions/complaints as noted below. Chief Complaint   Patient presents with    Establish Care     Previous patient of Dr. Suri Lofton, discuss thyroid per request of patient's eye doctor would like labs; has lump on bottom on left foot possible plantart wart; would like ears check for wax build up        HPI:   Pt has bulging of her eyes and eye doctor wanted her thyroid checked. Also has lump on bottom of her foot. Plastics thought it was a plantar's wart and said it should be checked.        Reviewed prior notes: None   Reviewed previous:  Labs    LDL Cholesterol (mg/dL)   Date Value   12/22/2020 72   04/28/2020 106       (goal LDL is <100)   AST (U/L)   Date Value   05/18/2023 25     ALT (U/L)   Date Value   05/18/2023 12     BUN (mg/dL)   Date Value   05/18/2023 11     TSH (uIU/mL)   Date Value   01/13/2023 4.23     BP Readings from Last 3 Encounters:   06/19/23 134/80   05/31/23 128/88   05/30/23 139/81          (goal 120/80)    Past Medical History:   Diagnosis Date    Abdominal pain     RLQ    Abnormal computed tomography of abdomen and pelvis 04/16/2021    Distal appendix is mildly thickened without significant periappendiceal inflammatory change    Acquired von Willebrand's disease ([facility] [address] 10/30/2018    Allergy to food dye     yellow and red dyes    Anemia     Asthma     Basal cell carcinoma of upper lip 06/22/2017    Benign paroxysmal positional vertigo 12/09/2013    Cancer ([facility] [address]     Thyroid and skin    Cardiac murmur     STATES SINCE CHILD BILLY- STATES SHE WAS BORN WITH A \"90 DEGREE ANGLE [place] Sage"    Chest pain 09/04/2020    Cobalamin deficiency 12/26/2012    Diverticulitis     Diverticulosis     Gastroesophageal reflux disease 12/26/2012    Gastroparesis     Hiatal hernia     History of

## 2023-06-20 ENCOUNTER — APPOINTMENT (OUTPATIENT)
Dept: PHYSICAL THERAPY | Age: 68
End: 2023-06-20
Payer: MEDICARE

## 2023-06-20 DIAGNOSIS — E03.9 HYPOTHYROIDISM, UNSPECIFIED TYPE: ICD-10-CM

## 2023-06-20 DIAGNOSIS — E83.51 HYPOCALCEMIA: Primary | Chronic | ICD-10-CM

## 2023-06-20 RX ORDER — LEVOTHYROXINE AND LIOTHYRONINE 57; 13.5 UG/1; UG/1
90 TABLET ORAL DAILY
Qty: 30 TABLET | Refills: 3 | Status: SHIPPED | OUTPATIENT
Start: 2023-06-20

## 2023-06-20 NOTE — RESULT ENCOUNTER NOTE
Advise pt her thyroid dose is too low. Increase it to 90 mg. New Rx sent to pharmacy. Continue the calcium like she has been taking. Level is still low but getting better. Recheck labs in 4-6 weeks.  Orders in

## 2023-06-22 ENCOUNTER — APPOINTMENT (OUTPATIENT)
Dept: PHYSICAL THERAPY | Age: 68
End: 2023-06-22
Payer: MEDICARE

## 2023-06-23 ENCOUNTER — APPOINTMENT (OUTPATIENT)
Dept: PHYSICAL THERAPY | Age: 68
End: 2023-06-23
Payer: MEDICARE

## 2023-07-17 ENCOUNTER — OFFICE VISIT (OUTPATIENT)
Dept: OBGYN CLINIC | Age: 68
End: 2023-07-17
Payer: MEDICARE

## 2023-07-17 VITALS
DIASTOLIC BLOOD PRESSURE: 84 MMHG | BODY MASS INDEX: 22.88 KG/M2 | WEIGHT: 134 LBS | HEIGHT: 64 IN | HEART RATE: 76 BPM | SYSTOLIC BLOOD PRESSURE: 151 MMHG

## 2023-07-17 DIAGNOSIS — Z12.31 ENCOUNTER FOR SCREENING MAMMOGRAM FOR MALIGNANT NEOPLASM OF BREAST: Primary | ICD-10-CM

## 2023-07-17 PROCEDURE — G0101 CA SCREEN;PELVIC/BREAST EXAM: HCPCS | Performed by: OBSTETRICS & GYNECOLOGY

## 2023-07-17 RX ORDER — NYSTATIN 100000 [USP'U]/G
POWDER TOPICAL
Qty: 60 G | Refills: 1 | Status: SHIPPED | OUTPATIENT
Start: 2023-07-17

## 2023-07-17 ASSESSMENT — ENCOUNTER SYMPTOMS
ABDOMINAL PAIN: 0
BACK PAIN: 0
COUGH: 0
SHORTNESS OF BREATH: 0

## 2023-08-14 DIAGNOSIS — E03.9 HYPOTHYROIDISM, UNSPECIFIED TYPE: ICD-10-CM

## 2023-08-14 NOTE — TELEPHONE ENCOUNTER
LAST VISIT:   6/19/2023     Future Appointments   Date Time Provider [address]   8/15/2023  1:30 PM Madelene Dancer, MD [facility] None   9/11/2023  2:45 PM [facility]   9/12/2023  2:30 PM Miya Larsen  [address]   11/29/2023  2:00 PM Susana Peña

## 2023-08-14 NOTE — TELEPHONE ENCOUNTER
----- Message from Annalee Wilson sent at 8/14/2023 10:07 AM EDT -----  Subject: Refill Request    QUESTIONS  Name of Medication? thyroid (ARMOUR THYROID) 90 MG tablet  Patient-reported dosage and instructions? 90 MG tablet 1x daily  How many days do you have left? 0  Preferred Pharmacy? [id number]  Pharmacy phone number (if available)? [phone]  Additional Information for Provider? Nxt appt 9/11/23  ---------------------------------------------------------------------------  --------------  CALL BACK INFO  What is the best way for the office to contact you? OK to leave message on   voicemail  Preferred Call Back Phone Number? [phone]  ---------------------------------------------------------------------------  --------------  SCRIPT ANSWERS  Relationship to Patient?  Self

## 2023-08-16 RX ORDER — THYROID 90 MG/1
90 TABLET ORAL DAILY
Qty: 30 TABLET | Refills: 3 | Status: SHIPPED | OUTPATIENT
Start: 2023-08-16

## 2023-09-11 ENCOUNTER — OFFICE VISIT (OUTPATIENT)
Dept: PRIMARY CARE CLINIC | Age: 68
End: 2023-09-11
Payer: MEDICARE

## 2023-09-11 VITALS
DIASTOLIC BLOOD PRESSURE: 78 MMHG | HEART RATE: 80 BPM | BODY MASS INDEX: 24.34 KG/M2 | SYSTOLIC BLOOD PRESSURE: 118 MMHG | OXYGEN SATURATION: 97 % | HEIGHT: 64 IN | WEIGHT: 142.6 LBS

## 2023-09-11 DIAGNOSIS — E03.9 HYPOTHYROIDISM, UNSPECIFIED TYPE: Chronic | ICD-10-CM

## 2023-09-11 DIAGNOSIS — E03.9 HYPOTHYROIDISM, UNSPECIFIED TYPE: ICD-10-CM

## 2023-09-11 DIAGNOSIS — J45.40 MODERATE PERSISTENT ASTHMA WITHOUT COMPLICATION: Primary | ICD-10-CM

## 2023-09-11 DIAGNOSIS — Z12.31 ENCOUNTER FOR SCREENING MAMMOGRAM FOR BREAST CANCER: ICD-10-CM

## 2023-09-11 DIAGNOSIS — E83.51 HYPOCALCEMIA: Chronic | ICD-10-CM

## 2023-09-11 DIAGNOSIS — S81.002A OPEN WOUND OF LEFT KNEE, INITIAL ENCOUNTER: ICD-10-CM

## 2023-09-11 DIAGNOSIS — R79.89 ELEVATED TSH: ICD-10-CM

## 2023-09-11 PROCEDURE — G8400 PT W/DXA NO RESULTS DOC: HCPCS | Performed by: FAMILY MEDICINE

## 2023-09-11 PROCEDURE — G8420 CALC BMI NORM PARAMETERS: HCPCS | Performed by: FAMILY MEDICINE

## 2023-09-11 PROCEDURE — 99214 OFFICE O/P EST MOD 30 MIN: CPT | Performed by: FAMILY MEDICINE

## 2023-09-11 PROCEDURE — 1036F TOBACCO NON-USER: CPT | Performed by: FAMILY MEDICINE

## 2023-09-11 PROCEDURE — 3017F COLORECTAL CA SCREEN DOC REV: CPT | Performed by: FAMILY MEDICINE

## 2023-09-11 PROCEDURE — 1123F ACP DISCUSS/DSCN MKR DOCD: CPT | Performed by: FAMILY MEDICINE

## 2023-09-11 PROCEDURE — G8427 DOCREV CUR MEDS BY ELIG CLIN: HCPCS | Performed by: FAMILY MEDICINE

## 2023-09-11 PROCEDURE — 1090F PRES/ABSN URINE INCON ASSESS: CPT | Performed by: FAMILY MEDICINE

## 2023-09-11 RX ORDER — AZITHROMYCIN 250 MG/1
TABLET, FILM COATED ORAL
Qty: 1 PACKET | Refills: 0 | Status: SHIPPED | OUTPATIENT
Start: 2023-09-11

## 2023-09-11 RX ORDER — ALBUTEROL SULFATE 90 UG/1
2 AEROSOL, METERED RESPIRATORY (INHALATION) 4 TIMES DAILY PRN
Qty: 18 G | Refills: 2 | Status: SHIPPED | OUTPATIENT
Start: 2023-09-11

## 2023-09-11 RX ORDER — THYROID 60 MG/1
60 TABLET ORAL DAILY
Qty: 30 TABLET | Refills: 3 | Status: SHIPPED | OUTPATIENT
Start: 2023-09-11

## 2023-09-11 RX ORDER — THYROID 15 MG/1
15 TABLET ORAL DAILY
Qty: 30 TABLET | Refills: 3 | Status: SHIPPED | OUTPATIENT
Start: 2023-09-11

## 2023-09-11 ASSESSMENT — ENCOUNTER SYMPTOMS
SHORTNESS OF BREATH: 1
SORE THROAT: 1
NAUSEA: 0
DIARRHEA: 0
VOMITING: 0
COUGH: 1

## 2023-09-11 NOTE — PROGRESS NOTES
[address] PRIMARY CARE  [address] [address] [address]  Dept: [phone]    Rudy Baltazar is a 69 y.o. female Established patient, who presents today for her medical conditions/complaints as noted below. Chief Complaint   Patient presents with    Sore Throat     Pt states she just came back from [facility] and has a sore throat x2 days    Blood Work     Pt would like BW orders checked for calcium, b12, electrolytes, magnesium, iron and thyroid checked    Fall     Pt fell 4 weeks agoon 8/14/23 would like LT knee looked at       HPI:   Pt has caught the virus going around right now. Going down into her chest.  Needs refill for her inhaler. Terra Gain on her knee about a month ago.       Reviewed prior notes: None   Reviewed previous:  Labs    LDL Cholesterol (mg/dL)   Date Value   12/22/2020 72   04/28/2020 106       (goal LDL is <100)   AST (U/L)   Date Value   06/19/2023 21     ALT (U/L)   Date Value   06/19/2023 12     BUN (mg/dL)   Date Value   06/19/2023 13     TSH (uIU/mL)   Date Value   06/19/2023 27.77 (H)     BP Readings from Last 3 Encounters:   09/11/23 118/78   08/15/23 131/82   07/17/23 (!) 151/84          (goal 120/80)    Past Medical History:   Diagnosis Date    Abdominal pain     RLQ    Abnormal computed tomography of abdomen and pelvis 04/16/2021    Distal appendix is mildly thickened without significant periappendiceal inflammatory change    Acquired von Willebrand's disease ([address]) 10/30/2018    Allergy to food dye     yellow and red dyes    Anemia     Asthma     Basal cell carcinoma of upper lip 06/22/2017    Benign paroxysmal positional vertigo 12/09/2013    Cancer ([address])     Thyroid and skin    Cardiac murmur     STATES SINCE CHILD BILLY- STATES SHE WAS BORN WITH A \"90 DEGREE ANGLE [place]"    Chest pain 09/04/2020    Cobalamin deficiency 12/26/2012    Diverticulitis     Diverticulosis     Gastroesophageal reflux disease 12/26/2012    Gastroparesis

## 2023-09-12 ENCOUNTER — OFFICE VISIT (OUTPATIENT)
Dept: PODIATRY | Age: 68
End: 2023-09-12
Payer: MEDICARE

## 2023-09-12 VITALS — HEIGHT: 64 IN | BODY MASS INDEX: 24.24 KG/M2 | WEIGHT: 142 LBS

## 2023-09-12 DIAGNOSIS — M21.6X2 ACQUIRED BILATERAL PES CAVUS: ICD-10-CM

## 2023-09-12 DIAGNOSIS — M79.671 PAIN IN BOTH FEET: ICD-10-CM

## 2023-09-12 DIAGNOSIS — R26.2 UNABLE TO WALK 150 FEET: Primary | ICD-10-CM

## 2023-09-12 DIAGNOSIS — D23.72 BENIGN NEOPLASM OF SKIN OF FOOT, LEFT: ICD-10-CM

## 2023-09-12 DIAGNOSIS — L90.9 FAT PAD ATROPHY OF FOOT: ICD-10-CM

## 2023-09-12 DIAGNOSIS — D23.71 BENIGN NEOPLASM OF SKIN OF FOOT, RIGHT: ICD-10-CM

## 2023-09-12 DIAGNOSIS — L85.1 ACQUIRED PLANTAR KERATODERMA: ICD-10-CM

## 2023-09-12 DIAGNOSIS — M21.6X1 ACQUIRED BILATERAL PES CAVUS: ICD-10-CM

## 2023-09-12 DIAGNOSIS — M79.672 PAIN IN BOTH FEET: ICD-10-CM

## 2023-09-12 LAB
CALCIUM IONIZED: 0.88 MMOL/L (ref 1.13–1.33)
TSH SERPL DL<=0.05 MIU/L-ACNC: 23.39 UIU/ML (ref 0.3–5)

## 2023-09-12 PROCEDURE — G8400 PT W/DXA NO RESULTS DOC: HCPCS | Performed by: PODIATRIST

## 2023-09-12 PROCEDURE — 1036F TOBACCO NON-USER: CPT | Performed by: PODIATRIST

## 2023-09-12 PROCEDURE — 3017F COLORECTAL CA SCREEN DOC REV: CPT | Performed by: PODIATRIST

## 2023-09-12 PROCEDURE — 1123F ACP DISCUSS/DSCN MKR DOCD: CPT | Performed by: PODIATRIST

## 2023-09-12 PROCEDURE — G8427 DOCREV CUR MEDS BY ELIG CLIN: HCPCS | Performed by: PODIATRIST

## 2023-09-12 PROCEDURE — 1090F PRES/ABSN URINE INCON ASSESS: CPT | Performed by: PODIATRIST

## 2023-09-12 PROCEDURE — 99204 OFFICE O/P NEW MOD 45 MIN: CPT | Performed by: PODIATRIST

## 2023-09-12 PROCEDURE — G8420 CALC BMI NORM PARAMETERS: HCPCS | Performed by: PODIATRIST

## 2023-09-12 NOTE — RESULT ENCOUNTER NOTE
Adv pt thyroid is still really abnormal- just take the new dose like we talked about and will see what the endocrinologist wants to do

## 2023-09-12 NOTE — PROGRESS NOTES
[facility]  New Patient History and Physical    Chief Complaint:   Chief Complaint   Patient presents with    Foot Pain     Left foot possible wart        HPI: Livan Song is a 69 y.o. female who presents to the office today complaining of possible wart left foot. Was doing a lot of walking, felt pain. Ball of left foot, has one on her right also. No injury. Was told it might be a wart. Using moisturizing cream. .   Currently denies F/C/N/V. Allergies   Allergen Reactions    Aspirin Anaphylaxis and Shortness Of Breath    Cefadroxil Shortness Of Breath    Avsumnim-Syuxvrb-Odqfpf [Fluocinolone] Shortness Of Breath    Ciprofloxacin Hcl Shortness Of Breath    Codeine Shortness Of Breath    Demerol Hcl [Meperidine] Shortness Of Breath    Doxycycline Nausea Only and Other (See Comments)    Glucosamine Shortness Of Breath, Diarrhea and Swelling     Other reaction(s): Respiratory Difficulty  \"lips swell up and get severe diarrhea\" - INCLUDES IODINE    Iodine Hives, Shortness Of Breath and Itching    Pcn [Penicillins] Anaphylaxis and Shortness Of Breath    Red Dye Shortness Of Breath    Shellfish-Derived Products Shortness Of Breath, Diarrhea and Swelling     \"lips swell up and get severe diarrhea\" - INCLUDES IODINE    Sulfa Antibiotics Hives, Shortness Of Breath, Itching and Rash     OK in small amounts, but larger amounts cause \"shortness of breath. \"    Yellow Dye Shortness Of Breath     Other reaction(s): Respiratory Difficulty    Yellow Dyes (Non-Tartrazine) Shortness Of Breath    Gadolinium Rash     Moderate allergic-like reaction consisting of diffuse urticaria to ProHance gadolinium-containing contrast material    Gadolinium Derivatives Rash     Moderate allergic-like reaction consisting of diffuse urticaria to ProHance gadolinium-containing contrast material    Midazolam Nausea Only     Versed caused bad nausea.     Milk (Cow)     Jerri Root     Iodides Hives    Adhesive Tape Rash       Past

## 2023-09-21 NOTE — PRE-PROCEDURE INSTRUCTIONS
VM left with pre-local anesthetic procedure instructions:     Date/time/location of surgery/procedure    PAT phone number [phone] provided for pt to call with any further questions prior to procedure. Instructed pt to take a shower the AM of surgery/procedure and to avoid lotions, creams, jewelry.

## 2023-09-25 ENCOUNTER — HOSPITAL ENCOUNTER (OUTPATIENT)
Age: 68
Setting detail: OUTPATIENT SURGERY
Discharge: HOME | End: 2023-09-25
Attending: PLASTIC SURGERY | Admitting: PLASTIC SURGERY
Payer: MEDICARE

## 2023-09-25 VITALS
WEIGHT: 142 LBS | HEIGHT: 64 IN | OXYGEN SATURATION: 100 % | DIASTOLIC BLOOD PRESSURE: 90 MMHG | HEART RATE: 64 BPM | SYSTOLIC BLOOD PRESSURE: 104 MMHG | TEMPERATURE: 97.1 F | RESPIRATION RATE: 16 BRPM | BODY MASS INDEX: 24.24 KG/M2

## 2023-09-25 DIAGNOSIS — D48.5 NEOPLASM OF UNCERTAIN BEHAVIOR OF SKIN: ICD-10-CM

## 2023-09-25 PROCEDURE — 3600000002 HC SURGERY LEVEL 2 BASE: Performed by: PLASTIC SURGERY

## 2023-09-25 PROCEDURE — 88305 TISSUE EXAM BY PATHOLOGIST: CPT

## 2023-09-25 PROCEDURE — 2500000003 HC RX 250 WO HCPCS: Performed by: PLASTIC SURGERY

## 2023-09-25 PROCEDURE — 3600000012 HC SURGERY LEVEL 2 ADDTL 15MIN: Performed by: PLASTIC SURGERY

## 2023-09-25 PROCEDURE — 2709999900 HC NON-CHARGEABLE SUPPLY: Performed by: PLASTIC SURGERY

## 2023-09-25 PROCEDURE — 7100000010 HC PHASE II RECOVERY - FIRST 15 MIN: Performed by: PLASTIC SURGERY

## 2023-09-25 RX ORDER — LIDOCAINE HYDROCHLORIDE AND EPINEPHRINE 10; 10 MG/ML; UG/ML
INJECTION, SOLUTION INFILTRATION; PERINEURAL
Status: DISCONTINUED
Start: 2023-09-25 | End: 2023-09-25 | Stop reason: HOSPADM

## 2023-09-25 ASSESSMENT — PAIN - FUNCTIONAL ASSESSMENT: PAIN_FUNCTIONAL_ASSESSMENT: 0-10

## 2023-09-25 NOTE — H&P
Subjective:      Patient ID: Trav Lawrence is a 69 y.o. female. HPI  Patient presents today for 2 new lesions. Review of Systems   Constitutional: Negative. HENT: Negative. Respiratory: Negative. Cardiovascular: Negative. Gastrointestinal: Negative. Musculoskeletal: Negative. Neurological: Negative. Medical History    Medical History    Diagnosis Date Comment Source   Anemia      Asthma      Basal cell carcinoma of upper lip 06/22/2017     Cancer (HCC)  Thyroid and skin    Cardiac murmur  STATES SINCE CHILD BILLY- STATES SHE WAS BORN WITH A \"90 DEGREE ANGLE [place]\"    Gastroesophageal reflux disease 12/26/2012     Hiatal hernia      Lichen sclerosus      Osteoarthritis      Osteoporosis  beginning     Postprocedural hypothyroidism 07/16/2018     Squamous cell carcinoma of upper extremity 04/08/2013 Scc in situ rt elbow 1/11    Thyroid disease  Thyroid Ca Hx. - thyroid was removed.         Other Medical History    Diagnosis Date Comment Source   Abdominal pain  RLQ    Abnormal computed tomography of abdomen and pelvis 04/16/2021 Distal appendix is mildly thickened without significant periappendiceal inflammatory change    Acquired von Willebrand's disease ([address]) 10/30/2018     Allergy to food dye  yellow and red dyes    Benign paroxysmal positional vertigo 12/09/2013     Chest pain 09/04/2020     Cobalamin deficiency 12/26/2012     Diverticulitis      Diverticulosis      Gastroparesis      History of fractured kneecap  RIGHT    History of malignant neoplasm of thyroid 04/17/2012     History of squamous cell carcinoma in situ of skin 06/09/2014 Chart states left wrist, but patient denies    Hypoparathyroidism ([address]) 07/03/2012     IBS (irritable bowel syndrome)  diarrhea-prominent type    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      Neoplasm of unspecified nature of

## 2023-09-25 NOTE — BRIEF OP NOTE
Brief Postoperative Note      Patient: Trav Lawrence  YOB: 1955  MRN: [id number]    Date of Procedure: 9/25/2023    Pre-Op Diagnosis Codes:      * Neoplasm of uncertain behavior of skin [D48.5]    Post-Op Diagnosis: Same       Procedure(s):  RIGHT ARM LESION EXCISION LEFT SHOULDER LESION EXCISION    Surgeon(s):  Harinder Ramirez MD    Assistant:  * No surgical staff found *    Anesthesia: Local    Estimated Blood Loss (mL): Minimal    Complications: None    Specimens:   ID Type Source Tests Collected by Time Destination   A : LEFT SHOULDER LESION Tissue Tissue SURGICAL PATHOLOGY Harinder Ramirez MD 9/25/2023 1339    B : RIGHT ARM LESION Tissue Tissue SURGICAL PATHOLOGY Harinder Ramirez MD 9/25/2023 1339        Implants:  * No implants in log *      Drains: * No LDAs found *    Findings:         Electronically signed by Harinder Ramirez MD on 9/25/2023 at 1:53 PM

## 2023-10-02 LAB — DERMATOLOGY PATHOLOGY REPORT: NORMAL

## 2023-10-13 ENCOUNTER — OFFICE VISIT (OUTPATIENT)
Dept: PRIMARY CARE CLINIC | Age: 68
End: 2023-10-13
Payer: MEDICARE

## 2023-10-13 VITALS
SYSTOLIC BLOOD PRESSURE: 110 MMHG | DIASTOLIC BLOOD PRESSURE: 80 MMHG | HEIGHT: 64 IN | OXYGEN SATURATION: 94 % | HEART RATE: 83 BPM | BODY MASS INDEX: 23.8 KG/M2 | WEIGHT: 139.4 LBS

## 2023-10-13 DIAGNOSIS — Z20.822 EXPOSURE TO COVID-19 VIRUS: ICD-10-CM

## 2023-10-13 DIAGNOSIS — H00.019 HORDEOLUM EXTERNUM, UNSPECIFIED LATERALITY: Primary | ICD-10-CM

## 2023-10-13 LAB
Lab: NORMAL
QC PASS/FAIL: NORMAL
SARS-COV-2 RDRP RESP QL NAA+PROBE: NEGATIVE

## 2023-10-13 PROCEDURE — 3017F COLORECTAL CA SCREEN DOC REV: CPT | Performed by: FAMILY MEDICINE

## 2023-10-13 PROCEDURE — 1036F TOBACCO NON-USER: CPT | Performed by: FAMILY MEDICINE

## 2023-10-13 PROCEDURE — G8484 FLU IMMUNIZE NO ADMIN: HCPCS | Performed by: FAMILY MEDICINE

## 2023-10-13 PROCEDURE — 87635 SARS-COV-2 COVID-19 AMP PRB: CPT | Performed by: FAMILY MEDICINE

## 2023-10-13 PROCEDURE — 1123F ACP DISCUSS/DSCN MKR DOCD: CPT | Performed by: FAMILY MEDICINE

## 2023-10-13 PROCEDURE — 99213 OFFICE O/P EST LOW 20 MIN: CPT | Performed by: FAMILY MEDICINE

## 2023-10-13 PROCEDURE — 1090F PRES/ABSN URINE INCON ASSESS: CPT | Performed by: FAMILY MEDICINE

## 2023-10-13 PROCEDURE — G8420 CALC BMI NORM PARAMETERS: HCPCS | Performed by: FAMILY MEDICINE

## 2023-10-13 PROCEDURE — G8400 PT W/DXA NO RESULTS DOC: HCPCS | Performed by: FAMILY MEDICINE

## 2023-10-13 PROCEDURE — G8427 DOCREV CUR MEDS BY ELIG CLIN: HCPCS | Performed by: FAMILY MEDICINE

## 2023-10-13 ASSESSMENT — ENCOUNTER SYMPTOMS
COUGH: 0
EYE ITCHING: 1
EYE DISCHARGE: 0
EYE REDNESS: 1
SHORTNESS OF BREATH: 0

## 2023-10-13 NOTE — PROGRESS NOTES
[address] PRIMARY CARE  [address] [address] [address]  Dept: [address] is a 77 y.o. female Established patient, who presents today for her medical conditions/complaints as noted below. Chief Complaint   Patient presents with    Eye Problem     Red bump on lower left eyelid, getting worse, first noticed about 4 weeks ago. Flu Vaccine     Patient declined. Discuss Labs     Patient would like cholesterol testing. HPI:   Red bump was there when Dr. Dorcas Souza saw her but he did not say anything about it.       Reviewed prior notes: None   Reviewed previous:  Labs    LDL Cholesterol (mg/dL)   Date Value   12/22/2020 72   04/28/2020 106       (goal LDL is <100)   AST (U/L)   Date Value   06/19/2023 21     ALT (U/L)   Date Value   06/19/2023 12     BUN (mg/dL)   Date Value   06/19/2023 13     TSH (uIU/mL)   Date Value   09/11/2023 23.39 (H)     BP Readings from Last 3 Encounters:   10/13/23 110/80   09/25/23 (!) 104/90   09/11/23 118/78          (goal 120/80)    Past Medical History:   Diagnosis Date    Abdominal pain     RLQ    Abnormal computed tomography of abdomen and pelvis 04/16/2021    Distal appendix is mildly thickened without significant periappendiceal inflammatory change    Acquired von Willebrand's disease ([address]) 10/30/2018    Allergy to food dye     yellow and red dyes    Anemia     Asthma     Basal cell carcinoma of upper lip 06/22/2017    Benign paroxysmal positional vertigo 12/09/2013    Cancer ([address])     Thyroid and skin    Cardiac murmur     STATES SINCE CHILD BILLY- STATES SHE WAS BORN WITH A \"90 DEGREE ANGLE [employer]"    Chest pain 09/04/2020    Cobalamin deficiency 12/26/2012    Diverticulitis     Diverticulosis     Gastroesophageal reflux disease 12/26/2012    Gastroparesis     Hiatal hernia     History of fractured kneecap     RIGHT    History of malignant neoplasm of thyroid 04/17/2012    History of squamous cell

## 2023-10-26 ENCOUNTER — OFFICE VISIT (OUTPATIENT)
Dept: PODIATRY | Age: 68
End: 2023-10-26
Payer: MEDICARE

## 2023-10-26 VITALS — BODY MASS INDEX: 23.9 KG/M2 | HEIGHT: 64 IN | WEIGHT: 140 LBS

## 2023-10-26 DIAGNOSIS — L85.1 ACQUIRED PLANTAR KERATODERMA: Primary | ICD-10-CM

## 2023-10-26 DIAGNOSIS — M79.672 PAIN IN BOTH FEET: ICD-10-CM

## 2023-10-26 DIAGNOSIS — M79.671 PAIN IN BOTH FEET: ICD-10-CM

## 2023-10-26 DIAGNOSIS — L90.9 FAT PAD ATROPHY OF FOOT: ICD-10-CM

## 2023-10-26 DIAGNOSIS — D23.72 BENIGN NEOPLASM OF SKIN OF FOOT, LEFT: ICD-10-CM

## 2023-10-26 DIAGNOSIS — D23.71 BENIGN NEOPLASM OF SKIN OF FOOT, RIGHT: ICD-10-CM

## 2023-10-26 PROCEDURE — 99999 PR OFFICE/OUTPT VISIT,PROCEDURE ONLY: CPT | Performed by: PODIATRIST

## 2023-10-26 PROCEDURE — 17110 DESTRUCTION B9 LES UP TO 14: CPT | Performed by: PODIATRIST

## 2023-10-26 RX ORDER — ALOSETRON HYDROCHLORIDE 0.5 MG/1
TABLET, FILM COATED ORAL
COMMUNITY
Start: 2023-10-25

## 2023-10-26 NOTE — PROGRESS NOTES
Jerri Root     Iodides Hives    Adhesive Tape Rash       Past Medical History:   Diagnosis Date    Abdominal pain     RLQ    Abnormal computed tomography of abdomen and pelvis 04/16/2021    Distal appendix is mildly thickened without significant periappendiceal inflammatory change    Acquired von Willebrand's disease ([address]) 10/30/2018    Allergy to food dye     yellow and red dyes    Anemia     Asthma     Basal cell carcinoma of upper lip 06/22/2017    Benign paroxysmal positional vertigo 12/09/2013    Cancer ([address])     Thyroid and skin    Cardiac murmur     STATES SINCE CHILD BILLY- STATES SHE WAS BORN WITH A \"90 DEGREE ANGLE [employer]"    Chest pain 09/04/2020    Cobalamin deficiency 12/26/2012    Diverticulitis     Diverticulosis     Gastroesophageal reflux disease 12/26/2012    Gastroparesis     Hiatal hernia     History of fractured kneecap     RIGHT    History of malignant neoplasm of thyroid 04/17/2012    History of squamous cell carcinoma in situ of skin 06/09/2014    Chart states left wrist, but patient denies    Hypoparathyroidism ([address]) 07/03/2012    IBS (irritable bowel syndrome)     diarrhea-prominent type    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 Neoplasm of unspecified nature of bone, soft tissue, and skin     New lesions of right dorsum of hand (healed after Efudex) and lesion of right anterior thigh.     Osteoarthritis     Osteoporosis     beginning     PONV (postoperative nausea and vomiting)     Postprocedural hypothyroidism 07/16/2018    Prolonged emergence from general anesthesia     Pt states this was \"a long time ago\"     Raynaud's disease     Seasonal allergies     Sensorineural hearing loss, bilateral 12/26/2012 Statement Selected

## 2023-11-22 ENCOUNTER — OFFICE VISIT (OUTPATIENT)
Dept: PRIMARY CARE CLINIC | Age: 68
End: 2023-11-22
Payer: MEDICARE

## 2023-11-22 VITALS
WEIGHT: 139 LBS | HEIGHT: 64 IN | SYSTOLIC BLOOD PRESSURE: 136 MMHG | DIASTOLIC BLOOD PRESSURE: 84 MMHG | HEART RATE: 72 BPM | OXYGEN SATURATION: 97 % | BODY MASS INDEX: 23.73 KG/M2

## 2023-11-22 DIAGNOSIS — L98.9 SKIN LESION: ICD-10-CM

## 2023-11-22 DIAGNOSIS — H93.8X3 EAR FULLNESS, BILATERAL: ICD-10-CM

## 2023-11-22 DIAGNOSIS — R42 DIZZINESS: Primary | ICD-10-CM

## 2023-11-22 DIAGNOSIS — E03.9 HYPOTHYROIDISM, UNSPECIFIED TYPE: ICD-10-CM

## 2023-11-22 PROCEDURE — 99213 OFFICE O/P EST LOW 20 MIN: CPT | Performed by: FAMILY MEDICINE

## 2023-11-22 PROCEDURE — 1123F ACP DISCUSS/DSCN MKR DOCD: CPT | Performed by: FAMILY MEDICINE

## 2023-11-22 PROCEDURE — 1090F PRES/ABSN URINE INCON ASSESS: CPT | Performed by: FAMILY MEDICINE

## 2023-11-22 PROCEDURE — G8427 DOCREV CUR MEDS BY ELIG CLIN: HCPCS | Performed by: FAMILY MEDICINE

## 2023-11-22 PROCEDURE — 3017F COLORECTAL CA SCREEN DOC REV: CPT | Performed by: FAMILY MEDICINE

## 2023-11-22 PROCEDURE — 1036F TOBACCO NON-USER: CPT | Performed by: FAMILY MEDICINE

## 2023-11-22 PROCEDURE — G8400 PT W/DXA NO RESULTS DOC: HCPCS | Performed by: FAMILY MEDICINE

## 2023-11-22 PROCEDURE — G8484 FLU IMMUNIZE NO ADMIN: HCPCS | Performed by: FAMILY MEDICINE

## 2023-11-22 PROCEDURE — G8420 CALC BMI NORM PARAMETERS: HCPCS | Performed by: FAMILY MEDICINE

## 2023-11-22 RX ORDER — LEVOTHYROXINE SODIUM 0.05 MG/1
50 TABLET ORAL DAILY
Qty: 90 TABLET | Refills: 3 | Status: SHIPPED | OUTPATIENT
Start: 2023-11-22

## 2023-11-22 ASSESSMENT — ENCOUNTER SYMPTOMS
COUGH: 0
SHORTNESS OF BREATH: 0

## 2023-11-22 NOTE — PROGRESS NOTES
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 Neoplasm of unspecified nature of bone, soft tissue, and skin     New lesions of right dorsum of hand (healed after Efudex) and lesion of right anterior thigh. 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 - thyroid was removed.      Vulvar dystrophy     Wears hearing aid     Wears partial dentures     LOWER      Past Surgical History:   Procedure Laterality Date    APPENDECTOMY  05/11/2021    ARM SURGERY N/A 9/25/2023    RIGHT ARM LESION EXCISION LEFT SHOULDER LESION EXCISION performed by Madelene Dancer, MD at [address]      Cyst removed rt. nipple     CATARACT REMOVAL Bilateral 07/17/2019    CHOLECYSTECTOMY  2011    COLONOSCOPY      COLONOSCOPY N/A 05/10/2021    COLONOSCOPY DIAGNOSTIC performed by Tiago Villegas MD at [address] [place] [address] Bilateral     Tendonitis X2    HERNIA REPAIR  2005    hiatal hernia repair    HYSTERECTOMY, TOTAL ABDOMINAL (CERVIX REMOVED)  92-97    BAIRON 3 Surgeries    KNEE SURGERY Right 1996    W/PINS    LAPAROSCOPY N/A 05/11/2021

## 2023-11-27 ENCOUNTER — TELEPHONE (OUTPATIENT)
Dept: PRIMARY CARE CLINIC | Age: 68
End: 2023-11-27

## 2023-11-27 DIAGNOSIS — H91.93 BILATERAL HEARING LOSS, UNSPECIFIED HEARING LOSS TYPE: Primary | ICD-10-CM

## 2023-11-27 NOTE — TELEPHONE ENCOUNTER
----- Message from Rosalina Martinez sent at 11/27/2023  9:47 AM EST -----  Subject: Referral Request    Reason for referral request? patient stated that the office for Yvette Lanier for her to get her hearing checked. please send referral over. She has appoint 11/28. Fax # [phone]  Provider patient wants to be referred to(if known):     Provider Phone Number(if known):     Additional Information for Provider?   ---------------------------------------------------------------------------  --------------  Sophy Stratford Yadi    [phone]; OK to leave message on voicemail  ---------------------------------------------------------------------------  --------------

## 2023-11-29 ENCOUNTER — OFFICE VISIT (OUTPATIENT)
Dept: PULMONOLOGY | Age: 68
End: 2023-11-29
Payer: MEDICARE

## 2023-11-29 VITALS
HEIGHT: 64 IN | TEMPERATURE: 97.5 F | DIASTOLIC BLOOD PRESSURE: 72 MMHG | BODY MASS INDEX: 23.9 KG/M2 | HEART RATE: 74 BPM | WEIGHT: 140 LBS | SYSTOLIC BLOOD PRESSURE: 120 MMHG | RESPIRATION RATE: 17 BRPM | OXYGEN SATURATION: 97 %

## 2023-11-29 DIAGNOSIS — J45.40 MODERATE PERSISTENT ASTHMA WITHOUT COMPLICATION: Primary | ICD-10-CM

## 2023-11-29 PROCEDURE — 1123F ACP DISCUSS/DSCN MKR DOCD: CPT | Performed by: INTERNAL MEDICINE

## 2023-11-29 PROCEDURE — G8484 FLU IMMUNIZE NO ADMIN: HCPCS | Performed by: INTERNAL MEDICINE

## 2023-11-29 PROCEDURE — G8420 CALC BMI NORM PARAMETERS: HCPCS | Performed by: INTERNAL MEDICINE

## 2023-11-29 PROCEDURE — G8427 DOCREV CUR MEDS BY ELIG CLIN: HCPCS | Performed by: INTERNAL MEDICINE

## 2023-11-29 PROCEDURE — 1036F TOBACCO NON-USER: CPT | Performed by: INTERNAL MEDICINE

## 2023-11-29 PROCEDURE — 1090F PRES/ABSN URINE INCON ASSESS: CPT | Performed by: INTERNAL MEDICINE

## 2023-11-29 PROCEDURE — G8400 PT W/DXA NO RESULTS DOC: HCPCS | Performed by: INTERNAL MEDICINE

## 2023-11-29 PROCEDURE — 99214 OFFICE O/P EST MOD 30 MIN: CPT | Performed by: INTERNAL MEDICINE

## 2023-11-29 PROCEDURE — 3017F COLORECTAL CA SCREEN DOC REV: CPT | Performed by: INTERNAL MEDICINE

## 2023-11-29 ASSESSMENT — SLEEP AND FATIGUE QUESTIONNAIRES
HOW LIKELY ARE YOU TO NOD OFF OR FALL ASLEEP WHILE LYING DOWN TO REST IN THE AFTERNOON WHEN CIRCUMSTANCES PERMIT: 0
HOW LIKELY ARE YOU TO NOD OFF OR FALL ASLEEP WHILE SITTING QUIETLY AFTER LUNCH WITHOUT ALCOHOL: 0
HOW LIKELY ARE YOU TO NOD OFF OR FALL ASLEEP WHEN YOU ARE A PASSENGER IN A CAR FOR AN HOUR WITHOUT A BREAK: 0
ESS TOTAL SCORE: 0
HOW LIKELY ARE YOU TO NOD OFF OR FALL ASLEEP WHILE SITTING AND READING: 0
HOW LIKELY ARE YOU TO NOD OFF OR FALL ASLEEP WHILE SITTING INACTIVE IN A PUBLIC PLACE: 0
HOW LIKELY ARE YOU TO NOD OFF OR FALL ASLEEP WHILE SITTING AND TALKING TO SOMEONE: 0
HOW LIKELY ARE YOU TO NOD OFF OR FALL ASLEEP WHILE WATCHING TV: 0
HOW LIKELY ARE YOU TO NOD OFF OR FALL ASLEEP IN A CAR, WHILE STOPPED FOR A FEW MINUTES IN TRAFFIC: 0

## 2023-11-29 NOTE — PROGRESS NOTES
carotid    bruit , no JVD   Back:     Symmetric, no curvature, ROM normal, no CVA tenderness   Lungs:   AP diameter is not increased percussion noted normally resonant breathing is vesicular expiration not prolonged no rails rhonchi audible no pleural friction rub is audible   Chest Wall:    No tenderness or deformity      Heart:    Regular rate and rhythm, S1 and S2 normal, no murmur, rub        or gallop no rvh                           Abdomen:                                                 Pulses:                              Skin:                  Lymph nodes:                    Neurologic:                  Soft, non-tender, bowel sounds active all four quadrants,     no masses, no organomegaly         2+ and symmetric all extremities     Skin color, texture, turgor normal, no rashes or lesions       Cervical, supraclavicular not enlarged or matted or tender      CNII-XII intact, normal strength 5/5 . Sensation grossly normal  and reflexes normal 2+  throughout     Clubbing No  Lower ext edema absent  Upper ext edema absent         Musculoskeletal no synovitis. No joint swelling or tenderness        CBC: No results for input(s): \"WBC\", \"HGB\", \"PLT\" in the last 72 hours. BMP:  No results for input(s): \"NA\", \"K\", \"CL\", \"CO2\", \"BUN\", \"CREATININE\", \"GLUCOSE\" in the last 72 hours. Hepatic: No results for input(s): \"AST\", \"ALT\", \"ALB\", \"BILITOT\", \"ALKPHOS\" in the last 72 hours. Amylase:   Lab Results   Component Value Date/Time    AMYLASE 65 05/28/2020 12:22 PM     Lipase:   Lab Results   Component Value Date/Time    LIPASE 18 05/28/2020 12:22 PM     Troponin: No results for input(s): \"TROPONINI\" in the last 72 hours. BNP: No results for input(s): \"BNP\" in the last 72 hours. Lipids: No results for input(s): \"CHOL\", \"HDL\" in the last 72 hours. Invalid input(s): \"LDLCALCU\"  ABGs: No results found for: \"PHART\", \"PO2ART\", \"BUJ5HLL\"  INR: No results for input(s): \"INR\" in the last 72 hours.   Thyroid:   Lab
\"PHUR\", \"PROTEINU\", \"RBCUA\", \"SPECGRAV\", \"BILIRUBINUR\", \"NITRU\", \"WBCUA\", \"LEUKOCYTESUR\", \"GLUCOSEU\" in the last 72 hours. Cultures:-  No cultures    CXR  Chest x-ray was reviewed which did not reveal any abnormality other than some hyperinflation no cardiomegaly      CT Scans  No recent CT scan    Echo    Cardiac echo was done last year which did not reveal any significant abnormality            IMPRESSION:      Bronchial asthma mild persistent  :      Hard of hearing from the right ear        Obesity  Possible sleep apnea syndrome  Hypothyroid state  Post resection carcinoma of the thyroid  Rule out allergy diathesis because of asthma  PLAN:     Bronchial asthma is under good control. I advised her to continue inhaled steroid and long-acting beta agonist which she does not want to. She will however continue the albuterol on as-needed basis. I advised her to take [place] precaution against exposure to cold weather. If her symptoms get worse she will contact our office. No evidence of any chest infection. No need for any antibiotics or any steroids. He continues to be hard of hearing. Note that she continue to have symptoms of sleep apnea but does not want a sleep study. She will return her blood pressure which she has not used. The circumstances as she is not using it is seems all right to return there. I encouraged her to lose weight. Continues on replacement therapy.     Dictated with Dr. Naeem Roberts MD dictation over thank

## 2023-12-07 ENCOUNTER — HOSPITAL ENCOUNTER (EMERGENCY)
Age: 68
Discharge: HOME | End: 2023-12-07
Attending: EMERGENCY MEDICINE
Payer: MEDICARE

## 2023-12-07 VITALS
HEART RATE: 68 BPM | WEIGHT: 139.99 LBS | SYSTOLIC BLOOD PRESSURE: 176 MMHG | RESPIRATION RATE: 15 BRPM | TEMPERATURE: 97.6 F | DIASTOLIC BLOOD PRESSURE: 82 MMHG | OXYGEN SATURATION: 99 % | BODY MASS INDEX: 23.9 KG/M2 | HEIGHT: 64 IN

## 2023-12-07 DIAGNOSIS — E83.51 HYPOCALCEMIA: Primary | ICD-10-CM

## 2023-12-07 LAB
ALBUMIN SERPL-MCNC: 4.5 G/DL (ref 3.5–5.2)
ALP SERPL-CCNC: 98 U/L (ref 35–104)
ALT SERPL-CCNC: 15 U/L (ref 5–33)
ANION GAP SERPL CALCULATED.3IONS-SCNC: 13 MMOL/L (ref 9–17)
AST SERPL-CCNC: 21 U/L
BILIRUB SERPL-MCNC: 0.5 MG/DL (ref 0.3–1.2)
BUN SERPL-MCNC: 15 MG/DL (ref 8–23)
CA-I BLD-SCNC: 0.86 MMOL/L (ref 1.1–1.33)
CALCIUM SERPL-MCNC: 6.6 MG/DL (ref 8.6–10.4)
CHLORIDE SERPL-SCNC: 98 MMOL/L (ref 98–107)
CO2 SERPL-SCNC: 29 MMOL/L (ref 20–31)
CREAT SERPL-MCNC: 0.9 MG/DL (ref 0.5–0.9)
EKG ATRIAL RATE: 63 BPM
EKG P AXIS: 54 DEGREES
EKG P-R INTERVAL: 134 MS
EKG Q-T INTERVAL: 444 MS
EKG QRS DURATION: 72 MS
EKG QTC CALCULATION (BAZETT): 454 MS
EKG R AXIS: 26 DEGREES
EKG T AXIS: 12 DEGREES
EKG VENTRICULAR RATE: 63 BPM
GFR SERPL CREATININE-BSD FRML MDRD: >60 ML/MIN/1.73M2
GLUCOSE SERPL-MCNC: 91 MG/DL (ref 70–99)
POTASSIUM SERPL-SCNC: 4.4 MMOL/L (ref 3.7–5.3)
PROT SERPL-MCNC: 7.4 G/DL (ref 6.4–8.3)
SODIUM SERPL-SCNC: 140 MMOL/L (ref 135–144)

## 2023-12-07 PROCEDURE — 99284 EMERGENCY DEPT VISIT MOD MDM: CPT

## 2023-12-07 PROCEDURE — 36415 COLL VENOUS BLD VENIPUNCTURE: CPT

## 2023-12-07 PROCEDURE — 80053 COMPREHEN METABOLIC PANEL: CPT

## 2023-12-07 PROCEDURE — 82330 ASSAY OF CALCIUM: CPT

## 2023-12-07 PROCEDURE — 93005 ELECTROCARDIOGRAM TRACING: CPT

## 2023-12-07 ASSESSMENT — ENCOUNTER SYMPTOMS
SHORTNESS OF BREATH: 0
ABDOMINAL PAIN: 0
VOMITING: 0
COUGH: 0
NAUSEA: 0

## 2023-12-07 ASSESSMENT — PAIN - FUNCTIONAL ASSESSMENT: PAIN_FUNCTIONAL_ASSESSMENT: NONE - DENIES PAIN

## 2023-12-07 NOTE — ED TRIAGE NOTES
Mode of arrival (squad #, walk in, police, etc) : walk in        Chief complaint(s): Pt c/o \"extremely low\" Ca; states PCP told her to be evaluated in ED; pt c/o numbness and tingling in bi-lateral hands and feet. Arrival Note (brief scenario, treatment PTA, etc). : Pt states symptoms X1 week        C= \"Have you ever felt that you should Cut down on your drinking? \"  No  A= \"Have people Annoyed you by criticizing your drinking? \"  No  G= \"Have you ever felt bad or Guilty about your drinking? \"  No  E= \"Have you ever had a drink as an Eye-opener first thing in the morning to steady your nerves or to help a hangover? \"  No      Deferred []      Reason for deferring: N/A    *If yes to two or more: probable alcohol abuse. *

## 2023-12-07 NOTE — ED PROVIDER NOTES
EMERGENCY DEPARTMENT ENCOUNTER    Pt Name: Johan Jauregui  MRN: [id number]  [address] [facility] [place] 1955  Date of evaluation: 12/7/23  CHIEF COMPLAINT       Chief Complaint   Patient presents with    Abnormal Lab    Numbness    Tingling     Pt c/o abnormal lab value. States PCP informed that her calcium is very low. HISTORY OF PRESENT ILLNESS   Presenting for abnormal lab. Patient has a history of hypocalcemia which may be secondary to her thyroid cancer. She is on 100 mg of oral calcium daily that she picks up over-the-counter. She was told her calcium is under 7 and she needs to come to the emergency department. Patient denies any carpopedal or laryngeal spasms. She denies any difficulty breathing or bronchospasms. She denies any seizures or neurological deficits. She says that she has some tingling in her hands. The history is provided by the patient. REVIEW OF SYSTEMS     Review of Systems   Constitutional:  Negative for chills and fever. HENT:  Negative for congestion. Eyes:  Negative for visual disturbance. Respiratory:  Negative for cough and shortness of breath. Cardiovascular:  Negative for chest pain. Gastrointestinal:  Negative for abdominal pain, nausea and vomiting. Genitourinary:  Negative for dysuria, flank pain, frequency and urgency. Musculoskeletal:  Negative for myalgias. Neurological:  Positive for numbness (tingling). Negative for dizziness, tremors, seizures, syncope, facial asymmetry, speech difficulty, weakness, light-headedness and headaches. Psychiatric/Behavioral:  Negative for behavioral problems.       PASTMEDICAL HISTORY     Past Medical History:   Diagnosis Date    Abdominal pain     RLQ    Abnormal computed tomography of abdomen and pelvis 04/16/2021    Distal appendix is mildly thickened without significant periappendiceal inflammatory change    Acquired von Willebrand's disease ([address]) 10/30/2018    Allergy to food dye     yellow and red dyes    Anemia 12/7/2023 12:02 PM        PHYSICIAN CONSULTS ORDERED THIS ENCOUNTER:  None  FINAL IMPRESSION      1.  Hypocalcemia          DISPOSITION/PLAN   DISPOSITION Decision To Discharge 12/07/2023 12:02:18 PM      OUTPATIENT FOLLOW UP THE PATIENT:  Jadyn Espinosa, [address] [place] [address]  [phone]    Schedule an appointment as soon as possible for a visit       [facility] ED  [address] [address]  [phone]  Go to   If symptoms worsen    Morna Baumgarten  [address] [place] [address] Drive  [phone]    Schedule an appointment as soon as possible for a visit         DO Mendy Samuel, [facility] [place], [place]  12/09/23 5870

## 2023-12-12 LAB
EKG ATRIAL RATE: 63 BPM
EKG P AXIS: 54 DEGREES
EKG P-R INTERVAL: 134 MS
EKG Q-T INTERVAL: 444 MS
EKG QRS DURATION: 72 MS
EKG QTC CALCULATION (BAZETT): 454 MS
EKG R AXIS: 26 DEGREES
EKG T AXIS: 12 DEGREES
EKG VENTRICULAR RATE: 63 BPM

## 2023-12-28 LAB — CALCIUM SERPL-MCNC: 7.4 MG/DL (ref 8.6–10.4)

## 2024-01-03 ENCOUNTER — OFFICE VISIT (OUTPATIENT)
Dept: PODIATRY | Age: 69
End: 2024-01-03
Payer: MEDICARE

## 2024-01-03 VITALS — BODY MASS INDEX: 23.9 KG/M2 | HEIGHT: 64 IN | WEIGHT: 140 LBS

## 2024-01-03 DIAGNOSIS — M79.675 PAIN IN TOES OF BOTH FEET: ICD-10-CM

## 2024-01-03 DIAGNOSIS — D23.72 BENIGN NEOPLASM OF SKIN OF FOOT, LEFT: ICD-10-CM

## 2024-01-03 DIAGNOSIS — M21.6X1 ACQUIRED BILATERAL PES CAVUS: ICD-10-CM

## 2024-01-03 DIAGNOSIS — M79.671 PAIN IN BOTH FEET: ICD-10-CM

## 2024-01-03 DIAGNOSIS — D23.71 BENIGN NEOPLASM OF SKIN OF FOOT, RIGHT: ICD-10-CM

## 2024-01-03 DIAGNOSIS — M79.674 PAIN IN TOES OF BOTH FEET: ICD-10-CM

## 2024-01-03 DIAGNOSIS — M21.6X2 ACQUIRED BILATERAL PES CAVUS: ICD-10-CM

## 2024-01-03 DIAGNOSIS — M25.572 LEFT ANKLE PAIN, UNSPECIFIED CHRONICITY: Primary | ICD-10-CM

## 2024-01-03 DIAGNOSIS — S93.492A SPRAIN OF ANTERIOR TALOFIBULAR LIGAMENT OF LEFT ANKLE, INITIAL ENCOUNTER: ICD-10-CM

## 2024-01-03 DIAGNOSIS — M79.672 PAIN IN BOTH FEET: ICD-10-CM

## 2024-01-03 DIAGNOSIS — L85.1 ACQUIRED PLANTAR KERATODERMA: ICD-10-CM

## 2024-01-03 DIAGNOSIS — B35.1 ONYCHOMYCOSIS: ICD-10-CM

## 2024-01-03 DIAGNOSIS — L90.9 FAT PAD ATROPHY OF FOOT: ICD-10-CM

## 2024-01-03 PROCEDURE — 11721 DEBRIDE NAIL 6 OR MORE: CPT | Performed by: PODIATRIST

## 2024-01-03 PROCEDURE — 3017F COLORECTAL CA SCREEN DOC REV: CPT | Performed by: PODIATRIST

## 2024-01-03 PROCEDURE — 99213 OFFICE O/P EST LOW 20 MIN: CPT | Performed by: PODIATRIST

## 2024-01-03 PROCEDURE — 17110 DESTRUCTION B9 LES UP TO 14: CPT | Performed by: PODIATRIST

## 2024-01-03 PROCEDURE — 1090F PRES/ABSN URINE INCON ASSESS: CPT | Performed by: PODIATRIST

## 2024-01-03 PROCEDURE — 1123F ACP DISCUSS/DSCN MKR DOCD: CPT | Performed by: PODIATRIST

## 2024-01-03 PROCEDURE — G8420 CALC BMI NORM PARAMETERS: HCPCS | Performed by: PODIATRIST

## 2024-01-03 PROCEDURE — G8484 FLU IMMUNIZE NO ADMIN: HCPCS | Performed by: PODIATRIST

## 2024-01-03 PROCEDURE — 1036F TOBACCO NON-USER: CPT | Performed by: PODIATRIST

## 2024-01-03 PROCEDURE — G8428 CUR MEDS NOT DOCUMENT: HCPCS | Performed by: PODIATRIST

## 2024-01-03 PROCEDURE — G8400 PT W/DXA NO RESULTS DOC: HCPCS | Performed by: PODIATRIST

## 2024-01-03 NOTE — PROGRESS NOTES
proper care of the feet and ankles.  All the above diagnosis were addressed at todays visit and all questions were answered.  Time spent with patient and patient care issues above the usual time needed for assessment and treatment was: [] 15-20 min  [x] 21-30 min  [] 31-44 min  [] 45 min or more. This time also included charting after the patients visit        The lesion was partially debrided to healthy pink skin. No bleeding was noted. No ulceration noted. The patient tolerated the procedure well and without complication.  Apperature pad applied. Discussed with patient options for offloading pressure on the area.      Good shoes  Recommended xrays of her back and hip  Recommended compression stockings for varicose veins  Continue with moisturizing cream  Ok to use a pumice stone after a shower    Orders Placed This Encounter   Procedures    XR ANKLE LEFT (MIN 3 VIEWS)    CA DEBRIDEMENT NAIL ANY METHOD 6/>    41325 - CA DESTRUCTION BENIGN LESIONS UP TO 14     No orders of the defined types were placed in this encounter.       RTC in 2month(s).    1/3/2024

## 2024-01-05 LAB — CALCIUM SERPL-MCNC: 6.9 MG/DL (ref 8.6–10.4)

## 2024-01-09 ENCOUNTER — NURSE TRIAGE (OUTPATIENT)
Dept: OTHER | Facility: CLINIC | Age: 69
End: 2024-01-09

## 2024-01-09 ENCOUNTER — OFFICE VISIT (OUTPATIENT)
Dept: PRIMARY CARE CLINIC | Age: 69
End: 2024-01-09
Payer: MEDICARE

## 2024-01-09 VITALS
OXYGEN SATURATION: 98 % | TEMPERATURE: 97.7 F | BODY MASS INDEX: 24.48 KG/M2 | WEIGHT: 143.4 LBS | HEIGHT: 64 IN | HEART RATE: 71 BPM | SYSTOLIC BLOOD PRESSURE: 128 MMHG | DIASTOLIC BLOOD PRESSURE: 84 MMHG

## 2024-01-09 DIAGNOSIS — R09.89 UPPER RESPIRATORY SYMPTOM: Primary | ICD-10-CM

## 2024-01-09 DIAGNOSIS — R09.81 NASAL CONGESTION: ICD-10-CM

## 2024-01-09 LAB
INFLUENZA A ANTIGEN, POC: NEGATIVE
INFLUENZA B ANTIGEN, POC: NEGATIVE
Lab: NORMAL
QC PASS/FAIL: NORMAL
SARS-COV-2 RDRP RESP QL NAA+PROBE: NEGATIVE
VALID INTERNAL CONTROL, POC: NORMAL

## 2024-01-09 PROCEDURE — G8427 DOCREV CUR MEDS BY ELIG CLIN: HCPCS | Performed by: STUDENT IN AN ORGANIZED HEALTH CARE EDUCATION/TRAINING PROGRAM

## 2024-01-09 PROCEDURE — G8400 PT W/DXA NO RESULTS DOC: HCPCS | Performed by: STUDENT IN AN ORGANIZED HEALTH CARE EDUCATION/TRAINING PROGRAM

## 2024-01-09 PROCEDURE — 99213 OFFICE O/P EST LOW 20 MIN: CPT | Performed by: STUDENT IN AN ORGANIZED HEALTH CARE EDUCATION/TRAINING PROGRAM

## 2024-01-09 PROCEDURE — 87502 INFLUENZA DNA AMP PROBE: CPT | Performed by: STUDENT IN AN ORGANIZED HEALTH CARE EDUCATION/TRAINING PROGRAM

## 2024-01-09 PROCEDURE — 3017F COLORECTAL CA SCREEN DOC REV: CPT | Performed by: STUDENT IN AN ORGANIZED HEALTH CARE EDUCATION/TRAINING PROGRAM

## 2024-01-09 PROCEDURE — G8484 FLU IMMUNIZE NO ADMIN: HCPCS | Performed by: STUDENT IN AN ORGANIZED HEALTH CARE EDUCATION/TRAINING PROGRAM

## 2024-01-09 PROCEDURE — G8420 CALC BMI NORM PARAMETERS: HCPCS | Performed by: STUDENT IN AN ORGANIZED HEALTH CARE EDUCATION/TRAINING PROGRAM

## 2024-01-09 PROCEDURE — 87635 SARS-COV-2 COVID-19 AMP PRB: CPT | Performed by: STUDENT IN AN ORGANIZED HEALTH CARE EDUCATION/TRAINING PROGRAM

## 2024-01-09 PROCEDURE — 1123F ACP DISCUSS/DSCN MKR DOCD: CPT | Performed by: STUDENT IN AN ORGANIZED HEALTH CARE EDUCATION/TRAINING PROGRAM

## 2024-01-09 PROCEDURE — 1036F TOBACCO NON-USER: CPT | Performed by: STUDENT IN AN ORGANIZED HEALTH CARE EDUCATION/TRAINING PROGRAM

## 2024-01-09 PROCEDURE — 1090F PRES/ABSN URINE INCON ASSESS: CPT | Performed by: STUDENT IN AN ORGANIZED HEALTH CARE EDUCATION/TRAINING PROGRAM

## 2024-01-09 RX ORDER — FLUTICASONE PROPIONATE 50 MCG
2 SPRAY, SUSPENSION (ML) NASAL DAILY
Qty: 16 G | Refills: 0 | Status: SHIPPED | OUTPATIENT
Start: 2024-01-09

## 2024-01-09 ASSESSMENT — ENCOUNTER SYMPTOMS
VOMITING: 0
COUGH: 1
SINUS PAIN: 1
SORE THROAT: 1
SHORTNESS OF BREATH: 1
SINUS PRESSURE: 1
NAUSEA: 0
ABDOMINAL PAIN: 0

## 2024-01-09 ASSESSMENT — PATIENT HEALTH QUESTIONNAIRE - PHQ9
SUM OF ALL RESPONSES TO PHQ QUESTIONS 1-9: 0
1. LITTLE INTEREST OR PLEASURE IN DOING THINGS: 0
SUM OF ALL RESPONSES TO PHQ QUESTIONS 1-9: 0
2. FEELING DOWN, DEPRESSED OR HOPELESS: 0
SUM OF ALL RESPONSES TO PHQ QUESTIONS 1-9: 0
SUM OF ALL RESPONSES TO PHQ9 QUESTIONS 1 & 2: 0
SUM OF ALL RESPONSES TO PHQ QUESTIONS 1-9: 0

## 2024-01-09 NOTE — PROGRESS NOTES
Derivatives Rash     Moderate allergic-like reaction consisting of diffuse urticaria to ProHance gadolinium-containing contrast material    Midazolam Nausea Only     Versed caused bad nausea.    Milk (Cow)     Jerri Root     Iodides Hives    Liquid Calcium With D3 [Calcium Carb-Cholecalciferol]     Adhesive Tape Rash       Health Maintenance   Topic Date Due    Pneumococcal 65+ years Vaccine (1 - PCV) Never done    DTaP/Tdap/Td vaccine (1 - Tdap) Never done    DEXA (modify frequency per FRAX score)  Never done    Respiratory Syncytial Virus (RSV) Pregnant or age 60 yrs+ (1 - 1-dose 60+ series) Never done    COVID-19 Vaccine (6 - 2023-24 season) 09/01/2023    Breast cancer screen  11/17/2023    Annual Wellness Visit (Medicare)  11/27/2023    Flu vaccine (1) 06/30/2024 (Originally 8/1/2023)    Shingles vaccine (1 of 2) 04/28/2026 (Originally 10/3/2003)    Depression Screen  12/18/2024    Lipids  12/22/2025    Colorectal Cancer Screen  05/10/2031    Hepatitis C screen  Completed    Hepatitis A vaccine  Aged Out    Hepatitis B vaccine  Aged Out    Hib vaccine  Aged Out    Polio vaccine  Aged Out    Meningococcal (ACWY) vaccine  Aged Out       Subjective:      Review of Systems   Constitutional:  Negative for chills and fever.   HENT:  Positive for congestion, ear pain, sinus pressure, sinus pain and sore throat.    Respiratory:  Positive for cough and shortness of breath.    Cardiovascular:  Negative for chest pain.   Gastrointestinal:  Negative for abdominal pain, nausea and vomiting.   Neurological:  Positive for headaches.       Objective:     /84   Pulse 71   Temp 97.7 °F (36.5 °C)   Ht 1.626 m (5' 4\")   Wt 65 kg (143 lb 6.4 oz)   SpO2 98%   BMI 24.61 kg/m²   Physical Exam  Vitals and nursing note reviewed.   Constitutional:       General: She is not in acute distress.     Appearance: She is not ill-appearing.   HENT:      Head: Atraumatic.      Right Ear: Tympanic membrane normal. There is no impacted

## 2024-01-09 NOTE — TELEPHONE ENCOUNTER
Location of patient: [place]    Received call from Robinmohsen at [facility] with Red Flag Complaint.    Subjective: Caller states \"\"     Current Symptoms: sore throat, SOB-every now and then, denies now and at rest, ear ringing. Cough-dry. HA off and on. Some nasal drainage-yellow     Denies redness, pus on throat.    Onset: 2 days ago;     Associated Symptoms:     Pain Severity: 5/10; sore;     Temperature: denies fevers     What has been tried: allergy medicine    LMP: NA Pregnant: NA    Recommended disposition: See in Office Today    Care advice provided, patient verbalizes understanding; denies any other questions or concerns; instructed to call back for any new or worsening symptoms.    Patient/Caller agrees with recommended disposition; writer provided warm transfer to Aundrea at [facility] for appointment scheduling    Attention Provider:  Thank you for allowing me to participate in the care of your patient.  The patient was connected to triage in response to information provided to the [facility].  Please do not respond through this encounter as the response is not directed to a shared pool.      Reason for Disposition   Earache also present    Protocols used: Sore Throat-ADULT-[place]

## 2024-01-18 LAB — CALCIUM SERPL-MCNC: 7.1 MG/DL (ref 8.6–10.4)

## 2024-01-25 LAB — CALCIUM SERPL-MCNC: 6.4 MG/DL (ref 8.6–10.4)

## 2024-01-31 LAB — CALCIUM SERPL-MCNC: 7.2 MG/DL (ref 8.6–10.4)

## 2024-02-08 DIAGNOSIS — R09.81 NASAL CONGESTION: ICD-10-CM

## 2024-02-08 DIAGNOSIS — E03.9 HYPOTHYROIDISM, UNSPECIFIED TYPE: ICD-10-CM

## 2024-02-08 RX ORDER — LEVOTHYROXINE SODIUM 0.05 MG/1
50 TABLET ORAL DAILY
Qty: 90 TABLET | Refills: 0 | Status: SHIPPED | OUTPATIENT
Start: 2024-02-08

## 2024-02-08 RX ORDER — FLUTICASONE PROPIONATE 50 MCG
2 SPRAY, SUSPENSION (ML) NASAL DAILY
Qty: 16 G | Refills: 0 | Status: SHIPPED | OUTPATIENT
Start: 2024-02-08

## 2024-02-08 NOTE — TELEPHONE ENCOUNTER
LAST VISIT:   1/9/2024     Future Appointments   Date Time Provider Department Center   2/12/2024 10:30 AM Yash Sin MD [facility] C   2/12/2024  1:00 PM [facility] M[facility]MO [facility] Radiolog   2/15/2024  8:30 [facility]   3/14/2024 10:45 AM Jackie Madison MD [facility] None   4/3/2024  3:00 PM Danielle Anders MD [facility]   5/29/2024  3:45 PM Carlos Jefferson MD [facility]       Pharmacy verified? Yes    [id number] - [place], [place] - [address] [facility] [address] - P [phone] - F [phone]  [address] [address] [address]  Phone: [phone] Fax: [phone]

## 2024-02-12 ENCOUNTER — HOSPITAL ENCOUNTER (OUTPATIENT)
Dept: MAMMOGRAPHY | Age: 69
Discharge: HOME | End: 2024-02-14
Payer: MEDICARE

## 2024-02-12 DIAGNOSIS — Z12.31 ENCOUNTER FOR SCREENING MAMMOGRAM FOR MALIGNANT NEOPLASM OF BREAST: ICD-10-CM

## 2024-02-12 PROCEDURE — 77063 BREAST TOMOSYNTHESIS BI: CPT

## 2024-02-15 ENCOUNTER — OFFICE VISIT (OUTPATIENT)
Dept: PODIATRY | Age: 69
End: 2024-02-15

## 2024-02-15 VITALS — BODY MASS INDEX: 24.41 KG/M2 | WEIGHT: 143 LBS | HEIGHT: 64 IN

## 2024-02-15 DIAGNOSIS — M79.674 PAIN IN TOES OF BOTH FEET: ICD-10-CM

## 2024-02-15 DIAGNOSIS — L90.9 FAT PAD ATROPHY OF FOOT: ICD-10-CM

## 2024-02-15 DIAGNOSIS — B35.1 ONYCHOMYCOSIS: ICD-10-CM

## 2024-02-15 DIAGNOSIS — M79.675 PAIN IN TOES OF BOTH FEET: ICD-10-CM

## 2024-02-15 DIAGNOSIS — D23.71 BENIGN NEOPLASM OF SKIN OF FOOT, RIGHT: ICD-10-CM

## 2024-02-15 DIAGNOSIS — M79.672 PAIN IN BOTH FEET: ICD-10-CM

## 2024-02-15 DIAGNOSIS — M79.671 PAIN IN BOTH FEET: ICD-10-CM

## 2024-02-15 DIAGNOSIS — D23.72 BENIGN NEOPLASM OF SKIN OF FOOT, LEFT: ICD-10-CM

## 2024-02-15 DIAGNOSIS — L85.1 ACQUIRED PLANTAR KERATODERMA: Primary | ICD-10-CM

## 2024-02-15 NOTE — PROGRESS NOTES
partially debrided to healthy pink skin. No bleeding was noted. No ulceration noted. The patient tolerated the procedure well and without complication.  Apperature pad applied. Discussed with patient options for offloading pressure on the area.      Good shoes  Continue with moisturizing cream  Ok to use a pumice stone after a shower  Apperature pads dispensed  Discussed the reasons why she is developing the hyperkeratosis again today. This is due to her foot structure, pressure and friction. We discussed wearing something with more cushion in the house such as a shoe or thicker slipper.     Orders Placed This Encounter   Procedures    SC DEBRIDEMENT NAIL ANY METHOD 6/> 17110 - SC DESTRUCTION BENIGN LESIONS UP TO 14     No orders of the defined types were placed in this encounter.       RTC in 2month(s).    2/15/2024

## 2024-03-13 DIAGNOSIS — R06.2 WHEEZING: Primary | ICD-10-CM

## 2024-03-13 DIAGNOSIS — R09.81 NASAL CONGESTION: ICD-10-CM

## 2024-03-13 RX ORDER — FLUTICASONE PROPIONATE 50 MCG
2 SPRAY, SUSPENSION (ML) NASAL DAILY
Qty: 16 G | Refills: 0 | Status: SHIPPED | OUTPATIENT
Start: 2024-03-13

## 2024-03-13 RX ORDER — ALBUTEROL SULFATE 90 UG/1
2 AEROSOL, METERED RESPIRATORY (INHALATION) 4 TIMES DAILY PRN
Qty: 18 G | Refills: 0 | Status: SHIPPED | OUTPATIENT
Start: 2024-03-13

## 2024-03-21 ENCOUNTER — OFFICE VISIT (OUTPATIENT)
Dept: PODIATRY | Age: 69
End: 2024-03-21
Payer: MEDICARE

## 2024-03-21 VITALS — HEIGHT: 64 IN | BODY MASS INDEX: 24.41 KG/M2 | WEIGHT: 143 LBS

## 2024-03-21 DIAGNOSIS — L25.9 CONTACT DERMATITIS OF LOWER LEG: ICD-10-CM

## 2024-03-21 DIAGNOSIS — M79.671 PAIN IN BOTH FEET: ICD-10-CM

## 2024-03-21 DIAGNOSIS — D23.72 BENIGN NEOPLASM OF SKIN OF FOOT, LEFT: ICD-10-CM

## 2024-03-21 DIAGNOSIS — L90.9 FAT PAD ATROPHY OF FOOT: ICD-10-CM

## 2024-03-21 DIAGNOSIS — M79.672 PAIN IN BOTH FEET: ICD-10-CM

## 2024-03-21 DIAGNOSIS — L85.1 ACQUIRED PLANTAR KERATODERMA: Primary | ICD-10-CM

## 2024-03-21 DIAGNOSIS — D23.71 BENIGN NEOPLASM OF SKIN OF FOOT, RIGHT: ICD-10-CM

## 2024-03-21 PROCEDURE — 1123F ACP DISCUSS/DSCN MKR DOCD: CPT | Performed by: PODIATRIST

## 2024-03-21 PROCEDURE — 17110 DESTRUCTION B9 LES UP TO 14: CPT | Performed by: PODIATRIST

## 2024-03-21 PROCEDURE — G8427 DOCREV CUR MEDS BY ELIG CLIN: HCPCS | Performed by: PODIATRIST

## 2024-03-21 PROCEDURE — G8420 CALC BMI NORM PARAMETERS: HCPCS | Performed by: PODIATRIST

## 2024-03-21 PROCEDURE — 1090F PRES/ABSN URINE INCON ASSESS: CPT | Performed by: PODIATRIST

## 2024-03-21 PROCEDURE — 3017F COLORECTAL CA SCREEN DOC REV: CPT | Performed by: PODIATRIST

## 2024-03-21 PROCEDURE — 1036F TOBACCO NON-USER: CPT | Performed by: PODIATRIST

## 2024-03-21 PROCEDURE — G8484 FLU IMMUNIZE NO ADMIN: HCPCS | Performed by: PODIATRIST

## 2024-03-21 PROCEDURE — 99213 OFFICE O/P EST LOW 20 MIN: CPT | Performed by: PODIATRIST

## 2024-03-21 PROCEDURE — G8400 PT W/DXA NO RESULTS DOC: HCPCS | Performed by: PODIATRIST

## 2024-03-21 RX ORDER — FLUOCINONIDE 0.5 MG/G
OINTMENT TOPICAL
Qty: 30 G | Refills: 3 | Status: SHIPPED | OUTPATIENT
Start: 2024-03-21

## 2024-03-21 NOTE — PROGRESS NOTES
on proper care of the feet and ankles.  All the above diagnosis were addressed at todays visit and all questions were answered.  Time spent with patient and patient care issues above the usual time needed for assessment and treatment was: [] 15-20 min  [x] 21-30 min  [] 31-44 min  [] 45 min or more. This time also included charting after the patients visit        The lesion was partially debrided to healthy pink skin. No bleeding was noted. No ulceration noted. The patient tolerated the procedure well and without complication.  Apperature pad applied. Discussed with patient options for offloading pressure on the area.      Good shoes  Continue with moisturizing cream  Rx Lidex ointment for leg  Ok to use a pumice stone after a shower, discussed again today  Apperature pads dispensed  Discussed the reasons why she is developing the hyperkeratosis again today. This is due to her foot structure, pressure and friction. We discussed wearing something with more cushion in the house such as a shoe or thicker slipper.     Orders Placed This Encounter   Procedures    70233 - NE DESTRUCTION BENIGN LESIONS UP TO 14     Orders Placed This Encounter   Medications    fluocinonide (LIDEX) 0.05 % ointment     Sig: Apply topically 2 times daily.     Dispense:  30 g     Refill:  3        RTC in 2month(s).    3/21/2024

## 2024-03-28 ENCOUNTER — TELEPHONE (OUTPATIENT)
Dept: PODIATRY | Age: 69
End: 2024-03-28

## 2024-03-28 NOTE — TELEPHONE ENCOUNTER
[facility] called regarding script for they received. Patient has an allergy to aspirin which is a cross reaction to diclofenac, an ingredient in the formula. [facility] will remove diclofenac and formula will consist of just gabapentin, lidocaine, and prilocaine.

## 2024-04-01 ENCOUNTER — HOSPITAL ENCOUNTER (OUTPATIENT)
Age: 69
Setting detail: OUTPATIENT SURGERY
Discharge: HOME | End: 2024-04-01
Attending: PLASTIC SURGERY | Admitting: PLASTIC SURGERY
Payer: MEDICARE

## 2024-04-01 VITALS
WEIGHT: 146 LBS | HEART RATE: 65 BPM | TEMPERATURE: 96.8 F | OXYGEN SATURATION: 100 % | HEIGHT: 64 IN | DIASTOLIC BLOOD PRESSURE: 78 MMHG | RESPIRATION RATE: 14 BRPM | SYSTOLIC BLOOD PRESSURE: 170 MMHG | BODY MASS INDEX: 24.92 KG/M2

## 2024-04-01 DIAGNOSIS — D48.5 NEOPLASM OF UNCERTAIN BEHAVIOR OF SKIN: ICD-10-CM

## 2024-04-01 PROCEDURE — 7100000010 HC PHASE II RECOVERY - FIRST 15 MIN: Performed by: PLASTIC SURGERY

## 2024-04-01 PROCEDURE — 7100000011 HC PHASE II RECOVERY - ADDTL 15 MIN: Performed by: PLASTIC SURGERY

## 2024-04-01 PROCEDURE — 3600000012 HC SURGERY LEVEL 2 ADDTL 15MIN: Performed by: PLASTIC SURGERY

## 2024-04-01 PROCEDURE — 2709999900 HC NON-CHARGEABLE SUPPLY: Performed by: PLASTIC SURGERY

## 2024-04-01 PROCEDURE — 3600000002 HC SURGERY LEVEL 2 BASE: Performed by: PLASTIC SURGERY

## 2024-04-01 PROCEDURE — 2500000003 HC RX 250 WO HCPCS: Performed by: PLASTIC SURGERY

## 2024-04-01 PROCEDURE — 88305 TISSUE EXAM BY PATHOLOGIST: CPT

## 2024-04-01 ASSESSMENT — PAIN - FUNCTIONAL ASSESSMENT
PAIN_FUNCTIONAL_ASSESSMENT: NONE - DENIES PAIN
PAIN_FUNCTIONAL_ASSESSMENT: 0-10

## 2024-04-01 NOTE — BRIEF OP NOTE
Brief Postoperative Note      Patient: Yuli Clements  YOB: 1955  MRN: [id number]    Date of Procedure: 4/1/2024    Pre-Op Diagnosis Codes:     * Neoplasm of uncertain behavior of skin [D48.5]    Post-Op Diagnosis: Same       Procedure(s):  LEFT BREAST AND LEFT THIGH LESION BIOPSY EXCISION    Surgeon(s):  Jackie Madison MD    Assistant:  * No surgical staff found *    Anesthesia: Local    Estimated Blood Loss (mL): Minimal    Complications: None    Specimens:   ID Type Source Tests Collected by Time Destination   A : left breast lesion Tissue Tissue SURGICAL PATHOLOGY Jackie Madison MD 4/1/2024 0924    B : left thigh lesion Tissue Tissue SURGICAL PATHOLOGY Jackie Madison MD 4/1/2024 0925        Implants:  * No implants in log *      Drains: * No LDAs found *    Findings:         Electronically signed by Jackie Madison MD on 4/1/2024 at 9:37 AM

## 2024-04-01 NOTE — H&P
Subjective:      Patient ID: Yuli Clements is a 68 y.o. female.     HPI  Patient presents today for lesions on the left breast and left thigh.         Review of Systems   Constitutional: Negative.    HENT: Negative.     Respiratory: Negative.     Cardiovascular: Negative.    Gastrointestinal: Negative.    Musculoskeletal: Negative.    Neurological: Negative.       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 - thyroid was removed.       Other Medical History    Diagnosis Date Comment Source   Abdominal pain  RLQ    Abnormal computed tomography of abdomen and pelvis 04/16/2021 Distal appendix is mildly thickened without significant periappendiceal inflammatory change    Acquired von Willebrand's disease (HCC) 10/30/2018     Allergy to food dye  yellow and red dyes    Benign paroxysmal positional vertigo 12/09/2013     Chest pain 09/04/2020     Cobalamin deficiency 12/26/2012     Diverticulitis      Diverticulosis      Gastroparesis      History of fractured kneecap  RIGHT    History of malignant neoplasm of thyroid 04/17/2012     History of squamous cell carcinoma in situ of skin 06/09/2014 Chart states left wrist, but patient denies    Hypoparathyroidism (HCC) 07/03/2012     IBS (irritable bowel syndrome)  diarrhea-prominent type    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

## 2024-04-01 NOTE — OP NOTE
[address] [address]                            OPERATIVE REPORT      PATIENT NAME: NATALI EDMOND              : 1955  MED REC NO: [id number]                         ROOM: [facility]  ACCOUNT NO: [id number]                       ADMIT DATE: 2024  PROVIDER: Jackie Madison MD      DATE OF PROCEDURE:  2024    SURGEON:  Jackie Madison MD    PREOPERATIVE DIAGNOSIS:  Suspicious lesions of left breast and left thigh.    POSTOPERATIVE DIAGNOSIS:  Suspicious lesions of left breast and left thigh.    PROCEDURES:    1. Excision of lesion of left breast (0.4 cm), taken with a 4 mm margin and with intermediate repair of 1.8 cm length.  2. Excision of lesion of left thigh (0.3 cm), taken with a 4 mm margin and with intermediate repair of 1.2 cm length.    ANESTHESIA:  Local 1% Xylocaine with epinephrine, buffered.    INDICATIONS:  The patient is a 68-year-old female with 2 suspicious lesions, here for removal and diagnosis.  The procedure, the risks, and benefits were discussed with her.  All questions were answered to her satisfaction.  Consent was signed.    DESCRIPTION OF PROCEDURE:  The patient was brought to the operative suite, placed in the supine position.  She was prepped and draped in the usual sterile fashion.  Using a marker, outline was made around each of the lesions including margin.  Local anesthetic infiltrated in both areas.  Next with scalpel, attention was taken first to the breast.  Incision was made around the perimeter of the margin and deepened through the full thickness of the skin.  It was then excised inclusive of underlying subcutaneous tissues.  Bovie cautery was used for hemostasis.  Wound closed in layers with interrupted 4-0 Vicryl in the subcutaneous, running intracuticular 4-0 Prolene in the skin.  Steri-Strips for dressing.  Attention was then taken to the left thigh.  The

## 2024-04-03 ENCOUNTER — OFFICE VISIT (OUTPATIENT)
Dept: PRIMARY CARE CLINIC | Age: 69
End: 2024-04-03

## 2024-04-03 VITALS
HEART RATE: 74 BPM | WEIGHT: 147.2 LBS | HEIGHT: 64 IN | OXYGEN SATURATION: 100 % | BODY MASS INDEX: 25.13 KG/M2 | SYSTOLIC BLOOD PRESSURE: 126 MMHG | DIASTOLIC BLOOD PRESSURE: 84 MMHG

## 2024-04-03 DIAGNOSIS — D69.6 THROMBOCYTOPENIA (HCC): ICD-10-CM

## 2024-04-03 DIAGNOSIS — R09.81 NASAL CONGESTION: ICD-10-CM

## 2024-04-03 DIAGNOSIS — E03.9 HYPOTHYROIDISM, UNSPECIFIED TYPE: ICD-10-CM

## 2024-04-03 DIAGNOSIS — E53.8 B12 DEFICIENCY: ICD-10-CM

## 2024-04-03 DIAGNOSIS — E20.9 HYPOPARATHYROIDISM, UNSPECIFIED HYPOPARATHYROIDISM TYPE (HCC): ICD-10-CM

## 2024-04-03 DIAGNOSIS — R06.2 WHEEZING: ICD-10-CM

## 2024-04-03 DIAGNOSIS — Z00.00 INITIAL MEDICARE ANNUAL WELLNESS VISIT: Primary | ICD-10-CM

## 2024-04-03 DIAGNOSIS — D68.00 VON WILLEBRAND'S DISEASE (HCC): ICD-10-CM

## 2024-04-03 DIAGNOSIS — E83.51 HYPOCALCEMIA: Chronic | ICD-10-CM

## 2024-04-03 DIAGNOSIS — Z78.0 MENOPAUSE: ICD-10-CM

## 2024-04-03 RX ORDER — FLUTICASONE PROPIONATE 50 MCG
2 SPRAY, SUSPENSION (ML) NASAL DAILY
Qty: 16 G | Refills: 3 | Status: SHIPPED | OUTPATIENT
Start: 2024-04-03

## 2024-04-03 RX ORDER — LEVOTHYROXINE SODIUM 0.05 MG/1
50 TABLET ORAL DAILY
Qty: 90 TABLET | Refills: 0 | Status: SHIPPED | OUTPATIENT
Start: 2024-04-03

## 2024-04-03 RX ORDER — ALBUTEROL SULFATE 90 UG/1
2 AEROSOL, METERED RESPIRATORY (INHALATION) 4 TIMES DAILY PRN
Qty: 18 G | Refills: 0 | Status: SHIPPED | OUTPATIENT
Start: 2024-04-03

## 2024-04-03 SDOH — ECONOMIC STABILITY: FOOD INSECURITY: WITHIN THE PAST 12 MONTHS, THE FOOD YOU BOUGHT JUST DIDN'T LAST AND YOU DIDN'T HAVE MONEY TO GET MORE.: NEVER TRUE

## 2024-04-03 SDOH — ECONOMIC STABILITY: INCOME INSECURITY: HOW HARD IS IT FOR YOU TO PAY FOR THE VERY BASICS LIKE FOOD, HOUSING, MEDICAL CARE, AND HEATING?: NOT HARD AT ALL

## 2024-04-03 SDOH — ECONOMIC STABILITY: FOOD INSECURITY: WITHIN THE PAST 12 MONTHS, YOU WORRIED THAT YOUR FOOD WOULD RUN OUT BEFORE YOU GOT MONEY TO BUY MORE.: NEVER TRUE

## 2024-04-03 ASSESSMENT — PATIENT HEALTH QUESTIONNAIRE - PHQ9
SUM OF ALL RESPONSES TO PHQ QUESTIONS 1-9: 0
1. LITTLE INTEREST OR PLEASURE IN DOING THINGS: NOT AT ALL
2. FEELING DOWN, DEPRESSED OR HOPELESS: NOT AT ALL
SUM OF ALL RESPONSES TO PHQ9 QUESTIONS 1 & 2: 0
SUM OF ALL RESPONSES TO PHQ QUESTIONS 1-9: 0

## 2024-04-03 ASSESSMENT — LIFESTYLE VARIABLES
HOW OFTEN DO YOU HAVE A DRINK CONTAINING ALCOHOL: MONTHLY OR LESS
HOW MANY STANDARD DRINKS CONTAINING ALCOHOL DO YOU HAVE ON A TYPICAL DAY: 1 OR 2

## 2024-04-03 NOTE — PROGRESS NOTES
Shortness Of Breath    Demerol Hcl [Meperidine] Shortness Of Breath    Doxycycline Nausea Only and Other (See Comments)    Glucosamine Shortness Of Breath, Diarrhea and Swelling     Other reaction(s): Respiratory Difficulty  \"lips swell up and get severe diarrhea\" - INCLUDES IODINE    Iodine Hives, Shortness Of Breath and Itching    Pcn [Penicillins] Anaphylaxis and Shortness Of Breath    Red Dye Shortness Of Breath    Shellfish-Derived Products Shortness Of Breath, Diarrhea and Swelling     \"lips swell up and get severe diarrhea\" - INCLUDES IODINE    Sulfa Antibiotics Hives, Shortness Of Breath, Itching and Rash     OK in small amounts, but larger amounts cause \"shortness of breath.\"    Sulfasalazine Hives, Itching, Rash and Shortness Of Breath    Yellow Dye Shortness Of Breath     Other reaction(s): Respiratory Difficulty    Gadolinium Rash     Moderate allergic-like reaction consisting of diffuse urticaria to ProHance gadolinium-containing contrast material    Midazolam Nausea Only     Versed caused bad nausea.    Milk (Cow)     Ginger Root     Iodides Hives    Liquid Calcium With D3 [Calcium Carb-Cholecalciferol]     Tetanus Toxoids      Had reaction to tetanus shot    Adhesive Tape Rash     Prior to Visit Medications    Medication Sig Taking? Authorizing Provider   albuterol sulfate HFA (VENTOLIN HFA) 108 (90 Base) MCG/ACT inhaler Inhale 2 puffs into the lungs 4 times daily as needed for Wheezing Yes Danielle Anders MD   levothyroxine (SYNTHROID) 50 MCG tablet Take 1 tablet by mouth Daily Please make sure tablets are white- pt allergic to yellow and red dye Yes Danielle Anders MD   fluticasone (FLONASE) 50 MCG/ACT nasal spray 2 sprays by Each Nostril route daily Yes Danielle Anders MD   fluocinonide (LIDEX) 0.05 % ointment Apply topically 2 times daily. Yes Brooke Treadwell DPM   levocetirizine (XYZAL) 5 MG tablet Take 1 tablet by mouth nightly Yes Provider, MD Jacklyn   calcium

## 2024-04-04 ENCOUNTER — HOSPITAL ENCOUNTER (OUTPATIENT)
Age: 69
Discharge: HOME | End: 2024-04-04
Payer: MEDICARE

## 2024-04-04 DIAGNOSIS — D68.00 VON WILLEBRAND'S DISEASE (HCC): ICD-10-CM

## 2024-04-04 DIAGNOSIS — E03.9 HYPOTHYROIDISM, UNSPECIFIED TYPE: ICD-10-CM

## 2024-04-04 DIAGNOSIS — E53.8 B12 DEFICIENCY: ICD-10-CM

## 2024-04-04 DIAGNOSIS — E83.51 HYPOCALCEMIA: Chronic | ICD-10-CM

## 2024-04-04 DIAGNOSIS — E20.9 HYPOPARATHYROIDISM, UNSPECIFIED HYPOPARATHYROIDISM TYPE (HCC): ICD-10-CM

## 2024-04-04 LAB
25(OH)D3 SERPL-MCNC: 17.4 NG/ML (ref 30–100)
ALBUMIN SERPL-MCNC: 4.3 G/DL (ref 3.5–5.2)
ALP SERPL-CCNC: 89 U/L (ref 35–104)
ALT SERPL-CCNC: 17 U/L (ref 5–33)
ANION GAP SERPL CALCULATED.3IONS-SCNC: 13 MMOL/L (ref 9–17)
AST SERPL-CCNC: 18 U/L
BASOPHILS # BLD: 0 K/UL (ref 0–0.2)
BASOPHILS NFR BLD: 1 % (ref 0–2)
BILIRUB SERPL-MCNC: 0.2 MG/DL (ref 0.3–1.2)
BUN SERPL-MCNC: 21 MG/DL (ref 8–23)
CA-I BLD-SCNC: 1.03 MMOL/L (ref 1.1–1.33)
CALCIUM SERPL-MCNC: 8.1 MG/DL (ref 8.6–10.4)
CHLORIDE SERPL-SCNC: 100 MMOL/L (ref 98–107)
CHOLEST SERPL-MCNC: 180 MG/DL
CHOLESTEROL/HDL RATIO: 6.4
CO2 SERPL-SCNC: 29 MMOL/L (ref 20–31)
CREAT SERPL-MCNC: 1 MG/DL (ref 0.5–0.9)
EOSINOPHIL # BLD: 0.2 K/UL (ref 0–0.4)
EOSINOPHILS RELATIVE PERCENT: 5 % (ref 0–4)
ERYTHROCYTE [DISTWIDTH] IN BLOOD BY AUTOMATED COUNT: 14.1 % (ref 11.5–14.9)
FOLATE SERPL-MCNC: 6.2 NG/ML (ref 4.8–24.2)
GFR SERPL CREATININE-BSD FRML MDRD: 61 ML/MIN/1.73M2
GLUCOSE SERPL-MCNC: 79 MG/DL (ref 70–99)
HCT VFR BLD AUTO: 36 % (ref 36–46)
HDLC SERPL-MCNC: 28 MG/DL
HGB BLD-MCNC: 11.7 G/DL (ref 12–16)
LDLC SERPL CALC-MCNC: ABNORMAL MG/DL (ref 0–130)
LDLC SERPL DIRECT ASSAY-MCNC: 54 MG/DL
LYMPHOCYTES NFR BLD: 1.1 K/UL (ref 1–4.8)
LYMPHOCYTES RELATIVE PERCENT: 20 % (ref 24–44)
MCH RBC QN AUTO: 30.5 PG (ref 26–34)
MCHC RBC AUTO-ENTMCNC: 32.4 G/DL (ref 31–37)
MCV RBC AUTO: 94.1 FL (ref 80–100)
MONOCYTES NFR BLD: 0.3 K/UL (ref 0.1–1.3)
MONOCYTES NFR BLD: 6 % (ref 1–7)
NEUTROPHILS NFR BLD: 68 % (ref 36–66)
NEUTS SEG NFR BLD: 3.6 K/UL (ref 1.3–9.1)
PLATELET # BLD AUTO: 97 K/UL (ref 150–450)
PMV BLD AUTO: 12.1 FL (ref 6–12)
POTASSIUM SERPL-SCNC: 4.4 MMOL/L (ref 3.7–5.3)
PROT SERPL-MCNC: 7.5 G/DL (ref 6.4–8.3)
RBC # BLD AUTO: 3.83 M/UL (ref 4–5.2)
SODIUM SERPL-SCNC: 142 MMOL/L (ref 135–144)
SURGICAL PATHOLOGY REPORT: NORMAL
TRIGL SERPL-MCNC: 515 MG/DL
TSH SERPL DL<=0.05 MIU/L-ACNC: 8.72 UIU/ML (ref 0.3–5)
VIT B12 SERPL-MCNC: 591 PG/ML (ref 232–1245)
WBC OTHER # BLD: 5.3 K/UL (ref 3.5–11)

## 2024-04-04 PROCEDURE — 80061 LIPID PANEL: CPT

## 2024-04-04 PROCEDURE — 82330 ASSAY OF CALCIUM: CPT

## 2024-04-04 PROCEDURE — 85025 COMPLETE CBC W/AUTO DIFF WBC: CPT

## 2024-04-04 PROCEDURE — 82306 VITAMIN D 25 HYDROXY: CPT

## 2024-04-04 PROCEDURE — 80053 COMPREHEN METABOLIC PANEL: CPT

## 2024-04-04 PROCEDURE — 84443 ASSAY THYROID STIM HORMONE: CPT

## 2024-04-04 PROCEDURE — 82746 ASSAY OF FOLIC ACID SERUM: CPT

## 2024-04-04 PROCEDURE — 83721 ASSAY OF BLOOD LIPOPROTEIN: CPT

## 2024-04-04 PROCEDURE — 82607 VITAMIN B-12: CPT

## 2024-04-04 PROCEDURE — 36415 COLL VENOUS BLD VENIPUNCTURE: CPT

## 2024-04-05 NOTE — RESULT ENCOUNTER NOTE
Adv pt thyroid is off just a little- need to adjust her dose- she should take 8 pills per week instead of 7.  So one day each week, take 2 tabs of the 50 mcg.   Also vit d level is low- should take vitamin D 2000 IU and should also take the calcitriol

## 2024-04-09 ENCOUNTER — HOSPITAL ENCOUNTER (OUTPATIENT)
Dept: WOMENS IMAGING | Age: 69
Discharge: HOME | End: 2024-04-11
Payer: MEDICARE

## 2024-04-09 DIAGNOSIS — Z78.0 MENOPAUSE: ICD-10-CM

## 2024-04-09 PROCEDURE — 77080 DXA BONE DENSITY AXIAL: CPT

## 2024-04-11 ENCOUNTER — OFFICE VISIT (OUTPATIENT)
Dept: OBGYN CLINIC | Age: 69
End: 2024-04-11
Payer: MEDICARE

## 2024-04-11 VITALS
SYSTOLIC BLOOD PRESSURE: 124 MMHG | WEIGHT: 145 LBS | BODY MASS INDEX: 24.75 KG/M2 | HEIGHT: 64 IN | DIASTOLIC BLOOD PRESSURE: 80 MMHG

## 2024-04-11 DIAGNOSIS — Z01.419 WELL WOMAN EXAM WITH ROUTINE GYNECOLOGICAL EXAM: ICD-10-CM

## 2024-04-11 DIAGNOSIS — N89.8 VAGINAL IRRITATION: Primary | ICD-10-CM

## 2024-04-11 PROCEDURE — G8399 PT W/DXA RESULTS DOCUMENT: HCPCS | Performed by: OBSTETRICS & GYNECOLOGY

## 2024-04-11 PROCEDURE — 3017F COLORECTAL CA SCREEN DOC REV: CPT | Performed by: OBSTETRICS & GYNECOLOGY

## 2024-04-11 PROCEDURE — 1036F TOBACCO NON-USER: CPT | Performed by: OBSTETRICS & GYNECOLOGY

## 2024-04-11 PROCEDURE — 99213 OFFICE O/P EST LOW 20 MIN: CPT | Performed by: OBSTETRICS & GYNECOLOGY

## 2024-04-11 PROCEDURE — G8420 CALC BMI NORM PARAMETERS: HCPCS | Performed by: OBSTETRICS & GYNECOLOGY

## 2024-04-11 PROCEDURE — 1090F PRES/ABSN URINE INCON ASSESS: CPT | Performed by: OBSTETRICS & GYNECOLOGY

## 2024-04-11 PROCEDURE — G8427 DOCREV CUR MEDS BY ELIG CLIN: HCPCS | Performed by: OBSTETRICS & GYNECOLOGY

## 2024-04-11 PROCEDURE — 1123F ACP DISCUSS/DSCN MKR DOCD: CPT | Performed by: OBSTETRICS & GYNECOLOGY

## 2024-04-11 RX ORDER — FLUOCINONIDE 0.5 MG/G
OINTMENT TOPICAL
Qty: 60 G | Refills: 0 | Status: SHIPPED | OUTPATIENT
Start: 2024-04-11 | End: 2024-04-18

## 2024-04-11 NOTE — PROGRESS NOTES
History and Physical  [facility]  [facility] [address]  [place], [place]  [address] [phone]   Fax [phone]  Yuli Clements  2024              68 y.o.  Chief Complaint   Patient presents with    Established New Doctor    Annual Exam       No LMP recorded. Patient is postmenopausal.             Primary Care Physician: Danielle Anders MD    The patient was seen and examined. She has no chief complaint today and is here for her annual exam.  Her bowels are regular. There are no voiding complaints. She denies any bloating.  She denies vaginal discharge and was counseled on STD's and the need for barrier contraception.     HPI : Yuli Clements is a 68 y.o. female     Pt presents to office for follow up on vaginal irritation. Pt is using lidex with improvement. Pt also uses lotrisone prn. Pt is having some night sweats. Pt has had a hysterectomy.   no Bloating  no Early Satiety  no Unexplained weight change of more than 15 lbs  no  PMB  no  PCB  ________________________________________________________________________  OB History    Para Term  AB Living   4 3 3 0 1 2   SAB IAB Ectopic Molar Multiple Live Births   1 0 0 0 0 2      # Outcome Date GA Lbr Livan/2nd Weight Sex Delivery Anes PTL Lv   4 Term            3 SAB            2 Term            1 Term              Past Medical History:   Diagnosis Date    Abdominal pain     RLQ    Abnormal computed tomography of abdomen and pelvis 2021    Distal appendix is mildly thickened without significant periappendiceal inflammatory change    Acquired von Willebrand's disease (HCC) 10/30/2018    Allergy to food dye     yellow and red dyes    Anemia     Asthma     Basal cell carcinoma of upper lip 2017    Benign paroxysmal positional vertigo 2013    Cancer (HCC)     Thyroid and skin    Cardiac murmur     STATES SINCE CHILD BILLY- STATES SHE WAS BORN WITH A \"90 DEGREE ANGLE VALVE\"    Chest

## 2024-04-16 ENCOUNTER — OFFICE VISIT (OUTPATIENT)
Dept: PODIATRY | Age: 69
End: 2024-04-16
Payer: MEDICARE

## 2024-04-16 VITALS — HEIGHT: 64 IN | WEIGHT: 145 LBS | BODY MASS INDEX: 24.75 KG/M2

## 2024-04-16 DIAGNOSIS — D23.72 BENIGN NEOPLASM OF SKIN OF FOOT, LEFT: ICD-10-CM

## 2024-04-16 DIAGNOSIS — D23.71 BENIGN NEOPLASM OF SKIN OF FOOT, RIGHT: ICD-10-CM

## 2024-04-16 DIAGNOSIS — L90.9 FAT PAD ATROPHY OF FOOT: ICD-10-CM

## 2024-04-16 DIAGNOSIS — L85.1 ACQUIRED PLANTAR KERATODERMA: Primary | ICD-10-CM

## 2024-04-16 DIAGNOSIS — M79.671 PAIN IN BOTH FEET: ICD-10-CM

## 2024-04-16 DIAGNOSIS — M79.672 PAIN IN BOTH FEET: ICD-10-CM

## 2024-04-16 PROCEDURE — 99999 PR OFFICE/OUTPT VISIT,PROCEDURE ONLY: CPT | Performed by: PODIATRIST

## 2024-04-16 PROCEDURE — 17110 DESTRUCTION B9 LES UP TO 14: CPT | Performed by: PODIATRIST

## 2024-04-16 NOTE — PROGRESS NOTES
92-97    [facility] 3 Surgeries    KNEE SURGERY Right 1996    W/PINS    LAPAROSCOPY N/A 05/11/2021    ATTEMPTED APPENDECTOMY LAPAROSCOPIC ROBOTIC CONVERTED TO , DIAGNOSTIC LAPARASCOPY performed by David Han MD at [facility] OR    LAPAROTOMY N/A 05/11/2021    LAPAROTOMY LYSIS OF ADHESIONS -OPEN APPENDECTOMY, LYSIS OF SMALL BOWEL ADHESIONS FOR 75 MINUTES performed by David Han MD at [facility] OR    MALIGNANT SKIN LESION EXCISION  2011    rt elbow-scc, rt shoulder-keratosis, lft leg skin with excoriation    NASAL POLYP SURGERY  2012    X2 in 1986    OTHER SURGICAL HISTORY  1979    PHARYNGEAL FLAP    SHOULDER SURGERY Right     PINCHED NERVE REPAIR    SKIN BIOPSY  2012    lft leg keratosis    SKIN BIOPSY  2009    rt forearm, under brst, lft leg-keratosis    SKIN BIOPSY  2008    back and rt forearm-keratosis, abdomen-hemangioma    SKIN BIOPSY  2002    rt breast, lft arm, lft brst-keratosis    SKIN CANCER EXCISION      SKIN LESION EXCISION Left 04/01/2024    LEFT BREAST AND LEFT THIGH LESION BIOPSY EXCISION performed by Jackie Madison MD at [facility] OR    THYROIDECTOMY  1985    Thyroid Cancer    TONSILLECTOMY AND ADENOIDECTOMY      x2    UPPER GASTROINTESTINAL ENDOSCOPY      Noted per Care Everywhere       Family History   Problem Relation Age of Onset    Other Mother         pneumonia    Coronary Art Dis Mother     Liver Cancer Father         age 91    Coronary Art Dis Father     Liver Cancer Brother 64    Colon Cancer Brother         \"Bowel and liver CA\"    Diabetes Other         Father's side    Thyroid Disease Other         Mom's side       Social History     Tobacco Use    Smoking status: Never    Smokeless tobacco: Never   Substance Use Topics    Alcohol use: No       Review of Systems    Lower Extremity Physical Examination:     Vitals: There were no vitals filed for this visit.  General: AAO x 3 in NAD.     Vascular: DP and PT pulses palpable 2/4, Bilateral.  CFT <3 seconds, Bilateral.  Hair growth present

## 2024-04-25 NOTE — DISCHARGE INSTRUCTIONS
Please start taking calcium supplement. You can obtain this over-the-counter. Patient to follow-up with primary physician for further management and follow-up testing. I believe your symptoms today are secondary to low calcium.
Heterosexual

## 2024-04-26 DIAGNOSIS — E03.9 HYPOTHYROIDISM, UNSPECIFIED TYPE: ICD-10-CM

## 2024-04-26 RX ORDER — LEVOTHYROXINE SODIUM 0.05 MG/1
TABLET ORAL
Qty: 102 TABLET | Refills: 3 | Status: SHIPPED | OUTPATIENT
Start: 2024-04-26

## 2024-04-26 RX ORDER — LEVOTHYROXINE SODIUM 0.05 MG/1
TABLET ORAL
Qty: 90 TABLET | Refills: 0 | Status: CANCELLED | OUTPATIENT
Start: 2024-04-26

## 2024-05-14 ENCOUNTER — OFFICE VISIT (OUTPATIENT)
Dept: PODIATRY | Age: 69
End: 2024-05-14
Payer: MEDICARE

## 2024-05-14 VITALS — HEIGHT: 64 IN | BODY MASS INDEX: 24.75 KG/M2 | WEIGHT: 145 LBS

## 2024-05-14 DIAGNOSIS — D23.71 BENIGN NEOPLASM OF SKIN OF FOOT, RIGHT: ICD-10-CM

## 2024-05-14 DIAGNOSIS — L90.9 FAT PAD ATROPHY OF FOOT: ICD-10-CM

## 2024-05-14 DIAGNOSIS — M77.51 TENDINITIS OF RIGHT ANKLE: ICD-10-CM

## 2024-05-14 DIAGNOSIS — M79.671 PAIN IN BOTH FEET: ICD-10-CM

## 2024-05-14 DIAGNOSIS — L85.1 ACQUIRED PLANTAR KERATODERMA: ICD-10-CM

## 2024-05-14 DIAGNOSIS — M79.671 RIGHT FOOT PAIN: Primary | ICD-10-CM

## 2024-05-14 DIAGNOSIS — D23.72 BENIGN NEOPLASM OF SKIN OF FOOT, LEFT: ICD-10-CM

## 2024-05-14 DIAGNOSIS — M19.071 PRIMARY OSTEOARTHRITIS OF RIGHT FOOT: ICD-10-CM

## 2024-05-14 DIAGNOSIS — M79.672 PAIN IN BOTH FEET: ICD-10-CM

## 2024-05-14 PROCEDURE — 1090F PRES/ABSN URINE INCON ASSESS: CPT | Performed by: PODIATRIST

## 2024-05-14 PROCEDURE — 1123F ACP DISCUSS/DSCN MKR DOCD: CPT | Performed by: PODIATRIST

## 2024-05-14 PROCEDURE — 99213 OFFICE O/P EST LOW 20 MIN: CPT | Performed by: PODIATRIST

## 2024-05-14 PROCEDURE — 1036F TOBACCO NON-USER: CPT | Performed by: PODIATRIST

## 2024-05-14 PROCEDURE — G8399 PT W/DXA RESULTS DOCUMENT: HCPCS | Performed by: PODIATRIST

## 2024-05-14 PROCEDURE — G8420 CALC BMI NORM PARAMETERS: HCPCS | Performed by: PODIATRIST

## 2024-05-14 PROCEDURE — G8427 DOCREV CUR MEDS BY ELIG CLIN: HCPCS | Performed by: PODIATRIST

## 2024-05-14 PROCEDURE — 17110 DESTRUCTION B9 LES UP TO 14: CPT | Performed by: PODIATRIST

## 2024-05-14 PROCEDURE — 3017F COLORECTAL CA SCREEN DOC REV: CPT | Performed by: PODIATRIST

## 2024-05-14 RX ORDER — PREDNISONE 10 MG/1
1 TABLET ORAL DAILY
Qty: 21 EACH | Refills: 0 | Status: SHIPPED | OUTPATIENT
Start: 2024-05-14

## 2024-05-14 NOTE — PROGRESS NOTES
[facility]  Return Patient     Chief Complaint:   Chief Complaint   [facility]    [facility]     [facility]        HPI: Yuli Clements is a 68 y.o. female who presents to the office today complaining of painful area left foot. The last debridement helped. Bought new shoes.  Going on vacation tomorrow. Was doing a lot of walking, felt pain. Ball of left foot, has one on her right also. No injury.  Using moisturizing cream. Wearing slipper socks in the house. .     She has a new complaint of right foot and ankle pain. On the side. No injury. 1 month duration. Has not changed shoes. Wearing [employer] shoes. Walked on vacation ok, does not limit her activity. Some swelling.    Currently denies F/C/N/V.     Allergies   Allergen Reactions    Aspirin Anaphylaxis and Shortness Of Breath    Cefadroxil Shortness Of Breath    Oadwuerk-Fsictvo-Jdbleg [Fluocinolone] Shortness Of Breath    Ciprofloxacin Hcl Shortness Of Breath    Codeine Shortness Of Breath    Demerol Hcl [Meperidine] Shortness Of Breath    Doxycycline Nausea Only and Other (See Comments)    Glucosamine Shortness Of Breath, Diarrhea and Swelling     Other reaction(s): Respiratory Difficulty  \"lips swell up and get severe diarrhea\" - INCLUDES IODINE    Iodine Hives, Shortness Of Breath and Itching    Pcn [Penicillins] Anaphylaxis and Shortness Of Breath    Red Dye Shortness Of Breath    Shellfish-Derived Products Shortness Of Breath, Diarrhea and Swelling     \"lips swell up and get severe diarrhea\" - INCLUDES IODINE    Sulfa Antibiotics Hives, Shortness Of Breath, Itching and Rash     OK in small amounts, but larger amounts cause \"shortness of breath.\"    Sulfasalazine Hives, Itching, Rash and Shortness Of Breath    Yellow Dye Shortness Of Breath     Other reaction(s): Respiratory Difficulty    Gadolinium Rash     Moderate allergic-like reaction consisting of diffuse urticaria to ProHance gadolinium-containing contrast material    Midazolam Nausea Only

## 2024-05-29 ENCOUNTER — TELEPHONE (OUTPATIENT)
Dept: PULMONOLOGY | Age: 69
End: 2024-05-29

## 2024-05-29 NOTE — TELEPHONE ENCOUNTER
The patient came to the office wanting to drop off her nebulizer machine. I explained that I cannot accept the machine. She would need to contact the DME company for a return.

## 2024-06-12 ENCOUNTER — OFFICE VISIT (OUTPATIENT)
Dept: PODIATRY | Age: 69
End: 2024-06-12
Payer: MEDICARE

## 2024-06-12 DIAGNOSIS — M79.671 PAIN IN BOTH FEET: ICD-10-CM

## 2024-06-12 DIAGNOSIS — R26.2 PATIENT UNABLE TO WALK 150 FEET: Primary | ICD-10-CM

## 2024-06-12 DIAGNOSIS — L90.9 FAT PAD ATROPHY OF FOOT: ICD-10-CM

## 2024-06-12 DIAGNOSIS — L85.1 ACQUIRED PLANTAR KERATODERMA: ICD-10-CM

## 2024-06-12 DIAGNOSIS — M79.672 PAIN IN BOTH FEET: ICD-10-CM

## 2024-06-12 DIAGNOSIS — D23.71 BENIGN NEOPLASM OF SKIN OF FOOT, RIGHT: ICD-10-CM

## 2024-06-12 DIAGNOSIS — D23.72 BENIGN NEOPLASM OF SKIN OF FOOT, LEFT: ICD-10-CM

## 2024-06-12 PROCEDURE — 1090F PRES/ABSN URINE INCON ASSESS: CPT | Performed by: PODIATRIST

## 2024-06-12 PROCEDURE — 99213 OFFICE O/P EST LOW 20 MIN: CPT | Performed by: PODIATRIST

## 2024-06-12 PROCEDURE — G8427 DOCREV CUR MEDS BY ELIG CLIN: HCPCS | Performed by: PODIATRIST

## 2024-06-12 PROCEDURE — 1036F TOBACCO NON-USER: CPT | Performed by: PODIATRIST

## 2024-06-12 PROCEDURE — G8420 CALC BMI NORM PARAMETERS: HCPCS | Performed by: PODIATRIST

## 2024-06-12 PROCEDURE — 3017F COLORECTAL CA SCREEN DOC REV: CPT | Performed by: PODIATRIST

## 2024-06-12 PROCEDURE — G8399 PT W/DXA RESULTS DOCUMENT: HCPCS | Performed by: PODIATRIST

## 2024-06-12 PROCEDURE — 1123F ACP DISCUSS/DSCN MKR DOCD: CPT | Performed by: PODIATRIST

## 2024-06-12 RX ORDER — FLUOCINONIDE 0.5 MG/G
OINTMENT TOPICAL
Qty: 30 G | Refills: 3 | Status: SHIPPED | OUTPATIENT
Start: 2024-06-12

## 2024-06-12 NOTE — PROGRESS NOTES
[facility]  Return Patient     Chief Complaint:   Chief Complaint   Patient presents with    Callouses     B/l callous trim        HPI: Yuli Clements is a 68 y.o. female who presents to the office today complaining of painful area left foot. The last debridement helped. Bought new shoes.  Has new insoles now too.   Using moisturizing cream. Wearing slipper socks in the house. .doing bettter.      Currently denies F/C/N/V.     Allergies   Allergen Reactions    Aspirin Anaphylaxis and Shortness Of Breath    Cefadroxil Shortness Of Breath    Ikajrwtm-Rbviisk-Nzukjn [Fluocinolone] Shortness Of Breath    Ciprofloxacin Hcl Shortness Of Breath    Codeine Shortness Of Breath    Demerol Hcl [Meperidine] Shortness Of Breath    Doxycycline Nausea Only and Other (See Comments)    Glucosamine Shortness Of Breath, Diarrhea and Swelling     Other reaction(s): Respiratory Difficulty  \"lips swell up and get severe diarrhea\" - INCLUDES IODINE    Iodine Hives, Shortness Of Breath and Itching    Pcn [Penicillins] Anaphylaxis and Shortness Of Breath    Red Dye Shortness Of Breath    Shellfish-Derived Products Shortness Of Breath, Diarrhea and Swelling     \"lips swell up and get severe diarrhea\" - INCLUDES IODINE    Sulfa Antibiotics Hives, Shortness Of Breath, Itching and Rash     OK in small amounts, but larger amounts cause \"shortness of breath.\"    Sulfasalazine Hives, Itching, Rash and Shortness Of Breath    Yellow Dye Shortness Of Breath     Other reaction(s): Respiratory Difficulty    Gadolinium Rash     Moderate allergic-like reaction consisting of diffuse urticaria to ProHance gadolinium-containing contrast material    Midazolam Nausea Only     Versed caused bad nausea.    Milk (Cow)     Jerri Root     Iodides Hives    Liquid Calcium With D3 [Calcium Carb-Cholecalciferol]     Tetanus Toxoids      Had reaction to tetanus shot    Adhesive Tape Rash       Past Medical History:   Diagnosis Date    Abdominal pain

## 2024-06-20 ENCOUNTER — HOSPITAL ENCOUNTER (OUTPATIENT)
Age: 69
Discharge: HOME | End: 2024-06-20
Payer: MEDICARE

## 2024-06-20 LAB
ALBUMIN SERPL-MCNC: 4.3 G/DL (ref 3.5–5.2)
ALP SERPL-CCNC: 67 U/L (ref 35–104)
ALT SERPL-CCNC: 21 U/L (ref 5–33)
AST SERPL-CCNC: 18 U/L
BILIRUB DIRECT SERPL-MCNC: 0.1 MG/DL
BILIRUB INDIRECT SERPL-MCNC: 0.2 MG/DL (ref 0–1)
BILIRUB SERPL-MCNC: 0.3 MG/DL (ref 0.3–1.2)
LIPASE SERPL-CCNC: 31 U/L (ref 13–60)
PROT SERPL-MCNC: 7 G/DL (ref 6.4–8.3)

## 2024-06-20 PROCEDURE — 36415 COLL VENOUS BLD VENIPUNCTURE: CPT

## 2024-06-20 PROCEDURE — 80076 HEPATIC FUNCTION PANEL: CPT

## 2024-06-20 PROCEDURE — 83690 ASSAY OF LIPASE: CPT

## 2024-07-05 ENCOUNTER — HOSPITAL ENCOUNTER (OUTPATIENT)
Age: 69
Setting detail: SPECIMEN
Discharge: HOME | End: 2024-07-05

## 2024-07-05 ENCOUNTER — OFFICE VISIT (OUTPATIENT)
Dept: PRIMARY CARE CLINIC | Age: 69
End: 2024-07-05

## 2024-07-05 VITALS
SYSTOLIC BLOOD PRESSURE: 134 MMHG | OXYGEN SATURATION: 96 % | WEIGHT: 146.2 LBS | DIASTOLIC BLOOD PRESSURE: 78 MMHG | HEART RATE: 87 BPM | BODY MASS INDEX: 25.1 KG/M2

## 2024-07-05 DIAGNOSIS — R30.0 DYSURIA: Primary | ICD-10-CM

## 2024-07-05 DIAGNOSIS — J45.40 MODERATE PERSISTENT ASTHMA WITHOUT COMPLICATION: ICD-10-CM

## 2024-07-05 DIAGNOSIS — R30.0 DYSURIA: ICD-10-CM

## 2024-07-05 DIAGNOSIS — E03.9 HYPOTHYROIDISM, UNSPECIFIED TYPE: ICD-10-CM

## 2024-07-05 DIAGNOSIS — H91.90 HEARING LOSS, UNSPECIFIED HEARING LOSS TYPE, UNSPECIFIED LATERALITY: ICD-10-CM

## 2024-07-05 LAB
BILIRUBIN, POC: NEGATIVE
BLOOD URINE, POC: NEGATIVE
CLARITY, POC: ABNORMAL
COLOR, POC: ABNORMAL
GLUCOSE URINE, POC: NEGATIVE
KETONES, POC: NEGATIVE
LEUKOCYTE EST, POC: ABNORMAL
NITRITE, POC: NEGATIVE
PH, POC: 7
PROTEIN, POC: NEGATIVE
SPECIFIC GRAVITY, POC: 1.02
UROBILINOGEN, POC: 0.2

## 2024-07-05 RX ORDER — BUDESONIDE AND FORMOTEROL FUMARATE DIHYDRATE 80; 4.5 UG/1; UG/1
2 AEROSOL RESPIRATORY (INHALATION) 2 TIMES DAILY
Qty: 10.2 G | Refills: 3 | Status: SHIPPED | OUTPATIENT
Start: 2024-07-05

## 2024-07-05 RX ORDER — FLUOCINONIDE 0.5 MG/G
OINTMENT TOPICAL
Qty: 30 G | Refills: 3 | Status: SHIPPED | OUTPATIENT
Start: 2024-07-05

## 2024-07-05 RX ORDER — ALOSETRON HYDROCHLORIDE 0.5 MG/1
0.5 TABLET ORAL 2 TIMES DAILY
COMMUNITY
Start: 2024-06-12

## 2024-07-05 RX ORDER — LEVOTHYROXINE SODIUM 0.05 MG/1
TABLET ORAL
Qty: 108 TABLET | Refills: 3 | Status: SHIPPED | OUTPATIENT
Start: 2024-07-05

## 2024-07-05 RX ORDER — FAMOTIDINE 20 MG/1
20 TABLET, FILM COATED ORAL 2 TIMES DAILY
COMMUNITY

## 2024-07-05 ASSESSMENT — ENCOUNTER SYMPTOMS
EYE REDNESS: 0
NAUSEA: 0
EYE DISCHARGE: 0
RHINORRHEA: 0
COUGH: 0
WHEEZING: 0
DIARRHEA: 0
SORE THROAT: 0
ABDOMINAL PAIN: 0
VOMITING: 0
SHORTNESS OF BREATH: 0

## 2024-07-05 NOTE — PROGRESS NOTES
Cancer Father         age 91    Coronary Art Dis Father     Liver Cancer Brother 64    Colon Cancer Brother         \"Bowel and liver CA\"    Diabetes Other         Father's side    Thyroid Disease Other         Mom's side       Social History     Tobacco Use    Smoking status: Never    Smokeless tobacco: Never   Substance Use Topics    Alcohol use: No      Current Outpatient Medications   Medication Sig Dispense Refill    alosetron (LOTRONEX) 0.5 MG tablet Take 1 tablet by mouth 2 times daily      famotidine (PEPCID) 20 MG tablet Take 1 tablet by mouth 2 times daily      fluocinonide (LIDEX) 0.05 % ointment Apply topically 2 times daily. 30 g 3    budesonide-formoterol (SYMBICORT) 80-4.5 MCG/ACT AERO Inhale 2 puffs into the lungs 2 times daily 10.2 g 3    levothyroxine (SYNTHROID) 50 MCG tablet Take 1 tab 5 days per week and 2 tabs 2 day per week. Please make sure tablets are white- pt allergic to yellow and red dye 108 tablet 3    Handicap Placard MISC by Does not apply route Duration:5 years ending 6/12/2029 1 each 0    albuterol sulfate HFA (VENTOLIN HFA) 108 (90 Base) MCG/ACT inhaler Inhale 2 puffs into the lungs 4 times daily as needed for Wheezing 18 g 0    fluticasone (FLONASE) 50 MCG/ACT nasal spray 2 sprays by Each Nostril route daily 16 g 3    levocetirizine (XYZAL) 5 MG tablet Take 1 tablet by mouth as needed      calcium citrate (CALCITRATE) 950 (200 Ca) MG tablet Take 2 tablets by mouth daily      predniSONE 10 MG (21) TBPK Take 1 tablet by mouth daily Day one 60 mg Day two 50 mg Day three 40 mg Day four three 30 mg Day five 20 mg Day six 10 mg (Patient not taking: Reported on 7/5/2024) 21 each 0    clotrimazole-betamethasone (LOTRISONE) 1-0.05 % cream Apply topically 2 times daily. 45 g 0     No current facility-administered medications for this visit.     Allergies   Allergen Reactions    Aspirin Anaphylaxis and Shortness Of Breath    Cefadroxil Shortness Of Breath    Bnxiyslk-Hxjsbln-Bjrewj

## 2024-07-06 LAB
MICROORGANISM SPEC CULT: NORMAL
SERVICE CMNT-IMP: NORMAL
SPECIMEN DESCRIPTION: NORMAL

## 2024-07-08 ENCOUNTER — HOSPITAL ENCOUNTER (OUTPATIENT)
Dept: CT IMAGING | Age: 69
Discharge: HOME | End: 2024-07-10
Attending: INTERNAL MEDICINE
Payer: MEDICARE

## 2024-07-08 ENCOUNTER — TELEPHONE (OUTPATIENT)
Dept: PRIMARY CARE CLINIC | Age: 69
End: 2024-07-08

## 2024-07-08 DIAGNOSIS — K58.0 IRRITABLE BOWEL SYNDROME WITH DIARRHEA: ICD-10-CM

## 2024-07-08 DIAGNOSIS — R19.5 CLAY-COLORED STOOLS: ICD-10-CM

## 2024-07-08 DIAGNOSIS — R19.4 CHANGE IN BOWEL HABITS: ICD-10-CM

## 2024-07-08 PROCEDURE — 74176 CT ABD & PELVIS W/O CONTRAST: CPT

## 2024-07-08 RX ORDER — FLUTICASONE PROPIONATE AND SALMETEROL XINAFOATE 115; 21 UG/1; UG/1
1 AEROSOL, METERED RESPIRATORY (INHALATION) 2 TIMES DAILY
Qty: 1 EACH | Refills: 3 | Status: SHIPPED | OUTPATIENT
Start: 2024-07-08 | End: 2024-07-10 | Stop reason: SDUPTHER

## 2024-07-08 NOTE — TELEPHONE ENCOUNTER
Per pharmacy patient's symbicort is not covered by insurance. Covered alternatives are advair, dulera and breo ellipta. Please escribe alternative [facility] [place]

## 2024-07-10 ENCOUNTER — TELEPHONE (OUTPATIENT)
Dept: PRIMARY CARE CLINIC | Age: 69
End: 2024-07-10

## 2024-07-10 RX ORDER — FLUTICASONE FUROATE AND VILANTEROL TRIFENATATE 100; 25 UG/1; UG/1
1 POWDER RESPIRATORY (INHALATION) DAILY
Qty: 1 EACH | Refills: 3 | Status: SHIPPED | OUTPATIENT
Start: 2024-07-10

## 2024-07-10 NOTE — TELEPHONE ENCOUNTER
Brand name Advair, Breo and Dulera are covered inhalers for patient per insurance, however generic Advair is not covered. Please send new prescription to [facility] in [place].

## 2024-07-24 ENCOUNTER — OFFICE VISIT (OUTPATIENT)
Dept: PODIATRY | Age: 69
End: 2024-07-24
Payer: MEDICARE

## 2024-07-24 VITALS — HEIGHT: 64 IN | BODY MASS INDEX: 24.92 KG/M2 | WEIGHT: 146 LBS

## 2024-07-24 DIAGNOSIS — M79.672 PAIN IN BOTH FEET: ICD-10-CM

## 2024-07-24 DIAGNOSIS — L90.9 FAT PAD ATROPHY OF FOOT: ICD-10-CM

## 2024-07-24 DIAGNOSIS — D23.72 BENIGN NEOPLASM OF SKIN OF FOOT, LEFT: Primary | ICD-10-CM

## 2024-07-24 DIAGNOSIS — D23.71 BENIGN NEOPLASM OF SKIN OF FOOT, RIGHT: ICD-10-CM

## 2024-07-24 DIAGNOSIS — L85.1 ACQUIRED PLANTAR KERATODERMA: ICD-10-CM

## 2024-07-24 DIAGNOSIS — M79.671 PAIN IN BOTH FEET: ICD-10-CM

## 2024-07-24 PROCEDURE — 1123F ACP DISCUSS/DSCN MKR DOCD: CPT | Performed by: PODIATRIST

## 2024-07-24 PROCEDURE — 17110 DESTRUCTION B9 LES UP TO 14: CPT | Performed by: PODIATRIST

## 2024-07-24 PROCEDURE — G8427 DOCREV CUR MEDS BY ELIG CLIN: HCPCS | Performed by: PODIATRIST

## 2024-07-24 PROCEDURE — G8399 PT W/DXA RESULTS DOCUMENT: HCPCS | Performed by: PODIATRIST

## 2024-07-24 PROCEDURE — 99212 OFFICE O/P EST SF 10 MIN: CPT | Performed by: PODIATRIST

## 2024-07-24 PROCEDURE — 3017F COLORECTAL CA SCREEN DOC REV: CPT | Performed by: PODIATRIST

## 2024-07-24 PROCEDURE — G8419 CALC BMI OUT NRM PARAM NOF/U: HCPCS | Performed by: PODIATRIST

## 2024-07-24 PROCEDURE — 1090F PRES/ABSN URINE INCON ASSESS: CPT | Performed by: PODIATRIST

## 2024-07-24 PROCEDURE — 1036F TOBACCO NON-USER: CPT | Performed by: PODIATRIST

## 2024-07-24 NOTE — PROGRESS NOTES
Reduced dose of progesterone to recommended dose of 100mg daily   Coronary Art Dis Mother     Liver Cancer Father         age 91    Coronary Art Dis Father     Liver Cancer Brother 64    Colon Cancer Brother         \"Bowel and liver CA\"    Diabetes Other         Father's side    Thyroid Disease Other         Mom's side       Social History     Tobacco Use    Smoking status: Never    Smokeless tobacco: Never   Substance Use Topics    Alcohol use: No       Review of Systems    Lower Extremity Physical Examination:     Vitals: There were no vitals filed for this visit.  General: AAO x 3 in NAD.     Vascular: DP and PT pulses palpable 2/4, Bilateral.  CFT <3 seconds, Bilateral.  Hair growth present to the level of the digits, Bilateral.  Edema absent, Bilateral.  Varicosities absent, Bilateral. Erythema absent, Bilateral    Neurological:  Sensation present to light touch to level of digits, Bilateral.    Musculoskeletal: Muscle strength 5/5, Bilateral.    Pain along peroneal tendons right ankle to tip of fifth met, no edema around tendons, pain lateral stj and sinus tarsi, pain dorsal forefoot, edema to forefoot, pitting. Area of pain is quite large.     Integument: Warm, dry, supple, Bilateral.  Open lesion absent, Bilateral.  Hyperkeratosis plantar 1st met head, left worse than right, there is a separate lesion left sub 1st met head, with a hard small core, this is painful also. Does not appear to be a verrucae.     Asessment: Patient is a 68 y.o. female with:   1. Benign neoplasm of skin of foot, left    2. Benign neoplasm of skin of foot, right    3. Fat pad atrophy of foot    4. Pain in both feet    5. Acquired plantar keratoderma          Plan: Pt was evaluated and examined. Patient was given personalized discharge instructions. . Pt will follow up in 4 weeks or sooner if any problems arise. Diagnosis was discussed with the pt and all of their questions were answered in detail. Proper foot hygiene and care was discussed with the pt.   Informed patient on proper diabetic foot

## 2024-08-01 ENCOUNTER — TELEPHONE (OUTPATIENT)
Dept: PRIMARY CARE CLINIC | Age: 69
End: 2024-08-01

## 2024-08-01 NOTE — TELEPHONE ENCOUNTER
Patient came in stating that Dr. Hood is recommending that she get a referral to Dermatology for d/t them finding out that she has skin cancer, squamous type. Dr. Hood wants her to get a full body scan done.     Patient would like a referral to a provider in [place], [place], if possible. Or [place] as her second choice.     Contact her  Marcelino, at [phone].    Please advise.

## 2024-08-28 ENCOUNTER — OFFICE VISIT (OUTPATIENT)
Dept: PODIATRY | Age: 69
End: 2024-08-28
Payer: MEDICARE

## 2024-08-28 VITALS — BODY MASS INDEX: 24.92 KG/M2 | HEIGHT: 64 IN | WEIGHT: 146 LBS

## 2024-08-28 DIAGNOSIS — M19.071 PRIMARY OSTEOARTHRITIS OF RIGHT FOOT: ICD-10-CM

## 2024-08-28 DIAGNOSIS — M79.675 PAIN IN TOES OF BOTH FEET: ICD-10-CM

## 2024-08-28 DIAGNOSIS — B35.1 ONYCHOMYCOSIS: ICD-10-CM

## 2024-08-28 DIAGNOSIS — L85.1 ACQUIRED PLANTAR KERATODERMA: ICD-10-CM

## 2024-08-28 DIAGNOSIS — M77.51 TENDINITIS OF RIGHT ANKLE: ICD-10-CM

## 2024-08-28 DIAGNOSIS — D23.72 BENIGN NEOPLASM OF SKIN OF FOOT, LEFT: Primary | ICD-10-CM

## 2024-08-28 DIAGNOSIS — D23.71 BENIGN NEOPLASM OF SKIN OF FOOT, RIGHT: ICD-10-CM

## 2024-08-28 DIAGNOSIS — M25.571 SINUS TARSITIS, RIGHT: ICD-10-CM

## 2024-08-28 DIAGNOSIS — L90.9 FAT PAD ATROPHY OF FOOT: ICD-10-CM

## 2024-08-28 DIAGNOSIS — M79.674 PAIN IN TOES OF BOTH FEET: ICD-10-CM

## 2024-08-28 DIAGNOSIS — M79.672 PAIN IN BOTH FEET: ICD-10-CM

## 2024-08-28 DIAGNOSIS — M79.671 PAIN IN BOTH FEET: ICD-10-CM

## 2024-08-28 PROCEDURE — G8427 DOCREV CUR MEDS BY ELIG CLIN: HCPCS | Performed by: PODIATRIST

## 2024-08-28 PROCEDURE — 1090F PRES/ABSN URINE INCON ASSESS: CPT | Performed by: PODIATRIST

## 2024-08-28 PROCEDURE — G8399 PT W/DXA RESULTS DOCUMENT: HCPCS | Performed by: PODIATRIST

## 2024-08-28 PROCEDURE — 1123F ACP DISCUSS/DSCN MKR DOCD: CPT | Performed by: PODIATRIST

## 2024-08-28 PROCEDURE — 17110 DESTRUCTION B9 LES UP TO 14: CPT | Performed by: PODIATRIST

## 2024-08-28 PROCEDURE — 11721 DEBRIDE NAIL 6 OR MORE: CPT | Performed by: PODIATRIST

## 2024-08-28 PROCEDURE — 99213 OFFICE O/P EST LOW 20 MIN: CPT | Performed by: PODIATRIST

## 2024-08-28 PROCEDURE — 3017F COLORECTAL CA SCREEN DOC REV: CPT | Performed by: PODIATRIST

## 2024-08-28 PROCEDURE — G8419 CALC BMI OUT NRM PARAM NOF/U: HCPCS | Performed by: PODIATRIST

## 2024-08-28 PROCEDURE — 1036F TOBACCO NON-USER: CPT | Performed by: PODIATRIST

## 2024-08-28 NOTE — PROGRESS NOTES
[facility] Podiat[facility]  Return Patient     Chief Complaint:   Chief Complaint   Patient presents with    Callouses     B/l callouses       HPI: Yuli Clements is a 68 y.o. female who presents to the office today complaining of painful area left foot. The last debridement helped. Wearing old shoes, about 2 years old.  Has new insoles. .   Using moisturizing cream. Wearing slipper socks in the house. .doing bettter.     She would like her nails trimmed. They are painful in shoes and she cannot safely take care of them herself.     Still having some right ankle pain and foot swelling. Last xray showed arthritis. We discussed wearing good shoe and orthotics. She says pain is getting worse.        Currently denies F/C/N/V.     Allergies   Allergen Reactions    Aspirin Anaphylaxis and Shortness Of Breath    Cefadroxil Shortness Of Breath    Aykgtwux-Aneheuo-Srurum [Fluocinolone] Shortness Of Breath    Ciprofloxacin Hcl Shortness Of Breath    Codeine Shortness Of Breath    Demerol Hcl [Meperidine] Shortness Of Breath    Doxycycline Nausea Only and Other (See Comments)    Glucosamine Shortness Of Breath, Diarrhea and Swelling     Other reaction(s): Respiratory Difficulty  \"lips swell up and get severe diarrhea\" - INCLUDES IODINE    Iodine Hives, Shortness Of Breath and Itching    Pcn [Penicillins] Anaphylaxis and Shortness Of Breath    Red Dye #40 (Allura Red) Shortness Of Breath    Shellfish-Derived Products Shortness Of Breath, Diarrhea and Swelling     \"lips swell up and get severe diarrhea\" - INCLUDES IODINE    Sulfa Antibiotics Hives, Shortness Of Breath, Itching and Rash     OK in small amounts, but larger amounts cause \"shortness of breath.\"    Sulfasalazine Hives, Itching, Rash and Shortness Of Breath    Yellow Dye Shortness Of Breath     Other reaction(s): Respiratory Difficulty    Gadolinium Rash     Moderate allergic-like reaction consisting of diffuse urticaria to ProHance gadolinium-containing contrast    Left    Dystrophic Changes                                                                 [x] [x] [x] [x] [x] [x] [x] [x] [x] [x]  5 4 3 2 1 1 2 3 4 5                         Right                                        Left    Color                                                                  [x] [x] [x] [x] [x] [x] [x] [x] [x] [x]  5 4 3 2 1 1 2 3 4 5                          Right                                        Left    Incurvation/Ingrowin                                                                   [] [] [] [] [] [] [] [] [] []  5 4 3 2 1 1 2 3 4 5                         Right                                        Left    Inflammation/Pain                                                                   [x] [x] [x] [x] [x] [x] [x] [x] [x] [x]  5 4 3 2 1 1 2 3 4 5                         Right                                        Left     Asessment: Patient is a 68 y.o. female with:   1. Benign neoplasm of skin of foot, left    2. Benign neoplasm of skin of foot, right    3. Fat pad atrophy of foot    4. Pain in both feet    5. Acquired plantar keratoderma    6. Primary osteoarthritis of right foot    7. Tendinitis of right ankle    8. Onychomycosis    9. Pain in toes of both feet    10. Sinus tarsitis, right          Plan: Pt was evaluated and examined. Patient was given personalized discharge instructions.  Nails 1-10 were debrided sharply in length and thickness with a nipper and , without incident. Pt will follow up in 2 months or sooner if any problems arise. Diagnosis was discussed with the pt and all of their questions were answered in detail. Proper foot hygiene and care was discussed with the pt.   Informed patient on proper diabetic foot care and importance of tight glycemic control. Patient to check feet daily and contact the office with any questions/problems/concerns.  Other comorbidity noted and will be managed by PCP.        All labs were reviewed and all imagining

## 2024-08-29 NOTE — PROGRESS NOTES
[facility]  [address]  [facility] [address]  Phone: [phone]  Fax: [phone]    Yuli Clements is a 68 y.o. female who presents today for her Complete Skin Check.    Personal history ofskin cancer:  Yes  What type?    Melanoma on upper lip  and SCC on right shoulder  Family history of skin cancer? No   Any Concerning Lesions? Right shoulder is being biopsied by Dr. Hood 9/9/24  Do you wear Sunscreen:  Yes, sometimes  Do you work or play outside regularly:  No    /70   Pulse 70   Ht 1.626 m (5' 4\")   Wt 65.2 kg (143 lb 12.8 oz)   SpO2 98%   BMI 24.68 kg/m²      Physical Exam  Face:  No suspicious lesions, no wounds, no dryness or color changes. Rosecea on bilateral cheeks and chin. Very subtle scar on upper lip from past melanoma--no recurrence  Ears:  No suspicious lesions, no wounds, no dryness or color changes  Neck:  No suspicious lesions, no wounds, no dryness or color changes  Chest:  Chest appears normal with numerous cherry angiomas and occ SK.   Abdomen: Abdomen appears normal with numerous Grover  Back:  Back appears with one rough papule on left upper back.  Numerous SKs and Grover.  Arms:  No suspicious lesions, no wounds, no dryness or color changes. Several circular slightly scaling salmon colored lesions on bilateral arms. Also scattered SKs and Grover. Right shoulder has a rough papule near the site of her BCC scar   Legs:  One rough red macule on right upper thigh. No suspicious lesions, no wounds, no dryness or color changes. Few SKs and occ CA  Hands:  No Callus, Ulcers, Abnormal nails or suspicious lesions  Feet:  No Callus, Ulcers, Abnormal nails or suspicious lesions  Breasts:  No suspicious lesions, no wounds or dryness or color changes  Genitalia:  No suspicious lesions, no wounds or dryness or color changes    1. Yeast vaginitis  -     fluconazole (DIFLUCAN) 150 MG tablet; TAKE ONE TABLET NOW AND ONE IN 72 HOURS., Disp-2 tablet,  R-0Normal  2. Nasal congestion  -     fluticasone (FLONASE) 50 MCG/ACT nasal spray; 2 sprays by Each Nostril route daily, Disp-16 g, R-3Normal  3. Neoplasm of uncertain behavior of skin  4. Seborrheic keratoses  5. Cherry angioma  6. Intrinsic atopic dermatitis  -     triamcinolone (ARISTOCORT) 0.5 % cream; Apply topically 3 times daily., Disp-45 g, R-1, Normal  7. Acne rosacea  -     Azelaic Acid (FINACEA) 15 % FOAM; Apply twice daily, Disp-1 each, R-1Normal  8. History of melanoma       Plan:  Discussed good skin care.    Discussed proper sun Protection.  Patient education given on skin cancer and precancers.  RTO for 2 shave biopsies  Sent diflucan and refill of nasal spray  Also sent triamcinolone for AD on arms  Return for 2 shave biopsies.    Miriam Hansen PA-C

## 2024-08-30 ENCOUNTER — INITIAL CONSULT (OUTPATIENT)
Dept: PRIMARY CARE CLINIC | Age: 69
End: 2024-08-30

## 2024-08-30 VITALS
HEIGHT: 64 IN | WEIGHT: 143.8 LBS | DIASTOLIC BLOOD PRESSURE: 70 MMHG | HEART RATE: 70 BPM | SYSTOLIC BLOOD PRESSURE: 130 MMHG | OXYGEN SATURATION: 98 % | BODY MASS INDEX: 24.55 KG/M2

## 2024-08-30 DIAGNOSIS — L82.1 SEBORRHEIC KERATOSES: ICD-10-CM

## 2024-08-30 DIAGNOSIS — D18.01 CHERRY ANGIOMA: ICD-10-CM

## 2024-08-30 DIAGNOSIS — Z85.820 HISTORY OF MELANOMA: ICD-10-CM

## 2024-08-30 DIAGNOSIS — L71.9 ACNE ROSACEA: ICD-10-CM

## 2024-08-30 DIAGNOSIS — L20.84 INTRINSIC ATOPIC DERMATITIS: ICD-10-CM

## 2024-08-30 DIAGNOSIS — B37.31 YEAST VAGINITIS: Primary | ICD-10-CM

## 2024-08-30 DIAGNOSIS — R09.81 NASAL CONGESTION: ICD-10-CM

## 2024-08-30 DIAGNOSIS — D48.5 NEOPLASM OF UNCERTAIN BEHAVIOR OF SKIN: ICD-10-CM

## 2024-08-30 RX ORDER — FLUTICASONE PROPIONATE 50 MCG
2 SPRAY, SUSPENSION (ML) NASAL DAILY
Qty: 16 G | Refills: 3 | Status: SHIPPED | OUTPATIENT
Start: 2024-08-30

## 2024-08-30 RX ORDER — FLUCONAZOLE 150 MG/1
150 TABLET ORAL ONCE
Qty: 1 TABLET | Refills: 0 | Status: CANCELLED | OUTPATIENT
Start: 2024-08-30 | End: 2024-08-30

## 2024-08-30 RX ORDER — TRIAMCINOLONE ACETONIDE 5 MG/G
CREAM TOPICAL
Qty: 45 G | Refills: 1 | Status: SHIPPED | OUTPATIENT
Start: 2024-08-30

## 2024-08-30 RX ORDER — AZELAIC ACID 0.15 G/G
AEROSOL, FOAM TOPICAL
Qty: 1 EACH | Refills: 1 | Status: SHIPPED | OUTPATIENT
Start: 2024-08-30

## 2024-08-30 RX ORDER — FLUCONAZOLE 150 MG/1
TABLET ORAL
Qty: 2 TABLET | Refills: 0 | Status: SHIPPED | OUTPATIENT
Start: 2024-08-30

## 2024-09-09 ENCOUNTER — HOSPITAL ENCOUNTER (OUTPATIENT)
Age: 69
Setting detail: OUTPATIENT SURGERY
Discharge: HOME | End: 2024-09-09
Attending: PLASTIC SURGERY | Admitting: PLASTIC SURGERY
Payer: MEDICARE

## 2024-09-09 VITALS
RESPIRATION RATE: 18 BRPM | TEMPERATURE: 97.8 F | WEIGHT: 146 LBS | HEART RATE: 64 BPM | DIASTOLIC BLOOD PRESSURE: 75 MMHG | BODY MASS INDEX: 25.06 KG/M2 | OXYGEN SATURATION: 97 % | SYSTOLIC BLOOD PRESSURE: 157 MMHG

## 2024-09-09 DIAGNOSIS — G89.18 POST-OP PAIN: Primary | ICD-10-CM

## 2024-09-09 DIAGNOSIS — D48.5 NEOPLASM OF UNCERTAIN BEHAVIOR OF SKIN: ICD-10-CM

## 2024-09-09 PROCEDURE — 3600000012 HC SURGERY LEVEL 2 ADDTL 15MIN: Performed by: PLASTIC SURGERY

## 2024-09-09 PROCEDURE — 7100000010 HC PHASE II RECOVERY - FIRST 15 MIN: Performed by: PLASTIC SURGERY

## 2024-09-09 PROCEDURE — 88305 TISSUE EXAM BY PATHOLOGIST: CPT

## 2024-09-09 PROCEDURE — 2709999900 HC NON-CHARGEABLE SUPPLY: Performed by: PLASTIC SURGERY

## 2024-09-09 PROCEDURE — 7100000011 HC PHASE II RECOVERY - ADDTL 15 MIN: Performed by: PLASTIC SURGERY

## 2024-09-09 PROCEDURE — 3600000002 HC SURGERY LEVEL 2 BASE: Performed by: PLASTIC SURGERY

## 2024-09-09 RX ORDER — CEPHALEXIN 500 MG/1
500 CAPSULE ORAL 3 TIMES DAILY
Qty: 15 CAPSULE | Refills: 0 | Status: SHIPPED | OUTPATIENT
Start: 2024-09-09 | End: 2024-09-09 | Stop reason: HOSPADM

## 2024-09-09 RX ORDER — AZITHROMYCIN 250 MG/1
TABLET, FILM COATED ORAL
Qty: 6 TABLET | Refills: 0 | Status: SHIPPED | OUTPATIENT
Start: 2024-09-09 | End: 2024-09-19

## 2024-09-09 RX ORDER — OXYCODONE AND ACETAMINOPHEN 5; 325 MG/1; MG/1
1 TABLET ORAL 2 TIMES DAILY
Qty: 10 TABLET | Refills: 0 | Status: SHIPPED | OUTPATIENT
Start: 2024-09-09 | End: 2024-09-14

## 2024-09-09 ASSESSMENT — PAIN - FUNCTIONAL ASSESSMENT: PAIN_FUNCTIONAL_ASSESSMENT: 0-10

## 2024-09-12 ENCOUNTER — TELEPHONE (OUTPATIENT)
Dept: ORTHOPEDIC SURGERY | Age: 69
End: 2024-09-12

## 2024-09-12 LAB — SURGICAL PATHOLOGY REPORT: NORMAL

## 2024-09-17 ENCOUNTER — TELEPHONE (OUTPATIENT)
Dept: OBGYN CLINIC | Age: 69
End: 2024-09-17

## 2024-09-17 RX ORDER — MICONAZOLE NITRATE 200 MG-2 %
COMBO PACK, PREFILLED APPL. AND CREAM VAGINAL NIGHTLY
Qty: 1 KIT | Refills: 0 | Status: SHIPPED | OUTPATIENT
Start: 2024-09-17

## 2024-09-20 ENCOUNTER — PROCEDURE VISIT (OUTPATIENT)
Dept: PRIMARY CARE CLINIC | Age: 69
End: 2024-09-20

## 2024-09-20 VITALS
WEIGHT: 142.8 LBS | BODY MASS INDEX: 24.38 KG/M2 | SYSTOLIC BLOOD PRESSURE: 130 MMHG | HEART RATE: 65 BPM | HEIGHT: 64 IN | OXYGEN SATURATION: 99 % | DIASTOLIC BLOOD PRESSURE: 86 MMHG

## 2024-09-20 DIAGNOSIS — D48.5 NEOPLASM OF UNCERTAIN BEHAVIOR OF SKIN: Primary | ICD-10-CM

## 2024-09-23 ENCOUNTER — LAB (OUTPATIENT)
Dept: LAB | Age: 69
End: 2024-09-23

## 2024-10-06 NOTE — PROGRESS NOTES
[facility] Keratoses destruction procedure note:  10/8/2024  Yuli Clements  1955    BP (!) 140/80   Pulse 81   Ht 1.626 m (5' 4\")   Wt 65.8 kg (145 lb)   SpO2 98%   BMI 24.89 kg/m²   ALL VITALS REVIEWED    Allergies   Allergen Reactions    Aspirin Anaphylaxis and Shortness Of Breath    Cefadroxil Shortness Of Breath    Cvnybado-Rzszqsi-Xxmksk [Fluocinolone] Shortness Of Breath    Ciprofloxacin Hcl Shortness Of Breath    Codeine Shortness Of Breath    Demerol Hcl [Meperidine] Shortness Of Breath    Doxycycline Nausea Only and Other (See Comments)    Glucosamine Shortness Of Breath, Diarrhea and Swelling     Other reaction(s): Respiratory Difficulty  \"lips swell up and get severe diarrhea\" - INCLUDES IODINE    Iodine Hives, Shortness Of Breath and Itching    Pcn [Penicillins] Anaphylaxis and Shortness Of Breath    Red Dye #40 (Allura Red) Shortness Of Breath    Shellfish-Derived Products Shortness Of Breath, Diarrhea and Swelling     \"lips swell up and get severe diarrhea\" - INCLUDES IODINE    Sulfa Antibiotics Hives, Shortness Of Breath, Itching and Rash     OK in small amounts, but larger amounts cause \"shortness of breath.\"    Sulfasalazine Hives, Itching, Rash and Shortness Of Breath    Yellow Dye Shortness Of Breath     Other reaction(s): Respiratory Difficulty    Gadolinium Rash     Moderate allergic-like reaction consisting of diffuse urticaria to ProHance gadolinium-containing contrast material    Midazolam Nausea Only     Versed caused bad nausea.    Milk (Cow)     Jerri Root     Iodides Hives    Liquid Calcium With D3 [Calcium Carb-Cholecalciferol]     Tetanus Toxoids      Had reaction to tetanus shot    Adhesive Tape Rash    Calcitriol Hives and Nausea And Vomiting       Chief Complaint   Patient presents with    Skin Cancer     Patient is here to have pre cancer spots treated on left arm      Location:  bilateral arms with biopsy proven on left forearm  Biopsy site healing well.     Physical

## 2024-10-08 ENCOUNTER — OFFICE VISIT (OUTPATIENT)
Dept: PRIMARY CARE CLINIC | Age: 69
End: 2024-10-08

## 2024-10-08 VITALS
BODY MASS INDEX: 24.75 KG/M2 | OXYGEN SATURATION: 98 % | HEART RATE: 81 BPM | SYSTOLIC BLOOD PRESSURE: 140 MMHG | WEIGHT: 145 LBS | HEIGHT: 64 IN | DIASTOLIC BLOOD PRESSURE: 80 MMHG

## 2024-10-08 DIAGNOSIS — L57.0 ACTINIC KERATOSIS: Primary | ICD-10-CM

## 2024-10-08 RX ORDER — FLUOROURACIL 50 MG/G
CREAM TOPICAL
Qty: 40 G | Refills: 1 | Status: SHIPPED | OUTPATIENT
Start: 2024-10-08

## 2024-10-10 ENCOUNTER — OFFICE VISIT (OUTPATIENT)
Dept: PRIMARY CARE CLINIC | Age: 69
End: 2024-10-10

## 2024-10-10 VITALS
WEIGHT: 144.4 LBS | BODY MASS INDEX: 24.65 KG/M2 | SYSTOLIC BLOOD PRESSURE: 122 MMHG | DIASTOLIC BLOOD PRESSURE: 78 MMHG | HEIGHT: 64 IN | HEART RATE: 65 BPM | OXYGEN SATURATION: 100 %

## 2024-10-10 DIAGNOSIS — E03.9 HYPOTHYROIDISM, UNSPECIFIED TYPE: ICD-10-CM

## 2024-10-10 DIAGNOSIS — R09.81 NASAL CONGESTION: ICD-10-CM

## 2024-10-10 DIAGNOSIS — E53.8 VITAMIN B 12 DEFICIENCY: ICD-10-CM

## 2024-10-10 DIAGNOSIS — R06.2 WHEEZING: ICD-10-CM

## 2024-10-10 DIAGNOSIS — H91.90 HEARING LOSS, UNSPECIFIED HEARING LOSS TYPE, UNSPECIFIED LATERALITY: Primary | ICD-10-CM

## 2024-10-10 LAB
CALCIUM IONIZED: 1.07 MMOL/L (ref 1.13–1.33)
CALCIUM SERPL-MCNC: 8.1 MG/DL (ref 8.6–10.4)
FOLATE: 9.1 NG/ML (ref 4.8–24.2)
T4 FREE: 0.5 NG/DL (ref 0.92–1.68)
TSH SERPL DL<=0.05 MIU/L-ACNC: 22.6 UIU/ML (ref 0.27–4.2)
VITAMIN B-12: 368 PG/ML (ref 232–1245)

## 2024-10-10 RX ORDER — LEVOTHYROXINE SODIUM 50 UG/1
TABLET ORAL
Qty: 108 TABLET | Refills: 3 | Status: SHIPPED | OUTPATIENT
Start: 2024-10-10

## 2024-10-10 RX ORDER — FLUTICASONE PROPIONATE 50 MCG
2 SPRAY, SUSPENSION (ML) NASAL DAILY
Qty: 16 G | Refills: 3 | Status: SHIPPED | OUTPATIENT
Start: 2024-10-10

## 2024-10-10 RX ORDER — ALBUTEROL SULFATE 90 UG/1
2 INHALANT RESPIRATORY (INHALATION) 4 TIMES DAILY PRN
Qty: 18 G | Refills: 0 | Status: SHIPPED | OUTPATIENT
Start: 2024-10-10

## 2024-10-10 NOTE — PROGRESS NOTES
Vycmxxxu-Yxrywfg-Jyunby [Fluocinolone] Shortness Of Breath    Ciprofloxacin Hcl Shortness Of Breath    Codeine Shortness Of Breath    Demerol Hcl [Meperidine] Shortness Of Breath    Doxycycline Nausea Only and Other (See Comments)    Glucosamine Shortness Of Breath, Diarrhea and Swelling     Other reaction(s): Respiratory Difficulty  \"lips swell up and get severe diarrhea\" - INCLUDES IODINE    Iodine Hives, Shortness Of Breath and Itching    Pcn [Penicillins] Anaphylaxis and Shortness Of Breath    Red Dye #40 (Allura Red) Shortness Of Breath    Shellfish-Derived Products Shortness Of Breath, Diarrhea and Swelling     \"lips swell up and get severe diarrhea\" - INCLUDES IODINE    Sulfa Antibiotics Hives, Shortness Of Breath, Itching and Rash     OK in small amounts, but larger amounts cause \"shortness of breath.\"    Sulfasalazine Hives, Itching, Rash and Shortness Of Breath    Yellow Dye Shortness Of Breath     Other reaction(s): Respiratory Difficulty    Gadolinium Rash     Moderate allergic-like reaction consisting of diffuse urticaria to ProHance gadolinium-containing contrast material    Midazolam Nausea Only     Versed caused bad nausea.    Milk (Cow)     Jerri Root     Iodides Hives    Liquid Calcium With D3 [Calcium Carb-Cholecalciferol]     Tetanus Toxoids      Had reaction to tetanus shot    Adhesive Tape Rash    Calcitriol Hives and Nausea And Vomiting       Health Maintenance   Topic Date Due    Pneumococcal 65+ years Vaccine (1 of 2 - PCV) Never done    Respiratory Syncytial Virus (RSV) Pregnant or age 60 yrs+ (1 - 1-dose 60+ series) Never done    Flu vaccine (1) 08/01/2024    COVID-19 Vaccine (7 - 2023-24 season) 09/01/2024    Shingles vaccine (1 of 2) 04/28/2026 (Originally 10/3/2003)    Depression Screen  04/03/2025    Annual Wellness Visit (Medicare)  04/04/2025    Breast cancer screen  02/12/2026    Lipids  04/04/2029    Colorectal Cancer Screen  05/10/2031    DEXA (modify frequency per FRAX score)

## 2024-10-10 NOTE — ASSESSMENT & PLAN NOTE
Has tinnitus and will check to see if related to her pulse.  If it is then we should check carotid scan.  If not, pt reassured.

## 2024-10-14 LAB — THYROGLOBULIN BY LC-MS/MS, SERUM/PLASMA: <0.5 NG/ML (ref 1.3–31.8)

## 2024-10-17 ENCOUNTER — OFFICE VISIT (OUTPATIENT)
Dept: OBGYN CLINIC | Age: 69
End: 2024-10-17
Payer: MEDICARE

## 2024-10-17 ENCOUNTER — HOSPITAL ENCOUNTER (OUTPATIENT)
Age: 69
Setting detail: SPECIMEN
Discharge: HOME | End: 2024-10-17

## 2024-10-17 VITALS
DIASTOLIC BLOOD PRESSURE: 86 MMHG | BODY MASS INDEX: 24.75 KG/M2 | SYSTOLIC BLOOD PRESSURE: 134 MMHG | WEIGHT: 145 LBS | HEIGHT: 64 IN

## 2024-10-17 DIAGNOSIS — N89.8 VAGINAL ODOR: ICD-10-CM

## 2024-10-17 DIAGNOSIS — N89.8 VAGINAL DISCHARGE: ICD-10-CM

## 2024-10-17 DIAGNOSIS — N89.8 VAGINAL ITCHING: ICD-10-CM

## 2024-10-17 DIAGNOSIS — N76.1 SUBACUTE VAGINITIS: ICD-10-CM

## 2024-10-17 DIAGNOSIS — N89.8 VAGINAL ITCHING: Primary | ICD-10-CM

## 2024-10-17 PROCEDURE — G8399 PT W/DXA RESULTS DOCUMENT: HCPCS | Performed by: OBSTETRICS & GYNECOLOGY

## 2024-10-17 PROCEDURE — G8420 CALC BMI NORM PARAMETERS: HCPCS | Performed by: OBSTETRICS & GYNECOLOGY

## 2024-10-17 PROCEDURE — 3017F COLORECTAL CA SCREEN DOC REV: CPT | Performed by: OBSTETRICS & GYNECOLOGY

## 2024-10-17 PROCEDURE — 99213 OFFICE O/P EST LOW 20 MIN: CPT | Performed by: OBSTETRICS & GYNECOLOGY

## 2024-10-17 PROCEDURE — G8484 FLU IMMUNIZE NO ADMIN: HCPCS | Performed by: OBSTETRICS & GYNECOLOGY

## 2024-10-17 PROCEDURE — 1123F ACP DISCUSS/DSCN MKR DOCD: CPT | Performed by: OBSTETRICS & GYNECOLOGY

## 2024-10-17 PROCEDURE — 1090F PRES/ABSN URINE INCON ASSESS: CPT | Performed by: OBSTETRICS & GYNECOLOGY

## 2024-10-17 PROCEDURE — 99459 PELVIC EXAMINATION: CPT | Performed by: OBSTETRICS & GYNECOLOGY

## 2024-10-17 PROCEDURE — G8427 DOCREV CUR MEDS BY ELIG CLIN: HCPCS | Performed by: OBSTETRICS & GYNECOLOGY

## 2024-10-17 PROCEDURE — 1036F TOBACCO NON-USER: CPT | Performed by: OBSTETRICS & GYNECOLOGY

## 2024-10-17 RX ORDER — NYSTATIN 100000 U/G
CREAM TOPICAL
Qty: 30 G | Refills: 1 | Status: SHIPPED | OUTPATIENT
Start: 2024-10-17 | End: 2024-10-17 | Stop reason: HOSPADM

## 2024-10-17 RX ORDER — CLOTRIMAZOLE AND BETAMETHASONE DIPROPIONATE 10; .64 MG/G; MG/G
CREAM TOPICAL
Qty: 45 G | Refills: 1 | Status: SHIPPED | OUTPATIENT
Start: 2024-10-17

## 2024-10-17 NOTE — PROGRESS NOTES
bilaterally    Pelvic: External genitalia: erythema like a diaper rash. No discharge seen. Culture done    Diagnostics:  No results found.    Lab Results:  Results for orders placed or performed in visit on 10/10/24   Vitamin B12 & Folate   Result Value Ref Range    Vitamin B-12 368 232 - 1245 pg/mL    Folate 9.1 4.8 - 24.2 ng/mL   Calcium, Ionized   Result Value Ref Range    Calcium, Ionized 1.07 (L) 1.13 - 1.33 mmol/L   Calcium   Result Value Ref Range    Calcium 8.1 (L) 8.6 - 10.4 mg/dL   T4, Free   Result Value Ref Range    T4 Free 0.5 (L) 0.92 - 1.68 ng/dL   TSH   Result Value Ref Range    TSH 22.60 (H) 0.27 - 4.20 uIU/mL   Thyroglobulin   Result Value Ref Range    Thyroglobulin by LC-MS/MS, Serum/Plasma <0.5 (L) 1.3 - 31.8 ng/mL         Assessment:   Diagnosis Orders   1. Vaginal itching  Culture, Genital      2. Vaginal discharge  Culture, Genital      3. Vaginal odor  Culture, Genital      4. Subacute vaginitis          Patient Active Problem List    Diagnosis Date Noted    Claustrophobia 01/13/2023     Priority: Medium    Arthritis of right hand 10/12/2022     Priority: Medium    History of melanoma 08/30/2024    Ear fullness, bilateral 11/22/2023    Skin lesion 11/22/2023    Open wound of left knee 09/11/2023    Plantar wart 06/19/2023    Elevated TSH 05/26/2022    Prolonged Q-T interval on ECG 05/26/2022     Formatting of this note might be different from the original.  QTC = 513 on admission 5/25/2022      Syncope and collapse 05/26/2022    Dizziness 05/25/2022    Fracture of left humerus 05/25/2022    Age-related nuclear cataract of left eye 04/29/2022    Dry eye syndrome of both eyes 04/29/2022    Optic atrophy of left eye 04/29/2022    Presbyopia 04/29/2022    Mild persistent asthma without complication 10/19/2021    Ventral hernia without obstruction or gangrene 10/19/2021    Vulvar irritation 10/19/2021    Bilateral hearing loss 09/20/2021    Bilateral tinnitus 09/20/2021    Hypocalcemia 09/02/2021

## 2024-10-20 LAB
MICROORGANISM SPEC CULT: NORMAL
SERVICE CMNT-IMP: NORMAL
SPECIMEN DESCRIPTION: NORMAL

## 2024-10-30 ENCOUNTER — OFFICE VISIT (OUTPATIENT)
Dept: PODIATRY | Age: 69
End: 2024-10-30
Payer: MEDICARE

## 2024-10-30 VITALS — BODY MASS INDEX: 24.75 KG/M2 | WEIGHT: 145 LBS | HEIGHT: 64 IN

## 2024-10-30 DIAGNOSIS — M79.674 PAIN IN TOES OF BOTH FEET: ICD-10-CM

## 2024-10-30 DIAGNOSIS — L90.9 FAT PAD ATROPHY OF FOOT: ICD-10-CM

## 2024-10-30 DIAGNOSIS — M79.672 PAIN IN BOTH FEET: ICD-10-CM

## 2024-10-30 DIAGNOSIS — D23.72 BENIGN NEOPLASM OF SKIN OF FOOT, LEFT: ICD-10-CM

## 2024-10-30 DIAGNOSIS — M79.675 PAIN IN TOES OF BOTH FEET: ICD-10-CM

## 2024-10-30 DIAGNOSIS — M79.671 RIGHT FOOT PAIN: Primary | ICD-10-CM

## 2024-10-30 DIAGNOSIS — M77.51 TENDINITIS OF RIGHT FOOT: ICD-10-CM

## 2024-10-30 DIAGNOSIS — L85.1 ACQUIRED PLANTAR KERATODERMA: ICD-10-CM

## 2024-10-30 DIAGNOSIS — M19.071 PRIMARY OSTEOARTHRITIS OF RIGHT FOOT: ICD-10-CM

## 2024-10-30 DIAGNOSIS — M79.671 PAIN IN BOTH FEET: ICD-10-CM

## 2024-10-30 DIAGNOSIS — B35.1 ONYCHOMYCOSIS: ICD-10-CM

## 2024-10-30 DIAGNOSIS — D23.71 BENIGN NEOPLASM OF SKIN OF FOOT, RIGHT: ICD-10-CM

## 2024-10-30 PROCEDURE — 1036F TOBACCO NON-USER: CPT | Performed by: PODIATRIST

## 2024-10-30 PROCEDURE — 1159F MED LIST DOCD IN RCRD: CPT | Performed by: PODIATRIST

## 2024-10-30 PROCEDURE — G8399 PT W/DXA RESULTS DOCUMENT: HCPCS | Performed by: PODIATRIST

## 2024-10-30 PROCEDURE — 3017F COLORECTAL CA SCREEN DOC REV: CPT | Performed by: PODIATRIST

## 2024-10-30 PROCEDURE — 17110 DESTRUCTION B9 LES UP TO 14: CPT | Performed by: PODIATRIST

## 2024-10-30 PROCEDURE — 11721 DEBRIDE NAIL 6 OR MORE: CPT | Performed by: PODIATRIST

## 2024-10-30 PROCEDURE — 1125F AMNT PAIN NOTED PAIN PRSNT: CPT | Performed by: PODIATRIST

## 2024-10-30 PROCEDURE — 1090F PRES/ABSN URINE INCON ASSESS: CPT | Performed by: PODIATRIST

## 2024-10-30 PROCEDURE — G8427 DOCREV CUR MEDS BY ELIG CLIN: HCPCS | Performed by: PODIATRIST

## 2024-10-30 PROCEDURE — 1123F ACP DISCUSS/DSCN MKR DOCD: CPT | Performed by: PODIATRIST

## 2024-10-30 PROCEDURE — 99213 OFFICE O/P EST LOW 20 MIN: CPT | Performed by: PODIATRIST

## 2024-10-30 PROCEDURE — G8420 CALC BMI NORM PARAMETERS: HCPCS | Performed by: PODIATRIST

## 2024-10-30 PROCEDURE — G8484 FLU IMMUNIZE NO ADMIN: HCPCS | Performed by: PODIATRIST

## 2024-10-30 NOTE — PROGRESS NOTES
instructions.  Nails 1-10 were debrided sharply in length and thickness with a nipper and , without incident. Pt will follow up in 2 months or sooner if any problems arise. Diagnosis was discussed with the pt and all of their questions were answered in detail. Proper foot hygiene and care was discussed with the pt.   Informed patient on proper diabetic foot care and importance of tight glycemic control. Patient to check feet daily and contact the office with any questions/problems/concerns.  Other comorbidity noted and will be managed by PCP.        All labs were reviewed and all imagining including the above findings were reviewed PRIOR to and during the patients arrival and with the patient today.  Previous patient encounter was reviewed.  Encounters from the patients other medical providers were reviewed and noted.      Time was spent educating the patient and their families/caregivers on proper care of the feet and ankles.  All the above diagnosis were addressed at todays visit and all questions were answered.  Time spent with patient and patient care issues above the usual time needed for assessment and treatment was: [] 15-20 min  [x] 21-30 min  [] 31-44 min  [] 45 min or more. This time also included charting after the patients visit        The lesion was partially debrided to healthy pink skin. No bleeding was noted. No ulceration noted. The patient tolerated the procedure well and without complication.  Apperature pad applied. Discussed with patient options for offloading pressure on the area.      Good shoes, inserts,  OTC compression ankle sleeve, wear more often  Continue with moisturizing cream, pumice stone    MRI ordered: He/She has gone through conservative care including good shoes, orthotics, topical pain relievers, heat and ice and his/her pain has increased and persisted.  I am concerned for   1. Right foot pain    2. Benign neoplasm of skin of foot, left    3. Benign neoplasm of skin of

## 2024-11-04 ENCOUNTER — HOSPITAL ENCOUNTER (OUTPATIENT)
Dept: MRI IMAGING | Age: 69
Discharge: HOME | End: 2024-11-06
Attending: PODIATRIST
Payer: MEDICARE

## 2024-11-04 DIAGNOSIS — M79.671 RIGHT FOOT PAIN: ICD-10-CM

## 2024-11-04 DIAGNOSIS — M77.51 TENDINITIS OF RIGHT FOOT: ICD-10-CM

## 2024-11-04 DIAGNOSIS — M19.071 PRIMARY OSTEOARTHRITIS OF RIGHT FOOT: ICD-10-CM

## 2024-11-04 PROCEDURE — 73718 MRI LOWER EXTREMITY W/O DYE: CPT

## 2024-11-18 PROBLEM — D48.5 NEOPLASM OF UNCERTAIN BEHAVIOR OF SKIN: Status: RESOLVED | Noted: 2018-11-08 | Resolved: 2024-11-18

## 2024-11-21 ENCOUNTER — OFFICE VISIT (OUTPATIENT)
Dept: PODIATRY | Age: 69
End: 2024-11-21
Payer: MEDICARE

## 2024-11-21 ENCOUNTER — OFFICE VISIT (OUTPATIENT)
Dept: PRIMARY CARE CLINIC | Age: 69
End: 2024-11-21

## 2024-11-21 VITALS
DIASTOLIC BLOOD PRESSURE: 60 MMHG | HEART RATE: 88 BPM | WEIGHT: 145 LBS | OXYGEN SATURATION: 96 % | BODY MASS INDEX: 24.75 KG/M2 | SYSTOLIC BLOOD PRESSURE: 116 MMHG | HEIGHT: 64 IN

## 2024-11-21 VITALS — HEIGHT: 64 IN | WEIGHT: 144 LBS | BODY MASS INDEX: 24.59 KG/M2

## 2024-11-21 DIAGNOSIS — M77.51 TENDINITIS OF RIGHT ANKLE: ICD-10-CM

## 2024-11-21 DIAGNOSIS — R60.0 EDEMA OF RIGHT FOOT: ICD-10-CM

## 2024-11-21 DIAGNOSIS — L57.0 ACTINIC KERATOSES: Primary | ICD-10-CM

## 2024-11-21 DIAGNOSIS — M79.671 RIGHT FOOT PAIN: ICD-10-CM

## 2024-11-21 DIAGNOSIS — M77.51 TENDINITIS OF RIGHT FOOT: ICD-10-CM

## 2024-11-21 DIAGNOSIS — M19.071 PRIMARY OSTEOARTHRITIS OF RIGHT FOOT: Primary | ICD-10-CM

## 2024-11-21 PROCEDURE — G8484 FLU IMMUNIZE NO ADMIN: HCPCS | Performed by: PODIATRIST

## 2024-11-21 PROCEDURE — 1036F TOBACCO NON-USER: CPT | Performed by: PODIATRIST

## 2024-11-21 PROCEDURE — 1090F PRES/ABSN URINE INCON ASSESS: CPT | Performed by: PODIATRIST

## 2024-11-21 PROCEDURE — 1123F ACP DISCUSS/DSCN MKR DOCD: CPT | Performed by: PODIATRIST

## 2024-11-21 PROCEDURE — G8420 CALC BMI NORM PARAMETERS: HCPCS | Performed by: PODIATRIST

## 2024-11-21 PROCEDURE — G8399 PT W/DXA RESULTS DOCUMENT: HCPCS | Performed by: PODIATRIST

## 2024-11-21 PROCEDURE — G8427 DOCREV CUR MEDS BY ELIG CLIN: HCPCS | Performed by: PODIATRIST

## 2024-11-21 PROCEDURE — 3017F COLORECTAL CA SCREEN DOC REV: CPT | Performed by: PODIATRIST

## 2024-11-21 PROCEDURE — 1159F MED LIST DOCD IN RCRD: CPT | Performed by: PODIATRIST

## 2024-11-21 PROCEDURE — 1125F AMNT PAIN NOTED PAIN PRSNT: CPT | Performed by: PODIATRIST

## 2024-11-21 PROCEDURE — 99213 OFFICE O/P EST LOW 20 MIN: CPT | Performed by: PODIATRIST

## 2024-11-21 NOTE — PROGRESS NOTES
Bilateral.  Open lesion absent, Bilateral.  Hyperkeratosis plantar 1st met head, left worse than right, there is a separate lesion left sub 1st met head, with a hard small core, this is painful also. Does not appear to be a verrucae.     Asessment: Patient is a 69 y.o. female with:   1. Primary osteoarthritis of right foot    2. Right foot pain    3. Tendinitis of right foot    4. Tendinitis of right ankle    5. Edema of right foot          Plan: Pt was evaluated and examined. Patient was given personalized discharge instructions.  . Pt will follow up in 3-4 weeks or sooner if any problems arise. Diagnosis was discussed with the pt and all of their questions were answered in detail. Proper foot hygiene and care was discussed with the pt.   Informed patient on proper diabetic foot care and importance of tight glycemic control. Patient to check feet daily and contact the office with any questions/problems/concerns.  Other comorbidity noted and will be managed by PCP.        All labs were reviewed and all imagining including the above findings were reviewed PRIOR to and during the patients arrival and with the patient today.  Previous patient encounter was reviewed.  Encounters from the patients other medical providers were reviewed and noted.      Time was spent educating the patient and their families/caregivers on proper care of the feet and ankles.  All the above diagnosis were addressed at todays visit and all questions were answered.  Time spent with patient and patient care issues above the usual time needed for assessment and treatment was: [] 15-20 min  [x] 21-30 min  [] 31-44 min  [] 45 min or more. This time also included charting after the patients visit       Good shoes, inserts,  OTC compression ankle sleeve, wear more often  Continue with moisturizing cream, pumice stone    Tubi   She declined an unna boot today due to the holiday next week and she is going out of town.   Continue heat         No

## 2024-11-21 NOTE — PROGRESS NOTES
[facility] Keratoses destruction procedure note:  11/21/2024  Yuli Clements  1955    /60   Pulse 88   Ht 1.626 m (5' 4\")   Wt 65.8 kg (145 lb)   SpO2 96%   BMI 24.89 kg/m²   ALL VITALS REVIEWED    Allergies   Allergen Reactions    Aspirin Anaphylaxis and Shortness Of Breath    Cefadroxil Shortness Of Breath    Aerefkll-Mgmxkpk-Suwdon [Fluocinolone] Shortness Of Breath    Ciprofloxacin Hcl Shortness Of Breath    Codeine Shortness Of Breath    Demerol Hcl [Meperidine] Shortness Of Breath    Doxycycline Nausea Only and Other (See Comments)    Glucosamine Shortness Of Breath, Diarrhea and Swelling     Other reaction(s): Respiratory Difficulty  \"lips swell up and get severe diarrhea\" - INCLUDES IODINE    Iodine Hives, Shortness Of Breath and Itching    Pcn [Penicillins] Anaphylaxis and Shortness Of Breath    Red Dye #40 (Allura Red) Shortness Of Breath    Shellfish-Derived Products Shortness Of Breath, Diarrhea and Swelling     \"lips swell up and get severe diarrhea\" - INCLUDES IODINE    Sulfa Antibiotics Hives, Shortness Of Breath, Itching and Rash     OK in small amounts, but larger amounts cause \"shortness of breath.\"    Sulfasalazine Hives, Itching, Rash and Shortness Of Breath    Yellow Dye Shortness Of Breath     Other reaction(s): Respiratory Difficulty    Gadolinium Rash     Moderate allergic-like reaction consisting of diffuse urticaria to ProHance gadolinium-containing contrast material    Midazolam Nausea Only     Versed caused bad nausea.    Milk (Cow)     Fluorouracil Diarrhea    Jerri Root     Iodides Hives    Liquid Calcium With D3 [Calcium Carb-Cholecalciferol]     Tetanus Toxoids      Had reaction to tetanus shot    Adhesive Tape Rash    Calcitriol Hives and Nausea And Vomiting       Chief Complaint   Patient presents with    Follow-up     Patient is here for a 6x week f/u - patient states states she can not tolerate fluorouracil - gave her diarrhea but is using triamcinolone  Using

## 2024-11-22 ENCOUNTER — HOSPITAL ENCOUNTER (OUTPATIENT)
Age: 69
Setting detail: SPECIMEN
Discharge: HOME | End: 2024-11-22
Payer: MEDICARE

## 2024-11-22 LAB
C DIFF GDH + TOXINS A+B STL QL IA.RAPID: ABNORMAL
SPECIMEN DESCRIPTION: ABNORMAL

## 2024-11-22 PROCEDURE — 87493 C DIFF AMPLIFIED PROBE: CPT

## 2024-11-22 PROCEDURE — 87324 CLOSTRIDIUM AG IA: CPT

## 2024-11-22 PROCEDURE — 83993 ASSAY FOR CALPROTECTIN FECAL: CPT

## 2024-11-22 PROCEDURE — 87506 IADNA-DNA/RNA PROBE TQ 6-11: CPT

## 2024-11-22 PROCEDURE — 87449 NOS EACH ORGANISM AG IA: CPT

## 2024-11-22 PROCEDURE — 87329 GIARDIA AG IA: CPT

## 2024-11-22 PROCEDURE — 87328 CRYPTOSPORIDIUM AG IA: CPT

## 2024-11-23 LAB
C DIFFICILE TOXINS, PCR: ABNORMAL
CAMPYLOBACTER DNA SPEC NAA+PROBE: NORMAL
ETEC ELTA+ESTB GENES STL QL NAA+PROBE: NORMAL
P SHIGELLOIDES DNA STL QL NAA+PROBE: NORMAL
SALMONELLA DNA SPEC QL NAA+PROBE: NORMAL
SHIGA TOXIN STX GENE SPEC NAA+PROBE: NORMAL
SHIGELLA DNA SPEC QL NAA+PROBE: NORMAL
SPECIMEN DESCRIPTION: ABNORMAL
SPECIMEN DESCRIPTION: NORMAL
V CHOL+PARA RFBL+TRKH+TNAA STL QL NAA+PR: NORMAL
Y ENTERO RECN STL QL NAA+PROBE: NORMAL

## 2024-11-24 LAB — CALPROTECTIN, FECAL: 226 UG/G

## 2024-11-25 LAB
C PARVUM AG STL QL IA: NEGATIVE
G LAMBLIA AG STL QL IA: NEGATIVE
SOURCE: NORMAL
SPECIMEN DESCRIPTION: NORMAL

## 2024-11-29 PROBLEM — H04.123 DRY EYE SYNDROME OF BILATERAL LACRIMAL GLANDS: Status: ACTIVE | Noted: 2022-04-29

## 2024-11-29 PROBLEM — E34.9 ENDOCRINE EXOPHTHALMOS: Status: ACTIVE | Noted: 2024-10-01

## 2024-11-29 PROBLEM — H50.34 INTERMITTENT ALTERNATING EXOTROPIA: Status: ACTIVE | Noted: 2024-10-01

## 2024-11-29 PROBLEM — R82.998: Status: ACTIVE | Noted: 2024-06-18

## 2024-11-29 PROBLEM — E07.9 DISORDER OF THYROID, UNSPECIFIED: Status: ACTIVE | Noted: 2024-10-01

## 2024-11-29 PROBLEM — H05.20 ENDOCRINE EXOPHTHALMOS: Status: ACTIVE | Noted: 2024-10-01

## 2024-11-29 PROBLEM — C73 MALIGNANT NEOPLASM OF THYROID GLAND (HCC): Status: ACTIVE | Noted: 2024-06-18

## 2024-11-29 PROBLEM — H47.019 NONARTERITIC ISCHEMIC OPTIC NEUROPATHY: Status: ACTIVE | Noted: 2024-10-01

## 2024-12-03 ENCOUNTER — OFFICE VISIT (OUTPATIENT)
Dept: OBGYN CLINIC | Age: 69
End: 2024-12-03
Payer: MEDICARE

## 2024-12-03 VITALS
SYSTOLIC BLOOD PRESSURE: 120 MMHG | WEIGHT: 144 LBS | HEIGHT: 64 IN | BODY MASS INDEX: 24.59 KG/M2 | DIASTOLIC BLOOD PRESSURE: 78 MMHG

## 2024-12-03 DIAGNOSIS — N89.8 VAGINAL IRRITATION: ICD-10-CM

## 2024-12-03 DIAGNOSIS — N89.8 VAGINAL ITCHING: Primary | ICD-10-CM

## 2024-12-03 DIAGNOSIS — N76.1 SUBACUTE VAGINITIS: ICD-10-CM

## 2024-12-03 PROCEDURE — G8484 FLU IMMUNIZE NO ADMIN: HCPCS | Performed by: OBSTETRICS & GYNECOLOGY

## 2024-12-03 PROCEDURE — 3017F COLORECTAL CA SCREEN DOC REV: CPT | Performed by: OBSTETRICS & GYNECOLOGY

## 2024-12-03 PROCEDURE — 99213 OFFICE O/P EST LOW 20 MIN: CPT | Performed by: OBSTETRICS & GYNECOLOGY

## 2024-12-03 PROCEDURE — 1123F ACP DISCUSS/DSCN MKR DOCD: CPT | Performed by: OBSTETRICS & GYNECOLOGY

## 2024-12-03 PROCEDURE — 1036F TOBACCO NON-USER: CPT | Performed by: OBSTETRICS & GYNECOLOGY

## 2024-12-03 PROCEDURE — 1090F PRES/ABSN URINE INCON ASSESS: CPT | Performed by: OBSTETRICS & GYNECOLOGY

## 2024-12-03 PROCEDURE — G8427 DOCREV CUR MEDS BY ELIG CLIN: HCPCS | Performed by: OBSTETRICS & GYNECOLOGY

## 2024-12-03 PROCEDURE — G8399 PT W/DXA RESULTS DOCUMENT: HCPCS | Performed by: OBSTETRICS & GYNECOLOGY

## 2024-12-03 PROCEDURE — G8420 CALC BMI NORM PARAMETERS: HCPCS | Performed by: OBSTETRICS & GYNECOLOGY

## 2024-12-03 PROCEDURE — 1159F MED LIST DOCD IN RCRD: CPT | Performed by: OBSTETRICS & GYNECOLOGY

## 2024-12-03 RX ORDER — VANCOMYCIN HYDROCHLORIDE 125 MG/1
1 CAPSULE ORAL
COMMUNITY
Start: 2024-11-26

## 2024-12-03 RX ORDER — CLOBETASOL PROPIONATE 0.5 MG/G
OINTMENT TOPICAL
Qty: 45 G | Refills: 1 | Status: SHIPPED | OUTPATIENT
Start: 2024-12-03

## 2024-12-10 DIAGNOSIS — R06.2 WHEEZING: ICD-10-CM

## 2024-12-10 DIAGNOSIS — E03.9 HYPOTHYROIDISM, UNSPECIFIED TYPE: ICD-10-CM

## 2024-12-10 NOTE — TELEPHONE ENCOUNTER
LAST VISIT:   11/21/2024     Future Appointments   Date Time Provider Department Center   12/12/2024 10:00 AM Brooke Treadwell DPM [email or web address]   1/7/2025 12:00 PM Ileana Hernandez [email or web address]   1/8/2025  1:00 PM Ashok Cruz MD [facility]   1/13/2025  2:30 PM Danielle Anders MD [facility]   5/28/2025  1:00 PM Danielle Anders MD [facility]   9/22/2025 10:00 AM Danielle Anders MD [facility]       Pharmacy verified? Yes    [id number] - [place], [place] - [address] [phone] - F [phone]  [address] [address]  Phone: [phone] Fax: [phone]

## 2024-12-11 RX ORDER — ALBUTEROL SULFATE 90 UG/1
2 INHALANT RESPIRATORY (INHALATION) 4 TIMES DAILY PRN
Qty: 18 G | Refills: 0 | Status: SHIPPED | OUTPATIENT
Start: 2024-12-11

## 2024-12-11 RX ORDER — LEVOTHYROXINE SODIUM 50 UG/1
TABLET ORAL
Qty: 108 TABLET | Refills: 3 | Status: SHIPPED | OUTPATIENT
Start: 2024-12-11

## 2024-12-12 ENCOUNTER — OFFICE VISIT (OUTPATIENT)
Dept: PODIATRY | Age: 69
End: 2024-12-12
Payer: MEDICARE

## 2024-12-12 VITALS — BODY MASS INDEX: 24.59 KG/M2 | WEIGHT: 144 LBS | HEIGHT: 64 IN

## 2024-12-12 DIAGNOSIS — L90.9 FAT PAD ATROPHY OF FOOT: ICD-10-CM

## 2024-12-12 DIAGNOSIS — M79.672 PAIN IN LEFT FOOT: ICD-10-CM

## 2024-12-12 DIAGNOSIS — M19.071 PRIMARY OSTEOARTHRITIS OF RIGHT FOOT: Primary | ICD-10-CM

## 2024-12-12 DIAGNOSIS — M77.51 TENDINITIS OF RIGHT FOOT: ICD-10-CM

## 2024-12-12 DIAGNOSIS — M79.671 RIGHT FOOT PAIN: ICD-10-CM

## 2024-12-12 DIAGNOSIS — R60.0 EDEMA OF RIGHT FOOT: ICD-10-CM

## 2024-12-12 DIAGNOSIS — D23.72 BENIGN NEOPLASM OF SKIN OF FOOT, LEFT: ICD-10-CM

## 2024-12-12 PROCEDURE — 1123F ACP DISCUSS/DSCN MKR DOCD: CPT | Performed by: PODIATRIST

## 2024-12-12 PROCEDURE — G8420 CALC BMI NORM PARAMETERS: HCPCS | Performed by: PODIATRIST

## 2024-12-12 PROCEDURE — 99213 OFFICE O/P EST LOW 20 MIN: CPT | Performed by: PODIATRIST

## 2024-12-12 PROCEDURE — G8427 DOCREV CUR MEDS BY ELIG CLIN: HCPCS | Performed by: PODIATRIST

## 2024-12-12 PROCEDURE — G8484 FLU IMMUNIZE NO ADMIN: HCPCS | Performed by: PODIATRIST

## 2024-12-12 PROCEDURE — 1036F TOBACCO NON-USER: CPT | Performed by: PODIATRIST

## 2024-12-12 PROCEDURE — 1125F AMNT PAIN NOTED PAIN PRSNT: CPT | Performed by: PODIATRIST

## 2024-12-12 PROCEDURE — G8399 PT W/DXA RESULTS DOCUMENT: HCPCS | Performed by: PODIATRIST

## 2024-12-12 PROCEDURE — 1159F MED LIST DOCD IN RCRD: CPT | Performed by: PODIATRIST

## 2024-12-12 PROCEDURE — 1090F PRES/ABSN URINE INCON ASSESS: CPT | Performed by: PODIATRIST

## 2024-12-12 PROCEDURE — 17110 DESTRUCTION B9 LES UP TO 14: CPT | Performed by: PODIATRIST

## 2024-12-12 PROCEDURE — 3017F COLORECTAL CA SCREEN DOC REV: CPT | Performed by: PODIATRIST

## 2024-12-12 NOTE — PROGRESS NOTES
bone, soft tissue, and skin     New lesions of right dorsum of hand (healed after Efudex) and lesion of right anterior thigh.    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 - thyroid was removed.     Vulvar dystrophy     Wears hearing aid     Wears partial dentures     LOWER       Prior to Admission medications    Medication Sig Start Date End Date Taking? Authorizing Provider   levothyroxine (SYNTHROID) 50 MCG tablet Take 1 tab 5 days per week and 2 tabs 2 day per week. Please make sure tablets are white- pt allergic to yellow and red dye 12/11/24  Yes Danielle Anders MD   albuterol sulfate HFA (VENTOLIN HFA) 108 (90 Base) MCG/ACT inhaler Inhale 2 puffs into the lungs 4 times daily as needed for Wheezing 12/11/24  Yes Danielle Anders MD   vancomycin (VANCOCIN) 125 MG capsule 1 capsule 11/26/24  Yes Provider, MD Jacklyn   clobetasol (TEMOVATE) 0.05 % ointment Apply topically 2 times daily. 12/3/24  Yes Ileana Hernandez,    clotrimazole-betamethasone (LOTRISONE) 1-0.05 % cream Apply to affected area bid externally x 4 days then daily for 3 days 10/17/24  Yes Ileana Hernandez,    fluticasone (FLONASE) 50 MCG/ACT nasal spray 2 sprays by Each Nostril route daily 10/10/24  Yes Danielle Anders MD   miconazole (MONISTAT 3 COMBINATION PACK) 200 & 2 MG-% (9GM) KIT kit Place vaginally nightly 9/17/24  Yes Natasha Gaytan APRN - CNP   Azelaic Acid (FINACEA) 15 % FOAM Apply twice daily 8/30/24  Yes Miriam Hansen PA-C   triamcinolone (ARISTOCORT) 0.5 %

## 2025-01-07 ENCOUNTER — OFFICE VISIT (OUTPATIENT)
Dept: OBGYN CLINIC | Age: 70
End: 2025-01-07
Payer: MEDICARE

## 2025-01-07 VITALS
BODY MASS INDEX: 24.24 KG/M2 | WEIGHT: 142 LBS | HEART RATE: 78 BPM | RESPIRATION RATE: 18 BRPM | HEIGHT: 64 IN | SYSTOLIC BLOOD PRESSURE: 118 MMHG | DIASTOLIC BLOOD PRESSURE: 76 MMHG

## 2025-01-07 DIAGNOSIS — N89.8 VAGINAL ITCHING: Primary | ICD-10-CM

## 2025-01-07 PROCEDURE — G8399 PT W/DXA RESULTS DOCUMENT: HCPCS | Performed by: OBSTETRICS & GYNECOLOGY

## 2025-01-07 PROCEDURE — 1159F MED LIST DOCD IN RCRD: CPT | Performed by: OBSTETRICS & GYNECOLOGY

## 2025-01-07 PROCEDURE — 99213 OFFICE O/P EST LOW 20 MIN: CPT | Performed by: OBSTETRICS & GYNECOLOGY

## 2025-01-07 PROCEDURE — 3017F COLORECTAL CA SCREEN DOC REV: CPT | Performed by: OBSTETRICS & GYNECOLOGY

## 2025-01-07 PROCEDURE — M1308 PR FLU IMMUNIZE NO ADMIN: HCPCS | Performed by: OBSTETRICS & GYNECOLOGY

## 2025-01-07 PROCEDURE — 1090F PRES/ABSN URINE INCON ASSESS: CPT | Performed by: OBSTETRICS & GYNECOLOGY

## 2025-01-07 PROCEDURE — 1123F ACP DISCUSS/DSCN MKR DOCD: CPT | Performed by: OBSTETRICS & GYNECOLOGY

## 2025-01-07 PROCEDURE — G8427 DOCREV CUR MEDS BY ELIG CLIN: HCPCS | Performed by: OBSTETRICS & GYNECOLOGY

## 2025-01-07 PROCEDURE — 1036F TOBACCO NON-USER: CPT | Performed by: OBSTETRICS & GYNECOLOGY

## 2025-01-07 PROCEDURE — G8420 CALC BMI NORM PARAMETERS: HCPCS | Performed by: OBSTETRICS & GYNECOLOGY

## 2025-01-07 RX ORDER — CLOBETASOL PROPIONATE 0.5 MG/G
OINTMENT TOPICAL
Qty: 45 G | Refills: 1 | Status: SHIPPED | OUTPATIENT
Start: 2025-01-07

## 2025-01-07 NOTE — PROGRESS NOTES
of malignant neoplasm of thyroid 04/17/2012           PLAN:  Return in about 6 months (around 7/7/2025) for Follow up current problem.  Rx temovate to use as needed  Repeat Annual every 1 year  Cervical Cytology Evaluation begins at 21 years old.  If Negative Cytology, Follow-up screening per current guidelines.   Return to the office in 6 months or prn sooner  Counseled on preventative health maintenance follow-up.  No orders of the defined types were placed in this encounter.          The patient, Yuli Clements is a 69 y.o. female, was seen with a total time spent of 20 minutes for the visit on this date of service by the E/M provider. The time component had both face to face and non face to face time spent in determining the total time component.  Counseling and education regarding her diagnosis listed below and her options regarding those diagnoses were also included in determining her time component.      Diagnosis Orders   1. Vaginal itching             The patient had her preventative health maintenance recommendations and follow-up reviewed with her at the completion of her visit.

## 2025-01-09 ENCOUNTER — OFFICE VISIT (OUTPATIENT)
Dept: PODIATRY | Age: 70
End: 2025-01-09
Payer: MEDICARE

## 2025-01-09 VITALS — WEIGHT: 142 LBS | BODY MASS INDEX: 24.24 KG/M2 | HEIGHT: 64 IN

## 2025-01-09 DIAGNOSIS — M79.672 PAIN IN LEFT FOOT: ICD-10-CM

## 2025-01-09 DIAGNOSIS — B35.1 ONYCHOMYCOSIS: ICD-10-CM

## 2025-01-09 DIAGNOSIS — M19.071 PRIMARY OSTEOARTHRITIS OF RIGHT FOOT: Primary | ICD-10-CM

## 2025-01-09 DIAGNOSIS — M79.671 RIGHT FOOT PAIN: ICD-10-CM

## 2025-01-09 DIAGNOSIS — M77.51 TENDINITIS OF RIGHT FOOT: ICD-10-CM

## 2025-01-09 DIAGNOSIS — L90.9 FAT PAD ATROPHY OF FOOT: ICD-10-CM

## 2025-01-09 DIAGNOSIS — M79.675 PAIN IN TOES OF BOTH FEET: ICD-10-CM

## 2025-01-09 DIAGNOSIS — R60.0 EDEMA OF RIGHT FOOT: ICD-10-CM

## 2025-01-09 DIAGNOSIS — D23.72 BENIGN NEOPLASM OF SKIN OF FOOT, LEFT: ICD-10-CM

## 2025-01-09 DIAGNOSIS — M79.674 PAIN IN TOES OF BOTH FEET: ICD-10-CM

## 2025-01-09 PROCEDURE — 1090F PRES/ABSN URINE INCON ASSESS: CPT | Performed by: PODIATRIST

## 2025-01-09 PROCEDURE — 1159F MED LIST DOCD IN RCRD: CPT | Performed by: PODIATRIST

## 2025-01-09 PROCEDURE — 1036F TOBACCO NON-USER: CPT | Performed by: PODIATRIST

## 2025-01-09 PROCEDURE — 1126F AMNT PAIN NOTED NONE PRSNT: CPT | Performed by: PODIATRIST

## 2025-01-09 PROCEDURE — G8427 DOCREV CUR MEDS BY ELIG CLIN: HCPCS | Performed by: PODIATRIST

## 2025-01-09 PROCEDURE — G8420 CALC BMI NORM PARAMETERS: HCPCS | Performed by: PODIATRIST

## 2025-01-09 PROCEDURE — G8399 PT W/DXA RESULTS DOCUMENT: HCPCS | Performed by: PODIATRIST

## 2025-01-09 PROCEDURE — 99212 OFFICE O/P EST SF 10 MIN: CPT | Performed by: PODIATRIST

## 2025-01-09 PROCEDURE — 1123F ACP DISCUSS/DSCN MKR DOCD: CPT | Performed by: PODIATRIST

## 2025-01-09 PROCEDURE — 3017F COLORECTAL CA SCREEN DOC REV: CPT | Performed by: PODIATRIST

## 2025-01-09 PROCEDURE — 11721 DEBRIDE NAIL 6 OR MORE: CPT | Performed by: PODIATRIST

## 2025-01-09 NOTE — PROGRESS NOTES
[facility]  Return Patient     Chief Complaint:   Chief Complaint   Patient presents with    Nail Problem     Nail trim/last saw Miriam Hansen PA-C 11/1/24    Callouses     Left foot        HPI: Yuli Clements is a 69 y.o. female who presents to the office today for follow up of right foot pain. Pain has resilved    wearing good shoe and orthotics.  Has tried topicals also    The painful spot on her left foot is bothering her again. Was improved last visit.     She would also like her nails trimmed.    Currently denies F/C/N/V.     Allergies   Allergen Reactions    Aspirin Anaphylaxis and Shortness Of Breath    Cefadroxil Shortness Of Breath    Gbofjzgx-Nvsoqck-Kqsjyp [Fluocinolone] Shortness Of Breath    Ciprofloxacin Hcl Shortness Of Breath    Codeine Shortness Of Breath    Demerol Hcl [Meperidine] Shortness Of Breath    Doxycycline Nausea Only and Other (See Comments)    Glucosamine Shortness Of Breath, Diarrhea and Swelling     Other reaction(s): Respiratory Difficulty  \"lips swell up and get severe diarrhea\" - INCLUDES IODINE    Iodine Hives, Shortness Of Breath and Itching    Pcn [Penicillins] Anaphylaxis and Shortness Of Breath    Red Dye #40 (Allura Red) Shortness Of Breath    Shellfish-Derived Products Shortness Of Breath, Diarrhea and Swelling     \"lips swell up and get severe diarrhea\" - INCLUDES IODINE    Sulfa Antibiotics Hives, Shortness Of Breath, Itching and Rash     OK in small amounts, but larger amounts cause \"shortness of breath.\"    Sulfasalazine Hives, Itching, Rash and Shortness Of Breath    Yellow Dye Shortness Of Breath     Other reaction(s): Respiratory Difficulty    Gadolinium Rash     Moderate allergic-like reaction consisting of diffuse urticaria to ProHance gadolinium-containing contrast material    Midazolam Nausea Only     Versed caused bad nausea.    Milk (Cow)     Fluorouracil Diarrhea    Jerri Root     Iodides Hives    Liquid Calcium With D3 [Calcium

## 2025-01-13 ENCOUNTER — OFFICE VISIT (OUTPATIENT)
Dept: PRIMARY CARE CLINIC | Age: 70
End: 2025-01-13
Payer: MEDICARE

## 2025-01-13 VITALS
OXYGEN SATURATION: 98 % | SYSTOLIC BLOOD PRESSURE: 126 MMHG | HEART RATE: 69 BPM | HEIGHT: 64 IN | BODY MASS INDEX: 24.52 KG/M2 | WEIGHT: 143.6 LBS | DIASTOLIC BLOOD PRESSURE: 70 MMHG

## 2025-01-13 DIAGNOSIS — C73 MALIGNANT NEOPLASM OF THYROID GLAND (HCC): ICD-10-CM

## 2025-01-13 DIAGNOSIS — M54.32 SCIATICA OF LEFT SIDE: Primary | ICD-10-CM

## 2025-01-13 DIAGNOSIS — D68.00 VON WILLEBRAND'S DISEASE (HCC): ICD-10-CM

## 2025-01-13 PROCEDURE — G8420 CALC BMI NORM PARAMETERS: HCPCS | Performed by: FAMILY MEDICINE

## 2025-01-13 PROCEDURE — 1123F ACP DISCUSS/DSCN MKR DOCD: CPT | Performed by: FAMILY MEDICINE

## 2025-01-13 PROCEDURE — 99213 OFFICE O/P EST LOW 20 MIN: CPT | Performed by: FAMILY MEDICINE

## 2025-01-13 PROCEDURE — G8399 PT W/DXA RESULTS DOCUMENT: HCPCS | Performed by: FAMILY MEDICINE

## 2025-01-13 PROCEDURE — 3017F COLORECTAL CA SCREEN DOC REV: CPT | Performed by: FAMILY MEDICINE

## 2025-01-13 PROCEDURE — 1159F MED LIST DOCD IN RCRD: CPT | Performed by: FAMILY MEDICINE

## 2025-01-13 PROCEDURE — 1036F TOBACCO NON-USER: CPT | Performed by: FAMILY MEDICINE

## 2025-01-13 PROCEDURE — 1090F PRES/ABSN URINE INCON ASSESS: CPT | Performed by: FAMILY MEDICINE

## 2025-01-13 PROCEDURE — G8427 DOCREV CUR MEDS BY ELIG CLIN: HCPCS | Performed by: FAMILY MEDICINE

## 2025-01-13 RX ORDER — CALCIUM CARBONATE 500 MG/1
2 TABLET, CHEWABLE ORAL 2 TIMES DAILY
COMMUNITY

## 2025-01-13 SDOH — ECONOMIC STABILITY: FOOD INSECURITY: WITHIN THE PAST 12 MONTHS, THE FOOD YOU BOUGHT JUST DIDN'T LAST AND YOU DIDN'T HAVE MONEY TO GET MORE.: NEVER TRUE

## 2025-01-13 SDOH — ECONOMIC STABILITY: FOOD INSECURITY: WITHIN THE PAST 12 MONTHS, YOU WORRIED THAT YOUR FOOD WOULD RUN OUT BEFORE YOU GOT MONEY TO BUY MORE.: NEVER TRUE

## 2025-01-13 ASSESSMENT — PATIENT HEALTH QUESTIONNAIRE - PHQ9
SUM OF ALL RESPONSES TO PHQ9 QUESTIONS 1 & 2: 0
SUM OF ALL RESPONSES TO PHQ QUESTIONS 1-9: 0
SUM OF ALL RESPONSES TO PHQ QUESTIONS 1-9: 0
2. FEELING DOWN, DEPRESSED OR HOPELESS: NOT AT ALL
1. LITTLE INTEREST OR PLEASURE IN DOING THINGS: NOT AT ALL
SUM OF ALL RESPONSES TO PHQ QUESTIONS 1-9: 0
SUM OF ALL RESPONSES TO PHQ QUESTIONS 1-9: 0

## 2025-01-13 NOTE — PROGRESS NOTES
[facility]  [facility]  [address]  [place] [address]  Dept: [phone]    Yuli Clements is a 69 y.o. female Established patient, who presents today for her medical conditions/complaints as noted below.      Chief Complaint   Patient presents with    Hip Pain     Patient would like to discuss her LT hip pain, patient states she was told she had arthritis previously - states the pain has been worse when going up and down stairs and worse with colder weather.       HPI:   Pt was sick over the holidays but is better now.  Bowels are better and sees Dr. Gagnon in Feb.      Having trouble getting up steps.  Pain is in her left buttock and a little down side of her leg.  Lifting leg and putting the foot down were both painful.  Taking heat, ice and tylenol.  No N/T in leg.     Reviewed prior notes: None   Reviewed previous:  Labs    No components found for: \"LDLCHOLESTEROL\", \"LDLCALC\"    (goal LDL is <100)   AST (U/L)   Date Value   06/20/2024 18     ALT (U/L)   Date Value   06/20/2024 21     BUN (mg/dL)   Date Value   04/04/2024 21     TSH (uIU/mL)   Date Value   10/10/2024 22.60 (H)     BP Readings from Last 3 Encounters:   01/13/25 126/70   01/07/25 118/76   12/03/24 120/78          (goal 120/80)    Past Medical History:   Diagnosis Date    Abdominal pain     RLQ    Abnormal computed tomography of abdomen and pelvis 04/16/2021    Distal appendix is mildly thickened without significant periappendiceal inflammatory change    Acquired von Willebrand's disease (HCC) 10/30/2018    Allergy to food dye     yellow and red dyes    Anemia     Asthma     Basal cell carcinoma of upper lip 06/22/2017    Benign paroxysmal positional vertigo 12/09/2013    Cancer (HCC)     Thyroid and skin    Cardiac murmur     STATES SINCE CHILD BILLY- STATES SHE WAS BORN WITH A \"90 DEGREE ANGLE VALVE\"    Chest pain 09/04/2020    Cobalamin deficiency 12/26/2012    Diverticulitis     Diverticulosis

## 2025-01-29 ENCOUNTER — TELEPHONE (OUTPATIENT)
Dept: PRIMARY CARE CLINIC | Age: 70
End: 2025-01-29

## 2025-01-29 DIAGNOSIS — Z12.31 ENCOUNTER FOR SCREENING MAMMOGRAM FOR BREAST CANCER: Primary | ICD-10-CM

## 2025-01-29 NOTE — TELEPHONE ENCOUNTER
Patient is here with her  for his appointment and she stated she received a notice that it is time for her to get a mammogram. Patient would like the order placed so that she can schedule the mammogram.     Please advise.

## 2025-02-06 ENCOUNTER — OFFICE VISIT (OUTPATIENT)
Dept: PODIATRY | Age: 70
End: 2025-02-06
Payer: MEDICARE

## 2025-02-06 VITALS — BODY MASS INDEX: 24.41 KG/M2 | HEIGHT: 64 IN | WEIGHT: 143 LBS

## 2025-02-06 DIAGNOSIS — L90.9 FAT PAD ATROPHY OF FOOT: ICD-10-CM

## 2025-02-06 DIAGNOSIS — B35.1 ONYCHOMYCOSIS: ICD-10-CM

## 2025-02-06 DIAGNOSIS — M79.675 PAIN IN TOES OF BOTH FEET: ICD-10-CM

## 2025-02-06 DIAGNOSIS — M79.674 PAIN IN TOES OF BOTH FEET: ICD-10-CM

## 2025-02-06 DIAGNOSIS — M79.672 PAIN IN LEFT FOOT: ICD-10-CM

## 2025-02-06 DIAGNOSIS — D23.72 BENIGN NEOPLASM OF SKIN OF FOOT, LEFT: Primary | ICD-10-CM

## 2025-02-06 PROCEDURE — 17110 DESTRUCTION B9 LES UP TO 14: CPT | Performed by: PODIATRIST

## 2025-02-06 PROCEDURE — 11721 DEBRIDE NAIL 6 OR MORE: CPT | Performed by: PODIATRIST

## 2025-02-06 PROCEDURE — 99999 PR OFFICE/OUTPT VISIT,PROCEDURE ONLY: CPT | Performed by: PODIATRIST

## 2025-02-06 NOTE — PROGRESS NOTES
[facility]  Return Patient     Chief Complaint:   Chief Complaint   Patient presents with    Nail Problem     B/l nail trim,last seen Danielle Anders MD 1/13/25    Callouses     Left foot        HPI: Yuli Clements is a 69 y.o. female who presents to the office today for follow up of several things. Her right foot pain is about the same.  wearing good shoe and orthotics.  Has tried topicals also    The painful spot on her left foot is bothering her again. Was improved last visit.     She would also like her nails trimmed.       Currently denies F/C/N/V.     Allergies   Allergen Reactions    Aspirin Anaphylaxis and Shortness Of Breath    Cefadroxil Shortness Of Breath    Geqdpymi-Jcroqdr-Lvllel [Fluocinolone] Shortness Of Breath    Ciprofloxacin Hcl Shortness Of Breath    Codeine Shortness Of Breath    Demerol Hcl [Meperidine] Shortness Of Breath    Doxycycline Nausea Only and Other (See Comments)    Glucosamine Shortness Of Breath, Diarrhea and Swelling     Other reaction(s): Respiratory Difficulty  \"lips swell up and get severe diarrhea\" - INCLUDES IODINE    Iodine Hives, Shortness Of Breath and Itching    Pcn [Penicillins] Anaphylaxis and Shortness Of Breath    Red Dye #40 (Allura Red) Shortness Of Breath    Shellfish-Derived Products Shortness Of Breath, Diarrhea and Swelling     \"lips swell up and get severe diarrhea\" - INCLUDES IODINE    Sulfa Antibiotics Hives, Shortness Of Breath, Itching and Rash     OK in small amounts, but larger amounts cause \"shortness of breath.\"    Sulfasalazine Hives, Itching, Rash and Shortness Of Breath    Yellow Dye Shortness Of Breath     Other reaction(s): Respiratory Difficulty    Gadolinium Rash     Moderate allergic-like reaction consisting of diffuse urticaria to ProHance gadolinium-containing contrast material    Midazolam Nausea Only     Versed caused bad nausea.    Milk (Cow)     Fluorouracil Diarrhea    Jerri Root     Iodides Hives    Liquid Calcium With

## 2025-02-12 ENCOUNTER — OFFICE VISIT (OUTPATIENT)
Dept: PRIMARY CARE CLINIC | Age: 70
End: 2025-02-12

## 2025-02-12 VITALS
SYSTOLIC BLOOD PRESSURE: 120 MMHG | DIASTOLIC BLOOD PRESSURE: 78 MMHG | HEIGHT: 64 IN | OXYGEN SATURATION: 98 % | WEIGHT: 143.2 LBS | HEART RATE: 76 BPM | BODY MASS INDEX: 24.45 KG/M2

## 2025-02-12 DIAGNOSIS — R14.0 BLOATING: ICD-10-CM

## 2025-02-12 DIAGNOSIS — E83.51 HYPOCALCEMIA: Chronic | ICD-10-CM

## 2025-02-12 DIAGNOSIS — R06.2 WHEEZING: ICD-10-CM

## 2025-02-12 DIAGNOSIS — E53.8 B12 DEFICIENCY: Chronic | ICD-10-CM

## 2025-02-12 DIAGNOSIS — M54.31 RIGHT SCIATIC NERVE PAIN: ICD-10-CM

## 2025-02-12 DIAGNOSIS — M62.838 MUSCLE SPASM: ICD-10-CM

## 2025-02-12 DIAGNOSIS — E53.8 B12 DEFICIENCY: Primary | Chronic | ICD-10-CM

## 2025-02-12 LAB
ALBUMIN/GLOBULIN RATIO: 1.5 (ref 1–2.5)
ALBUMIN: 4.4 G/DL (ref 3.5–5.2)
ALP BLD-CCNC: 81 U/L (ref 35–104)
ALT SERPL-CCNC: 15 U/L (ref 10–35)
ANION GAP SERPL CALCULATED.3IONS-SCNC: 12 MMOL/L (ref 9–16)
AST SERPL-CCNC: 21 U/L (ref 10–35)
BASOPHILS ABSOLUTE: 0.07 K/UL (ref 0–0.2)
BASOPHILS RELATIVE PERCENT: 1 % (ref 0–2)
BILIRUB SERPL-MCNC: 0.6 MG/DL (ref 0–1.2)
BUN BLDV-MCNC: 16 MG/DL (ref 8–23)
CALCIUM IONIZED: 0.98 MMOL/L (ref 1.13–1.33)
CALCIUM SERPL-MCNC: 8.4 MG/DL (ref 8.6–10.4)
CHLORIDE BLD-SCNC: 97 MMOL/L (ref 98–107)
CO2: 30 MMOL/L (ref 20–31)
CREAT SERPL-MCNC: 1 MG/DL (ref 0.6–0.9)
EOSINOPHILS ABSOLUTE: 0.23 K/UL (ref 0–0.44)
EOSINOPHILS RELATIVE PERCENT: 3 % (ref 1–4)
GFR, ESTIMATED: 60 ML/MIN/1.73M2
GLUCOSE BLD-MCNC: 89 MG/DL (ref 74–99)
HCT VFR BLD CALC: 35.6 % (ref 36.3–47.1)
HEMOGLOBIN: 10.9 G/DL (ref 11.9–15.1)
IMMATURE GRANULOCYTES %: 0 %
IMMATURE GRANULOCYTES ABSOLUTE: <0.03 K/UL (ref 0–0.3)
LYMPHOCYTES ABSOLUTE: 1.11 K/UL (ref 1.1–3.7)
LYMPHOCYTES RELATIVE PERCENT: 16 % (ref 24–43)
MAGNESIUM: 1.9 MG/DL (ref 1.6–2.4)
MCH RBC QN AUTO: 29.5 PG (ref 25.2–33.5)
MCHC RBC AUTO-ENTMCNC: 30.6 G/DL (ref 28.4–34.8)
MCV RBC AUTO: 96.2 FL (ref 82.6–102.9)
MONOCYTES ABSOLUTE: 0.43 K/UL (ref 0.1–1.2)
MONOCYTES RELATIVE PERCENT: 6 % (ref 3–12)
NEUTROPHILS ABSOLUTE: 5.17 K/UL (ref 1.5–8.1)
NEUTROPHILS RELATIVE PERCENT: 74 % (ref 36–65)
NRBC AUTOMATED: 0 PER 100 WBC
PDW BLD-RTO: 15.2 % (ref 11.8–14.4)
PLATELET # BLD: ABNORMAL K/UL (ref 138–453)
PLATELET, FLUORESCENCE: 144 K/UL (ref 138–453)
PLATELET, IMMATURE FRACTION: 18.8 % (ref 1.1–10.3)
POTASSIUM SERPL-SCNC: 4.1 MMOL/L (ref 3.7–5.3)
RBC # BLD: 3.7 M/UL (ref 3.95–5.11)
RBC # BLD: ABNORMAL 10*6/UL
SODIUM BLD-SCNC: 139 MMOL/L (ref 136–145)
TOTAL PROTEIN: 7.3 G/DL (ref 6.6–8.7)
TSH SERPL DL<=0.05 MIU/L-ACNC: 2.05 UIU/ML (ref 0.27–4.2)
VITAMIN B-12: 318 PG/ML (ref 232–1245)
WBC # BLD: 7 K/UL (ref 3.5–11.3)

## 2025-02-12 RX ORDER — ALBUTEROL SULFATE 90 UG/1
2 INHALANT RESPIRATORY (INHALATION) 4 TIMES DAILY PRN
Qty: 18 G | Refills: 3 | Status: SHIPPED | OUTPATIENT
Start: 2025-02-12

## 2025-02-12 NOTE — PROGRESS NOTES
[facility]  [facility]  [address]  [place] [address]  Dept: [phone]    Yuli Clements is a 69 y.o. female Established patient, who presents today for her medical conditions/complaints as noted below.      Chief Complaint   Patient presents with    Hip Pain     BL hip pain that has progressively worsened & is wanting a mammogram ordered.       HPI:   Skin lesion that we need to look at.  On her forearm    Hip pain is continuing - worse when moving around.  Uses heat which helps some      Having pain in her left temple- wakes her up out of a sleep.  Hits her and then goes away pretty quickly.  No other symptoms.  Always in the same spot. Only first thing in the morning.     Reviewed prior notes: None   Reviewed previous:  Labs and Imaging    No components found for: \"LDLCHOLESTEROL\", \"LDLCALC\"    (goal LDL is <100)   AST (U/L)   Date Value   06/20/2024 18     ALT (U/L)   Date Value   06/20/2024 21     BUN (mg/dL)   Date Value   04/04/2024 21     TSH (uIU/mL)   Date Value   10/10/2024 22.60 (H)     BP Readings from Last 3 Encounters:   02/12/25 120/78   01/13/25 126/70   01/07/25 118/76          (goal 120/80)    Past Medical History:   Diagnosis Date    Abdominal pain     RLQ    Abnormal computed tomography of abdomen and pelvis 04/16/2021    Distal appendix is mildly thickened without significant periappendiceal inflammatory change    Acquired von Willebrand's disease (HCC) 10/30/2018    Allergy to food dye     yellow and red dyes    Anemia     Asthma     Basal cell carcinoma of upper lip 06/22/2017    Benign paroxysmal positional vertigo 12/09/2013    Cancer (HCC)     Thyroid and skin    Cardiac murmur     STATES SINCE CHILD BILLY- STATES SHE WAS BORN WITH A \"90 DEGREE ANGLE VALVE\"    Chest pain 09/04/2020    Cobalamin deficiency 12/26/2012    Diverticulitis     Diverticulosis     Gastroesophageal reflux disease 12/26/2012    Gastroparesis     Hiatal hernia

## 2025-02-12 NOTE — PATIENT INSTRUCTIONS
Probiotic daily to help with bloating.   Exercises to help with hip pain  Massage therapy to help with pain in head and neck

## 2025-02-13 NOTE — RESULT ENCOUNTER NOTE
Adv pt her labs are stable, other than she is slightly more anemic than normal.  That could be causing some of the fatigue.  Try to get foods with extra iron, like spinach and red meat and beans

## 2025-03-06 ENCOUNTER — OFFICE VISIT (OUTPATIENT)
Dept: PODIATRY | Age: 70
End: 2025-03-06

## 2025-03-06 VITALS — HEIGHT: 64 IN | BODY MASS INDEX: 24.41 KG/M2 | WEIGHT: 143 LBS

## 2025-03-06 DIAGNOSIS — M79.672 PAIN IN LEFT FOOT: ICD-10-CM

## 2025-03-06 DIAGNOSIS — D23.72 BENIGN NEOPLASM OF SKIN OF FOOT, LEFT: Primary | ICD-10-CM

## 2025-03-06 DIAGNOSIS — L90.9 FAT PAD ATROPHY OF FOOT: ICD-10-CM

## 2025-03-06 DIAGNOSIS — B35.1 ONYCHOMYCOSIS: ICD-10-CM

## 2025-03-06 DIAGNOSIS — M79.674 PAIN IN TOES OF BOTH FEET: ICD-10-CM

## 2025-03-06 DIAGNOSIS — M79.675 PAIN IN TOES OF BOTH FEET: ICD-10-CM

## 2025-03-06 NOTE — PROGRESS NOTES
Onychomycosis    5. Pain in toes of both feet          Plan: Pt was evaluated and examined. Patient was given personalized discharge instructions.  Nails 1-10 were debrided sharply in length and thickness with a nipper and , without incident. Pt will follow up in 6 weeks or sooner if any problems arise. Diagnosis was discussed with the pt and all of their questions were answered in detail. Proper foot hygiene and care was discussed with the pt.   Informed patient on proper diabetic foot care and importance of tight glycemic control. Patient to check feet daily and contact the office with any questions/problems/concerns.  Other comorbidity noted and will be managed by PCP.     All labs were reviewed and all imagining including the above findings were reviewed PRIOR to and during the patients arrival and with the patient today.  Previous patient encounter was reviewed.  Encounters from the patients other medical providers were reviewed and noted.      Time was spent educating the patient and their families/caregivers on proper care of the feet and ankles.  All the above diagnosis were addressed at todays visit and all questions were answered.  Time spent with patient and patient care issues above the usual time needed for assessment and treatment was: [x] 15-20 min  [] 21-30 min  [] 31-44 min  [] 45 min or more. This time also included charting after the patients visit       Good shoes, inserts,  OTC compression ankle sleeve, wear more often  Continue with moisturizing cream, pumice stone          No orders of the defined types were placed in this encounter.      No orders of the defined types were placed in this encounter.       RTC in 1 month(s).    3/6/2025

## 2025-03-20 ENCOUNTER — HOSPITAL ENCOUNTER (OUTPATIENT)
Dept: WOMENS IMAGING | Age: 70
Discharge: HOME | End: 2025-03-22
Payer: MEDICARE

## 2025-03-20 VITALS — HEIGHT: 64 IN | WEIGHT: 139 LBS | BODY MASS INDEX: 23.73 KG/M2

## 2025-03-20 DIAGNOSIS — Z12.31 ENCOUNTER FOR SCREENING MAMMOGRAM FOR BREAST CANCER: ICD-10-CM

## 2025-03-20 PROCEDURE — 77063 BREAST TOMOSYNTHESIS BI: CPT

## 2025-03-21 ENCOUNTER — RESULTS FOLLOW-UP (OUTPATIENT)
Dept: WOMENS IMAGING | Age: 70
End: 2025-03-21

## 2025-04-02 ENCOUNTER — OFFICE VISIT (OUTPATIENT)
Dept: PRIMARY CARE CLINIC | Age: 70
End: 2025-04-02

## 2025-04-02 VITALS
HEART RATE: 89 BPM | SYSTOLIC BLOOD PRESSURE: 122 MMHG | OXYGEN SATURATION: 96 % | BODY MASS INDEX: 25.94 KG/M2 | WEIGHT: 146.4 LBS | DIASTOLIC BLOOD PRESSURE: 80 MMHG | HEIGHT: 63 IN

## 2025-04-02 DIAGNOSIS — Z11.59 SCREENING FOR MEASLES: ICD-10-CM

## 2025-04-02 DIAGNOSIS — E03.9 HYPOTHYROIDISM, UNSPECIFIED TYPE: ICD-10-CM

## 2025-04-02 DIAGNOSIS — Z11.59 SCREENING FOR MEASLES: Primary | ICD-10-CM

## 2025-04-02 DIAGNOSIS — R06.2 WHEEZING: ICD-10-CM

## 2025-04-02 DIAGNOSIS — R09.81 NASAL CONGESTION: ICD-10-CM

## 2025-04-02 LAB — RUBV IGG SER QL: 0.2 IU/ML

## 2025-04-02 RX ORDER — FLUTICASONE PROPIONATE 50 MCG
2 SPRAY, SUSPENSION (ML) NASAL DAILY
Qty: 16 G | Refills: 3 | Status: SHIPPED | OUTPATIENT
Start: 2025-04-02

## 2025-04-02 RX ORDER — ALBUTEROL SULFATE 90 UG/1
2 INHALANT RESPIRATORY (INHALATION) 4 TIMES DAILY PRN
Qty: 18 G | Refills: 3 | Status: SHIPPED | OUTPATIENT
Start: 2025-04-02

## 2025-04-02 RX ORDER — LEVOTHYROXINE SODIUM 50 UG/1
TABLET ORAL
Qty: 120 TABLET | Refills: 3 | Status: SHIPPED | OUTPATIENT
Start: 2025-04-02

## 2025-04-02 NOTE — PROGRESS NOTES
[facility]  [facility]  [address]  [place] [address]  Dept: [phone]    Yuli Clements is a 69 y.o. female Established patient, who presents today for her medical conditions/complaints as noted below.      Chief Complaint   Patient presents with    Vaginitis     Patient here for follow up; Patient c/o yeast infection and polyp near vaginal area.         HPI:     History of Present Illness  The patient presents for evaluation of a yeast infection, knee pain, anemia, and hot flashes.    Concerns are expressed regarding a potential measles outbreak during a planned trip to [place] from 05/13/2025 to 05/20/2025. A history of [place] measles and vaccination in youth is noted, and confirmation of current immunity status is sought.    A recurrent infection is reported, characterized by a white polyp on the right side of the vaginal area. The polyp is described as a hard lump causing discomfort but not significant pain. Despite using a cream prescribed by Dr. Elaine, no improvement is noted. A follow-up consultation with Dr. Elaine is considered after returning from [place].    An unusual clicking sound in the knee during ambulation is noted, accompanied by a protruding bone. Curiosity about potential age-related changes in bone structure is expressed. Additionally, an incident of foot swelling while in [place], which resolved spontaneously, is recalled. Concerns about the possibility of arthritis and its impact on upcoming travel are mentioned.    Supplementation with vitamin B12 every other day is reported, and refills for thyroid medication and Flonase are requested. The current thyroid medication regimen includes two pills on Sunday, Tuesday, and Thursday, with one pill on the remaining days.    Nightly hot sweats and palpitations have disrupted sleep. The cardiologist has reassured that the heart condition is stable and does not require immediate follow-up.      No

## 2025-04-03 ENCOUNTER — OFFICE VISIT (OUTPATIENT)
Dept: PODIATRY | Age: 70
End: 2025-04-03
Payer: MEDICARE

## 2025-04-03 VITALS — WEIGHT: 146 LBS | HEIGHT: 62 IN | BODY MASS INDEX: 26.87 KG/M2

## 2025-04-03 DIAGNOSIS — M79.672 PAIN IN LEFT FOOT: ICD-10-CM

## 2025-04-03 DIAGNOSIS — M79.675 PAIN IN TOES OF BOTH FEET: ICD-10-CM

## 2025-04-03 DIAGNOSIS — B35.1 ONYCHOMYCOSIS: ICD-10-CM

## 2025-04-03 DIAGNOSIS — M79.674 PAIN IN TOES OF BOTH FEET: ICD-10-CM

## 2025-04-03 DIAGNOSIS — D23.72 BENIGN NEOPLASM OF SKIN OF FOOT, LEFT: Primary | ICD-10-CM

## 2025-04-03 DIAGNOSIS — L90.9 FAT PAD ATROPHY OF FOOT: ICD-10-CM

## 2025-04-03 PROCEDURE — 3017F COLORECTAL CA SCREEN DOC REV: CPT | Performed by: PODIATRIST

## 2025-04-03 PROCEDURE — 1126F AMNT PAIN NOTED NONE PRSNT: CPT | Performed by: PODIATRIST

## 2025-04-03 PROCEDURE — G8419 CALC BMI OUT NRM PARAM NOF/U: HCPCS | Performed by: PODIATRIST

## 2025-04-03 PROCEDURE — 1090F PRES/ABSN URINE INCON ASSESS: CPT | Performed by: PODIATRIST

## 2025-04-03 PROCEDURE — 1159F MED LIST DOCD IN RCRD: CPT | Performed by: PODIATRIST

## 2025-04-03 PROCEDURE — 1036F TOBACCO NON-USER: CPT | Performed by: PODIATRIST

## 2025-04-03 PROCEDURE — 99213 OFFICE O/P EST LOW 20 MIN: CPT | Performed by: PODIATRIST

## 2025-04-03 PROCEDURE — G8427 DOCREV CUR MEDS BY ELIG CLIN: HCPCS | Performed by: PODIATRIST

## 2025-04-03 PROCEDURE — 1123F ACP DISCUSS/DSCN MKR DOCD: CPT | Performed by: PODIATRIST

## 2025-04-03 PROCEDURE — G8399 PT W/DXA RESULTS DOCUMENT: HCPCS | Performed by: PODIATRIST

## 2025-04-03 NOTE — PROGRESS NOTES
[facility]  Return Patient     Chief Complaint:   Chief Complaint   Patient presents with    Nail Problem     B/l nail trim    Callouses     Left foot       HPI: Yuli Clements is a 69 y.o. female who presents to the office today for follow up of several things. Her right foot pain is about the same.  wearing good shoe and orthotics.  Has tried topicals also    The painful spot on her left foot is bothering her again. Was improved last visit. Bug she does not want it treated because she is afraid it will hurt on her way to [place] next week.     She would also like her nails trimmed.       Currently denies F/C/N/V.     Allergies   Allergen Reactions    Aspirin Anaphylaxis and Shortness Of Breath    Cefadroxil Shortness Of Breath    Hobdlbni-Zlmrnop-Pjgnea [Fluocinolone] Shortness Of Breath    Ciprofloxacin Hcl Shortness Of Breath    Codeine Shortness Of Breath    Demerol Hcl [Meperidine] Shortness Of Breath    Doxycycline Nausea Only and Other (See Comments)    Glucosamine Shortness Of Breath, Diarrhea and Swelling     Other reaction(s): Respiratory Difficulty  \"lips swell up and get severe diarrhea\" - INCLUDES IODINE    Iodine Hives, Shortness Of Breath and Itching    Pcn [Penicillins] Anaphylaxis and Shortness Of Breath    Red Dye #40 (Allura Red) Shortness Of Breath    Shellfish-Derived Products Shortness Of Breath, Diarrhea and Swelling     \"lips swell up and get severe diarrhea\" - INCLUDES IODINE    Sulfa Antibiotics Hives, Shortness Of Breath, Itching and Rash     OK in small amounts, but larger amounts cause \"shortness of breath.\"    Sulfasalazine Hives, Itching, Rash and Shortness Of Breath    Yellow Dye Shortness Of Breath     Other reaction(s): Respiratory Difficulty    Gadolinium Rash     Moderate allergic-like reaction consisting of diffuse urticaria to ProHance gadolinium-containing contrast material    Midazolam Nausea Only     Versed caused bad nausea.    Milk (Cow)     Fluorouracil

## 2025-04-04 ENCOUNTER — RESULTS FOLLOW-UP (OUTPATIENT)
Dept: PRIMARY CARE CLINIC | Age: 70
End: 2025-04-04

## 2025-04-04 LAB — MEASLES ANTIBODY IGG: 1.81

## 2025-04-09 ENCOUNTER — OFFICE VISIT (OUTPATIENT)
Dept: OBGYN CLINIC | Age: 70
End: 2025-04-09
Payer: MEDICARE

## 2025-04-09 VITALS
SYSTOLIC BLOOD PRESSURE: 118 MMHG | WEIGHT: 143 LBS | HEIGHT: 64 IN | DIASTOLIC BLOOD PRESSURE: 84 MMHG | BODY MASS INDEX: 24.41 KG/M2

## 2025-04-09 DIAGNOSIS — N76.1 SUBACUTE VAGINITIS: ICD-10-CM

## 2025-04-09 DIAGNOSIS — N89.8 VAGINAL ITCHING: Primary | ICD-10-CM

## 2025-04-09 PROCEDURE — G8420 CALC BMI NORM PARAMETERS: HCPCS | Performed by: OBSTETRICS & GYNECOLOGY

## 2025-04-09 PROCEDURE — G8399 PT W/DXA RESULTS DOCUMENT: HCPCS | Performed by: OBSTETRICS & GYNECOLOGY

## 2025-04-09 PROCEDURE — G8427 DOCREV CUR MEDS BY ELIG CLIN: HCPCS | Performed by: OBSTETRICS & GYNECOLOGY

## 2025-04-09 PROCEDURE — 3017F COLORECTAL CA SCREEN DOC REV: CPT | Performed by: OBSTETRICS & GYNECOLOGY

## 2025-04-09 PROCEDURE — 99213 OFFICE O/P EST LOW 20 MIN: CPT | Performed by: OBSTETRICS & GYNECOLOGY

## 2025-04-09 PROCEDURE — 1090F PRES/ABSN URINE INCON ASSESS: CPT | Performed by: OBSTETRICS & GYNECOLOGY

## 2025-04-09 PROCEDURE — 1123F ACP DISCUSS/DSCN MKR DOCD: CPT | Performed by: OBSTETRICS & GYNECOLOGY

## 2025-04-09 PROCEDURE — 1036F TOBACCO NON-USER: CPT | Performed by: OBSTETRICS & GYNECOLOGY

## 2025-04-09 PROCEDURE — 1159F MED LIST DOCD IN RCRD: CPT | Performed by: OBSTETRICS & GYNECOLOGY

## 2025-04-09 RX ORDER — CLOBETASOL PROPIONATE 0.5 MG/G
CREAM TOPICAL
Qty: 15 EACH | Refills: 1 | Status: SHIPPED | OUTPATIENT
Start: 2025-04-09

## 2025-04-09 NOTE — PROGRESS NOTES
wrist 06/09/2014     left wrist      Dysuria 01/24/2014    Spasm 01/24/2014    Wrist joint pain 01/16/2014    Benign paroxysmal positional vertigo 12/09/2013    Hip pain 12/09/2013    Pain in limb 08/08/2013    Hyperlipidemia 05/10/2013    Megaloblastic anemia 05/10/2013    Benign neoplasm of skin of upper extremity 04/08/2013    Benign neoplasm of skin of trunk, except scrotum 04/08/2013    Rosacea 04/08/2013    Squamous cell carcinoma of upper extremity 04/08/2013     Scc in situ rt elbow 1/11      Hearing loss 03/06/2013    B12 deficiency 12/26/2012    Asthma 12/26/2012    Cervical spondylosis without myelopathy 12/26/2012    Diaphragmatic hernia 12/26/2012    Diverticulosis of colon 12/26/2012    Dyspareunia 12/26/2012    Enthesopathy 12/26/2012    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    Medial epicondylitis 12/26/2012    Mitral valve disorder 12/26/2012    Obstructive sleep apnea syndrome 12/26/2012    Scoliosis (and kyphoscoliosis), idiopathic 12/26/2012     Replacing diagnoses that were inactivated after the 10/1/2023 regulatory import      Sensorineural hearing loss, bilateral 12/26/2012    Vitamin D deficiency 12/26/2012    Gastroesophageal reflux disease 12/26/2012    Bursitis 12/26/2012     LEFT ISCHIAL      Iron deficiency anemia 09/27/2012    Irritable bowel syndrome with diarrhea 09/27/2012    Chronic maxillary sinusitis 08/15/2012     RESOLVED      Impairment of voice production 08/10/2012     RASPINESS      Hypoparathyroidism 07/03/2012    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      History of malignant neoplasm of thyroid 04/17/2012         PLAN:  Return in about 3 months (around 7/9/2025) for Follow up current problem.  Rx temovate then switch to lotrisone  Repeat Annual every 1 year  Cervical Cytology Evaluation begins at 21 years old.  If Negative Cytology, Follow-up screening per current guidelines.   Return to the office in 12

## 2025-05-07 ENCOUNTER — OFFICE VISIT (OUTPATIENT)
Dept: PODIATRY | Age: 70
End: 2025-05-07

## 2025-05-07 VITALS — HEIGHT: 64 IN | WEIGHT: 143 LBS | BODY MASS INDEX: 24.41 KG/M2

## 2025-05-07 DIAGNOSIS — M79.674 PAIN IN TOES OF BOTH FEET: ICD-10-CM

## 2025-05-07 DIAGNOSIS — M79.675 PAIN IN TOES OF BOTH FEET: ICD-10-CM

## 2025-05-07 DIAGNOSIS — D23.72 BENIGN NEOPLASM OF SKIN OF FOOT, LEFT: Primary | ICD-10-CM

## 2025-05-07 DIAGNOSIS — B35.1 ONYCHOMYCOSIS: ICD-10-CM

## 2025-05-07 DIAGNOSIS — M79.672 PAIN IN LEFT FOOT: ICD-10-CM

## 2025-05-07 DIAGNOSIS — L90.9 FAT PAD ATROPHY OF FOOT: ICD-10-CM

## 2025-05-07 NOTE — PROGRESS NOTES
Relation Age of Onset    Other Mother         pneumonia    Coronary Art Dis Mother     Liver Cancer Father         age 91    Coronary Art Dis Father     Liver Cancer Brother 64    Colon Cancer Brother         \"Bowel and liver CA\"    Diabetes Other         Father's side    Thyroid Disease Other         Mom's side       Social History     Tobacco Use    Smoking status: Never    Smokeless tobacco: Never   Substance Use Topics    Alcohol use: No       Review of Systems    Lower Extremity Physical Examination:     Vitals: There were no vitals filed for this visit.  General: AAO x 3 in NAD.     Vascular: DP and PT pulses palpable 2/4, Bilateral.  CFT <3 seconds, Bilateral.  Hair growth present to the level of the digits, Bilateral.  Edema absent, Bilateral.  Varicosities absent, Bilateral. Erythema absent, Bilateral    Neurological:  Sensation present to light touch to level of digits, Bilateral.    Musculoskeletal: Muscle strength 5/5, Bilateral.   Pain most severe today  pain dorsal forefoot, edema to forefoot, pitting. Area of pain is quite large.     Integument: Warm, dry, supple, Bilateral.  Open lesion absent, Bilateral.  Hyperkeratosis plantar 1st met head, left worse than right, there is a separate lesion left sub 1st met head, with a hard small core, this is painful also. Does not appear to be a verrucae.     Sites of Onychomycosis Involvement (Check affected area)  [x] [x] [x] [x] [x] [x] [x] [x] [x] [x]  5 4 3 2 1 1 2 3 4 5                          Right                                        Left    Thickness  [x] [x] [x] [x] [x] [x] [x] [x] [x] [x]  5 4 3 2 1 1 2 3 4 5                         Right                                        Left    Dystrophic Changes                                                                 [x] [x] [x] [x] [x] [x] [x] [x] [x] [x]  5 4 3 2 1 1 2 3 4 5                         Right                                        Left    Color

## 2025-05-09 ENCOUNTER — OFFICE VISIT (OUTPATIENT)
Dept: PRIMARY CARE CLINIC | Age: 70
End: 2025-05-09

## 2025-05-09 VITALS
HEART RATE: 71 BPM | DIASTOLIC BLOOD PRESSURE: 70 MMHG | WEIGHT: 139.6 LBS | SYSTOLIC BLOOD PRESSURE: 124 MMHG | BODY MASS INDEX: 23.83 KG/M2 | HEIGHT: 64 IN | OXYGEN SATURATION: 98 %

## 2025-05-09 DIAGNOSIS — R06.2 WHEEZING: ICD-10-CM

## 2025-05-09 DIAGNOSIS — K58.0 IRRITABLE BOWEL SYNDROME WITH DIARRHEA: ICD-10-CM

## 2025-05-09 DIAGNOSIS — M25.571 CHRONIC PAIN OF RIGHT ANKLE: Primary | ICD-10-CM

## 2025-05-09 DIAGNOSIS — G89.29 CHRONIC PAIN OF RIGHT ANKLE: Primary | ICD-10-CM

## 2025-05-09 RX ORDER — ALBUTEROL SULFATE 90 UG/1
2 INHALANT RESPIRATORY (INHALATION) 4 TIMES DAILY PRN
Qty: 2 EACH | Refills: 3 | Status: SHIPPED | OUTPATIENT
Start: 2025-05-09

## 2025-05-09 RX ORDER — ALOSETRON HYDROCHLORIDE 0.5 MG/1
0.5 TABLET ORAL DAILY
Qty: 30 TABLET | Refills: 3 | Status: SHIPPED
Start: 2025-05-09

## 2025-05-09 ASSESSMENT — PATIENT HEALTH QUESTIONNAIRE - PHQ9
1. LITTLE INTEREST OR PLEASURE IN DOING THINGS: NOT AT ALL
SUM OF ALL RESPONSES TO PHQ QUESTIONS 1-9: 0
2. FEELING DOWN, DEPRESSED OR HOPELESS: NOT AT ALL

## 2025-05-09 NOTE — PROGRESS NOTES
[facility]  [facility]  [address] [address] [address]  Dept: [phone]    Yuli Clements is a 69 y.o. female Established patient, who presents today for her medical conditions/complaints as noted below.      Chief Complaint   Patient presents with    Hot flashes     3 week follow up - patient states Dr Gagnon said alosetron is causing hot flashes - patient taking 1 tab daily instead of 2 - hot flashes have improved    Knee Pain     Patient complains of bilateral knee pain       HPI:     History of Present Illness  The patient presents for evaluation of right ankle pain and hot flashes.    She reports a noticeable change in her knees, particularly the right one, which exhibits a clicking sound during ambulation. This is accompanied by pain in the right ankle, regardless of whether she is walking or sitting. The pain is described as constant and progressively worsening. She also notes a slight protrusion on the front of her foot, which is not associated with any bone-related issues. She has been utilizing an Ace wrap for support during extensive walking activities. She had a fracture in the past.    She has identified a correlation between her medication for irritable bowel syndrome and the onset of hot flashes and sweats. Her physician, Dr. Gagnon, has adjusted the dosage to once daily, which appears to be beneficial. She is seeking a refill of this medication prior to her upcoming travel to [place] on Tuesday.      No results found for: \"LABA1C\"      Reviewed prior notes: None  Reviewed previous: Imaging    LDL Cholesterol (mg/dL)   Date Value   04/04/2024          HDL (mg/dL)   Date Value   04/04/2024 28 (L)       (goal LDL is <100)   AST (U/L)   Date Value   02/12/2025 21     ALT (U/L)   Date Value   02/12/2025 15     BUN (mg/dL)   Date Value   02/12/2025 16     TSH (uIU/mL)   Date Value   02/12/2025 2.05     BP Readings from Last 3 Encounters:   05/09/25 124/70

## 2025-05-21 ENCOUNTER — APPOINTMENT (OUTPATIENT)
Dept: CT IMAGING | Age: 70
End: 2025-05-21
Payer: MEDICARE

## 2025-05-21 ENCOUNTER — HOSPITAL ENCOUNTER (EMERGENCY)
Age: 70
Discharge: HOME | End: 2025-05-21
Attending: EMERGENCY MEDICINE
Payer: MEDICARE

## 2025-05-21 ENCOUNTER — APPOINTMENT (OUTPATIENT)
Dept: GENERAL RADIOLOGY | Age: 70
End: 2025-05-21
Payer: MEDICARE

## 2025-05-21 VITALS
DIASTOLIC BLOOD PRESSURE: 72 MMHG | TEMPERATURE: 98.1 F | RESPIRATION RATE: 12 BRPM | BODY MASS INDEX: 23.85 KG/M2 | WEIGHT: 139 LBS | OXYGEN SATURATION: 100 % | SYSTOLIC BLOOD PRESSURE: 190 MMHG | HEART RATE: 71 BPM

## 2025-05-21 DIAGNOSIS — J06.9 ACUTE UPPER RESPIRATORY INFECTION: Primary | ICD-10-CM

## 2025-05-21 LAB
ANION GAP SERPL CALCULATED.3IONS-SCNC: 15 MMOL/L (ref 9–16)
BASOPHILS # BLD: 0.05 K/UL (ref 0–0.2)
BASOPHILS NFR BLD: 1 % (ref 0–2)
BNP SERPL-MCNC: 421 PG/ML (ref 0–300)
BUN SERPL-MCNC: 15 MG/DL (ref 8–23)
CALCIUM SERPL-MCNC: 8 MG/DL (ref 8.6–10.4)
CHLORIDE SERPL-SCNC: 103 MMOL/L (ref 98–107)
CO2 SERPL-SCNC: 22 MMOL/L (ref 20–31)
CREAT SERPL-MCNC: 1 MG/DL (ref 0.7–1.2)
D DIMER PPP FEU-MCNC: 1.13 UG/ML FEU (ref 0–0.59)
EOSINOPHIL # BLD: 0.34 K/UL (ref 0–0.44)
EOSINOPHILS RELATIVE PERCENT: 6 % (ref 0–4)
ERYTHROCYTE [DISTWIDTH] IN BLOOD BY AUTOMATED COUNT: 15.6 % (ref 11.5–14.9)
FLUAV RNA RESP QL NAA+PROBE: NOT DETECTED
FLUBV RNA RESP QL NAA+PROBE: NOT DETECTED
GFR, ESTIMATED: 60 ML/MIN/1.73M2
GLUCOSE SERPL-MCNC: 96 MG/DL (ref 74–99)
HCT VFR BLD AUTO: 33.2 % (ref 36–46)
HGB BLD-MCNC: 10.7 G/DL (ref 12–16)
IMM GRANULOCYTES # BLD AUTO: 0.03 K/UL (ref 0–0.3)
IMM GRANULOCYTES NFR BLD: 1 %
LYMPHOCYTES NFR BLD: 0.97 K/UL (ref 1.1–3.7)
LYMPHOCYTES RELATIVE PERCENT: 18 % (ref 24–44)
MCH RBC QN AUTO: 29.6 PG (ref 26–34)
MCHC RBC AUTO-ENTMCNC: 32.2 G/DL (ref 31–37)
MCV RBC AUTO: 92 FL (ref 80–100)
MONOCYTES NFR BLD: 0.31 K/UL (ref 0.1–1.2)
MONOCYTES NFR BLD: 6 % (ref 3–12)
MYOGLOBIN SERPL-MCNC: 106 NG/ML (ref 25–58)
NEUTROPHILS NFR BLD: 68 % (ref 36–66)
NEUTS SEG NFR BLD: 3.7 K/UL (ref 1.5–8.1)
NRBC BLD-RTO: 0 PER 100 WBC
PLATELET # BLD AUTO: ABNORMAL K/UL (ref 150–450)
PLATELET, FLUORESCENCE: 115 K/UL (ref 150–450)
PLATELETS.RETICULATED NFR BLD AUTO: 12.4 % (ref 1.1–10.3)
PMV BLD AUTO: 12.8 FL (ref 8–13.5)
POTASSIUM SERPL-SCNC: 3.9 MMOL/L (ref 3.7–5.3)
RBC # BLD AUTO: 3.61 M/UL (ref 3.95–5.11)
SARS-COV-2 RNA RESP QL NAA+PROBE: NOT DETECTED
SODIUM SERPL-SCNC: 140 MMOL/L (ref 136–145)
SOURCE: NORMAL
SPECIMEN DESCRIPTION: NORMAL
SPECIMEN SOURCE: NORMAL
STREP A, MOLECULAR: NEGATIVE
TROPONIN I SERPL HS-MCNC: 7 NG/L (ref 0–14)
WBC OTHER # BLD: 5.4 K/UL (ref 3.5–11)

## 2025-05-21 PROCEDURE — 71045 X-RAY EXAM CHEST 1 VIEW: CPT

## 2025-05-21 PROCEDURE — 94640 AIRWAY INHALATION TREATMENT: CPT

## 2025-05-21 PROCEDURE — 6360000004 HC RX CONTRAST MEDICATION: Performed by: EMERGENCY MEDICINE

## 2025-05-21 PROCEDURE — 96374 THER/PROPH/DIAG INJ IV PUSH: CPT

## 2025-05-21 PROCEDURE — 83874 ASSAY OF MYOGLOBIN: CPT

## 2025-05-21 PROCEDURE — 99285 EMERGENCY DEPT VISIT HI MDM: CPT

## 2025-05-21 PROCEDURE — 85379 FIBRIN DEGRADATION QUANT: CPT

## 2025-05-21 PROCEDURE — 83880 ASSAY OF NATRIURETIC PEPTIDE: CPT

## 2025-05-21 PROCEDURE — 2500000003 HC RX 250 WO HCPCS: Performed by: EMERGENCY MEDICINE

## 2025-05-21 PROCEDURE — 2580000003 HC RX 258: Performed by: EMERGENCY MEDICINE

## 2025-05-21 PROCEDURE — 87651 STREP A DNA AMP PROBE: CPT

## 2025-05-21 PROCEDURE — 87636 SARSCOV2 & INF A&B AMP PRB: CPT

## 2025-05-21 PROCEDURE — 94761 N-INVAS EAR/PLS OXIMETRY MLT: CPT

## 2025-05-21 PROCEDURE — 80048 BASIC METABOLIC PNL TOTAL CA: CPT

## 2025-05-21 PROCEDURE — 71260 CT THORAX DX C+: CPT

## 2025-05-21 PROCEDURE — 6360000002 HC RX W HCPCS: Performed by: EMERGENCY MEDICINE

## 2025-05-21 PROCEDURE — 93005 ELECTROCARDIOGRAM TRACING: CPT | Performed by: EMERGENCY MEDICINE

## 2025-05-21 PROCEDURE — 84484 ASSAY OF TROPONIN QUANT: CPT

## 2025-05-21 PROCEDURE — 85025 COMPLETE CBC W/AUTO DIFF WBC: CPT

## 2025-05-21 PROCEDURE — 96375 TX/PRO/DX INJ NEW DRUG ADDON: CPT

## 2025-05-21 PROCEDURE — 36415 COLL VENOUS BLD VENIPUNCTURE: CPT

## 2025-05-21 RX ORDER — DIPHENHYDRAMINE HYDROCHLORIDE 50 MG/ML
50 INJECTION, SOLUTION INTRAMUSCULAR; INTRAVENOUS ONCE
Status: COMPLETED | OUTPATIENT
Start: 2025-05-21 | End: 2025-05-21

## 2025-05-21 RX ORDER — 0.9 % SODIUM CHLORIDE 0.9 %
100 INTRAVENOUS SOLUTION INTRAVENOUS ONCE
Status: COMPLETED | OUTPATIENT
Start: 2025-05-21 | End: 2025-05-21

## 2025-05-21 RX ORDER — ALBUTEROL SULFATE 0.83 MG/ML
2.5 SOLUTION RESPIRATORY (INHALATION) ONCE
Status: COMPLETED | OUTPATIENT
Start: 2025-05-21 | End: 2025-05-21

## 2025-05-21 RX ORDER — IOPAMIDOL 755 MG/ML
75 INJECTION, SOLUTION INTRAVASCULAR
Status: COMPLETED | OUTPATIENT
Start: 2025-05-21 | End: 2025-05-21

## 2025-05-21 RX ORDER — PREDNISONE 10 MG/1
TABLET ORAL
Qty: 20 TABLET | Refills: 0 | Status: SHIPPED | OUTPATIENT
Start: 2025-05-21 | End: 2025-05-31

## 2025-05-21 RX ORDER — SODIUM CHLORIDE 0.9 % (FLUSH) 0.9 %
10 SYRINGE (ML) INJECTION PRN
Status: DISCONTINUED | OUTPATIENT
Start: 2025-05-21 | End: 2025-05-21 | Stop reason: HOSPADM

## 2025-05-21 RX ADMIN — WATER 125 MG: 1 INJECTION INTRAMUSCULAR; INTRAVENOUS; SUBCUTANEOUS at 10:23

## 2025-05-21 RX ADMIN — SODIUM CHLORIDE, PRESERVATIVE FREE 10 ML: 5 INJECTION INTRAVENOUS at 10:56

## 2025-05-21 RX ADMIN — ALBUTEROL SULFATE 2.5 MG: 2.5 SOLUTION RESPIRATORY (INHALATION) at 10:45

## 2025-05-21 RX ADMIN — DIPHENHYDRAMINE HYDROCHLORIDE 50 MG: 50 INJECTION INTRAMUSCULAR; INTRAVENOUS at 10:23

## 2025-05-21 RX ADMIN — IOPAMIDOL 75 ML: 755 INJECTION, SOLUTION INTRAVENOUS at 10:56

## 2025-05-21 RX ADMIN — SODIUM CHLORIDE 100 ML: 9 INJECTION, SOLUTION INTRAVENOUS at 10:56

## 2025-05-21 ASSESSMENT — ENCOUNTER SYMPTOMS
EYE DISCHARGE: 0
SINUS PRESSURE: 0
WHEEZING: 0
FACIAL SWELLING: 0
SORE THROAT: 0
BLOOD IN STOOL: 0
CONSTIPATION: 0
RHINORRHEA: 0
BACK PAIN: 0
COLOR CHANGE: 0
EYE REDNESS: 0
VOMITING: 0
COUGH: 1
DIARRHEA: 0
SHORTNESS OF BREATH: 1
NAUSEA: 0
CHEST TIGHTNESS: 0
TROUBLE SWALLOWING: 0
VOICE CHANGE: 1
EYE PAIN: 0
ABDOMINAL PAIN: 0

## 2025-05-21 ASSESSMENT — LIFESTYLE VARIABLES
HOW MANY STANDARD DRINKS CONTAINING ALCOHOL DO YOU HAVE ON A TYPICAL DAY: 1 OR 2
HOW OFTEN DO YOU HAVE A DRINK CONTAINING ALCOHOL: MONTHLY OR LESS

## 2025-05-21 NOTE — ED PROVIDER NOTES
eMERGENCY dEPARTMENT eNCOUnter      Pt Name: Yuli Clements  MRN: [id number]  Birthdate 1955  Date of evaluation: 5/21/25      CHIEF COMPLAINT       Chief Complaint   Patient presents with    Shortness of Breath    Pharyngitis         HISTORY OF PRESENT ILLNESS    Yuli Clements is a 69 y.o. female who presents complaining of trouble breathing.  Patient states she was traveling and was [place] and then it was cold but warm in the water.  Patient states she started having a loss of her voice becoming very hoarse 5 days ago then has developed a cough and some shortness of breath since then.  Patient does have a history of asthma.  Patient denies any chest pain headaches vomiting or diarrhea.  Patient states she did have some swelling in her legs from traveling but the swelling is gone down.  Patient has no history of CHF and was just at the cardiologist and everything checked out normal at that time.      REVIEW OF SYSTEMS       Review of Systems   Constitutional:  Negative for activity change, appetite change, chills, diaphoresis and fever.   HENT:  Positive for voice change. Negative for congestion, ear pain, facial swelling, nosebleeds, rhinorrhea, sinus pressure, sore throat and trouble swallowing.    Eyes:  Negative for pain, discharge and redness.   Respiratory:  Positive for cough and shortness of breath. Negative for chest tightness and wheezing.    Cardiovascular:  Negative for chest pain, palpitations and leg swelling.   Gastrointestinal:  Negative for abdominal pain, blood in stool, constipation, diarrhea, nausea and vomiting.   Genitourinary:  Negative for difficulty urinating, dysuria, flank pain, frequency, genital sores and hematuria.   Musculoskeletal:  Negative for arthralgias, back pain, gait problem, joint swelling, myalgias and neck pain.   Skin:  Negative for color change, pallor, rash and wound.   Neurological:  Negative for dizziness, tremors, seizures, syncope,

## 2025-05-22 LAB
EKG ATRIAL RATE: 70 BPM
EKG P AXIS: 52 DEGREES
EKG P-R INTERVAL: 134 MS
EKG Q-T INTERVAL: 414 MS
EKG QRS DURATION: 72 MS
EKG QTC CALCULATION (BAZETT): 447 MS
EKG R AXIS: 23 DEGREES
EKG T AXIS: -2 DEGREES
EKG VENTRICULAR RATE: 70 BPM

## 2025-06-04 ENCOUNTER — TELEPHONE (OUTPATIENT)
Dept: PRIMARY CARE CLINIC | Age: 70
End: 2025-06-04

## 2025-06-04 ENCOUNTER — OFFICE VISIT (OUTPATIENT)
Dept: PRIMARY CARE CLINIC | Age: 70
End: 2025-06-04

## 2025-06-04 VITALS
BODY MASS INDEX: 23.66 KG/M2 | DIASTOLIC BLOOD PRESSURE: 86 MMHG | OXYGEN SATURATION: 96 % | SYSTOLIC BLOOD PRESSURE: 140 MMHG | WEIGHT: 138.6 LBS | HEIGHT: 64 IN | HEART RATE: 73 BPM

## 2025-06-04 DIAGNOSIS — R06.2 WHEEZING: ICD-10-CM

## 2025-06-04 RX ORDER — ALBUTEROL SULFATE 90 UG/1
2 INHALANT RESPIRATORY (INHALATION) 4 TIMES DAILY PRN
Qty: 2 EACH | Refills: 3 | Status: SHIPPED | OUTPATIENT
Start: 2025-06-04

## 2025-06-04 RX ORDER — FLUCONAZOLE 40 MG/ML
200 POWDER, FOR SUSPENSION ORAL DAILY
Qty: 35 ML | Refills: 0 | Status: SHIPPED | OUTPATIENT
Start: 2025-06-04 | End: 2025-06-11

## 2025-06-04 RX ORDER — FLUCONAZOLE 150 MG/1
150 TABLET ORAL DAILY
Qty: 7 TABLET | Refills: 0 | Status: SHIPPED | OUTPATIENT
Start: 2025-06-04 | End: 2025-06-04

## 2025-06-04 NOTE — TELEPHONE ENCOUNTER
Pharmacy called in states diflucan has a red dye and patient has a severe allergy to it. Asking if you want to switch to suspension which does not have the dye. She states she believes all manufacturers of diflucan have the dye in them

## 2025-06-04 NOTE — PROGRESS NOTES
Neurological:      Mental Status: She is alert and oriented to person, place, and time.   Psychiatric:         Mood and Affect: Mood normal.         Behavior: Behavior normal.         Thought Content: Thought content normal.           Assessment and Plan:     Assessment & Plan  1. Laryngitis.  - The condition is likely due to a viral infection, which has been exacerbating asthma.  - Physical examination reveals no lymphadenopathy and good lung sounds.  - The infection is expected to resolve within the next week; if not, a prescription for an antifungal medication will be provided.  - Continue using honey lemon cough drops for symptom relief; contact the office if there is no significant improvement over the next week.  -Diflucan Rx given in case she does not get better as this may be a fungal infection.     2. Cough and abnormal CT scan lungs.  - The cough is likely due to postnasal drainage and the viral infection.  - The patient reports increased coughing, especially when lying down at night.  - Due to allergy to  DYE, Tessalon Perles was not prescribed.  - Use honey lemon cough drops for relief; contact the office if the cough persists for further evaluation.    Assessment & Plan     1. Wheezing  -     albuterol sulfate HFA (VENTOLIN HFA) 108 (90 Base) MCG/ACT inhaler; Inhale 2 puffs into the lungs 4 times daily as needed for Wheezing, Disp-2 each, R-3Normal       Orders Placed This Encounter   Medications    albuterol sulfate HFA (VENTOLIN HFA) 108 (90 Base) MCG/ACT inhaler     Sig: Inhale 2 puffs into the lungs 4 times daily as needed for Wheezing     Dispense:  2 each     Refill:  3    fluconazole (DIFLUCAN) 150 MG tablet     Sig: Take 1 tablet by mouth daily for 7 days     Dispense:  7 tablet     Refill:  0            Results  Imaging   - CT scan of the lungs: A little bit of haziness in the lungs.       No follow-ups on file.     The patient (or guardian, if applicable) and other individuals in attendance

## 2025-06-05 ENCOUNTER — OFFICE VISIT (OUTPATIENT)
Dept: PODIATRY | Age: 70
End: 2025-06-05

## 2025-06-05 VITALS — HEIGHT: 64 IN | BODY MASS INDEX: 23.56 KG/M2 | WEIGHT: 138 LBS

## 2025-06-05 DIAGNOSIS — B35.1 ONYCHOMYCOSIS: ICD-10-CM

## 2025-06-05 DIAGNOSIS — D23.72 BENIGN NEOPLASM OF SKIN OF FOOT, LEFT: Primary | ICD-10-CM

## 2025-06-05 DIAGNOSIS — M79.671 RIGHT FOOT PAIN: ICD-10-CM

## 2025-06-05 DIAGNOSIS — M79.675 PAIN IN TOES OF BOTH FEET: ICD-10-CM

## 2025-06-05 DIAGNOSIS — L90.9 FAT PAD ATROPHY OF FOOT: ICD-10-CM

## 2025-06-05 DIAGNOSIS — M79.672 PAIN IN LEFT FOOT: ICD-10-CM

## 2025-06-05 DIAGNOSIS — M19.071 PRIMARY OSTEOARTHRITIS OF RIGHT FOOT: ICD-10-CM

## 2025-06-05 DIAGNOSIS — M79.674 PAIN IN TOES OF BOTH FEET: ICD-10-CM

## 2025-06-05 NOTE — PROGRESS NOTES
[facility]  Return Patient     Chief Complaint:   Chief Complaint   Patient presents with    Callouses     Left foot     Nail Problem     B/l nail trim/ last seen Dr. Danielle Anders 4/2/2025       HPI: Yuli Clements is a 69 y.o. female who presents to the office today for follow up of several things. Her right foot pain is about the same.  wearing good shoe and orthotics.  Has tried topicals also    The painful spot on her left foot is bothering her again. Was improved last visit.     She would also like her nails trimmed.       Currently denies F/C/N/V.     Allergies   Allergen Reactions    Aspirin Anaphylaxis and Shortness Of Breath    Cefadroxil Shortness Of Breath    Kybxcsjp-Wphucxf-Ydmzaq [Fluocinolone] Shortness Of Breath    Ciprofloxacin Hcl Shortness Of Breath    Codeine Shortness Of Breath    Demerol Hcl [Meperidine] Shortness Of Breath    Doxycycline Nausea Only and Other (See Comments)    Glucosamine Shortness Of Breath, Diarrhea and Swelling     Other reaction(s): Respiratory Difficulty  \"lips swell up and get severe diarrhea\" - INCLUDES IODINE    Iodine Hives, Shortness Of Breath and Itching    Pcn [Penicillins] Anaphylaxis and Shortness Of Breath    Red Dye #40 (Allura Red) Shortness Of Breath    Shellfish-Derived Products Shortness Of Breath, Diarrhea and Swelling     \"lips swell up and get severe diarrhea\" - INCLUDES IODINE    Sulfa Antibiotics Hives, Shortness Of Breath, Itching and Rash     OK in small amounts, but larger amounts cause \"shortness of breath.\"    Sulfasalazine Hives, Itching, Rash and Shortness Of Breath    Yellow Dye Shortness Of Breath     Other reaction(s): Respiratory Difficulty    Gadolinium Rash     Moderate allergic-like reaction consisting of diffuse urticaria to ProHance gadolinium-containing contrast material    Midazolam Nausea Only     Versed caused bad nausea.    Milk (Cow)     Fluorouracil Diarrhea    Jerri Root     Iodides Hives    Liquid Calcium

## 2025-06-19 NOTE — PROGRESS NOTES
[facility] Keratoses destruction procedure note:  6/23/2025  Yuli Clements  1955    BP (!) 142/74   Pulse 70   Ht 1.626 m (5' 4.02\")   Wt 62.4 kg (137 lb 9.6 oz)   BMI 23.61 kg/m²   ALL VITALS REVIEWED    Allergies   Allergen Reactions    Aspirin Anaphylaxis and Shortness Of Breath    Cefadroxil Shortness Of Breath    Suyqgelp-Cztjtsv-Ppvyro [Fluocinolone] Shortness Of Breath    Ciprofloxacin Hcl Shortness Of Breath    Codeine Shortness Of Breath    Demerol Hcl [Meperidine] Shortness Of Breath    Doxycycline Nausea Only and Other (See Comments)    Glucosamine Shortness Of Breath, Diarrhea and Swelling     Other reaction(s): Respiratory Difficulty  \"lips swell up and get severe diarrhea\" - INCLUDES IODINE    Iodine Hives, Shortness Of Breath and Itching    Pcn [Penicillins] Anaphylaxis and Shortness Of Breath    Red Dye #40 (Allura Red) Shortness Of Breath    Shellfish-Derived Products Shortness Of Breath, Diarrhea and Swelling     \"lips swell up and get severe diarrhea\" - INCLUDES IODINE    Sulfa Antibiotics Hives, Shortness Of Breath, Itching and Rash     OK in small amounts, but larger amounts cause \"shortness of breath.\"    Sulfasalazine Hives, Itching, Rash and Shortness Of Breath    Yellow Dye Shortness Of Breath     Other reaction(s): Respiratory Difficulty    Gadolinium Rash     Moderate allergic-like reaction consisting of diffuse urticaria to ProHance gadolinium-containing contrast material    Midazolam Nausea Only     Versed caused bad nausea.    Milk (Cow)     Fluorouracil Diarrhea    Jerri Root     Iodides Hives    Liquid Calcium With D3 [Calcium Carb-Cholecalciferol]     Tetanus Toxoids      Had reaction to tetanus shot    Adhesive Tape Rash    Calcitriol Hives and Nausea And Vomiting       Chief Complaint   Patient presents with    Skin Problem     Recheck on skin.   Attempted Efudex in past and she could not tolerate.     Written consent was obtained.  Lesion(s) cleansed with

## 2025-06-20 ENCOUNTER — PROCEDURE VISIT (OUTPATIENT)
Dept: PRIMARY CARE CLINIC | Age: 70
End: 2025-06-20
Payer: MEDICARE

## 2025-06-20 VITALS
BODY MASS INDEX: 23.49 KG/M2 | DIASTOLIC BLOOD PRESSURE: 74 MMHG | SYSTOLIC BLOOD PRESSURE: 142 MMHG | HEIGHT: 64 IN | HEART RATE: 70 BPM | WEIGHT: 137.6 LBS

## 2025-06-20 DIAGNOSIS — L57.0 ACTINIC KERATOSIS: Primary | ICD-10-CM

## 2025-06-20 DIAGNOSIS — L82.1 SEBORRHEIC KERATOSES: ICD-10-CM

## 2025-06-20 DIAGNOSIS — L60.8 ACQUIRED DEFORMITY OF NAIL OF FINGER: ICD-10-CM

## 2025-06-20 PROCEDURE — 1036F TOBACCO NON-USER: CPT | Performed by: PHYSICIAN ASSISTANT

## 2025-06-20 PROCEDURE — G8420 CALC BMI NORM PARAMETERS: HCPCS | Performed by: PHYSICIAN ASSISTANT

## 2025-06-20 PROCEDURE — 1090F PRES/ABSN URINE INCON ASSESS: CPT | Performed by: PHYSICIAN ASSISTANT

## 2025-06-20 PROCEDURE — 3017F COLORECTAL CA SCREEN DOC REV: CPT | Performed by: PHYSICIAN ASSISTANT

## 2025-06-20 PROCEDURE — 1123F ACP DISCUSS/DSCN MKR DOCD: CPT | Performed by: PHYSICIAN ASSISTANT

## 2025-06-20 PROCEDURE — 99213 OFFICE O/P EST LOW 20 MIN: CPT | Performed by: PHYSICIAN ASSISTANT

## 2025-06-20 PROCEDURE — G8399 PT W/DXA RESULTS DOCUMENT: HCPCS | Performed by: PHYSICIAN ASSISTANT

## 2025-06-20 PROCEDURE — G8427 DOCREV CUR MEDS BY ELIG CLIN: HCPCS | Performed by: PHYSICIAN ASSISTANT

## 2025-06-20 PROCEDURE — 1159F MED LIST DOCD IN RCRD: CPT | Performed by: PHYSICIAN ASSISTANT

## 2025-06-20 ASSESSMENT — PATIENT HEALTH QUESTIONNAIRE - PHQ9
SUM OF ALL RESPONSES TO PHQ QUESTIONS 1-9: 0
1. LITTLE INTEREST OR PLEASURE IN DOING THINGS: NOT AT ALL
2. FEELING DOWN, DEPRESSED OR HOPELESS: NOT AT ALL

## 2025-07-03 ENCOUNTER — OFFICE VISIT (OUTPATIENT)
Dept: PODIATRY | Age: 70
End: 2025-07-03

## 2025-07-03 VITALS — HEIGHT: 64 IN | WEIGHT: 137 LBS | BODY MASS INDEX: 23.39 KG/M2

## 2025-07-03 DIAGNOSIS — M79.672 PAIN IN LEFT FOOT: ICD-10-CM

## 2025-07-03 DIAGNOSIS — M25.571 SINUS TARSITIS, RIGHT: ICD-10-CM

## 2025-07-03 DIAGNOSIS — B35.1 ONYCHOMYCOSIS: ICD-10-CM

## 2025-07-03 DIAGNOSIS — M79.674 PAIN IN TOES OF BOTH FEET: ICD-10-CM

## 2025-07-03 DIAGNOSIS — M79.675 PAIN IN TOES OF BOTH FEET: ICD-10-CM

## 2025-07-03 DIAGNOSIS — D23.72 BENIGN NEOPLASM OF SKIN OF FOOT, LEFT: Primary | ICD-10-CM

## 2025-07-03 DIAGNOSIS — L90.9 FAT PAD ATROPHY OF FOOT: ICD-10-CM

## 2025-07-03 NOTE — PROGRESS NOTES
Liver Cancer Brother 64    Colon Cancer Brother         \"Bowel and liver CA\"    Diabetes Other         Father's side    Thyroid Disease Other         Mom's side       Social History     Tobacco Use    Smoking status: Never    Smokeless tobacco: Never   Substance Use Topics    Alcohol use: No       Review of Systems    Lower Extremity Physical Examination:     Vitals: There were no vitals filed for this visit.  General: AAO x 3 in NAD.     Vascular: DP and PT pulses palpable 2/4, Bilateral.  CFT <3 seconds, Bilateral.  Hair growth present to the level of the digits, Bilateral.  Edema absent, Bilateral.  Varicosities absent, Bilateral. Erythema absent, Bilateral    Neurological:  Sensation present to light touch to level of digits, Bilateral.    Musculoskeletal: Muscle strength 5/5, Bilateral.   Pain most severe today  pain sinus tarsi right, edema to forefoot, pitting. Area of pain is quite large.     Integument: Warm, dry, supple, Bilateral.  Open lesion absent, Bilateral.  Hyperkeratosis plantar 1st met head, left worse than right, there is a separate lesion left sub 1st met head, with a hard small core, this is painful also. Does not appear to be a verrucae.     Asessment: Patient is a 69 y.o. female with:   1. Benign neoplasm of skin of foot, left    2. Pain in left foot    3. Fat pad atrophy of foot    4. Onychomycosis    5. Pain in toes of both feet    6. Sinus tarsitis, right          Plan: Pt was evaluated and examined. Patient was given personalized discharge instructions.   Pt will follow up in 6 weeks or sooner if any problems arise. Diagnosis was discussed with the pt and all of their questions were answered in detail. Proper foot hygiene and care was discussed with the pt.   Informed patient on proper diabetic foot care and importance of tight glycemic control. Patient to check feet daily and contact the office with any questions/problems/concerns.  Other comorbidity noted and will be managed by PCP.

## 2025-07-08 ENCOUNTER — OFFICE VISIT (OUTPATIENT)
Dept: OBGYN CLINIC | Age: 70
End: 2025-07-08

## 2025-07-08 VITALS
SYSTOLIC BLOOD PRESSURE: 126 MMHG | WEIGHT: 139 LBS | BODY MASS INDEX: 23.73 KG/M2 | DIASTOLIC BLOOD PRESSURE: 80 MMHG | HEIGHT: 64 IN

## 2025-07-08 DIAGNOSIS — N76.1 SUBACUTE VAGINITIS: ICD-10-CM

## 2025-07-08 DIAGNOSIS — N89.8 VAGINAL ITCHING: Primary | ICD-10-CM

## 2025-07-08 DIAGNOSIS — N89.8 VAGINAL IRRITATION: ICD-10-CM

## 2025-07-08 RX ORDER — CLOTRIMAZOLE AND BETAMETHASONE DIPROPIONATE 10; .64 MG/G; MG/G
CREAM TOPICAL
Qty: 45 G | Refills: 1 | Status: SHIPPED | OUTPATIENT
Start: 2025-07-08

## 2025-07-08 NOTE — PROGRESS NOTES
Hem/Onc.       Postprocedural hypothyroidism 07/16/2018    Dystrophy of vulva 06/14/2018    Lichen sclerosus et atrophicus 08/31/2017    Basal cell carcinoma of upper lip 06/22/2017    Actinic keratosis 06/22/2017    Functional diarrhea 11/16/2016    Right upper quadrant pain 10/28/2015    Other plastic surgery for unacceptable cosmetic appearance 08/18/2015    Food allergy      yellow and red dyes      Squamous cell carcinoma in situ 06/09/2014     left wrist      Squamous cell carcinoma in situ of skin of left wrist 06/09/2014     left wrist      Dysuria 01/24/2014    Spasm 01/24/2014    Wrist joint pain 01/16/2014    Benign paroxysmal positional vertigo 12/09/2013    Hip pain 12/09/2013    Pain in limb 08/08/2013    Hyperlipidemia 05/10/2013    Megaloblastic anemia 05/10/2013    Benign neoplasm of skin of upper extremity 04/08/2013    Benign neoplasm of skin of trunk, except scrotum 04/08/2013    Rosacea 04/08/2013    Squamous cell carcinoma of upper extremity 04/08/2013     Scc in situ rt elbow 1/11      Hearing loss 03/06/2013    B12 deficiency 12/26/2012    Asthma 12/26/2012    Cervical spondylosis without myelopathy 12/26/2012    Diaphragmatic hernia 12/26/2012    Diverticulosis of colon 12/26/2012    Dyspareunia 12/26/2012    Enthesopathy 12/26/2012    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    Medial epicondylitis 12/26/2012    Mitral valve disorder 12/26/2012    Obstructive sleep apnea syndrome 12/26/2012    Scoliosis (and kyphoscoliosis), idiopathic 12/26/2012     Replacing diagnoses that were inactivated after the 10/1/2023 regulatory import      Sensorineural hearing loss, bilateral 12/26/2012    Vitamin D deficiency 12/26/2012    Gastroesophageal reflux disease 12/26/2012    Bursitis 12/26/2012     LEFT ISCHIAL      Iron deficiency anemia 09/27/2012    Irritable bowel syndrome with diarrhea 09/27/2012    Chronic maxillary sinusitis 08/15/2012     RESOLVED      Impairment of voice production

## 2025-07-15 ENCOUNTER — HOSPITAL ENCOUNTER (OUTPATIENT)
Age: 70
Setting detail: SPECIMEN
Discharge: HOME | End: 2025-07-15
Payer: MEDICARE

## 2025-07-15 LAB
C DIFF GDH + TOXINS A+B STL QL IA.RAPID: NEGATIVE
SPECIMEN DESCRIPTION: NORMAL

## 2025-07-15 PROCEDURE — 87324 CLOSTRIDIUM AG IA: CPT

## 2025-07-15 PROCEDURE — 83993 ASSAY FOR CALPROTECTIN FECAL: CPT

## 2025-07-15 PROCEDURE — 87506 IADNA-DNA/RNA PROBE TQ 6-11: CPT

## 2025-07-15 PROCEDURE — 87449 NOS EACH ORGANISM AG IA: CPT

## 2025-07-16 LAB
CAMPYLOBACTER DNA SPEC NAA+PROBE: NORMAL
ETEC ELTA+ESTB GENES STL QL NAA+PROBE: NORMAL
P SHIGELLOIDES DNA STL QL NAA+PROBE: NORMAL
SALMONELLA DNA SPEC QL NAA+PROBE: NORMAL
SHIGA TOXIN STX GENE SPEC NAA+PROBE: NORMAL
SHIGELLA DNA SPEC QL NAA+PROBE: NORMAL
SPECIMEN DESCRIPTION: NORMAL
V CHOL+PARA RFBL+TRKH+TNAA STL QL NAA+PR: NORMAL
Y ENTERO RECN STL QL NAA+PROBE: NORMAL

## 2025-07-17 LAB — CALPROTECTIN, FECAL: 85 UG/G

## 2025-07-28 ENCOUNTER — HOSPITAL ENCOUNTER (OUTPATIENT)
Age: 70
Discharge: HOME | End: 2025-07-28
Payer: MEDICARE

## 2025-07-28 LAB
ALBUMIN SERPL-MCNC: 4.2 G/DL (ref 3.5–5.2)
ALP SERPL-CCNC: 73 U/L (ref 35–104)
ALT SERPL-CCNC: 12 U/L (ref 10–35)
AST SERPL-CCNC: 20 U/L (ref 10–35)
BILIRUB DIRECT SERPL-MCNC: <0.1 MG/DL (ref 0–0.3)
BILIRUB INDIRECT SERPL-MCNC: NORMAL MG/DL (ref 0–1)
BILIRUB SERPL-MCNC: 0.3 MG/DL (ref 0–1.2)
LIPASE SERPL-CCNC: 28 U/L (ref 13–60)
PROT SERPL-MCNC: 6.8 G/DL (ref 6.6–8.7)

## 2025-07-28 PROCEDURE — 80076 HEPATIC FUNCTION PANEL: CPT

## 2025-07-28 PROCEDURE — 83690 ASSAY OF LIPASE: CPT

## 2025-07-28 PROCEDURE — 36415 COLL VENOUS BLD VENIPUNCTURE: CPT

## 2025-07-29 ENCOUNTER — TRANSCRIBE ORDERS (OUTPATIENT)
Dept: ADMINISTRATIVE | Age: 70
End: 2025-07-29

## 2025-07-29 DIAGNOSIS — R10.11 RIGHT UPPER QUADRANT ABDOMINAL PAIN: Primary | ICD-10-CM

## 2025-07-30 ENCOUNTER — PROCEDURE VISIT (OUTPATIENT)
Dept: OBGYN CLINIC | Age: 70
End: 2025-07-30

## 2025-07-30 ENCOUNTER — HOSPITAL ENCOUNTER (OUTPATIENT)
Age: 70
Setting detail: SPECIMEN
Discharge: HOME | End: 2025-07-30

## 2025-07-30 VITALS
BODY MASS INDEX: 23.56 KG/M2 | WEIGHT: 138 LBS | HEIGHT: 64 IN | SYSTOLIC BLOOD PRESSURE: 122 MMHG | DIASTOLIC BLOOD PRESSURE: 74 MMHG

## 2025-07-30 DIAGNOSIS — N89.8 VAGINAL IRRITATION: Primary | ICD-10-CM

## 2025-07-30 DIAGNOSIS — N76.1 SUBACUTE VAGINITIS: ICD-10-CM

## 2025-07-30 DIAGNOSIS — N89.8 VAGINAL ITCHING: ICD-10-CM

## 2025-07-30 NOTE — PROGRESS NOTES
The patient was seen today for labial biopsy due to vaginal irritation/ discoloration. She was offered conservative medical management. She was counseled on the risk and complications of the procedure; bleeding, infection, scarring, painful intercourse, and re-occurrence.She was given  all conservative options. The patient signed the consent form.     Blood pressure 122/74, height 1.626 m (5' 4\"), weight 62.6 kg (138 lb), not currently breastfeeding.    Chaperone for Intimate Exam  Chaperone was offered and accepted as part of the rooming process.  Chaperone: Elda      A time out was called by the Chaperone listed above while ALL participants were quiet and attentive.  The procedure to be completed was confirmed, with the appropriate laterality demarcated prior, if appropriate, as well as the patients name and a unique identifier, her date of birth.  All questions were answered to her satisfaction.  The consent was signed prior to the time out and all the risks were discussed in detail.          PROCEDURE NOTE:  The patient was sterilely prepped and draped. 1% Lidocaine without epinephrine was used for local anesthetic 1 ml total. The 1 # of lesions had their base instilled and confirmatory check was completed. The lesion was biopsied  with a punch biopsy. Silver nitrate utilized for bleeding with excellent hemostasis.  Proper hygiene was reviewed. An antibiotic was not  given. The patient tolerated the procedure well. She is to return for an office visit in 1 week.   She is to report any temp more than 100 F , Pain or redness. She is to refrain from intercourse douching or tampons. No baths lakes or pools.    Location of Excision:   1. Right labia    Assessment:   Diagnosis Orders   1. Vaginal irritation  Specimen to Pathology Outpatient    BIOPSY OF VAGINA,SIMPLE      2. Vaginal itching  Specimen to Pathology Outpatient    BIOPSY OF VAGINA,SIMPLE      3. Subacute vaginitis  Specimen to Pathology Outpatient    BIOPSY

## 2025-08-01 ENCOUNTER — HOSPITAL ENCOUNTER (OUTPATIENT)
Dept: ULTRASOUND IMAGING | Age: 70
Discharge: HOME | End: 2025-08-01
Attending: INTERNAL MEDICINE
Payer: MEDICARE

## 2025-08-01 DIAGNOSIS — R10.11 RIGHT UPPER QUADRANT ABDOMINAL PAIN: ICD-10-CM

## 2025-08-01 PROCEDURE — 76705 ECHO EXAM OF ABDOMEN: CPT

## 2025-08-05 ENCOUNTER — RESULTS FOLLOW-UP (OUTPATIENT)
Dept: OBGYN CLINIC | Age: 70
End: 2025-08-05

## 2025-08-05 LAB — SURGICAL PATHOLOGY REPORT: NORMAL

## 2025-08-07 ENCOUNTER — OFFICE VISIT (OUTPATIENT)
Dept: PODIATRY | Age: 70
End: 2025-08-07

## 2025-08-07 VITALS — BODY MASS INDEX: 23.9 KG/M2 | WEIGHT: 140 LBS | HEIGHT: 64 IN

## 2025-08-07 DIAGNOSIS — B35.1 ONYCHOMYCOSIS: ICD-10-CM

## 2025-08-07 DIAGNOSIS — M79.672 PAIN IN LEFT FOOT: ICD-10-CM

## 2025-08-07 DIAGNOSIS — M79.675 PAIN IN TOES OF BOTH FEET: ICD-10-CM

## 2025-08-07 DIAGNOSIS — M25.571 SINUS TARSITIS, RIGHT: ICD-10-CM

## 2025-08-07 DIAGNOSIS — L90.9 FAT PAD ATROPHY OF FOOT: ICD-10-CM

## 2025-08-07 DIAGNOSIS — M79.674 PAIN IN TOES OF BOTH FEET: ICD-10-CM

## 2025-08-07 DIAGNOSIS — D23.72 BENIGN NEOPLASM OF SKIN OF FOOT, LEFT: Primary | ICD-10-CM

## 2025-08-19 ENCOUNTER — TELEPHONE (OUTPATIENT)
Dept: OBGYN CLINIC | Age: 70
End: 2025-08-19

## (undated) DEVICE — SUTURE PDS II SZ 0 L60IN ABSRB VLT L48MM CTX 1/2 CIR Z990G

## (undated) DEVICE — SPONGE LAP W18XL18IN WHT COT 4 PLY FLD STRUNG RADPQ DISP ST

## (undated) DEVICE — REDUCER: Brand: ENDOWRIST

## (undated) DEVICE — KIT DRN FLAT W/ 100CC EVAC 10MM FULL PERF

## (undated) DEVICE — SOLUTION IRRIG 1000ML 0.9% SOD CHL USP POUR PLAS BTL

## (undated) DEVICE — MERCY HEALTH ST CHARLES: Brand: MEDLINE INDUSTRIES, INC.

## (undated) DEVICE — SOLUTION IV IRRIG WATER 1000ML POUR BRL 2F7114

## (undated) DEVICE — SEALER ENDOSCP NANO COAT OPN DIV CRV L JAW LIGASURE IMPACT

## (undated) DEVICE — MASTISOL ADHESIVE LIQ 2/3ML

## (undated) DEVICE — BLANKET WRM W29.9XL79.1IN UP BODY FORC AIR MISTRAL-AIR

## (undated) DEVICE — SUTURE VCRL SZ 4-0 L18IN ABSRB UD L13MM P-3 3/8 CIR PRIM J494H

## (undated) DEVICE — ELECTRODE ES L3IN S STL BLDE INSUL DISP VALLEYLAB EDGE

## (undated) DEVICE — TUBING, SUCTION, 3/16" X 6', STRAIGHT: Brand: MEDLINE

## (undated) DEVICE — PAD,NON-ADHERENT,3X8,STERILE,LF,1/PK: Brand: MEDLINE

## (undated) DEVICE — TROCARS: Brand: KII® BALLOON BLUNT TIP SYSTEM

## (undated) DEVICE — DEFENDO AIR WATER SUCTION AND BIOPSY VALVE KIT FOR  OLYMPUS: Brand: DEFENDO AIR/WATER/SUCTION AND BIOPSY VALVE

## (undated) DEVICE — SPONGE DRN W4XL4IN RAYON/POLYESTER 6 PLY NONWOVEN PRECUT

## (undated) DEVICE — BLADELESS OBTURATOR: Brand: WECK VISTA

## (undated) DEVICE — TOTAL TRAY, DB, 100% SILI FOLEY, 16FR 10: Brand: MEDLINE

## (undated) DEVICE — COVER,TABLE,60X90,STERILE: Brand: MEDLINE

## (undated) DEVICE — SUTURE MCRYL + SZ 4-0 L27IN ABSRB UD L19MM PS-2 3/8 CIR MCP426H

## (undated) DEVICE — SUTURE MONOCRYL SZ 3 0 L18IN ABSRB UD PS 2 3 8 CIR REV CUT NDL MCP497G

## (undated) DEVICE — ELECTRODE PT RET AD L9FT HI MOIST COND ADH HYDRGEL CORDED

## (undated) DEVICE — SUTURE PERMAHAND SZ 0 L30IN NONABSORBABLE BLK FSL L30MM 3/8 680H

## (undated) DEVICE — SKIN PREP TRAY W/CHG: Brand: MEDLINE INDUSTRIES, INC.

## (undated) DEVICE — GLOVE ORANGE PI 7 1/2   MSG9075

## (undated) DEVICE — CLIP INT M L POLYMER LOK LIG HEM O LOK

## (undated) DEVICE — YANKAUER,POOLE TIP,STERILE,50/CS: Brand: MEDLINE

## (undated) DEVICE — STAPLER INT 12MM 60MM CART SHT NEW KNF BLDE W/ EVERY FIRING

## (undated) DEVICE — ARM DRAPE

## (undated) DEVICE — GLOVE ORTHO 7 1/2   MSG9475

## (undated) DEVICE — PREMIUM DRY TRAY LF: Brand: MEDLINE INDUSTRIES, INC.

## (undated) DEVICE — RELOAD STPL 45MM THN VASC TISS WHT W/ GRIPPING SURF

## (undated) DEVICE — SPONGE GZ W2XL2IN NONWOVEN 4 PLY FASTER WICKING ABIL AVANT

## (undated) DEVICE — SOLUTION SCRB 4OZ 4% CHG H2O AIDED FOR PREOPERATIVE SKIN

## (undated) DEVICE — SUTURE PROL SZ 4-0 L18IN NONABSORBABLE BLU L16MM PC-3 3/8 8634G

## (undated) DEVICE — Device

## (undated) DEVICE — KIT DRN FLAT W/ 100CC EVAC 7MM FULL PERF

## (undated) DEVICE — INTENDED FOR TISSUE SEPARATION, AND OTHER PROCEDURES THAT REQUIRE A SHARP SURGICAL BLADE TO PUNCTURE OR CUT.: Brand: BARD-PARKER ® CARBON RIB-BACK BLADES

## (undated) DEVICE — MHPB HAND AND FOOT PACK: Brand: MEDLINE INDUSTRIES, INC.

## (undated) DEVICE — ENDO KIT W/SYRINGE: Brand: MEDLINE INDUSTRIES, INC.

## (undated) DEVICE — STRIP,CLOSURE,WOUND,MEDI-STRIP,1/2X4: Brand: MEDLINE

## (undated) DEVICE — CANNULA SEAL

## (undated) DEVICE — FORCEPS BX L240CM WRK CHN 2.8MM STD CAP W/ NDL MIC MESH

## (undated) DEVICE — ADHESIVE SKIN CLOSURE TOP 36 CC HI VISC DERMBND MINI

## (undated) DEVICE — SYRINGE IRRIG 60ML SFT PLIABLE BLB EZ TO GRP 1 HND USE W/

## (undated) DEVICE — SUTURE ABSORBABLE BRAIDED 4-0 P-3 18 IN UD VICRYL J494G

## (undated) DEVICE — SUTURE PDS II SZ 0 L27IN ABSRB VLT UR-6 L26MM 1/2 CIR D7185

## (undated) DEVICE — COVER,MAYO STAND,XL,STERILE: Brand: MEDLINE

## (undated) DEVICE — GOWN,AURORA,NONREINFORCED,LARGE: Brand: MEDLINE

## (undated) DEVICE — RELOAD STPL 45MM THCK TISS GRN W/ GRIPPING SURF TECHNOLOGY

## (undated) DEVICE — SUTURE ETHBND EXCEL SZ 0 L30IN NONABSORBABLE GRN L26MM CT-2 X412H

## (undated) DEVICE — SOLUTION SCRB 4OZ 7.5% POVIDONE IOD ANTIMIC BTL

## (undated) DEVICE — SYRINGE MED 20ML STD CLR PLAS LUERSLIP TIP N CTRL DISP

## (undated) DEVICE — DRESSING TRNSPAR W4XL10IN FLM MIC POR SURESITE 123

## (undated) DEVICE — TROCAR: Brand: KII FIOS FIRST ENTRY

## (undated) DEVICE — MARKER,SKIN,WI/RULER AND LABELS: Brand: MEDLINE

## (undated) DEVICE — ST CHARLES GEN LAPAROSCOPY PK: Brand: MEDLINE INDUSTRIES, INC.

## (undated) DEVICE — SEAL

## (undated) DEVICE — DISSECTOR LAP DIA5MM BLNT TIP ENDOPATH

## (undated) DEVICE — MHPB GEN MINOR PACK: Brand: MEDLINE INDUSTRIES, INC.

## (undated) DEVICE — SYRINGE, LUER LOCK, 10ML: Brand: MEDLINE

## (undated) DEVICE — DRESSING TRNSPAR W2XL2.75IN FLM SHT SEMIPERMEABLE WIND